# Patient Record
Sex: FEMALE | Race: WHITE | Employment: OTHER | URBAN - METROPOLITAN AREA
[De-identification: names, ages, dates, MRNs, and addresses within clinical notes are randomized per-mention and may not be internally consistent; named-entity substitution may affect disease eponyms.]

---

## 2017-01-04 ENCOUNTER — HOSPITAL ENCOUNTER (OUTPATIENT)
Dept: PHYSICAL THERAPY | Age: 82
Discharge: HOME | End: 2017-01-04
Payer: MEDICARE

## 2017-01-04 PROCEDURE — G8979 MOBILITY GOAL STATUS: HCPCS

## 2017-01-04 PROCEDURE — G8980 MOBILITY D/C STATUS: HCPCS

## 2017-01-04 PROCEDURE — 97116 GAIT TRAINING THERAPY: CPT

## 2017-01-04 NOTE — PROGRESS NOTES
PT DAILY TREATMENT NOTE - [facility]     Patient Name: Carli Child  Date:2017  : 10/17/1927  [x]  Patient  Verified  Payor: [employer] MEDICARE COMPLETE / Plan: [facility] MEDICARE COMPLETE / Product Type: Managed Care Medicare /    In time:1030  Out time:1048  Total Treatment Time (min): 18  Total Timed Codes (min): 18  1:1 Treatment Time ( [address] only): 18   Visit #: 5 of 8    Treatment Area: Muscle weakness (generalized) [M62.81]    SUBJECTIVE  Pain Level (0-10 scale): 0  Any medication changes, allergies to medications, adverse drug reactions, diagnosis change, or new procedure performed?: [x] No    [] Yes (see summary sheet for update)  Subjective functional status/changes:   [] No changes reported  \"No pain or dizziness. \"     OBJECTIVE    Modality rationale: patient declined   Min Type Additional Details    [] Estim:  []Unatt       []IFC  []Premod                        []Other:  []w/ice   []w/heat  Position:  Location:    [] Estim: []Att    []TENS instruct  []NMES                    []Other:  []w/US   []w/ice   []w/heat  Position:  Location:    []  Traction: [] Cervical       []Lumbar                       [] Prone          []Supine                       []Intermittent   []Continuous Lbs:  [] before manual  [] after manual    []  Ultrasound: []Continuous   [] Pulsed                           []1MHz   []3MHz W/cm2:  Location:    []  Iontophoresis with dexamethasone         Location: [] Take home patch   [] In clinic    []  Ice     []  heat  []  Ice massage  []  Laser   []  Anodyne Position:  Location:    []  Laser with stim  []  Other:  Position:  Location:    []  Vasopneumatic Device Pressure:       [] lo [] med [] hi   Temperature: [] lo [] med [] hi   [] Skin assessment post-treatment:  []intact []redness- no adverse reaction    []redness  adverse reaction:     10 min Therapeutic Activity:  [x]  See flow sheet : HEP review   Rationale: increase ROM and increase strength  to improve the patients ability to perform ADLs      8 min Gait Training:  _150__ feet with _SPC__ device on level surfaces with __SBA_ level of assist   Rationale: To improve fluidity with gait    With   [] TE   [x] TA   [x] neuro   [] other: Patient Education: [x] Review HEP    [] Progressed/Changed HEP based on:   [x] positioning   [x] body mechanics   [] transfers   [] heat/ice application    [] other:      Other Objective/Functional Measures:      Pain Level (0-10 scale) post treatment: 0    ASSESSMENT/Changes in Function: Ms. Abran Lutz has been a pleasure to treat and reports 100% improvement since beginning therapy. She reports no dizziness or falls recently. She wishes to discharge at this time to an updated HEP due to feeling and operating around her home much better. Patient will continue to benefit from skilled PT services to modify and progress therapeutic interventions, address functional mobility deficits, address ROM deficits, address strength deficits, analyze and address soft tissue restrictions, analyze and cue movement patterns, analyze and modify body mechanics/ergonomics, assess and modify postural abnormalities, address imbalance/dizziness and instruct in home and community integration to attain remaining goals. []  See Plan of Care  []  See progress note/recertification  [x]  See Discharge Summary         Progress towards goals / Updated goals:  Short Term Goals: To be accomplished in 1 weeks:  1. Establish HEP for ROM & Strengthening. Eval Status:issued   MET    Long Term Goals: To be accomplished in 4 weeks:  1. Patient will be independent with HEP for ROM & Strengthening. Eval Status:na   MET    2. Pt will report no falls over the next 4 weeks to improve fall risk & improve functional independence. Eval Status:2-3 falls over the past few weeks   MET; no falls to report  3. Pt will increase FOTO score by 10 points to demonstrate increased ease with ADLs.     Eval Status: FOTO: assess at 1st treatment session. Patient deferred    4. Pt will report decreased onset of vertigo symptoms by 50% to decrease fall risk.     Eval Status:reports vertigo whenever she lies down or turns   MET; reports 100% improvement    Functional Gains: balance, walking, dizziness, ADLs, no falls recently  Functional Deficits: none  % improvement: 100%  Pain   Average: 0/10       Best: 0/10     Worst: 0/10  Patient Goal: \"do the laundry again\"    PLAN  []  Upgrade activities as tolerated     []  Continue plan of care  []  Update interventions per flow sheet       [x]  Discharge due to: [address]  []  Other:_      Xavier King PTA, CSCS 1/4/2017  10:53 AM    Future Appointments  Date Time Provider Luís Abreu   1/4/2017 10:30 AM Xavier King PTA [facility]   1/6/2017 11:30 AM Xavier King PTA [facility]   4/5/2017 11:00 AM Bridget Wilkerson MD [facility]

## 2017-01-05 NOTE — PROGRESS NOTES
[facility] [address]  [address] [facility] [address], [address] [phone] fax    Discharge Summary  Patient name: Kishan Crump Start of Care: 12/15/2016   Referral source: [facility], * : 10/17/1927    Medical Diagnosis: Muscle weakness (generalized) [M62.81] Onset Date:7 weeks    Treatment Diagnosis: balance abnormality   Prior Hospitalization: see medical history Provider#: [id number]   Medications: Verified on Patient summary List   Comorbidities: osteoporosis, OA, HTN, hearing impaired  Prior Level of Function: retired. Lives with daughter in 1st floor of 2 story home          Visits from Start of Care: 5    Missed Visits: 0  Reporting Period : 12/15/16 to 17      Progress towards goals / Updated goals:  Short Term Goals: To be accomplished in 1 weeks:  1. Establish HEP for ROM & Strengthening. Eval Status:issued  MET    Long Term Goals: To be accomplished in 4 weeks:  1. Patient will be independent with HEP for ROM & Strengthening. Eval Status:na  MET    2. Pt will report no falls over the next 4 weeks to improve fall risk & improve functional independence. Eval Status:2-3 falls over the past few weeks  MET; no falls to report  3. Pt will increase FOTO score by 10 points to demonstrate increased ease with ADLs. Eval Status: FOTO: assess at 1st treatment session. Patient deferred    4. Pt will report decreased onset of vertigo symptoms by 50% to decrease fall risk. Eval Status:reports vertigo whenever she lies down or turns  MET; reports 100% improvement     Functional Gains: balance, walking, dizziness, ADLs, no falls recently  Functional Deficits: none  % improvement: 100%  Pain Average: 0/10  Best: 0/10  Worst: 0/10  Patient Goal: \"do the laundry again\"    Assessment/Summary of care: Ms. Neal Johnson has been a pleasure to treat and has seen excellent response to PT for improved balance, mobility & decrease in vertigo symptoms.  The patient reports much improvement with daily activities & no falls. She would like to discharge to [facility] at this time as she reports no remaining limitations. G-Codes (GP)  Mobility   [id number] Goal  CK= 40-59%  D/C  CK= 40-59%      The severity rating is based on clinical judgment and theFOTO score.   RECOMMENDATIONS:  [x]Discontinue therapy: [x]Patient has reached or is progressing toward set goals      []Patient is non-compliant or has abdicated      []Due to lack of appreciable progress towards set [address], PT 1/5/2017 10:45 AM

## 2017-01-06 ENCOUNTER — APPOINTMENT (OUTPATIENT)
Dept: PHYSICAL THERAPY | Age: 82
End: 2017-01-06
Payer: MEDICARE

## 2017-03-09 RX ORDER — SERTRALINE HYDROCHLORIDE 50 MG/1
TABLET, FILM COATED ORAL
Qty: 90 TAB | Refills: 0 | Status: SHIPPED | OUTPATIENT
Start: 2017-03-09 | End: 2017-06-08 | Stop reason: SDUPTHER

## 2017-04-05 ENCOUNTER — OFFICE VISIT (OUTPATIENT)
Dept: INTERNAL MEDICINE CLINIC | Age: 82
End: 2017-04-05

## 2017-04-05 VITALS
RESPIRATION RATE: 14 BRPM | DIASTOLIC BLOOD PRESSURE: 72 MMHG | BODY MASS INDEX: 23.48 KG/M2 | WEIGHT: 127.6 LBS | SYSTOLIC BLOOD PRESSURE: 142 MMHG | OXYGEN SATURATION: 96 % | HEIGHT: 62 IN | HEART RATE: 101 BPM | TEMPERATURE: 97.4 F

## 2017-04-05 DIAGNOSIS — I10 ESSENTIAL HYPERTENSION WITH GOAL BLOOD PRESSURE LESS THAN 140/90: Primary | ICD-10-CM

## 2017-04-05 DIAGNOSIS — M15.9 GENERALIZED OSTEOARTHROSIS, INVOLVING MULTIPLE SITES: ICD-10-CM

## 2017-04-05 DIAGNOSIS — K21.9 GASTROESOPHAGEAL REFLUX DISEASE WITHOUT ESOPHAGITIS: ICD-10-CM

## 2017-04-05 NOTE — PROGRESS NOTES
Facundo Blake 10/17/1927, is a 80 y.o. female, who is seen today for reevaluation of hypertension anxiety and depression GERD and history of multiple falls. After she had had several falls and fractured her wrist last fall and center to physical therapy and her daughter indicates that great with physical therapy, a lot better with her balance and strength and still uses her walker when she is first walking now because that is when her legs feel weak in her knees hurt but most of the time gets around without her walker and without further falls. She also notes that sometimes she gets a headache from the frontal to the occipital area and by taking 2 Advil and laying down for well that goes away. She is having no reflux symptoms as she continues omeprazole. Past Medical History:   Diagnosis Date    Anxiety     Arthritis     Depression     GERD (gastroesophageal reflux disease)     HTN (hypertension)     LBBB (left bundle branch block)     Osteoporosis     completed 8 years Fosamax    Sciatica     Urinary incontinence      Current Outpatient Prescriptions   Medication Sig Dispense Refill    sertraline (ZOLOFT) 50 mg tablet TAKE 1 TABLET BY MOUTH DAILY 90 Tab 0    loratadine (CLARITIN) 10 mg tablet Take 10 mg by mouth.  omeprazole (PRILOSEC) 40 mg capsule TAKE ONE CAPSULE BY MOUTH DAILY 90 Cap 1    amLODIPine (NORVASC) 5 mg tablet Take 1 Tab by mouth daily. 90 Tab 1    CALCIUM CARBONATE/VITAMIN D3 (CALCIUM 600 + D,3, PO) Take  by mouth.  MULTI-VITAMIN PO Take  by mouth. Visit Vitals    /72    Pulse (!) 101    Temp 97.4 °F (36.3 °C) (Oral)    Resp 14    Ht 5' 2\" (1.575 m)    Wt 127 lb 9.6 oz (57.9 kg)    SpO2 96%    BMI 23.34 kg/m2     Carotids are 2+ without bruits. Lungs are clear to percussion. Good breath sounds with no wheezing or crackles. Heart reveals regular rhythm with normal S1 and S2 no murmur gallop click or rub. Apical impulse is not palpable.   Abdomen is soft and nontender with no hepatosplenomegaly or masses and no bruits. Extremities reveal no clubbing cyanosis or edema. Pulses are 2+. She has good range of motion of both knees but there is some crepitation in the left knee. Good stability of both knees. Assessment: #1. Hypertension adequately controlled. She will continue amlodipine 5 mg daily. #2.  History of anxiety and depression doing quite well. She will continue sertraline 50 mg daily. #3.  GERD asymptomatic. She will continue omeprazole 40 mg daily. #4.  Multiple falls doing much better after physical therapy training. She will continue to use her walker as needed and I am pleased that she has gained 8 pounds over the last 4 or 5 months. Follow-up in 6 months and we will plan to do a complete evaluation and laboratory in a regular slot    Charan Spencer MD FACP    Please note: This document has been produced using voice recognition software. Unrecognized errors in transcription may be present.

## 2017-04-05 NOTE — MR AVS SNAPSHOT
Visit Information Date & Time Provider Department Dept. Phone Encounter #  
 4/5/2017 11:00 AM Amanuel Newberry MD [facility] [address] [id number] Follow-up Instructions Return in about 6 months (around 10/5/2017). Follow-up and Disposition History Upcoming Health Maintenance Date Due  
 GLAUCOMA SCREENING Q2Y 6/1/2017* Pneumococcal 65+ Low/Medium Risk (2 of 2 - PPSV23) 10/21/2017 MEDICARE YEARLY EXAM 10/22/2017 DTaP/Tdap/Td series (2 - Td) 10/21/2026 *Topic was postponed. The date shown is not the original due date. Allergies as of 4/5/2017  Review Complete On: 4/5/2017 By: Amanuel Newberry MD  
 No Known Allergies Current Immunizations  Reviewed on 10/21/2016 Name Date Influenza High Dose Vaccine PF 10/21/2016  5:11 PM, 10/10/2014 Influenza Vaccine Split 9/15/2011 Pneumococcal Conjugate (PCV-13) 10/21/2016  5:10 PM  
  
 Not reviewed this visit You Were Diagnosed With   
  
 Codes Comments Essential hypertension with goal blood pressure less than 140/90    -  Primary ICD-10-CM: I10 
ICD-9-CM: 401.9 Gastroesophageal reflux disease without esophagitis     ICD-10-CM: K21.9 ICD-9-CM: 530.81 Generalized osteoarthrosis, involving multiple sites     ICD-10-CM: M15.9 ICD-9-CM: 715.09 Vitals BP Pulse Temp Resp Height(growth percentile) Weight(growth percentile) 142/72 (!) 101 97.4 °F (36.3 °C) (Oral) 14 5' 2\" (1.575 m) 127 lb 9.6 oz (57.9 kg) SpO2 BMI OB Status Smoking Status 96% 23.34 kg/m2 Hysterectomy Never Smoker Vitals History BMI and BSA Data Body Mass Index Body Surface Area  
 23.34 kg/m 2 1.59 m 2 Preferred Pharmacy Pharmacy Name Phone [facility] [address] [place] [address] Roberto Ponce [address] [place] [address] [phone] Your Updated Medication List  
  
   
 This list is accurate as of: 4/5/17 11:57 AM.  Always use your most recent med list. amLODIPine 5 mg tablet Commonly known as:  Leana Medici Take 1 Tab by mouth daily. CALCIUM 600 + D(3) PO Take  by mouth. CLARITIN 10 mg tablet Generic drug:  loratadine Take 10 mg by mouth. MULTI-VITAMIN PO Take  by mouth. omeprazole 40 mg capsule Commonly known as:  PRILOSEC  
TAKE ONE CAPSULE BY MOUTH DAILY  
  
 sertraline 50 mg tablet Commonly known as:  ZOLOFT  
TAKE 1 TABLET BY MOUTH DAILY Follow-up Instructions Return in about 6 months (around 10/5/2017). Introducing [facility]! Marlen Morales introduces [employer] patient portal. Now you can access parts of your medical record, email your doctor's office, and request medication refills online. 1. In your internet browser, go to [email or web address] 2. Click on the First Time User? Click Here link in the Sign In box. You will see the New Member Sign Up page. 3. Enter your [employer] Access Code exactly as it appears below. You will not need to use this code after youve completed the sign-up process. If you do not sign up before the expiration date, you must request a new code. · [employer] Access Code: [email or web address] Expires: 6/4/2017  3:57 PM 
 
4. Enter the last four digits of your Social Security Number (xxxx) and Date of Birth (mm/dd/yyyy) as indicated and click Submit. You will be taken to the next sign-up page. 5. Create a [employer] ID. This will be your [employer] login ID and cannot be changed, so think of one that is secure and easy to remember. 6. Create a [employer] password. You can change your password at any time. 7. Enter your Password Reset Question and Answer. This can be used at a later time if you forget your password. 8. Enter your e-mail address. You will receive e-mail notification when new information is available in [address] 9. Click Sign Up. You can now view and download portions of your medical record. 10. Click the Download Summary menu link to download a portable copy of your medical information. If you have questions, please visit the Frequently Asked Questions section of the [employer] website. Remember, [employer] is NOT to be used for urgent needs. For medical emergencies, dial 911. Now available from your iPhone and Android! Please provide this summary of care documentation to your next provider. Your primary care clinician is listed as Madhavi Yip. Kandace Fernandez. If you have any questions after today's visit, please call [phone].

## 2017-05-15 RX ORDER — AMLODIPINE BESYLATE 5 MG/1
TABLET ORAL
Qty: 90 TAB | Refills: 0 | Status: SHIPPED | OUTPATIENT
Start: 2017-05-15 | End: 2017-08-14 | Stop reason: SDUPTHER

## 2017-05-15 RX ORDER — OMEPRAZOLE 40 MG/1
CAPSULE, DELAYED RELEASE ORAL
Qty: 90 CAP | Refills: 0 | Status: SHIPPED | OUTPATIENT
Start: 2017-05-15 | End: 2017-08-14 | Stop reason: SDUPTHER

## 2017-06-08 RX ORDER — SERTRALINE HYDROCHLORIDE 50 MG/1
TABLET, FILM COATED ORAL
Qty: 90 TAB | Refills: 0 | Status: SHIPPED | OUTPATIENT
Start: 2017-06-08 | End: 2017-09-04 | Stop reason: SDUPTHER

## 2017-08-14 RX ORDER — OMEPRAZOLE 40 MG/1
CAPSULE, DELAYED RELEASE ORAL
Qty: 90 CAP | Refills: 0 | Status: SHIPPED | OUTPATIENT
Start: 2017-08-14 | End: 2017-11-10 | Stop reason: SDUPTHER

## 2017-08-14 RX ORDER — AMLODIPINE BESYLATE 5 MG/1
TABLET ORAL
Qty: 90 TAB | Refills: 0 | Status: SHIPPED | OUTPATIENT
Start: 2017-08-14 | End: 2017-11-10 | Stop reason: SDUPTHER

## 2017-09-05 RX ORDER — SERTRALINE HYDROCHLORIDE 50 MG/1
TABLET, FILM COATED ORAL
Qty: 90 TAB | Refills: 0 | Status: SHIPPED | OUTPATIENT
Start: 2017-09-05 | End: 2017-12-04 | Stop reason: SDUPTHER

## 2017-10-11 ENCOUNTER — HOSPITAL ENCOUNTER (OUTPATIENT)
Dept: LAB | Age: 82
Discharge: HOME | End: 2017-10-11
Payer: MEDICARE

## 2017-10-11 ENCOUNTER — OFFICE VISIT (OUTPATIENT)
Dept: INTERNAL MEDICINE CLINIC | Age: 82
End: 2017-10-11

## 2017-10-11 VITALS
DIASTOLIC BLOOD PRESSURE: 78 MMHG | OXYGEN SATURATION: 96 % | BODY MASS INDEX: 23.3 KG/M2 | SYSTOLIC BLOOD PRESSURE: 138 MMHG | RESPIRATION RATE: 12 BRPM | HEIGHT: 62 IN | TEMPERATURE: 97.6 F | HEART RATE: 102 BPM | WEIGHT: 126.6 LBS

## 2017-10-11 DIAGNOSIS — E55.9 HYPOVITAMINOSIS D: ICD-10-CM

## 2017-10-11 DIAGNOSIS — Z00.00 MEDICARE ANNUAL WELLNESS VISIT, SUBSEQUENT: ICD-10-CM

## 2017-10-11 DIAGNOSIS — Z13.39 SCREENING FOR ALCOHOLISM: ICD-10-CM

## 2017-10-11 DIAGNOSIS — I10 ESSENTIAL HYPERTENSION WITH GOAL BLOOD PRESSURE LESS THAN 140/90: ICD-10-CM

## 2017-10-11 DIAGNOSIS — Z23 ENCOUNTER FOR IMMUNIZATION: Primary | ICD-10-CM

## 2017-10-11 DIAGNOSIS — Z13.31 SCREENING FOR DEPRESSION: ICD-10-CM

## 2017-10-11 DIAGNOSIS — R63.4 WEIGHT LOSS: ICD-10-CM

## 2017-10-11 LAB
ALBUMIN SERPL-MCNC: 3.9 G/DL (ref 3.4–5)
ALBUMIN/GLOB SERPL: 1 {RATIO} (ref 0.8–1.7)
ALP SERPL-CCNC: 124 U/L (ref 45–117)
ALT SERPL-CCNC: 41 U/L (ref 13–56)
ANION GAP SERPL CALC-SCNC: 10 MMOL/L (ref 3–18)
AST SERPL-CCNC: 23 U/L (ref 15–37)
BASOPHILS # BLD: 0 K/UL (ref 0–0.06)
BASOPHILS NFR BLD: 0 % (ref 0–2)
BILIRUB SERPL-MCNC: 0.3 MG/DL (ref 0.2–1)
BUN SERPL-MCNC: 19 MG/DL (ref 7–18)
BUN/CREAT SERPL: 19 (ref 12–20)
CALCIUM SERPL-MCNC: 9.4 MG/DL (ref 8.5–10.1)
CHLORIDE SERPL-SCNC: 103 MMOL/L (ref 100–108)
CHOLEST SERPL-MCNC: 247 MG/DL
CO2 SERPL-SCNC: 26 MMOL/L (ref 21–32)
CREAT SERPL-MCNC: 1.02 MG/DL (ref 0.6–1.3)
DIFFERENTIAL METHOD BLD: ABNORMAL
EOSINOPHIL # BLD: 0.1 K/UL (ref 0–0.4)
EOSINOPHIL NFR BLD: 1 % (ref 0–5)
ERYTHROCYTE [DISTWIDTH] IN BLOOD BY AUTOMATED COUNT: 15.2 % (ref 11.6–14.5)
GLOBULIN SER CALC-MCNC: 3.8 G/DL (ref 2–4)
GLUCOSE SERPL-MCNC: 90 MG/DL (ref 74–99)
HCT VFR BLD AUTO: 44.9 % (ref 35–45)
HDLC SERPL-MCNC: 63 MG/DL (ref 40–60)
HDLC SERPL: 3.9 {RATIO} (ref 0–5)
HGB BLD-MCNC: 14.4 G/DL (ref 12–16)
LDLC SERPL CALC-MCNC: 157.8 MG/DL (ref 0–100)
LIPID PROFILE,FLP: ABNORMAL
LYMPHOCYTES # BLD: 1.7 K/UL (ref 0.9–3.6)
LYMPHOCYTES NFR BLD: 21 % (ref 21–52)
MCH RBC QN AUTO: 27.3 PG (ref 24–34)
MCHC RBC AUTO-ENTMCNC: 32.1 G/DL (ref 31–37)
MCV RBC AUTO: 85 FL (ref 74–97)
MONOCYTES # BLD: 0.7 K/UL (ref 0.05–1.2)
MONOCYTES NFR BLD: 9 % (ref 3–10)
NEUTS SEG # BLD: 5.4 K/UL (ref 1.8–8)
NEUTS SEG NFR BLD: 69 % (ref 40–73)
PLATELET # BLD AUTO: 278 K/UL (ref 135–420)
PMV BLD AUTO: 9.7 FL (ref 9.2–11.8)
POTASSIUM SERPL-SCNC: 4.8 MMOL/L (ref 3.5–5.5)
PROT SERPL-MCNC: 7.7 G/DL (ref 6.4–8.2)
RBC # BLD AUTO: 5.28 M/UL (ref 4.2–5.3)
SODIUM SERPL-SCNC: 139 MMOL/L (ref 136–145)
T4 FREE SERPL-MCNC: 1.2 NG/DL (ref 0.7–1.5)
TRIGL SERPL-MCNC: 131 MG/DL (ref ?–150)
TSH SERPL DL<=0.05 MIU/L-ACNC: 2.52 UIU/ML (ref 0.36–3.74)
VLDLC SERPL CALC-MCNC: 26.2 MG/DL
WBC # BLD AUTO: 7.9 K/UL (ref 4.6–13.2)

## 2017-10-11 PROCEDURE — 82306 VITAMIN D 25 HYDROXY: CPT | Performed by: INTERNAL MEDICINE

## 2017-10-11 PROCEDURE — 84439 ASSAY OF FREE THYROXINE: CPT | Performed by: INTERNAL MEDICINE

## 2017-10-11 PROCEDURE — 80053 COMPREHEN METABOLIC PANEL: CPT | Performed by: INTERNAL MEDICINE

## 2017-10-11 PROCEDURE — 85025 COMPLETE CBC W/AUTO DIFF WBC: CPT | Performed by: INTERNAL MEDICINE

## 2017-10-11 PROCEDURE — 84443 ASSAY THYROID STIM HORMONE: CPT | Performed by: INTERNAL MEDICINE

## 2017-10-11 PROCEDURE — 80061 LIPID PANEL: CPT | Performed by: INTERNAL MEDICINE

## 2017-10-11 RX ORDER — OMEPRAZOLE 40 MG/1
40 CAPSULE, DELAYED RELEASE ORAL DAILY
Qty: 90 CAP | Refills: 3 | Status: SHIPPED | OUTPATIENT
Start: 2017-10-11 | End: 2018-10-30 | Stop reason: SDUPTHER

## 2017-10-11 RX ORDER — AMLODIPINE BESYLATE 5 MG/1
5 TABLET ORAL DAILY
Qty: 90 TAB | Refills: 3 | Status: SHIPPED | OUTPATIENT
Start: 2017-10-11 | End: 2018-10-30 | Stop reason: SDUPTHER

## 2017-10-11 RX ORDER — SERTRALINE HYDROCHLORIDE 50 MG/1
50 TABLET, FILM COATED ORAL DAILY
Qty: 90 TAB | Refills: 3 | Status: SHIPPED | OUTPATIENT
Start: 2017-10-11 | End: 2017-11-17 | Stop reason: ALTCHOICE

## 2017-10-11 NOTE — PATIENT INSTRUCTIONS
Medicare Part B Preventive Services Limitations Recommendation Follow-up Action Needed    Bone Mass Measurement  (age 72 & older, biennial) Requires diagnosis related to osteoporosis or estrogen deficiency. Biennial benefit unless patient has history of long-term glucocorticoid tx or baseline is needed because initial test was by other method Last: Done per patient report    A DEXA scan is recommended after you turn 72years of age to determine your risk for osteoporosis Next: Follow up as recommended by primary care provider and/or specialist     Colorectal Cancer Screening  -Fecal occult blood test (annual)  -Flexible sigmoidoscopy (5y)  -Screening colonoscopy (10y)  -Barium Enema Normally recommended for patients age 48 - 78  Last: N/A    Every 5-10 years depending on your colonoscopy result starting at age 48 years Next: No longer needed based on age, follow up as recommended by primary care provider and/or specialist      Glaucoma Screening (no USPSTF recommendation) Diabetes mellitus, family history, , age 48 or over,  American, age 72 or over Last: Several years ago    Every year, or as directed by provider Next: Please consider going to eye doctor   Screening Mammography (biennial age 54-69) Annually (age 36 or over) Last: N/A Next: No longer needed based on age, follow up as recommended by primary care provider and/or specialist      Screening Pap Tests and Pelvic Examination (up to age 72 and after 72 if unknown history or abnormal study last 10 years) Every 24 months except high risk Last: N/A Next: Discuss with your doctor if this screening is appropriate for you.    Cardiovascular Screening Blood Tests (every 5 years)  Total cholesterol, HDL, Triglycerides Order as a panel if possible Last: 10/2016    Every Year Next: Follow up as recommended by primary care provider and/or specialist      Diabetes Screening Tests (at least every 3 years, Medicare covers annually or at 6-month intervals for prediabetic patients)    Fasting blood sugar (FBS) or glucose tolerance test (GTT) Patient must be diagnosed with one of the following:  -Hypertension, Dyslipidemia, obesity, previous impaired FBS or GTT  Or any two of the following: overweight, FH of diabetes, age ? 72, history of gestational diabetes, birth of baby weighing more than 9 pounds Last: 10/2016    Every 3-6 months depending on your result    Every 3 years Next: Follow up as recommended by primary care provider and/or specialist        Diabetes Self-Management Training (DSMT) (no USPSTF recommendation) Requires referral by treating physician for patient with diabetes or renal disease. 10 hours of initial DSMT session of no less than 30 minutes each in a continuous 12-month period. 2 hours of follow-up DSMT in subsequent years. Last: N/A Talk to your doctor if you are interested in a refresher course. Medical Nutrition Therapy (MNT) (for diabetes or renal disease not recommended schedule) Requires referral by treating physician for patient with diabetes or renal disease. Can be provided in same year as diabetes self-management training (DSMT), and CMS recommends medical nutrition therapy take place after DSMT. Up to 3 hours for initial year and 2 hours in subsequent years. Last: N/A Talk to your doctor if you are interested in a refresher course.    Seasonal Influenza Vaccination (annually)  Last:     Every Fall Please consider getting one every fall    Pneumococcal Vaccination (once after 72)  Last:  Pneumovax: Given today    Prevnar-13: 10/2016   Next:Vaccination series complete      Shingles Vaccination  Last: N/A    A shingles vaccine is recommended once in a lifetime after age 61 N/A   Tetanus, Diphtheria and Pertussis (Tdap) Vaccination Booster One Booster as an adult and then tetanus every 10 years or as indicated Last: N/A Please consider, especially if you will have frequent contact with young children who have not completed their vaccine series. Can get at your pharmacy. Hepatitis B Vaccinations (if medium/high risk) Medium/high risk factors:  End-stage renal disease,  Hemophiliacs who received Factor VIII or IX concentrates, Clients of institutions for the mentally retarded, Persons who live in the same house as a HepB virus carrier, Homosexual men, Illicit injectable drug abusers. Last: N/A Next: N/A   Counseling to Prevent Tobacco Use (up to 8 sessions per year)  - Counseling greater than 3 and up to 10 minutes  - Counseling greater than 10 minutes Patients must be asymptomatic of tobacco-related conditions to receive as preventive service  As recommended by your PCP or specialist    Human Immunodeficiency Virus (HIV) Screening (annually for increased risk patients)  HIV-1 and HIV-2 by EIA, EMRE, rapid antibody test, or oral mucosa transudate Patient must be at increased risk for HIV infection per USPSTF guidelines or pregnant. Tests covered annually for patients at increased risk. Pregnant patients may receive up to 3 test during pregnancy. As recommended by your PCP or Specialist   Ultrasound Screening for Abdominal Aortic Aneurysm (AAA) once Patient must be referred through IPPE and not have had a screening for abdominal aortic aneurysm before under medicare. Limited to patients who meet onf of the following criteria:  -Men who are 73-68 years old and have smoked more than 100 cigarettes in their lifetime  -Anyone with a FH of AAA  -Anyone recommended for screening by the USPSFTF    As recommended by your PCP or Specialist     NA = Not Applicable; NI= Not Indicated     Advanced care planning: Please bring in a copy of your advanced directives at your convenience to our office so we may scan a copy for our records.

## 2017-10-11 NOTE — MR AVS SNAPSHOT
Visit Information Date & Time Provider Department Dept. Phone Encounter #  
 10/11/2017 11:00 AM Sterling Fox MD [facility] [phone] Upcoming Health Maintenance Date Due  
 GLAUCOMA SCREENING Q2Y 10/17/1992 INFLUENZA AGE 9 TO ADULT 8/1/2017 Pneumococcal 65+ Low/Medium Risk (2 of 2 - PPSV23) 10/21/2017 MEDICARE YEARLY EXAM 10/22/2017 DTaP/Tdap/Td series (2 - Td) 10/21/2026 Allergies as of 10/11/2017  Review Complete On: 10/11/2017 By: Sterling Fox MD  
 No Known Allergies Current Immunizations  Reviewed on 10/11/2017 Name Date Influenza High Dose Vaccine PF 10/11/2017 11:35 AM, 10/21/2016  5:11 PM, 10/10/2014 Influenza Vaccine Split 9/15/2011 Pneumococcal Conjugate (PCV-13) 10/21/2016  5:10 PM  
 Pneumococcal Polysaccharide (PPSV-23) 10/11/2017 11:36 AM  
  
 Reviewed by Sterling Fox MD on 10/11/2017 at 11:30 AM  
 Reviewed by Sterling Fox MD on 10/11/2017 at 11:30 AM  
You Were Diagnosed With   
  
 Codes Comments Encounter for immunization    -  Primary ICD-10-CM: Z07 ICD-9-CM: V03.89 Essential hypertension with goal blood pressure less than 140/90     ICD-10-CM: I10 
ICD-9-CM: 401.9 Medicare annual wellness visit, subsequent     ICD-10-CM: Z00.00 ICD-9-CM: V70.0 Screening for alcoholism     ICD-10-CM: Z13.89 ICD-9-CM: V79.1 Screening for depression     ICD-10-CM: Z13.89 ICD-9-CM: V79.0 Vitals BP Pulse Temp Resp Height(growth percentile) Weight(growth percentile) 150/74 (!) 102 97.6 °F (36.4 °C) (Oral) 12 5' 2\" (1.575 m) 126 lb 9.6 oz (57.4 kg) SpO2 BMI OB Status Smoking Status 96% 23.16 kg/m2 Hysterectomy Never Smoker Vitals History BMI and BSA Data Body Mass Index Body Surface Area  
 23.16 kg/m 2 1.58 m 2 Preferred Pharmacy Pharmacy Name Phone  [address], [address] [facility],[address] Your Updated Medication List  
  
   
This list is accurate as of: 10/11/17 12:10 PM.  Always use your most recent med list. amLODIPine 5 mg tablet Commonly known as:  Kaci Blade Take 1 Tab by mouth daily. CALCIUM 600 + D(3) PO Take  by mouth. CLARITIN 10 mg tablet Generic drug:  loratadine Take 10 mg by mouth. MULTI-VITAMIN PO Take  by mouth. omeprazole 40 mg capsule Commonly known as:  PRILOSEC Take 1 Cap by mouth daily. sertraline 50 mg tablet Commonly known as:  ZOLOFT Take 1 Tab by mouth daily. Prescriptions Sent to Pharmacy Refills  
 omeprazole (PRILOSEC) 40 mg capsule 3 Sig: Take 1 Cap by mouth daily. Class: Normal  
 Pharmacy: [address] Ph #: [phone] Route: Oral  
 sertraline (ZOLOFT) 50 mg tablet 3 Sig: Take 1 Tab by mouth daily. Class: Normal  
 Pharmacy: [address] Ph #: [phone] Route: Oral  
 amLODIPine (NORVASC) 5 mg tablet 3 Sig: Take 1 Tab by mouth daily. Class: Normal  
 Pharmacy: [address] Ph #: [phone] Route: Oral  
  
We Performed the Following [place] [id number] [[id number] [facility]] INFLUENZA VIRUS VACCINE, HIGH DOSE SEASONAL, PRESERVATIVE FREE [63446 CPT(R)] PNEUMOCOCCAL POLYSACCHARIDE VACCINE, 23-VALENT, ADULT OR IMMUNOSUPPRESSED PT DOSE, [02121 CPT(R)] Patient Instructions Medicare Part B Preventive Services Limitations Recommendation Follow-up Action Needed Bone Mass Measurement 
(age 72 & older, biennial) Requires diagnosis related to osteoporosis or estrogen deficiency.  Biennial benefit unless patient has history of long-term glucocorticoid tx or baseline is needed because initial test was by other method Last: Done per patient report A DEXA scan is recommended after you turn 72years of age to determine your risk for osteoporosis Next: Follow up as recommended by primary care provider and/or specialist    
Colorectal Cancer Screening 
-Fecal occult blood test (annual) -Flexible sigmoidoscopy (5y) 
-Screening colonoscopy (10y) -Barium Enema Normally recommended for patients age 48 - 78  Last: N/A Every 5-10 years depending on your colonoscopy result starting at age 48 years Next: No longer needed based on age, follow up as recommended by primary care provider and/or specialist  
  
Glaucoma Screening (no USPSTF recommendation) Diabetes mellitus, family history, , age 48 or over,  American, age 72 or over Last: Several years ago Every year, or as directed by provider Next: Please consider going to eye doctor Screening Mammography (biennial age 54-69) Annually (age 36 or over) Last: N/A Next: No longer needed based on age, follow up as recommended by primary care provider and/or specialist  
  
Screening Pap Tests and Pelvic Examination (up to age 72 and after 72 if unknown history or abnormal study last 10 years) Every 24 months except high risk Last: N/A Next: Discuss with your doctor if this screening is appropriate for you. Cardiovascular Screening Blood Tests (every 5 years) Total cholesterol, HDL, Triglycerides Order as a panel if possible Last: 10/2016 Every Year Next: Follow up as recommended by primary care provider and/or specialist  
  
Diabetes Screening Tests (at least every 3 years, Medicare covers annually or at 6-month intervals for prediabetic patients) Fasting blood sugar (FBS) or glucose tolerance test (GTT) Patient must be diagnosed with one of the following: 
-Hypertension, Dyslipidemia, obesity, previous impaired FBS or GTT Or any two of the following: overweight, FH of diabetes, age ? 72, history of gestational diabetes, birth of baby weighing more than 9 pounds Last: 10/2016 Every 3-6 months depending on your result Every 3 years Next: Follow up as recommended by primary care provider and/or specialist  
 
  
Diabetes Self-Management Training (DSMT) (no USPSTF recommendation) Requires referral by treating physician for patient with diabetes or renal disease. 10 hours of initial DSMT session of no less than 30 minutes each in a continuous 12-month period. 2 hours of follow-up DSMT in subsequent years. Last: N/A Talk to your doctor if you are interested in a refresher course. Medical Nutrition Therapy (MNT) (for diabetes or renal disease not recommended schedule) Requires referral by treating physician for patient with diabetes or renal disease. Can be provided in same year as diabetes self-management training (DSMT), and CMS recommends medical nutrition therapy take place after DSMT. Up to 3 hours for initial year and 2 hours in subsequent years. Last: N/A Talk to your doctor if you are interested in a refresher course. Seasonal Influenza Vaccination (annually)  Last:  
 
Every Fall Please consider getting one every fall Pneumococcal Vaccination (once after 65)  Last: 
Pneumovax: Given today Prevnar-13: 10/2016 Next:Vaccination series complete Shingles Vaccination  Last: N/A A shingles vaccine is recommended once in a lifetime after age 61 N/A Tetanus, Diphtheria and Pertussis (Tdap) Vaccination Booster One Booster as an adult and then tetanus every 10 years or as indicated Last: N/A Please consider, especially if you will have frequent contact with young children who have not completed their vaccine series. Can get at your pharmacy. Hepatitis B Vaccinations (if medium/high risk) Medium/high risk factors:  End-stage renal disease, Hemophiliacs who received Factor VIII or IX concentrates, Clients of institutions for the mentally retarded, Persons who live in the same house as a HepB virus carrier, Homosexual men, Illicit injectable drug abusers. Last: N/A Next: N/A Counseling to Prevent Tobacco Use (up to 8 sessions per year) - Counseling greater than 3 and up to 10 minutes - Counseling greater than 10 minutes Patients must be asymptomatic of tobacco-related conditions to receive as preventive service  As recommended by your PCP or specialist   
Human Immunodeficiency Virus (HIV) Screening (annually for increased risk patients) HIV-1 and HIV-2 by EIA, EMRE, rapid antibody test, or oral mucosa transudate Patient must be at increased risk for HIV infection per USPSTF guidelines or pregnant. Tests covered annually for patients at increased risk. Pregnant patients may receive up to 3 test during pregnancy. As recommended by your PCP or Specialist  
Ultrasound Screening for Abdominal Aortic Aneurysm (AAA) once Patient must be referred through IPPE and not have had a screening for abdominal aortic aneurysm before under medicare. Limited to patients who meet onf of the following criteria: 
-Men who are 73-68 years old and have smoked more than 100 cigarettes in their lifetime 
-Anyone with a FH of AAA 
-Anyone recommended for screening by the USPSFTF As recommended by your PCP or Specialist 
  
NA = Not Applicable; NI= Not Indicated Advanced care planning: Please bring in a copy of your advanced directives at your convenience to our office so we may scan a copy for our records. Introducing [facility] & HEALTH SERVICES! [facility] introduces [employer] patient portal. Now you can access parts of your medical record, email your doctor's office, and request medication refills online. 1. In your internet browser, go to [email or web address] 2. Click on the First Time User? Click Here link in the Sign In box. You will see the New Member Sign Up page. 3. Enter your [employer] Access Code exactly as it appears below. You will not need to use this code after youve completed the sign-up process. If you do not sign up before the expiration date, you must request a new code. · [employer] Access Code: [email or web address] Expires: 11/25/2017  1:53 PM 
 
4. Enter the last four digits of your Social Security Number (xxxx) and Date of Birth (mm/dd/yyyy) as indicated and click Submit. You will be taken to the next sign-up page. 5. Create a [employer] ID. This will be your [employer] login ID and cannot be changed, so think of one that is secure and easy to remember. 6. Create a [employer] password. You can change your password at any time. 7. Enter your Password Reset Question and Answer. This can be used at a later time if you forget your password. 8. Enter your e-mail address. You will receive e-mail notification when new information is available in [address] 9. Click Sign Up. You can now view and download portions of your medical record. 10. Click the Download Summary menu link to download a portable copy of your medical information. If you have questions, please visit the Frequently Asked Questions section of the [employer] website. Remember, [employer] is NOT to be used for urgent needs. For medical emergencies, dial 911. Now available from your iPhone and Android! Please provide this summary of care documentation to your next provider. Your primary care clinician is listed as Humaira Joseph. Luan Whitlock. If you have any questions after today's visit, please call [phone].

## 2017-10-12 LAB — 25(OH)D3 SERPL-MCNC: 38.8 NG/ML (ref 30–100)

## 2017-10-12 NOTE — PROGRESS NOTES
Heike Mac 10/17/1927, is a 80 y.o. female, who is seen today for reevaluation of hypertension, depression, anxiety, GERD, hyperlipidemia. Her daughter notes that she is sleeping more than she ever did in the past, 2 fairly long naps in the daytime but sleeps well at night as well. She had been losing weight and now is drinking a can of Ensure each day. She does not eat a lot during the day. She does not seem particularly depressed or anxious and she continues to use sertraline. She takes her blood pressure medicine regularly. She is having no reflux as she continues omeprazole. She does have arthritis in her right knee which is gradually getting worse and she occasionally uses Advil or Aspercreme, each of these seem to help somewhat. Past Medical History:   Diagnosis Date    Anxiety     Arthritis     Depression     GERD (gastroesophageal reflux disease)     HTN (hypertension)     LBBB (left bundle branch block)     Osteoporosis     completed 8 years Fosamax    Sciatica     Urinary incontinence      Current Outpatient Prescriptions   Medication Sig Dispense Refill    omeprazole (PRILOSEC) 40 mg capsule Take 1 Cap by mouth daily. 90 Cap 3    sertraline (ZOLOFT) 50 mg tablet Take 1 Tab by mouth daily. 90 Tab 3    amLODIPine (NORVASC) 5 mg tablet Take 1 Tab by mouth daily. 90 Tab 3    sertraline (ZOLOFT) 50 mg tablet TAKE 1 TABLET BY MOUTH DAILY 90 Tab 0    omeprazole (PRILOSEC) 40 mg capsule TAKE ONE CAPSULE BY MOUTH DAILY 90 Cap 0    amLODIPine (NORVASC) 5 mg tablet TAKE 1 TABLET BY MOUTH DAILY 90 Tab 0    loratadine (CLARITIN) 10 mg tablet Take 10 mg by mouth.  CALCIUM CARBONATE/VITAMIN D3 (CALCIUM 600 + D,3, PO) Take  by mouth.  MULTI-VITAMIN PO Take  by mouth.        No Known Allergies  Past Surgical History:   Procedure Laterality Date    HX CHOLECYSTECTOMY      HX HYSTERECTOMY      HX ORTHOPAEDIC      fractured right patella surgery    [facility] ANESTH,SURGERY OF SHOULDER Social History     Social History    Marital status:      Spouse name: N/A    Number of children: N/A    Years of education: N/A     Social History Main Topics    Smoking status: Never Smoker    Smokeless tobacco: Never Used    Alcohol use No    Drug use: No    Sexual activity: Not Asked     Other Topics Concern    None     Social History Narrative     Visit Vitals    /78    Pulse (!) 102    Temp 97.6 °F (36.4 °C) (Oral)    Resp 12    Ht 5' 2\" (1.575 m)    Wt 126 lb 9.6 oz (57.4 kg)    SpO2 96%    BMI 23.16 kg/m2     Ear canals and tympanic membranes appear normal.  Oral cavity reveals no lesions. Neck reveals no adenopathy or thyromegaly. Carotids are 2+ without bruits. Lungs are clear to percussion. Good breath sounds with no wheezing or crackles. Heart reveals a regular rhythm with normal S1 and S2 no murmur gallop click or rub. Apical impulse is not palpable. Abdomen is soft and nontender with no hepatosplenomegaly or masses and no bruits. Extremities reveal no clubbing cyanosis or edema. Pulses are 2+. She does have some slight crepitation with range of motion of the right knee but no instability of the cruciate or collateral ligaments can be demonstrated. Results for orders placed or performed during the hospital encounter of 10/14/16   CBC WITH AUTOMATED DIFF   Result Value Ref Range    WBC 7.3 4.6 - 13.2 K/uL    RBC 5.05 4.20 - 5.30 M/uL    HGB 13.7 12.0 - 16.0 g/dL    HCT 43.5 35.0 - 45.0 %    MCV 86.1 74.0 - 97.0 FL    MCH 27.1 24.0 - 34.0 PG    MCHC 31.5 31.0 - 37.0 g/dL    RDW 15.3 (H) 11.6 - 14.5 %    PLATELET 575 171 - 784 K/uL    MPV 9.6 9.2 - 11.8 FL    NEUTROPHILS 56 40 - 73 %    LYMPHOCYTES 33 21 - 52 %    MONOCYTES 8 3 - 10 %    EOSINOPHILS 2 0 - 5 %    BASOPHILS 1 0 - 2 %    ABS. NEUTROPHILS 4.1 1.8 - 8.0 K/UL    ABS. LYMPHOCYTES 2.4 0.9 - 3.6 K/UL    ABS. MONOCYTES 0.6 0.05 - 1.2 K/UL    ABS. EOSINOPHILS 0.2 0.0 - 0.4 K/UL    ABS.  BASOPHILS 0.0 0.0 - 0.06 K/UL    DF AUTOMATED     T4, FREE   Result Value Ref Range    T4, Free 1.0 0.7 - 1.5 NG/DL   LIPID PANEL   Result Value Ref Range    LIPID PROFILE          Cholesterol, total 260 (H) <200 MG/DL    Triglyceride 104 <150 MG/DL    HDL Cholesterol 69 (H) 40 - 60 MG/DL    LDL, calculated 170.2 (H) 0 - 100 MG/DL    VLDL, calculated 20.8 MG/DL    CHOL/HDL Ratio 3.8 0 - 5.0     METABOLIC PANEL, COMPREHENSIVE   Result Value Ref Range    Sodium 138 136 - 145 mmol/L    Potassium 4.5 3.5 - 5.5 mmol/L    Chloride 101 100 - 108 mmol/L    CO2 28 21 - 32 mmol/L    Anion gap 9 3.0 - 18 mmol/L    Glucose 83 74 - 99 mg/dL    BUN 18 7.0 - 18 MG/DL    Creatinine 1.08 0.6 - 1.3 MG/DL    BUN/Creatinine ratio 17 12 - 20      GFR est AA 58 (L) >60 ml/min/1.73m2    GFR est non-AA 48 (L) >60 ml/min/1.73m2    Calcium 8.9 8.5 - 10.1 MG/DL    Bilirubin, total 0.4 0.2 - 1.0 MG/DL    ALT (SGPT) 24 13 - 56 U/L    AST (SGOT) 23 15 - 37 U/L    Alk. phosphatase 114 45 - 117 U/L    Protein, total 7.6 6.4 - 8.2 g/dL    Albumin 3.8 3.4 - 5.0 g/dL    Globulin 3.8 2.0 - 4.0 g/dL    A-G Ratio 1.0 0.8 - 1.7     TSH, 3RD GENERATION   Result Value Ref Range    TSH 2.50 0.36 - 3.74 uIU/mL   URINALYSIS W/ RFLX MICROSCOPIC   Result Value Ref Range    Color YELLOW      Appearance CLEAR      Specific gravity 1.015 1.005 - 1.030      pH (UA) 7.5 5.0 - 8.0      Protein NEGATIVE  NEG mg/dL    Glucose NEGATIVE  NEG mg/dL    Ketone NEGATIVE  NEG mg/dL    Bilirubin NEGATIVE  NEG      Blood NEGATIVE  NEG      Urobilinogen 0.2 0.2 - 1.0 EU/dL    Nitrites NEGATIVE  NEG      Leukocyte Esterase TRACE (A) NEG     VITAMIN D, 25 HYDROXY   Result Value Ref Range    Vitamin D 25-Hydroxy 39.2 30 - 100 ng/mL   URINE MICROSCOPIC ONLY   Result Value Ref Range    WBC 0 to 3 0 - 4 /hpf    RBC NEGATIVE  0 - 5 /hpf    Epithelial cells 1+ 0 - 5 /lpf    Bacteria 1+ (A) NEG /hpf     Assessment: #1. Hypertension is controlled. She will continue amlodipine 10 mg daily.   #2.  History of depression and anxiety doing fairly well but not eating that well. She will continue sertraline 50 mg daily and Ensure 1 can daily along with regular food. #3.  GERD asymptomatic. She will continue omeprazole 40 mg daily. #4.  History of osteoporosis, she will continue calcium and D supplements. #5. She is sleeping more and is having somewhat more problems with memory. Mini cog was not too good today. Nonetheless her daughter takes good care of her and they do not think any medication is going to be particularly helpful at this point. She lives with her daughter. #6.  History of hyperlipidemia, lipids have been adequately controlled with diet alone, medication is unlikely to offer any significant benefit. We will check lab today. She also will get a flu shot and pneumococcal vaccination today. She had a Medicare wellness evaluation today and I agree with Foster's note. Follow-up in 6 months or sooner if needed    Charan Mejia MD FACP    Please note: This document has been produced using voice recognition software. Unrecognized errors in transcription may be present.

## 2017-11-10 ENCOUNTER — TELEPHONE (OUTPATIENT)
Dept: INTERNAL MEDICINE CLINIC | Age: 82
End: 2017-11-10

## 2017-11-10 RX ORDER — AMLODIPINE BESYLATE 5 MG/1
TABLET ORAL
Qty: 90 TAB | Refills: 0 | Status: SHIPPED | OUTPATIENT
Start: 2017-11-10 | End: 2017-11-17 | Stop reason: ALTCHOICE

## 2017-11-10 RX ORDER — OMEPRAZOLE 40 MG/1
CAPSULE, DELAYED RELEASE ORAL
Qty: 90 CAP | Refills: 0 | Status: SHIPPED | OUTPATIENT
Start: 2017-11-10 | End: 2017-11-17 | Stop reason: ALTCHOICE

## 2017-11-17 ENCOUNTER — OFFICE VISIT (OUTPATIENT)
Dept: INTERNAL MEDICINE CLINIC | Age: 82
End: 2017-11-17

## 2017-11-17 VITALS
OXYGEN SATURATION: 96 % | DIASTOLIC BLOOD PRESSURE: 74 MMHG | TEMPERATURE: 97.8 F | BODY MASS INDEX: 23.19 KG/M2 | HEART RATE: 103 BPM | HEIGHT: 62 IN | WEIGHT: 126 LBS | SYSTOLIC BLOOD PRESSURE: 136 MMHG

## 2017-11-17 DIAGNOSIS — H92.02 LEFT EAR PAIN: Primary | ICD-10-CM

## 2017-11-17 NOTE — PROGRESS NOTES
Chief Complaint   Patient presents with    Ear Injury     Follow up from [facility] for a ruptured left ear drum. ROOM 9     Patient's daughter states that patient is on amoxicillin for the left ear, and at [facility] they cleared out the wax and told patient not to wear her hearing aide until everything clears up. Health Maintenance Due   Topic Date Due    GLAUCOMA SCREENING Q2Y  10/17/1992     1. Have you been to the ER, urgent care clinic or hospitalized since your last visit? YES. [facility] 7 days ago for a ruptured left ear drum. 2. Have you seen or consulted any other health care providers outside of the [address] [place] [address] since your last visit (Include any pap smears or colon screening)?  NO

## 2017-11-17 NOTE — MR AVS SNAPSHOT
Visit Information Date & Time Provider Department Dept. Phone Encounter #  
 11/17/2017  4:00 PM Haley Springer MD [facility] [phone] [id number] Your Appointments 4/17/2018 11:00 AM  
Office Visit with Haley Springer MD  
[facility] [address]) Appt Note: 6 month f/u  
 [address] [facility], [address]  
  
   
 [address] Upcoming Health Maintenance Date Due  
 GLAUCOMA SCREENING Q2Y 10/17/1992 MEDICARE YEARLY EXAM 10/12/2018 DTaP/Tdap/Td series (2 - Td) 10/21/2026 Allergies as of 11/17/2017  Review Complete On: 11/17/2017 By: Haley Springer MD  
 No Known Allergies Current Immunizations  Reviewed on 10/11/2017 Name Date Influenza High Dose Vaccine PF 10/11/2017 11:35 AM, 10/21/2016  5:11 PM, 10/10/2014 Influenza Vaccine Split 9/15/2011 Pneumococcal Conjugate (PCV-13) 10/21/2016  5:10 PM  
 Pneumococcal Polysaccharide (PPSV-23) 10/11/2017 11:36 AM  
  
 Not reviewed this visit Vitals BP Pulse Temp Height(growth percentile) Weight(growth percentile) SpO2  
 140/74 (BP 1 Location: Right arm, BP Patient Position: Sitting) (!) 103 97.8 °F (36.6 °C) (Oral) 5' 2\" (1.575 m) 126 lb (57.2 kg) 96% BMI OB Status Smoking Status 23.05 kg/m2 Hysterectomy Never Smoker Vitals History BMI and BSA Data Body Mass Index Body Surface Area 23.05 kg/m 2 1.58 m 2 Preferred Pharmacy Pharmacy Name Phone [facility] Roberto Ponce [address] [facility] [phone] Your Updated Medication List  
  
   
This list is accurate as of: 11/17/17  4:55 PM.  Always use your most recent med list. amLODIPine 5 mg tablet Commonly known as:  Richard Del Take 1 Tab by mouth daily. CALCIUM 600 + D(3) PO Take  by mouth. CLARITIN 10 mg tablet Generic drug:  loratadine Take 10 mg by mouth. MULTI-VITAMIN PO Take  by mouth. omeprazole 40 mg capsule Commonly known as:  PRILOSEC Take 1 Cap by mouth daily. sertraline 50 mg tablet Commonly known as:  ZOLOFT  
TAKE 1 TABLET BY MOUTH DAILY Introducing [facility] & HEALTH SERVICES! [address] [facility] introduces "[employer] patient portal. Now you can access parts of your medical record, email your doctor's office, and request medication refills online. 1. In your internet browser, go to [email or web address] 2. Click on the First Time User? Click Here link in the Sign In box. You will see the New Member Sign Up page. 3. Enter your "[employer] Access Code exactly as it appears below. You will not need to use this code after youve completed the sign-up process. If you do not sign up before the expiration date, you must request a new code. · "[employer] Access Code: [email or web address] Expires: 11/25/2017 12:53 PM 
 
4. Enter the last four digits of your Social Security Number (xxxx) and Date of Birth (mm/dd/yyyy) as indicated and click Submit. You will be taken to the next sign-up page. 5. Create a "[employer] ID. This will be your "[employer] login ID and cannot be changed, so think of one that is secure and easy to remember. 6. Create a "[employer] password. You can change your password at any time. 7. Enter your Password Reset Question and Answer. This can be used at a later time if you forget your password. 8. Enter your e-mail address. You will receive e-mail notification when new information is available in [address] 9. Click Sign Up. You can now view and download portions of your medical record. 10. Click the Download Summary menu link to download a portable copy of your medical information. If you have questions, please visit the Frequently Asked Questions section of the "[employer] website.  Remember, "[employer] is NOT to be used for urgent needs. For medical emergencies, dial 911. Now available from your iPhone and Android! Please provide this summary of care documentation to your next provider. Your primary care clinician is listed as Martin Fine. Valery Toro. If you have any questions after today's visit, please call [phone].

## 2017-11-17 NOTE — PROGRESS NOTES
Estrellita Lechuga 10/17/1927, is a 80 y.o. female, who is seen today for left ear perforation. The patient went to have her hearing aids adjusted 1 week ago and the technician said there was cerumen impaction and recommended going to the urgent care center. She went there and the ear was irrigated and apparently curetting was done to remove wax but when the doctor looked in the ear she told the patient there was tympanic perforation and she was given amoxicillin. She was told to follow-up here. She is not having any pain in the ear or any loss of hearing compared with usual but was told not to use her hearing aid until she was checked out here. Past Medical History:   Diagnosis Date    Anxiety     Arthritis     Depression     GERD (gastroesophageal reflux disease)     HTN (hypertension)     LBBB (left bundle branch block)     Osteoporosis     completed 8 years Fosamax    Sciatica     Urinary incontinence      Current Outpatient Prescriptions   Medication Sig Dispense Refill    omeprazole (PRILOSEC) 40 mg capsule Take 1 Cap by mouth daily. 90 Cap 3    amLODIPine (NORVASC) 5 mg tablet Take 1 Tab by mouth daily. 90 Tab 3    sertraline (ZOLOFT) 50 mg tablet TAKE 1 TABLET BY MOUTH DAILY 90 Tab 0    loratadine (CLARITIN) 10 mg tablet Take 10 mg by mouth.  CALCIUM CARBONATE/VITAMIN D3 (CALCIUM 600 + D,3, PO) Take  by mouth.  MULTI-VITAMIN PO Take  by mouth. Visit Vitals    /74 (BP 1 Location: Right arm, BP Patient Position: Sitting)    Pulse (!) 103    Temp 97.8 °F (36.6 °C) (Oral)    Ht 5' 2\" (1.575 m)    Wt 126 lb (57.2 kg)    SpO2 96%    BMI 23.05 kg/m2     The outer ear appears normal and the ear canal appears normal.  The tympanic membrane reveals some scarring but no current perforation. There is no redness.   There is minimal wax near the entrance to the ear canal.    Assessment: Recent cerumen impaction has been cleared and I do not see any redness or blood, just some scarring of the tympanic membrane. At this point she can stop amoxicillin and start using her hearing aid again. If she has further problems with the ear I recommended she see an ear nose and throat specialist.    Follow-up as previously planned    Dung Cm. Elijah Ortega MD FACP    Please note: This document has been produced using voice recognition software. Unrecognized errors in transcription may be present.

## 2017-11-21 ENCOUNTER — TELEPHONE (OUTPATIENT)
Dept: INTERNAL MEDICINE CLINIC | Age: 82
End: 2017-11-21

## 2017-11-21 NOTE — TELEPHONE ENCOUNTER
Micah from [facility] to check and see if  received a provider query for the patient. Says he will fax it over again today.     Pls Advise   [facility] [phone] ext [phone]

## 2017-12-04 RX ORDER — SERTRALINE HYDROCHLORIDE 50 MG/1
TABLET, FILM COATED ORAL
Qty: 90 TAB | Refills: 0 | Status: SHIPPED | OUTPATIENT
Start: 2017-12-04 | End: 2020-11-16 | Stop reason: SDUPTHER

## 2018-04-23 ENCOUNTER — OFFICE VISIT (OUTPATIENT)
Dept: INTERNAL MEDICINE CLINIC | Age: 83
End: 2018-04-23

## 2018-04-23 VITALS
TEMPERATURE: 97.3 F | HEIGHT: 62 IN | SYSTOLIC BLOOD PRESSURE: 138 MMHG | BODY MASS INDEX: 22.34 KG/M2 | DIASTOLIC BLOOD PRESSURE: 72 MMHG | WEIGHT: 121.4 LBS | OXYGEN SATURATION: 97 % | HEART RATE: 90 BPM | RESPIRATION RATE: 12 BRPM

## 2018-04-23 DIAGNOSIS — K21.00 REFLUX ESOPHAGITIS: ICD-10-CM

## 2018-04-23 DIAGNOSIS — J30.1 SEASONAL ALLERGIC RHINITIS DUE TO POLLEN: ICD-10-CM

## 2018-04-23 DIAGNOSIS — I10 ESSENTIAL HYPERTENSION WITH GOAL BLOOD PRESSURE LESS THAN 140/90: Primary | ICD-10-CM

## 2018-04-23 NOTE — MR AVS SNAPSHOT
Davi Fine 
 
 
 [address], [address] 
[phone] Patient: Marielle Garcia MRN: [id number] :10/17/1927 Visit Information Date & Time Provider Department Dept. Phone Encounter #  
 2018 11:00 AM Simón Escamilla MD [facility] [phone] Your Appointments 10/24/2018 10:30 AM  
PHYSICAL with Simón Escamilla MD  
[facility] [address] [facility]) Appt Note: rpe rm - labs at visit per pt  
 [address], [address] [address] [address] [place]  
  
   
 [address] [facility] [address] Upcoming Health Maintenance Date Due  
 GLAUCOMA SCREENING Q2Y 2018* Bone Densitometry (Dexa) Screening 2018* MEDICARE YEARLY EXAM 10/12/2018 DTaP/Tdap/Td series (2 - Td) 10/21/2026 *Topic was postponed. The date shown is not the original due date. Allergies as of 2018  Review Complete On: 2018 By: Simón Escamilla MD  
 No Known Allergies Current Immunizations  Reviewed on 10/11/2017 Name Date Influenza High Dose Vaccine PF 10/11/2017 11:35 AM, 10/21/2016  5:11 PM, 10/10/2014 Influenza Vaccine Split 9/15/2011 Pneumococcal Conjugate (PCV-13) 10/21/2016  5:10 PM  
 Pneumococcal Polysaccharide (PPSV-23) 10/11/2017 11:36 AM  
  
 Not reviewed this visit You Were Diagnosed With   
  
 Codes Comments Essential hypertension with goal blood pressure less than 140/90    -  Primary ICD-10-CM: I10 
ICD-9-CM: 401.9 Seasonal allergic rhinitis due to pollen     ICD-10-CM: J30.1 ICD-9-CM: 477.0 Reflux esophagitis     ICD-10-CM: K21.0 ICD-9-CM: 530.11 Vitals BP Pulse Temp Resp Height(growth percentile) Weight(growth percentile) 138/72 90 97.3 °F (36.3 °C) (Oral) 12 5' 2\" (1.575 m) 121 lb 6.4 oz (55.1 kg) SpO2 BMI OB Status Smoking Status 97% 22.2 kg/m2 Hysterectomy Never Smoker Vitals History BMI and BSA Data Body Mass Index Body Surface Area  
 22.2 kg/m 2 1.55 m 2 Preferred Pharmacy Pharmacy Name Phone [facility] Roberto Ponce [address] [place] [address] [phone] Your Updated Medication List  
  
   
This list is accurate as of 4/23/18 11:51 AM.  Always use your most recent med list. amLODIPine 5 mg tablet Commonly known as:  Osmel Lute Take 1 Tab by mouth daily. CALCIUM 600 + D(3) PO Take  by mouth. CLARITIN 10 mg tablet Generic drug:  loratadine Take 10 mg by mouth. MULTI-VITAMIN PO Take  by mouth. omeprazole 40 mg capsule Commonly known as:  PRILOSEC Take 1 Cap by mouth daily. sertraline 50 mg tablet Commonly known as:  ZOLOFT  
TAKE 1 TABLET BY MOUTH DAILY Introducing [address]! Eri Draper introduces [employer] patient portal. Now you can access parts of your medical record, email your doctor's office, and request medication refills online. 1. In your internet browser, go to [email or web address] 2. Click on the First Time User? Click Here link in the Sign In box. You will see the New Member Sign Up page. 3. Enter your [employer] Access Code exactly as it appears below. You will not need to use this code after youve completed the sign-up process. If you do not sign up before the expiration date, you must request a new code. · [employer] Access Code: [email or web address] Expires: 7/22/2018 11:02 AM 
 
4. Enter the last four digits of your Social Security Number (xxxx) and Date of Birth (mm/dd/yyyy) as indicated and click Submit. You will be taken to the next sign-up page. 5. Create a [employer] ID. This will be your [employer] login ID and cannot be changed, so think of one that is secure and easy to remember. 6. Create a [employer] password. You can change your password at any time. 7. Enter your Password Reset Question and Answer. This can be used at a later time if you forget your password. 8. Enter your e-mail address. You will receive e-mail notification when new information is available in [address] 9. Click Sign Up. You can now view and download portions of your medical record. 10. Click the Download Summary menu link to download a portable copy of your medical information. If you have questions, please visit the Frequently Asked Questions section of the [employer] website. Remember, [employer] is NOT to be used for urgent needs. For medical emergencies, dial 911. Now available from your iPhone and Android! Please provide this summary of care documentation to your next provider. Your primary care clinician is listed as Antoinette Servin. If you have any questions after today's visit, please call [phone].

## 2018-04-23 NOTE — PROGRESS NOTES
Mimi Gutierrez 10/17/1927, is a 80 y.o. female, who is seen today for reevaluation of hypertension anxiety GERD weight loss. She has lost 5 more pounds in the last 6 months but weight remains acceptable. Her strength is pretty good. She gets around without falls. She takes her medicine regularly and has had no edema. Nerves are doing pretty well on sertraline and she is having no reflux symptoms on omeprazole. She has noticed some phlegm in her throat the last couple of weeks that she has to cough up in the mornings. She also has a sore area that comes and goes to some extent but never totally clears in the left popliteal fossa. Past Medical History:   Diagnosis Date    Anxiety     Arthritis     Depression     GERD (gastroesophageal reflux disease)     HTN (hypertension)     LBBB (left bundle branch block)     Osteoporosis     completed 8 years Fosamax    Sciatica     Urinary incontinence      Current Outpatient Prescriptions   Medication Sig Dispense Refill    sertraline (ZOLOFT) 50 mg tablet TAKE 1 TABLET BY MOUTH DAILY 90 Tab 0    omeprazole (PRILOSEC) 40 mg capsule Take 1 Cap by mouth daily. 90 Cap 3    amLODIPine (NORVASC) 5 mg tablet Take 1 Tab by mouth daily. 90 Tab 3    loratadine (CLARITIN) 10 mg tablet Take 10 mg by mouth.  CALCIUM CARBONATE/VITAMIN D3 (CALCIUM 600 + D,3, PO) Take  by mouth.  MULTI-VITAMIN PO Take  by mouth. Visit Vitals    /72    Pulse 90    Temp 97.3 °F (36.3 °C) (Oral)    Resp 12    Ht 5' 2\" (1.575 m)    Wt 121 lb 6.4 oz (55.1 kg)    SpO2 97%    BMI 22.2 kg/m2     Carotids are 2+ without bruits. Nasal passages reveal clear tenacious secretions on the left and no secretions on the right. Pharynx reveals no redness exudate or drainage. Carotids are 2+ without bruits. Lungs are clear to percussion. Good breath sounds with no wheezing or crackles. Heart reveals a regular rhythm with normal S1 and S2 no murmur gallop click or rub. Apical impulse is not palpable. Abdomen is soft and nontender with no hepatosplenomegaly or masses and no bruits. Extremities reveal no clubbing cyanosis or edema. Pulses are 2+. In the left popliteal fossa there is an indurated reddened thickened growth that may be simply inflammatory but not rule out skin cancer. Assessment: #1. Hypertension is controlled. She will continue amlodipine 5 mg daily. #2.  History of anxiety doing well. She will continue sertraline 50 mg daily. #3.  GERD asymptomatic, she will continue omeprazole 40 mg daily. #4.  There is some type of growth in the left popliteal fossa, she will see Dr. Danelle Gross check that for her. #5.  Allergic rhinitis probably the cause of the morning phlegm in her throat. I recommended over-the-counter generic Flonase and her daughter will get her some of that to use for at least a month. #5. She has lost 5 more pounds in the last 6 months, have encouraged her to continue using Ensure and try to stabilize weight. She still has a little fat over the abdomen but I have cautioned her that any further weight loss could result in increased weakness. Follow-up in 6 months for complete evaluation, we will do lab work when she is here. Charan Spicer MD FACP    Please note: This document has been produced using voice recognition software. Unrecognized errors in transcription may be present.

## 2018-10-28 ENCOUNTER — HOSPITAL ENCOUNTER (EMERGENCY)
Age: 83
Discharge: HOME | End: 2018-10-28
Attending: EMERGENCY MEDICINE
Payer: MEDICARE

## 2018-10-28 ENCOUNTER — APPOINTMENT (OUTPATIENT)
Dept: GENERAL RADIOLOGY | Age: 83
End: 2018-10-28
Attending: STUDENT IN AN ORGANIZED HEALTH CARE EDUCATION/TRAINING PROGRAM
Payer: MEDICARE

## 2018-10-28 VITALS
SYSTOLIC BLOOD PRESSURE: 146 MMHG | OXYGEN SATURATION: 94 % | HEART RATE: 81 BPM | RESPIRATION RATE: 24 BRPM | DIASTOLIC BLOOD PRESSURE: 122 MMHG

## 2018-10-28 DIAGNOSIS — R55 SYNCOPE AND COLLAPSE: Primary | ICD-10-CM

## 2018-10-28 LAB
ALBUMIN SERPL-MCNC: 3.6 G/DL (ref 3.4–5)
ALBUMIN/GLOB SERPL: 0.8 {RATIO} (ref 0.8–1.7)
ALP SERPL-CCNC: 100 U/L (ref 45–117)
ALT SERPL-CCNC: 23 U/L (ref 13–56)
ANION GAP SERPL CALC-SCNC: 9 MMOL/L (ref 3–18)
AST SERPL-CCNC: 26 U/L (ref 15–37)
BASOPHILS # BLD: 0 K/UL (ref 0–0.1)
BASOPHILS NFR BLD: 1 % (ref 0–2)
BILIRUB SERPL-MCNC: 0.4 MG/DL (ref 0.2–1)
BUN SERPL-MCNC: 25 MG/DL (ref 7–18)
BUN/CREAT SERPL: 22 (ref 12–20)
CALCIUM SERPL-MCNC: 8.9 MG/DL (ref 8.5–10.1)
CHLORIDE SERPL-SCNC: 104 MMOL/L (ref 100–108)
CK MB CFR SERPL CALC: 2.6 % (ref 0–4)
CK MB SERPL-MCNC: 1.9 NG/ML (ref 5–25)
CK SERPL-CCNC: 74 U/L (ref 26–192)
CO2 SERPL-SCNC: 27 MMOL/L (ref 21–32)
CREAT SERPL-MCNC: 1.14 MG/DL (ref 0.6–1.3)
DIFFERENTIAL METHOD BLD: NORMAL
EOSINOPHIL # BLD: 0.1 K/UL (ref 0–0.4)
EOSINOPHIL NFR BLD: 2 % (ref 0–5)
ERYTHROCYTE [DISTWIDTH] IN BLOOD BY AUTOMATED COUNT: 14.5 % (ref 11.6–14.5)
GLOBULIN SER CALC-MCNC: 4.5 G/DL (ref 2–4)
GLUCOSE SERPL-MCNC: 102 MG/DL (ref 74–99)
HCT VFR BLD AUTO: 41.1 % (ref 35–45)
HGB BLD-MCNC: 14.2 G/DL (ref 12–16)
INR PPP: 0.9 (ref 0.8–1.2)
LACTATE BLD-SCNC: 0.9 MMOL/L (ref 0.4–2)
LYMPHOCYTES # BLD: 2.2 K/UL (ref 0.9–3.6)
LYMPHOCYTES NFR BLD: 29 % (ref 21–52)
MAGNESIUM SERPL-MCNC: 2 MG/DL (ref 1.6–2.6)
MCH RBC QN AUTO: 28.3 PG (ref 24–34)
MCHC RBC AUTO-ENTMCNC: 34.5 G/DL (ref 31–37)
MCV RBC AUTO: 82 FL (ref 74–97)
MONOCYTES # BLD: 0.7 K/UL (ref 0.05–1.2)
MONOCYTES NFR BLD: 9 % (ref 3–10)
NEUTS SEG # BLD: 4.7 K/UL (ref 1.8–8)
NEUTS SEG NFR BLD: 59 % (ref 40–73)
PLATELET # BLD AUTO: 209 K/UL (ref 135–420)
PMV BLD AUTO: 9.3 FL (ref 9.2–11.8)
POTASSIUM SERPL-SCNC: 4.5 MMOL/L (ref 3.5–5.5)
PROT SERPL-MCNC: 8.1 G/DL (ref 6.4–8.2)
PROTHROMBIN TIME: 12.2 SEC (ref 11.5–15.2)
RBC # BLD AUTO: 5.01 M/UL (ref 4.2–5.3)
SODIUM SERPL-SCNC: 140 MMOL/L (ref 136–145)
TROPONIN I SERPL-MCNC: <0.02 NG/ML (ref 0–0.04)
WBC # BLD AUTO: 7.7 K/UL (ref 4.6–13.2)

## 2018-10-28 PROCEDURE — 85610 PROTHROMBIN TIME: CPT | Performed by: STUDENT IN AN ORGANIZED HEALTH CARE EDUCATION/TRAINING PROGRAM

## 2018-10-28 PROCEDURE — 99284 EMERGENCY DEPT VISIT MOD MDM: CPT

## 2018-10-28 PROCEDURE — 93005 ELECTROCARDIOGRAM TRACING: CPT

## 2018-10-28 PROCEDURE — 83735 ASSAY OF MAGNESIUM: CPT | Performed by: STUDENT IN AN ORGANIZED HEALTH CARE EDUCATION/TRAINING PROGRAM

## 2018-10-28 PROCEDURE — 80053 COMPREHEN METABOLIC PANEL: CPT | Performed by: STUDENT IN AN ORGANIZED HEALTH CARE EDUCATION/TRAINING PROGRAM

## 2018-10-28 PROCEDURE — 85025 COMPLETE CBC W/AUTO DIFF WBC: CPT | Performed by: STUDENT IN AN ORGANIZED HEALTH CARE EDUCATION/TRAINING PROGRAM

## 2018-10-28 PROCEDURE — 71045 X-RAY EXAM CHEST 1 VIEW: CPT

## 2018-10-28 PROCEDURE — 82550 ASSAY OF CK (CPK): CPT | Performed by: STUDENT IN AN ORGANIZED HEALTH CARE EDUCATION/TRAINING PROGRAM

## 2018-10-28 PROCEDURE — 83605 ASSAY OF LACTIC ACID: CPT

## 2018-10-28 RX ORDER — GUAIFENESIN 100 MG/5ML
324 LIQUID (ML) ORAL
Status: DISCONTINUED | OUTPATIENT
Start: 2018-10-28 | End: 2018-10-28 | Stop reason: HOSPADM

## 2018-10-28 NOTE — ED TRIAGE NOTES
From home via EMS. witnessed syncopal episode. Patient was eased to floor by daughter when she became weak. No trauma. +LOC for less than 3 seconds

## 2018-10-28 NOTE — ED PROVIDER NOTES
EMERGENCY DEPARTMENT HISTORY AND PHYSICAL EXAM 
 
3:18 PM 
 
 
Date: 10/28/2018 Patient Name: Minh Taylor History of Presenting Illness No chief complaint on file. History Provided By: Patient, Patient's Daughter and Patient's Sister Chief Complaint: syncope today about an hour ago Duration:  Seconds Timing:  Acute Location: occurred at pt's house, daughter caught her Quality: NA Severity: N/A Modifying Factors: acute onset and resolution Associated Symptoms: queezy and nervous Additional History (Context): Minh Taylor is a 80 y.o. female with hypertension who presents with 2nd occurrence of syncope (first occurred last month, never evaluated). Pt states she doesn't feel right. Syncopized with daughter and daughter caught her. Pt did not strike head. Does not on any anticoagulation PCP: Gallo Stewart MD 
 
Current Outpatient Medications Medication Sig Dispense Refill  sertraline (ZOLOFT) 50 mg tablet TAKE 1 TABLET BY MOUTH DAILY 90 Tab 0  
 omeprazole (PRILOSEC) 40 mg capsule Take 1 Cap by mouth daily. 90 Cap 3  
 amLODIPine (NORVASC) 5 mg tablet Take 1 Tab by mouth daily. 90 Tab 3  
 loratadine (CLARITIN) 10 mg tablet Take 10 mg by mouth.  CALCIUM CARBONATE/VITAMIN D3 (CALCIUM 600 + D,3, PO) Take  by mouth.  MULTI-VITAMIN PO Take  by mouth. Past History Past Medical History: 
Past Medical History:  
Diagnosis Date  Anxiety  Arthritis  Depression  GERD (gastroesophageal reflux disease)  HTN (hypertension)  LBBB (left bundle branch block)  Osteoporosis   
 completed 8 years Fosamax  Sciatica  Urinary incontinence Past Surgical History: 
Past Surgical History:  
Procedure Laterality Date  HX CHOLECYSTECTOMY  HX HYSTERECTOMY  HX ORTHOPAEDIC    
 fractured right patella surgery  CO ANESTH,SURGERY OF SHOULDER Family History: 
Family History Problem Relation Age of Onset  Heart Disease Mother  Diabetes Mother  Heart Disease Father  Diabetes Father  Hypertension Sister  Diabetes Sister  Heart Disease Brother  Heart Disease Sister  Diabetes Sister  Heart Disease Brother Social History: 
Social History Tobacco Use  Smoking status: Never Smoker  Smokeless tobacco: Never Used Substance Use Topics  Alcohol use: No  
 Drug use: No  
 
 
Allergies: 
No Known Allergies Review of Systems Review of Systems Constitutional: Negative for activity change, appetite change, chills, diaphoresis, fatigue, fever and unexpected weight change. Eyes: Negative for photophobia and visual disturbance. Respiratory: Negative for apnea, cough, chest tightness, shortness of breath, wheezing and stridor. Cardiovascular: Negative for chest pain, palpitations and leg swelling. Gastrointestinal: Negative for abdominal distention, abdominal pain, blood in stool, constipation, diarrhea, nausea and vomiting. Genitourinary: Negative for difficulty urinating, dysuria and urgency. Musculoskeletal: Negative for gait problem. Neurological: Positive for syncope. Negative for dizziness, seizures, facial asymmetry, light-headedness and headaches. Psychiatric/Behavioral: The patient is nervous/anxious. All other systems reviewed and are negative. Physical Exam  
 
Visit Vitals BP (!) 146/122 Pulse 81 Resp 24 SpO2 94% Physical Exam  
Constitutional: She is oriented to person, place, and time. She appears well-developed and well-nourished. No distress. HENT:  
Head: Normocephalic and atraumatic. Eyes: Pupils are equal, round, and reactive to light. Neck: Normal range of motion. Neck supple. No JVD present. No tracheal deviation present. No thyromegaly present. Cardiovascular: Normal rate, regular rhythm, normal heart sounds and normal pulses. Pulmonary/Chest: Effort normal and breath sounds normal. No stridor. No respiratory distress. She has no wheezes. She has no rales. She exhibits no tenderness. Abdominal: She exhibits mass. She exhibits no distension. There is no tenderness. There is no rebound and no guarding. Neurological: She is alert and oriented to person, place, and time. No cranial nerve deficit. Coordination normal.  
Skin: She is not diaphoretic. Psychiatric: She has a normal mood and affect. Diagnostic Study Results Labs - Recent Results (from the past 12 hour(s)) EKG, 12 LEAD, INITIAL Collection Time: 10/28/18  3:26 PM  
Result Value Ref Range Ventricular Rate 96 BPM  
 Atrial Rate 96 BPM  
 P-R Interval 192 ms QRS Duration 154 ms Q-T Interval 416 ms  
 QTC Calculation (Bezet) 525 ms Calculated P Axis 10 degrees Calculated R Axis -2 degrees Calculated T Axis 151 degrees Diagnosis Normal sinus rhythm Left bundle branch block Abnormal ECG When compared with ECG of 09-JUN-2016 20:29, No significant change was found CBC WITH AUTOMATED DIFF Collection Time: 10/28/18  3:36 PM  
Result Value Ref Range WBC 7.7 4.6 - 13.2 K/uL  
 RBC 5.01 4.20 - 5.30 M/uL  
 HGB 14.2 12.0 - 16.0 g/dL HCT 41.1 35.0 - 45.0 % MCV 82.0 74.0 - 97.0 FL  
 MCH 28.3 24.0 - 34.0 PG  
 MCHC 34.5 31.0 - 37.0 g/dL  
 RDW 14.5 11.6 - 14.5 % PLATELET 928 333 - 900 K/uL MPV 9.3 9.2 - 11.8 FL  
 NEUTROPHILS 59 40 - 73 % LYMPHOCYTES 29 21 - 52 % MONOCYTES 9 3 - 10 % EOSINOPHILS 2 0 - 5 % BASOPHILS 1 0 - 2 %  
 ABS. NEUTROPHILS 4.7 1.8 - 8.0 K/UL  
 ABS. LYMPHOCYTES 2.2 0.9 - 3.6 K/UL  
 ABS. MONOCYTES 0.7 0.05 - 1.2 K/UL  
 ABS. EOSINOPHILS 0.1 0.0 - 0.4 K/UL  
 ABS. BASOPHILS 0.0 0.0 - 0.1 K/UL  
 DF AUTOMATED PROTHROMBIN TIME + INR Collection Time: 10/28/18  3:36 PM  
Result Value Ref Range Prothrombin time 12.2 11.5 - 15.2 sec INR 0.9 0.8 - 1.2 METABOLIC PANEL, COMPREHENSIVE Collection Time: 10/28/18  3:36 PM  
Result Value Ref Range Sodium 140 136 - 145 mmol/L Potassium 4.5 3.5 - 5.5 mmol/L Chloride 104 100 - 108 mmol/L  
 CO2 27 21 - 32 mmol/L Anion gap 9 3.0 - 18 mmol/L Glucose 102 (H) 74 - 99 mg/dL BUN 25 (H) 7.0 - 18 MG/DL Creatinine 1.14 0.6 - 1.3 MG/DL  
 BUN/Creatinine ratio 22 (H) 12 - 20 GFR est AA 54 (L) >60 ml/min/1.73m2 GFR est non-AA 45 (L) >60 ml/min/1.73m2 Calcium 8.9 8.5 - 10.1 MG/DL Bilirubin, total 0.4 0.2 - 1.0 MG/DL  
 ALT (SGPT) 23 13 - 56 U/L  
 AST (SGOT) 26 15 - 37 U/L Alk. phosphatase 100 45 - 117 U/L Protein, total 8.1 6.4 - 8.2 g/dL Albumin 3.6 3.4 - 5.0 g/dL Globulin 4.5 (H) 2.0 - 4.0 g/dL A-G Ratio 0.8 0.8 - 1.7 MAGNESIUM Collection Time: 10/28/18  3:36 PM  
Result Value Ref Range Magnesium 2.0 1.6 - 2.6 mg/dL CARDIAC PANEL,(CK, CKMB & TROPONIN) Collection Time: 10/28/18  3:36 PM  
Result Value Ref Range CK 74 26 - 192 U/L  
 CK - MB 1.9 <3.6 ng/ml CK-MB Index 2.6 0.0 - 4.0 % Troponin-I, Qt. <0.02 0.0 - 0.045 NG/ML  
POC LACTIC ACID Collection Time: 10/28/18  3:44 PM  
Result Value Ref Range Lactic Acid (POC) 0.9 0.4 - 2.0 mmol/L Radiologic Studies -  
XR CHEST PORT    (Results Pending) Medical Decision Making I am the first provider for this patient. I reviewed the vital signs, available nursing notes, past medical history, past surgical history, family history and social history. Vital Signs-Reviewed the patient's vital signs. Pulse Oximetry Analysis -  94%  on room air Cardiac Monitor: 
Rate:  
Rhythm:  Normal Sinus Rhythm EKG: Interpreted by the EP. Recent Results (from the past 12 hour(s)) EKG, 12 LEAD, INITIAL Collection Time: 10/28/18  3:26 PM  
Result Value Ref Range Ventricular Rate 96 BPM  
 Atrial Rate 96 BPM  
 P-R Interval 192 ms QRS Duration 154 ms  Q-T Interval 416 ms  
 QTC Calculation (Bezet) 525 ms Calculated P Axis 10 degrees Calculated R Axis -2 degrees Calculated T Axis 151 degrees Diagnosis Normal sinus rhythm Left bundle branch block Abnormal ECG When compared with ECG of 09-JUN-2016 20:29, No significant change was found Records Reviewed: Nursing Notes, Old Medical Records and Previous electrocardiograms (Time of Review: 3:18 PM) ED Course: Progress Notes, Reevaluation, and Consults: 
Pt remained stable and Asx throughout her ED stay with exception of mild epigastric discomfort Provider Notes (Medical Decision Making):  
considered ACS and PE as source of syncope but think less likely as pt's ECG is unchanged from prior in the setting of no chest pain or dyspnea. Pt is wells PE low risk. HEART score 4. Pt is not on anticoagulation and no trauma associated with the fall. Most likely vasovagal in elderly but can not r/o arrhythmia. Pt and family advised to FU with PCM and cardiology as this week. Procedures: POCUS echo No PCeffusion. No WMA. Normal EF Diagnosis Clinical Impression: 1. Syncope and collapse Disposition: Home with PCM and cardiology FU Follow-up Information Follow up With Specialties Details Why Contact Info Evelin Morales MD Internal Medicine Schedule an appointment as soon as possible for a visit  [address] [place] [address] [address] [place] [address] 
[phone] [facility] EMERGENCY DEPT Emergency Medicine  If symptoms worsen, As needed [address] [phone] 
[phone] Medication List  
  
CONTINUE taking these medications   
amLODIPine 5 mg tablet Commonly known as:  Siri Greening Take 1 Tab by mouth daily. CALCIUM 600 + D(3) PO 
  
CLARITIN 10 mg tablet Generic drug:  loratadine MULTI-VITAMIN PO 
  
omeprazole 40 mg capsule Commonly known as:  PRILOSEC Take 1 Cap by mouth daily. sertraline 50 mg tablet Commonly known as:  ZOLOFT 
TAKE 1 TABLET BY MOUTH DAILY 
  
  
 
_______________________________ Kenrick Dickerson DO October 28, 2018 at 10:05 PM  
PGY3 
    
 
_______________________________

## 2018-10-28 NOTE — DISCHARGE INSTRUCTIONS
Vasovagal Syncope: Care Instructions  Your Care Instructions    Vasovagal syncope (say \"yrh-nqk-ICIHerman MCGOVERN-kuh-pee\")is sudden dizziness or fainting that can be set off by things such as pain, stress, fear, or trauma. You may sweat or feel lightheaded, sick to your stomach, or tingly. The problem causes the heart rate to slow and the blood vessels to widen, or dilate, for a short time. When this happens, blood pools in the lower body, and less blood goes to the brain. You can usually get relief by lying down with your legs raised (elevated). This helps more blood to flow to your brain and may help relieve symptoms like feeling dizzy. Some doctors may recommend a technique that involves tensing your fists and arms. This type of fainting is often easy to predict. For example, it happens to some people when they see blood or have to get a shot. They may feel symptoms before they faint. An episode of vasovagal syncope usually responds well to self-care. Other treatment often isn't needed. But if the fainting keeps happening, your doctor may suggest further treatments. Follow-up care is a key part of your treatment and safety. Be sure to make and go to all appointments, and call your doctor if you are having problems. It's also a good idea to know your test results and keep a list of the medicines you take. How can you care for yourself at home? · Drink plenty of fluids to prevent dehydration. If you have kidney, heart, or liver disease and have to limit fluids, talk with your doctor before you increase your fluid intake. · Try to avoid things that you think may set off vasovagal syncope. · Talk to your doctor about any medicines you take. Some medicines may increase the chance of this condition occurring. · If you feel symptoms, lie down with your legs raised. Talk to your doctor about what to do if your symptoms come back. When should you call for help?   Call 911 anytime you think you may need emergency care. For example, call if:    · You have symptoms of a heart problem. These may include:  ? Chest pain or pressure. ? Severe trouble breathing. ? A fast or irregular heartbeat.    Watch closely for changes in your health, and be sure to contact your doctor if:    · You have more episodes of fainting at home.     · You do not get better as expected. Where can you learn more? Go to http://luz maria-yoselin.info/. Enter L754 in the search box to learn more about \"Vasovagal Syncope: Care Instructions. \"  Current as of: November 20, 2017  Content Version: 11.8  © 9230-7916 [employer]. Care instructions adapted under license by [facility] (which disclaims liability or warranty for this information). If you have questions about a medical condition or this instruction, always ask your healthcare professional. [address] any warranty or liability for your use of this information. Lightheadedness or Faintness: Care Instructions  Your Care Instructions  Lightheadedness is a feeling that you are about to faint or \"pass out. \" You do not feel as if you or your surroundings are moving. It is different from vertigo, which is the feeling that you or things around you are spinning or tilting. Lightheadedness usually goes away or gets better when you lie down. If lightheadedness gets worse, it can lead to a fainting spell. It is common to feel lightheaded from time to time. Lightheadedness usually is not caused by a serious problem. It often is caused by a short-lasting drop in blood pressure and blood flow to your head that occurs when you get up too quickly from a seated or lying position. Follow-up care is a key part of your treatment and safety. Be sure to make and go to all appointments, and call your doctor if you are having problems. It's also a good idea to know your test results and keep a list of the medicines you take.   How can you care for yourself at home? · Lie down for 1 or 2 minutes when you feel lightheaded. After lying down, sit up slowly and remain sitting for 1 to 2 minutes before slowly standing up. · Avoid movements, positions, or activities that have made you lightheaded in the past.  · Get plenty of rest, especially if you have a cold or flu, which can cause lightheadedness. · Make sure you drink plenty of fluids, especially if you have a fever or have been sweating. · Do not drive or put yourself and others in danger while you feel lightheaded. When should you call for help? Call 911 anytime you think you may need emergency care. For example, call if:    · You have symptoms of a stroke. These may include:  ? Sudden numbness, tingling, weakness, or loss of movement in your face, arm, or leg, especially on only one side of your body. ? Sudden vision changes. ? Sudden trouble speaking. ? Sudden confusion or trouble understanding simple statements. ? Sudden problems with walking or balance. ? A sudden, severe headache that is different from past headaches.     · You have symptoms of a heart attack. These may include:  ? Chest pain or pressure, or a strange feeling in the chest.  ? Sweating. ? Shortness of breath. ? Nausea or vomiting. ? Pain, pressure, or a strange feeling in the back, neck, jaw, or upper belly or in one or both shoulders or arms. ? Lightheadedness or sudden weakness. ? A fast or irregular heartbeat. After you call 911, the  may tell you to chew 1 adult-strength or 2 to 4 low-dose aspirin. Wait for an ambulance. Do not try to drive yourself.    Watch closely for changes in your health, and be sure to contact your doctor if:    · Your lightheadedness gets worse or does not get better with home care. Where can you learn more? Go to http://luz maria-yoselin.info/. Enter O916 in the search box to learn more about \"Lightheadedness or Faintness: Care Instructions. \"  Current as of: November 20, 2017  Content Version: 11.8  © 8910-0964 [employer]. Care instructions adapted under license by [employer] (which disclaims liability or warranty for this information). If you have questions about a medical condition or this instruction, always ask your healthcare professional. [address] any warranty or liability for your use of this information. Fainting: Care Instructions  Your Care Instructions    When you faint, or pass out, you lose consciousness for a short time. A brief drop in blood flow to the brain often causes it. When you fall or lie down, more blood flows to your brain and you regain consciousness. Emotional stress, pain, or overheating--especially if you have been standing--can make you faint. In these cases, fainting is usually not serious. But fainting can be a sign of a more serious problem. Your doctor may want you to have more tests to rule out other causes. The treatment you need depends on the reason why you fainted. The doctor has checked you carefully, but problems can develop later. If you notice any problems or new symptoms, get medical treatment right away. Follow-up care is a key part of your treatment and safety. Be sure to make and go to all appointments, and call your doctor if you are having problems. It's also a good idea to know your test results and keep a list of the medicines you take. How can you care for yourself at home? · Drink plenty of fluids to prevent dehydration. If you have kidney, heart, or liver disease and have to limit fluids, talk with your doctor before you increase your fluid intake. When should you call for help? Call 911 anytime you think you may need emergency care. For example, call if:    · You have symptoms of a heart problem. These may include:  ? Chest pain or pressure. ? Severe trouble breathing. ? A fast or irregular heartbeat.   ? Lightheadedness or sudden weakness. ? Coughing up pink, foamy mucus. ? Passing out. After you call 911, the  may tell you to chew 1 adult-strength or 2 to 4 low-dose aspirin. Wait for an ambulance. Do not try to drive yourself.     · You have symptoms of a stroke. These may include:  ? Sudden numbness, tingling, weakness, or loss of movement in your face, arm, or leg, especially on only one side of your body. ? Sudden vision changes. ? Sudden trouble speaking. ? Sudden confusion or trouble understanding simple statements. ? Sudden problems with walking or balance. ? A sudden, severe headache that is different from past headaches.     · You passed out (lost consciousness) again.    Watch closely for changes in your health, and be sure to contact your doctor if:    · You do not get better as expected. Where can you learn more? Go to http://luz maria-yoselin.info/. Enter F063 in the search box to learn more about \"Fainting: Care Instructions. \"  Current as of: November 20, 2017  Content Version: 11.8  © 3790-7111 [employer]. Care instructions adapted under license by [employer] (which disclaims liability or warranty for this information). If you have questions about a medical condition or this instruction, always ask your healthcare professional. [address] any warranty or liability for your use of this information.

## 2018-10-28 NOTE — ED NOTES
Reviewed discharge instructions with the patient and her daughters. This includes suggested follow-up. Questions encouraged and answered. Patient and family verbalized an understanding

## 2018-10-29 LAB
ATRIAL RATE: 96 BPM
CALCULATED P AXIS, ECG09: 10 DEGREES
CALCULATED R AXIS, ECG10: -2 DEGREES
CALCULATED T AXIS, ECG11: 151 DEGREES
DIAGNOSIS, 93000: NORMAL
P-R INTERVAL, ECG05: 192 MS
Q-T INTERVAL, ECG07: 416 MS
QRS DURATION, ECG06: 154 MS
QTC CALCULATION (BEZET), ECG08: 525 MS
VENTRICULAR RATE, ECG03: 96 BPM

## 2018-10-30 RX ORDER — OMEPRAZOLE 40 MG/1
CAPSULE, DELAYED RELEASE ORAL
Qty: 90 CAP | Refills: 0 | Status: SHIPPED | OUTPATIENT
Start: 2018-10-30 | End: 2019-01-27 | Stop reason: SDUPTHER

## 2018-10-30 RX ORDER — AMLODIPINE BESYLATE 5 MG/1
TABLET ORAL
Qty: 90 TAB | Refills: 0 | Status: SHIPPED | OUTPATIENT
Start: 2018-10-30 | End: 2018-11-07 | Stop reason: ALTCHOICE

## 2018-11-01 ENCOUNTER — OFFICE VISIT (OUTPATIENT)
Dept: INTERNAL MEDICINE CLINIC | Age: 83
End: 2018-11-01

## 2018-11-01 VITALS
SYSTOLIC BLOOD PRESSURE: 120 MMHG | DIASTOLIC BLOOD PRESSURE: 68 MMHG | OXYGEN SATURATION: 97 % | BODY MASS INDEX: 22.63 KG/M2 | HEIGHT: 62 IN | HEART RATE: 87 BPM | TEMPERATURE: 97.8 F | WEIGHT: 123 LBS | RESPIRATION RATE: 12 BRPM

## 2018-11-01 DIAGNOSIS — F32.0 CURRENT MILD EPISODE OF MAJOR DEPRESSIVE DISORDER, UNSPECIFIED WHETHER RECURRENT (HCC): ICD-10-CM

## 2018-11-01 DIAGNOSIS — K21.00 REFLUX ESOPHAGITIS: ICD-10-CM

## 2018-11-01 DIAGNOSIS — I10 ESSENTIAL HYPERTENSION WITH GOAL BLOOD PRESSURE LESS THAN 140/90: ICD-10-CM

## 2018-11-01 DIAGNOSIS — M17.0 PRIMARY OSTEOARTHRITIS OF BOTH KNEES: ICD-10-CM

## 2018-11-01 DIAGNOSIS — R55 SYNCOPE, UNSPECIFIED SYNCOPE TYPE: Primary | ICD-10-CM

## 2018-11-01 RX ORDER — MELOXICAM 15 MG/1
15 TABLET ORAL DAILY
Qty: 90 TAB | Refills: 3 | Status: SHIPPED | OUTPATIENT
Start: 2018-11-01 | End: 2018-11-29

## 2018-11-01 NOTE — PROGRESS NOTES
Renee Perla 10/17/1927, is a 80 y.o. female, who is seen today for reevaluation after recent syncope. The patient passed out under uncertain circumstances about a month ago when her daughter was out of town. She did not know anything about it initially. 4 days ago the patient had gone to [employer] then was with her daughter standing when she started feeling slight nausea and very quickly thereafter slid into her daughter and fell but her daughter broke the fall. She appeared very pale immediately after and was confused for a few minutes as to where she was and what was going on and then was back to normal mentation fairly soon. She has never had problems with feeling palpitations. She is not having no dyspnea or chest pain. She takes all the same medicine. She does have a lot of arthritis in her knees and Advil has not been helping very much, her daughter would like her to try something else and also they do need a new wheelchair, hers is very old and have a in too big for her. She cannot really get around the home some of the time without a wheelchair because of the arthritis and sense of weakness and concerned about passing out again. Past Medical History:  
Diagnosis Date  Anxiety  Arthritis  Depression  GERD (gastroesophageal reflux disease)  HTN (hypertension)  LBBB (left bundle branch block)  Osteoporosis   
 completed 8 years Fosamax  Sciatica  Urinary incontinence Current Outpatient Medications Medication Sig Dispense Refill  meloxicam (MOBIC) 15 mg tablet Take 1 Tab by mouth daily. 90 Tab 3  
 amLODIPine (NORVASC) 5 mg tablet TAKE 1 TABLET BY MOUTH DAILY 90 Tab 0  
 omeprazole (PRILOSEC) 40 mg capsule TAKE 1 CAPSULE BY MOUTH DAILY 90 Cap 0  
 sertraline (ZOLOFT) 50 mg tablet TAKE 1 TABLET BY MOUTH DAILY 90 Tab 0  
 loratadine (CLARITIN) 10 mg tablet Take 10 mg by mouth.  CALCIUM CARBONATE/VITAMIN D3 (CALCIUM 600 + D,3, PO) Take  by mouth.  MULTI-VITAMIN PO Take  by mouth. Visit Vitals /68 (BP 1 Location: Left arm, BP Patient Position: Sitting) Pulse 87 Temp 97.8 °F (36.6 °C) (Oral) Resp 12 Ht 5' 2\" (1.575 m) Wt 123 lb (55.8 kg) SpO2 97% BMI 22.50 kg/m² The above blood pressure was taken after sitting for about 10 minutes. Blood pressure supine soon thereafter was 128/68 and after standing 2 minutes blood pressure was 124/70. When she first laid down and then when she first sat up she felt a spinning type sensation but not really lightheaded. This cleared quickly with no change in position thereafter. Carotids are 2+ without bruits. No JVD or HJR. Lungs are clear to percussion. Good breath sounds with no wheezing or crackles. Heart reveals a regular rhythm with normal S1 and S2 no gallop click or rub. Abdomen is soft and nontender with no hepatosplenomegaly or masses and no bruits. Extremities reveal no clubbing cyanosis or edema. Pulses are 2+, except 1+ in the feet and ankles. Assessment: #1.  2 episodes of syncope within the last month, her second episode was more clearly documented and sounds like it could be vasovagal because of the initial symptoms preceding syncope but she has never had syncope in the past including as a teenager or young adult when blood has been drawn when being upset or in pain or overly hot or cold. She needs further evaluation to investigate the possibility of serious arrhythmia. Nothing was seen on initial evaluation in the emergency room a few days ago I will refer her to a local cardiologist.  #2. Hypertension is very well controlled but with these episodes of syncope for the time being at least we will stop amlodipine.   I told her daughter the amlodipine is very unlikely to be the cause of her syncope but could contribute to the severity and without that medicine she might simply feel lightheaded and not actually pass out. The daughter will be monitoring blood pressure at home, gave her written guidelines on how to monitor blood pressure including being at rest having a arm supported doing 3 readings in a row etc.  #3. History of anxiety and some depression doing well, she will continue sertraline 50 mg daily. #4.  GERD asymptomatic, she will continue omeprazole 40 mg daily. #5.  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 Follow-up will be rescheduled for 3 months from now with lab, rather than the complete evaluation that had been planned for a few days from now. This visit lasted 40 minutes, greater than 50% of the time was spent counseling, explained to the patient and her daughter the differential diagnosis of syncope, various treatments for her arthritic knees, the need to stop amlodipine and how to monitor blood pressure at home. Charan Das, [facility] Please note: This document has been produced using voice recognition software. Unrecognized errors in transcription may be present.

## 2018-11-07 ENCOUNTER — OFFICE VISIT (OUTPATIENT)
Dept: CARDIOLOGY CLINIC | Age: 83
End: 2018-11-07

## 2018-11-07 VITALS
OXYGEN SATURATION: 97 % | DIASTOLIC BLOOD PRESSURE: 86 MMHG | HEIGHT: 62 IN | SYSTOLIC BLOOD PRESSURE: 162 MMHG | WEIGHT: 125 LBS | HEART RATE: 85 BPM | BODY MASS INDEX: 23 KG/M2

## 2018-11-07 DIAGNOSIS — R55 SYNCOPE, UNSPECIFIED SYNCOPE TYPE: Primary | ICD-10-CM

## 2018-11-07 NOTE — PROGRESS NOTES
Kristal Sargent    Chief Complaint   Patient presents with    New Patient     ref by PCP for syncope       HPI    Kristal Sargent is a 80 y.o. with no known cardiac disease who presents for evaluation of syncope. As you know, Lloyd Reyes is such a pleasant patient with history hypertension, arthritis and falls who comes in for evaluation of repeated syncope. Lloyd Reyes lives with her daughter. Back in Sept when her daughter was out of town she apparently had a syncopal event. Her sister was with her at the time and saw her somewhat slumped in her chair. She was slow to respond but came to. Lloyd Reyes wasn't sure what happened and didn't seek medical care after that initial event. Again since then she had another episode. Her daughter said her mother looked unwell/ pale and said her stomach was upset. She again kind of slumped and her daughter caught her so she did not fall/ get injured. She believes she lost consciousness. She came to and felt somewhat fatigued briefly. She had no observed seizure like activity. She completely denies surrounding cardiac complaints with these episodes- specifically has never noticed palpations/ heart racing, no CP, or SOB. No swelling or headaches. She has no known heart problems and has never seen a heart doctor or had cardiac testing. Past Medical History:   Diagnosis Date    Anxiety     Arthritis     Depression     GERD (gastroesophageal reflux disease)     HTN (hypertension)     LBBB (left bundle branch block)     Osteoporosis     completed 8 years Fosamax    Sciatica     Urinary incontinence        Past Surgical History:   Procedure Laterality Date    HX CHOLECYSTECTOMY      HX HYSTERECTOMY      HX ORTHOPAEDIC      fractured right patella surgery    RI ANESTH,SURGERY OF SHOULDER         Current Outpatient Medications   Medication Sig Dispense Refill    meloxicam (MOBIC) 15 mg tablet Take 1 Tab by mouth daily.  90 Tab 3    omeprazole (PRILOSEC) 40 mg capsule TAKE 1 CAPSULE BY MOUTH DAILY 90 Cap 0    sertraline (ZOLOFT) 50 mg tablet TAKE 1 TABLET BY MOUTH DAILY 90 Tab 0    loratadine (CLARITIN) 10 mg tablet Take 10 mg by mouth.  CALCIUM CARBONATE/VITAMIN D3 (CALCIUM 600 + D,3, PO) Take  by mouth.  MULTI-VITAMIN PO Take  by mouth. No Known Allergies    Social History     Socioeconomic History    Marital status:      Spouse name: Not on file    Number of children: Not on file    Years of education: Not on file    Highest education level: Not on file   Social Needs    Financial resource strain: Not on file    Food insecurity - worry: Not on file    Food insecurity - inability: Not on file    Transportation needs - medical: Not on file   [employer] needs - non-medical: Not on file   Occupational History    Not on file   Tobacco Use    Smoking status: Never Smoker    Smokeless tobacco: Never Used   Substance and Sexual Activity    Alcohol use: No    Drug use: No    Sexual activity: Not on file   Other Topics Concern    Not on file   Social History Narrative    Not on file        The patient has a family history of heart problems, but no genetic cardiomyopathies or SCD. Review of Systems    14 pt Review of Systems is negative unless otherwise mentioned in the HPI. Wt Readings from Last 3 Encounters:   11/07/18 56.7 kg (125 lb)   11/01/18 55.8 kg (123 lb)   04/23/18 55.1 kg (121 lb 6.4 oz)     Temp Readings from Last 3 Encounters:   11/01/18 97.8 °F (36.6 °C) (Oral)   04/23/18 97.3 °F (36.3 °C) (Oral)   11/17/17 97.8 °F (36.6 °C) (Oral)     BP Readings from Last 3 Encounters:   11/07/18 162/86   11/01/18 120/68   10/28/18 (!) 146/122     Pulse Readings from Last 3 Encounters:   11/07/18 85   11/01/18 87   10/28/18 81            Physical Exam:    Visit Vitals  /86   Pulse 85   Ht 5' 2\" (1.575 m)   Wt 56.7 kg (125 lb)   SpO2 97%   BMI 22.86 kg/m²      Physical Exam   Constitutional: She is oriented to person, place, and time.    HENT:   Head: Normocephalic and atraumatic. Eyes: EOM are normal. Pupils are equal, round, and reactive to light. Cardiovascular: Normal rate, regular rhythm, normal heart sounds and intact distal pulses. Exam reveals no gallop and no friction rub. No murmur heard. Pulmonary/Chest: Effort normal and breath sounds normal. No respiratory distress. She has no wheezes. She has no rales. She exhibits no tenderness. Abdominal: Soft. Bowel sounds are normal.   Musculoskeletal: She exhibits no edema or tenderness. Neurological: She is alert and oriented to person, place, and time. Skin: Skin is warm and dry. Psychiatric: She has a normal mood and affect. EKG today shows: NSR, normal axis, no ST segment abnormalities. LBBB noted and similar when compared to prior. Impression and Plan:  Pawel Chapin is a 80 y.o. with:    1.) Syncope, mostly likely neurocardiogenic/ vasovagal but r/o structural/ significant arrhtyhmia  2.) HTN, amlodipine held with BP monitoring at home, known  3.) Dyslipidemia, known  4.) Advanced age with falls, known  5.) Normal exam, doubt significant valvular finding/ AS  6.) LBBB, known    1.) 7 day event monitor  2.) 2D echocardiogram  3.) Agree close BP monitoring  4.) Consider cholesterol medication for stroke/ ASCVD prevention ( 2017)    I took the liberty of setting patient up for some noninvasive testing to help r/o significant arrhythmias/ structural heart issues that we could potentially treat medically. I do suspect she is having the \"common faint\" and spent the majority of the time offering suggestions on how to avoid falling if this happens again. We will call patient with the results and hopefully give her reassurance. Thank you for allowing me to participate in the care of your patient, please do not hesitate to call with questions or concerns. Follow-up Disposition:  Return if symptoms worsen or fail to improve.     Kelley Higuera, DO

## 2018-11-20 ENCOUNTER — HOSPITAL ENCOUNTER (OUTPATIENT)
Dept: NON INVASIVE DIAGNOSTICS | Age: 83
Discharge: HOME | End: 2018-11-20
Attending: INTERNAL MEDICINE
Payer: MEDICARE

## 2018-11-20 VITALS
WEIGHT: 125 LBS | BODY MASS INDEX: 23 KG/M2 | SYSTOLIC BLOOD PRESSURE: 162 MMHG | HEIGHT: 62 IN | DIASTOLIC BLOOD PRESSURE: 86 MMHG

## 2018-11-20 DIAGNOSIS — R55 SYNCOPE, UNSPECIFIED SYNCOPE TYPE: ICD-10-CM

## 2018-11-20 LAB
ECHO AO ROOT DIAM: 3.32 CM
ECHO LA AREA 4C: 18.2 CM2
ECHO LA VOL 2C: 87.14 ML (ref 22–52)
ECHO LA VOL 4C: 46.51 ML (ref 22–52)
ECHO LA VOL BP: 70.32 ML (ref 22–52)
ECHO LA VOL/BSA BIPLANE: 44.92 ML/M2 (ref 16–28)
ECHO LA VOLUME INDEX A2C: 55.67 ML/M2 (ref 16–28)
ECHO LA VOLUME INDEX A4C: 29.71 ML/M2 (ref 16–28)
ECHO LV INTERNAL DIMENSION DIASTOLIC: 4.77 CM (ref 3.9–5.3)
ECHO LV INTERNAL DIMENSION SYSTOLIC: 4.28 CM
ECHO LV IVSD: 1.29 CM (ref 0.6–0.9)
ECHO LV MASS 2D: 296.4 G (ref 67–162)
ECHO LV MASS INDEX 2D: 189.3 G/M2 (ref 43–95)
ECHO LV POSTERIOR WALL DIASTOLIC: 1.34 CM (ref 0.6–0.9)
ECHO LVOT DIAM: 1.87 CM
ECHO LVOT PEAK GRADIENT: 2.3 MMHG
ECHO LVOT PEAK VELOCITY: 76.62 CM/S
ECHO LVOT VTI: 17.14 CM
ECHO MV A VELOCITY: 97.45 CM/S
ECHO MV E DECELERATION TIME (DT): 157.7 MS
ECHO MV E VELOCITY: 0.71 CM/S
ECHO MV E/A RATIO: 0.01
ECHO PULMONARY ARTERY SYSTOLIC PRESSURE (PASP): 37 MMHG
ECHO RV INTERNAL DIMENSION: 2.8 CM
ECHO TV REGURGITANT MAX VELOCITY: 292.52 CM/S
ECHO TV REGURGITANT PEAK GRADIENT: 34.2 MMHG

## 2018-11-20 PROCEDURE — 93306 TTE W/DOPPLER COMPLETE: CPT

## 2018-11-21 NOTE — PROGRESS NOTES
Per your last note\" Boni Narvaez is a 80 y.o. with: 
  
1.) Syncope, mostly likely neurocardiogenic/ vasovagal but r/o structural/ significant arrhtyhmia 2.) HTN, amlodipine held with BP monitoring at home, known 
3.) Dyslipidemia, known 
4.) Advanced age with falls, known 
5.) Normal exam, doubt significant valvular finding/ AS 
6.) LBBB, known 
  
1.) 7 day event monitor 
2.) 2D echocardiogram 
3.) Agree close BP monitoring 
4.) Consider cholesterol medication for stroke/ ASCVD prevention ( 2017) 
  
I took the liberty of setting patient up for some noninvasive testing to help r/o significant arrhythmias/ structural heart issues that we could potentially treat medically. I do suspect she is having the \"common faint\" and spent the majority of the time offering suggestions on how to avoid falling if this happens again. We will call patient with the results and hopefully give her reassurance.

## 2018-11-23 ENCOUNTER — TELEPHONE (OUTPATIENT)
Dept: CARDIOLOGY | Age: 83
End: 2018-11-23

## 2018-11-23 NOTE — TELEPHONE ENCOUNTER
I called patient's daughter Jh Tan (who she lives with). Discussed results of the echo. 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 I will have them come back for a follow up appt and likely start medical therapy (and will hopefully have results of her monitor then as well). Daughter agreed and understood. ----- Message from Valeria Díaz LPN sent at 38/82/1164 12:27 PM EST -----  Per your last note\" Joe Snider is a 80 y.o. with:     1.) Syncope, mostly likely neurocardiogenic/ vasovagal but r/o structural/ significant arrhtyhmia  2.) HTN, amlodipine held with BP monitoring at home, known  3.) Dyslipidemia, known  4.) Advanced age with falls, known  5.) Normal exam, doubt significant valvular finding/ AS  6.) LBBB, known     1.) 7 day event monitor  2.) 2D echocardiogram  3.) Agree close BP monitoring  4.) Consider cholesterol medication for stroke/ ASCVD prevention ( 2017)     I took the liberty of setting patient up for some noninvasive testing to help r/o significant arrhythmias/ structural heart issues that we could potentially treat medically. I do suspect she is having the \"common faint\" and spent the majority of the time offering suggestions on how to avoid falling if this happens again.  We will call patient with the results and hopefully give her reassurance.

## 2018-11-24 ENCOUNTER — HOSPITAL ENCOUNTER (INPATIENT)
Age: 83
LOS: 5 days | Discharge: HOME HEALTH | DRG: 243 | End: 2018-11-29
Attending: EMERGENCY MEDICINE | Admitting: INTERNAL MEDICINE
Payer: MEDICARE

## 2018-11-24 ENCOUNTER — APPOINTMENT (OUTPATIENT)
Dept: GENERAL RADIOLOGY | Age: 83
DRG: 243 | End: 2018-11-24
Attending: PHYSICIAN ASSISTANT
Payer: MEDICARE

## 2018-11-24 DIAGNOSIS — N39.0 ACUTE UTI: ICD-10-CM

## 2018-11-24 DIAGNOSIS — I50.21 CHF (CONGESTIVE HEART FAILURE), NYHA CLASS III, ACUTE, SYSTOLIC (HCC): Primary | ICD-10-CM

## 2018-11-24 DIAGNOSIS — R55 SYNCOPE, UNSPECIFIED SYNCOPE TYPE: ICD-10-CM

## 2018-11-24 DIAGNOSIS — Z95.0 S/P CARDIAC PACEMAKER PROCEDURE: ICD-10-CM

## 2018-11-24 PROBLEM — R39.81 FUNCTIONAL URINARY INCONTINENCE: Status: ACTIVE | Noted: 2018-11-24

## 2018-11-24 PROBLEM — J81.0 ACUTE PULMONARY EDEMA (HCC): Status: ACTIVE | Noted: 2018-11-24

## 2018-11-24 LAB
ANION GAP SERPL CALC-SCNC: 5 MMOL/L (ref 3–18)
APPEARANCE UR: CLEAR
ATRIAL RATE: 95 BPM
BACTERIA URNS QL MICRO: ABNORMAL /HPF
BASOPHILS # BLD: 0 K/UL (ref 0–0.1)
BASOPHILS NFR BLD: 0 % (ref 0–2)
BILIRUB UR QL: NEGATIVE
BNP SERPL-MCNC: 7192 PG/ML (ref 0–1800)
BUN SERPL-MCNC: 19 MG/DL (ref 7–18)
BUN/CREAT SERPL: 18 (ref 12–20)
CALCIUM SERPL-MCNC: 9 MG/DL (ref 8.5–10.1)
CALCULATED P AXIS, ECG09: -7 DEGREES
CALCULATED R AXIS, ECG10: -28 DEGREES
CALCULATED T AXIS, ECG11: 141 DEGREES
CHLORIDE SERPL-SCNC: 106 MMOL/L (ref 100–108)
CHOLEST SERPL-MCNC: 204 MG/DL
CK MB CFR SERPL CALC: 4.1 % (ref 0–4)
CK MB CFR SERPL CALC: 4.4 % (ref 0–4)
CK MB SERPL-MCNC: 2.2 NG/ML (ref 5–25)
CK MB SERPL-MCNC: 2.4 NG/ML (ref 5–25)
CK SERPL-CCNC: 54 U/L (ref 26–192)
CK SERPL-CCNC: 54 U/L (ref 26–192)
CO2 SERPL-SCNC: 28 MMOL/L (ref 21–32)
COLOR UR: YELLOW
CREAT SERPL-MCNC: 1.08 MG/DL (ref 0.6–1.3)
DIAGNOSIS, 93000: NORMAL
DIFFERENTIAL METHOD BLD: ABNORMAL
EOSINOPHIL # BLD: 0.3 K/UL (ref 0–0.4)
EOSINOPHIL NFR BLD: 5 % (ref 0–5)
EPITH CASTS URNS QL MICRO: ABNORMAL /LPF (ref 0–5)
ERYTHROCYTE [DISTWIDTH] IN BLOOD BY AUTOMATED COUNT: 14.8 % (ref 11.6–14.5)
GLUCOSE SERPL-MCNC: 93 MG/DL (ref 74–99)
GLUCOSE UR STRIP.AUTO-MCNC: NEGATIVE MG/DL
HCT VFR BLD AUTO: 41.9 % (ref 35–45)
HDLC SERPL-MCNC: 59 MG/DL (ref 40–60)
HDLC SERPL: 3.5 {RATIO} (ref 0–5)
HGB BLD-MCNC: 13.8 G/DL (ref 12–16)
HGB UR QL STRIP: ABNORMAL
INR PPP: 1 (ref 0.8–1.2)
KETONES UR QL STRIP.AUTO: NEGATIVE MG/DL
LACTATE BLD-SCNC: 0.88 MMOL/L (ref 0.4–2)
LACTATE SERPL-SCNC: 1.2 MMOL/L (ref 0.4–2)
LDLC SERPL CALC-MCNC: 129.2 MG/DL (ref 0–100)
LEUKOCYTE ESTERASE UR QL STRIP.AUTO: ABNORMAL
LIPID PROFILE,FLP: ABNORMAL
LYMPHOCYTES # BLD: 2.3 K/UL (ref 0.9–3.6)
LYMPHOCYTES NFR BLD: 32 % (ref 21–52)
MCH RBC QN AUTO: 27.9 PG (ref 24–34)
MCHC RBC AUTO-ENTMCNC: 32.9 G/DL (ref 31–37)
MCV RBC AUTO: 84.8 FL (ref 74–97)
MONOCYTES # BLD: 0.7 K/UL (ref 0.05–1.2)
MONOCYTES NFR BLD: 10 % (ref 3–10)
NEUTS SEG # BLD: 3.9 K/UL (ref 1.8–8)
NEUTS SEG NFR BLD: 53 % (ref 40–73)
NITRITE UR QL STRIP.AUTO: POSITIVE
P-R INTERVAL, ECG05: 188 MS
PH UR STRIP: 5 [PH] (ref 5–8)
PLATELET # BLD AUTO: 242 K/UL (ref 135–420)
PMV BLD AUTO: 9.6 FL (ref 9.2–11.8)
POTASSIUM SERPL-SCNC: 4.1 MMOL/L (ref 3.5–5.5)
PROT UR STRIP-MCNC: NEGATIVE MG/DL
PROTHROMBIN TIME: 12.9 SEC (ref 11.5–15.2)
Q-T INTERVAL, ECG07: 424 MS
QRS DURATION, ECG06: 148 MS
QTC CALCULATION (BEZET), ECG08: 532 MS
RBC # BLD AUTO: 4.94 M/UL (ref 4.2–5.3)
RBC #/AREA URNS HPF: ABNORMAL /HPF (ref 0–5)
SODIUM SERPL-SCNC: 139 MMOL/L (ref 136–145)
SP GR UR REFRACTOMETRY: 1.01 (ref 1–1.03)
TRIGL SERPL-MCNC: 79 MG/DL (ref ?–150)
TROPONIN I SERPL-MCNC: 0.1 NG/ML (ref 0–0.04)
TROPONIN I SERPL-MCNC: 0.1 NG/ML (ref 0–0.04)
UROBILINOGEN UR QL STRIP.AUTO: 0.2 EU/DL (ref 0.2–1)
VENTRICULAR RATE, ECG03: 95 BPM
VLDLC SERPL CALC-MCNC: 15.8 MG/DL
WBC # BLD AUTO: 7.2 K/UL (ref 4.6–13.2)
WBC URNS QL MICRO: ABNORMAL /HPF (ref 0–5)

## 2018-11-24 PROCEDURE — 36415 COLL VENOUS BLD VENIPUNCTURE: CPT

## 2018-11-24 PROCEDURE — 82553 CREATINE MB FRACTION: CPT

## 2018-11-24 PROCEDURE — 80061 LIPID PANEL: CPT

## 2018-11-24 PROCEDURE — 81001 URINALYSIS AUTO W/SCOPE: CPT

## 2018-11-24 PROCEDURE — 83880 ASSAY OF NATRIURETIC PEPTIDE: CPT

## 2018-11-24 PROCEDURE — 83605 ASSAY OF LACTIC ACID: CPT

## 2018-11-24 PROCEDURE — 94761 N-INVAS EAR/PLS OXIMETRY MLT: CPT

## 2018-11-24 PROCEDURE — 87077 CULTURE AEROBIC IDENTIFY: CPT

## 2018-11-24 PROCEDURE — 87186 SC STD MICRODIL/AGAR DIL: CPT

## 2018-11-24 PROCEDURE — 74011250636 HC RX REV CODE- 250/636: Performed by: INTERNAL MEDICINE

## 2018-11-24 PROCEDURE — 74011250637 HC RX REV CODE- 250/637: Performed by: INTERNAL MEDICINE

## 2018-11-24 PROCEDURE — 65660000000 HC RM CCU STEPDOWN

## 2018-11-24 PROCEDURE — 87040 BLOOD CULTURE FOR BACTERIA: CPT

## 2018-11-24 PROCEDURE — 93005 ELECTROCARDIOGRAM TRACING: CPT

## 2018-11-24 PROCEDURE — 74011250636 HC RX REV CODE- 250/636: Performed by: EMERGENCY MEDICINE

## 2018-11-24 PROCEDURE — 80048 BASIC METABOLIC PNL TOTAL CA: CPT

## 2018-11-24 PROCEDURE — 99283 EMERGENCY DEPT VISIT LOW MDM: CPT

## 2018-11-24 PROCEDURE — 74011000250 HC RX REV CODE- 250: Performed by: INTERNAL MEDICINE

## 2018-11-24 PROCEDURE — 85610 PROTHROMBIN TIME: CPT

## 2018-11-24 PROCEDURE — 71045 X-RAY EXAM CHEST 1 VIEW: CPT

## 2018-11-24 PROCEDURE — 87086 URINE CULTURE/COLONY COUNT: CPT

## 2018-11-24 PROCEDURE — 85025 COMPLETE CBC W/AUTO DIFF WBC: CPT

## 2018-11-24 PROCEDURE — 99284 EMERGENCY DEPT VISIT MOD MDM: CPT

## 2018-11-24 RX ORDER — NALOXONE HYDROCHLORIDE 0.4 MG/ML
0.4 INJECTION, SOLUTION INTRAMUSCULAR; INTRAVENOUS; SUBCUTANEOUS AS NEEDED
Status: DISCONTINUED | OUTPATIENT
Start: 2018-11-24 | End: 2018-11-29 | Stop reason: HOSPADM

## 2018-11-24 RX ORDER — DOCUSATE SODIUM 100 MG/1
100 CAPSULE, LIQUID FILLED ORAL
Status: DISCONTINUED | OUTPATIENT
Start: 2018-11-24 | End: 2018-11-29 | Stop reason: HOSPADM

## 2018-11-24 RX ORDER — GUAIFENESIN 100 MG/5ML
81 LIQUID (ML) ORAL DAILY
Status: DISCONTINUED | OUTPATIENT
Start: 2018-11-25 | End: 2018-11-29 | Stop reason: HOSPADM

## 2018-11-24 RX ORDER — CARVEDILOL 3.12 MG/1
3.12 TABLET ORAL EVERY 12 HOURS
Status: DISCONTINUED | OUTPATIENT
Start: 2018-11-24 | End: 2018-11-27

## 2018-11-24 RX ORDER — SODIUM CHLORIDE 0.9 % (FLUSH) 0.9 %
5-10 SYRINGE (ML) INJECTION AS NEEDED
Status: DISCONTINUED | OUTPATIENT
Start: 2018-11-24 | End: 2018-11-29 | Stop reason: HOSPADM

## 2018-11-24 RX ORDER — ACETAMINOPHEN 325 MG/1
650 TABLET ORAL
Status: DISCONTINUED | OUTPATIENT
Start: 2018-11-24 | End: 2018-11-29 | Stop reason: HOSPADM

## 2018-11-24 RX ORDER — ATORVASTATIN CALCIUM 10 MG/1
10 TABLET, FILM COATED ORAL
Status: DISCONTINUED | OUTPATIENT
Start: 2018-11-24 | End: 2018-11-29 | Stop reason: HOSPADM

## 2018-11-24 RX ORDER — SERTRALINE HYDROCHLORIDE 50 MG/1
50 TABLET, FILM COATED ORAL DAILY
Status: DISCONTINUED | OUTPATIENT
Start: 2018-11-25 | End: 2018-11-29 | Stop reason: HOSPADM

## 2018-11-24 RX ORDER — SPIRONOLACTONE 25 MG/1
25 TABLET ORAL DAILY
Status: DISCONTINUED | OUTPATIENT
Start: 2018-11-24 | End: 2018-11-27

## 2018-11-24 RX ORDER — MELOXICAM 7.5 MG/1
15 TABLET ORAL DAILY
Status: DISCONTINUED | OUTPATIENT
Start: 2018-11-25 | End: 2018-11-29 | Stop reason: HOSPADM

## 2018-11-24 RX ORDER — ONDANSETRON 2 MG/ML
4 INJECTION INTRAMUSCULAR; INTRAVENOUS
Status: DISCONTINUED | OUTPATIENT
Start: 2018-11-24 | End: 2018-11-29 | Stop reason: HOSPADM

## 2018-11-24 RX ORDER — OXYCODONE AND ACETAMINOPHEN 5; 325 MG/1; MG/1
1 TABLET ORAL
Status: DISCONTINUED | OUTPATIENT
Start: 2018-11-24 | End: 2018-11-29 | Stop reason: HOSPADM

## 2018-11-24 RX ORDER — LISINOPRIL 10 MG/1
10 TABLET ORAL DAILY
Status: DISCONTINUED | OUTPATIENT
Start: 2018-11-25 | End: 2018-11-25

## 2018-11-24 RX ORDER — PANTOPRAZOLE SODIUM 40 MG/1
40 TABLET, DELAYED RELEASE ORAL
Status: DISCONTINUED | OUTPATIENT
Start: 2018-11-25 | End: 2018-11-29 | Stop reason: HOSPADM

## 2018-11-24 RX ORDER — HEPARIN SODIUM 5000 [USP'U]/ML
5000 INJECTION, SOLUTION INTRAVENOUS; SUBCUTANEOUS EVERY 8 HOURS
Status: DISPENSED | OUTPATIENT
Start: 2018-11-24 | End: 2018-11-27

## 2018-11-24 RX ORDER — FUROSEMIDE 10 MG/ML
40 INJECTION INTRAMUSCULAR; INTRAVENOUS EVERY 12 HOURS
Status: DISCONTINUED | OUTPATIENT
Start: 2018-11-24 | End: 2018-11-25

## 2018-11-24 RX ORDER — LEVOFLOXACIN 5 MG/ML
750 INJECTION, SOLUTION INTRAVENOUS EVERY 24 HOURS
Status: DISCONTINUED | OUTPATIENT
Start: 2018-11-24 | End: 2018-11-24

## 2018-11-24 RX ORDER — FUROSEMIDE 10 MG/ML
40 INJECTION INTRAMUSCULAR; INTRAVENOUS
Status: COMPLETED | OUTPATIENT
Start: 2018-11-24 | End: 2018-11-24

## 2018-11-24 RX ORDER — IPRATROPIUM BROMIDE AND ALBUTEROL SULFATE 2.5; .5 MG/3ML; MG/3ML
3 SOLUTION RESPIRATORY (INHALATION)
Status: DISCONTINUED | OUTPATIENT
Start: 2018-11-24 | End: 2018-11-29 | Stop reason: HOSPADM

## 2018-11-24 RX ADMIN — HEPARIN SODIUM 5000 UNITS: 5000 INJECTION, SOLUTION INTRAVENOUS; SUBCUTANEOUS at 16:08

## 2018-11-24 RX ADMIN — FUROSEMIDE 40 MG: 10 INJECTION, SOLUTION INTRAMUSCULAR; INTRAVENOUS at 23:30

## 2018-11-24 RX ADMIN — CARVEDILOL 3.12 MG: 6.25 TABLET, FILM COATED ORAL at 18:42

## 2018-11-24 RX ADMIN — FUROSEMIDE 40 MG: 10 INJECTION, SOLUTION INTRAMUSCULAR; INTRAVENOUS at 15:57

## 2018-11-24 RX ADMIN — CEFTRIAXONE SODIUM 2 G: 2 INJECTION, POWDER, FOR SOLUTION INTRAMUSCULAR; INTRAVENOUS at 15:50

## 2018-11-24 RX ADMIN — ATORVASTATIN CALCIUM 10 MG: 10 TABLET, FILM COATED ORAL at 23:29

## 2018-11-24 RX ADMIN — SPIRONOLACTONE 25 MG: 25 TABLET ORAL at 18:40

## 2018-11-24 RX ADMIN — SPIRONOLACTONE 25 MG: 25 TABLET ORAL at 18:43

## 2018-11-24 RX ADMIN — HEPARIN SODIUM 5000 UNITS: 5000 INJECTION, SOLUTION INTRAVENOUS; SUBCUTANEOUS at 23:31

## 2018-11-24 NOTE — ED PROVIDER NOTES
EMERGENCY DEPARTMENT HISTORY AND PHYSICAL EXAM    1:40 PM      Date: 11/24/2018  Patient Name: Reba Alvarado    History of Presenting Illness     Chief Complaint   Patient presents with    Shortness of Breath         History Provided By: Patient and Patient's Daughter    Chief Complaint: shortness of breath  Duration:  Days  Timing:  Acute  Location: respiratory   Quality: fullness  Severity: Moderate  Modifying Factors: none   Associated Symptoms: sensation of fullness in chest and abdomen area, decreased appetite      Additional History (Context): Reba Alvarado is a 80 y.o. female with Anxiety, HTN, GERD, and LBBB presenting to the ED with nicholas for evaluation of shortness of breath onset 3 days ago. Associated sensation of fullness in chest and abdomen area, decreased appetite. Denies nausea, vomiting, diarrhea. Daughter states that the Pt has not been eating as much as she usually does. Daughter was contacted by cardiology w/ results of recent echocardiogram. Daughter was told that the study revealed that the Pt's heart is not pumping the way it is supposed to. Pt started complaining of difficulty breathing. Pt also started experience a, \"fullness sensation,\" from her chest to her abdomen. Pt says that she has not been eating regularly, because it feels like the food is not going all the way down to her stomach. Pt clarified that it does not feel like she is choking when this happens.      Cardiologist: Konrad Rodgers DO    PCP: Ashly Woodward MD    Current Facility-Administered Medications   Medication Dose Route Frequency Provider Last Rate Last Dose    furosemide (LASIX) injection 40 mg  40 mg IntraVENous NOW Edu Rodriguez DO        sodium chloride (NS) flush 5-10 mL  5-10 mL IntraVENous PRN Kiana Rodriguez DO        sodium chloride 0.9 % bolus infusion 1,000 mL  1,000 mL IntraVENous ONCE Edu Rodriguez DO        cefTRIAXone (ROCEPHIN) 2 g in sterile water (preservative free) 20 mL IV syringe  2 g IntraVENous Q24H Grace Beltrán MD        [START ON 11/25/2018] meloxicam (MOBIC) tablet 15 mg  15 mg Oral DAILY Grace Beltrán MD        [START ON 11/25/2018] pantoprazole (PROTONIX) tablet 40 mg  40 mg Oral ACB Grace Beltrán MD        [START ON 11/25/2018] sertraline (ZOLOFT) tablet 50 mg  50 mg Oral DAILY Grace Beltrán MD        furosemide (LASIX) injection 40 mg  40 mg IntraVENous Q12H Grace Beltrán MD        [START ON 11/25/2018] aspirin chewable tablet 81 mg  81 mg Oral DAILY Grace Beltrán MD        [START ON 11/25/2018] lisinopril (PRINIVIL, ZESTRIL) tablet 10 mg  10 mg Oral DAILY Grace Beltrán MD        acetaminophen (TYLENOL) tablet 650 mg  650 mg Oral Q6H PRN Grace Beltrán MD        oxyCODONE-acetaminophen (PERCOCET) 5-325 mg per tablet 1 Tab  1 Tab Oral Q6H PRN Grace Beltrán MD        naloxone [facility]) injection 0.4 mg  0.4 mg IntraVENous PRN Grace Beltrán MD        ondansetron [facility]) injection 4 mg  4 mg IntraVENous Q6H PRN Grace Beltrán MD        docusate sodium (COLACE) capsule 100 mg  100 mg Oral BID PRN Grace Beltrán MD        heparin (porcine) injection 5,000 Units  5,000 Units SubCUTAneous Q8H Yue Chen MD        atorvastatin (LIPITOR) tablet 10 mg  10 mg Oral QHS Grace Beltrán MD         Current Outpatient Medications   Medication Sig Dispense Refill    meloxicam (MOBIC) 15 mg tablet Take 1 Tab by mouth daily. 90 Tab 3    omeprazole (PRILOSEC) 40 mg capsule TAKE 1 CAPSULE BY MOUTH DAILY 90 Cap 0    sertraline (ZOLOFT) 50 mg tablet TAKE 1 TABLET BY MOUTH DAILY 90 Tab 0    loratadine (CLARITIN) 10 mg tablet Take 10 mg by mouth.  CALCIUM CARBONATE/VITAMIN D3 (CALCIUM 600 + D,3, PO) Take  by mouth.  MULTI-VITAMIN PO Take  by mouth.          Past History     Past Medical History:  Past Medical History:   Diagnosis Date    Anxiety     Arthritis     Depression     GERD (gastroesophageal reflux disease)     HTN (hypertension)     LBBB (left bundle branch block)     Osteoporosis     completed 8 years Fosamax    Sciatica     Urinary incontinence        Past Surgical History:  Past Surgical History:   Procedure Laterality Date    HX CHOLECYSTECTOMY      HX HYSTERECTOMY      HX ORTHOPAEDIC      fractured right patella surgery    OH ANESTH,SURGERY OF SHOULDER         Family History:  Family History   Problem Relation Age of Onset    Heart Disease Mother     Diabetes Mother     Heart Disease Father     Diabetes Father     Hypertension Sister     Diabetes Sister     Heart Disease Brother     Heart Disease Sister     Diabetes Sister     Heart Disease Brother        Social History:  Social History     Tobacco Use    Smoking status: Never Smoker    Smokeless tobacco: Never Used   Substance Use Topics    Alcohol use: No    Drug use: No       Allergies:  No Known Allergies      Review of Systems       Review of Systems   Constitutional: Positive for appetite change (decrease). Negative for chills, diaphoresis and fever. HENT: Negative. Negative for congestion, rhinorrhea and sore throat. Eyes: Negative. Negative for pain, discharge and redness. Respiratory: Positive for shortness of breath. Negative for cough, chest tightness and wheezing. Cardiovascular: Negative. Negative for chest pain. Gastrointestinal: Negative. Negative for abdominal pain, constipation, diarrhea, nausea and vomiting. Feels like her food is not going all the way to her stomach. Denies choking. Genitourinary: Negative. Negative for dysuria, flank pain, frequency, hematuria and urgency. Musculoskeletal: Negative. Negative for back pain and neck pain. Skin: Negative. Negative for rash. Neurological: Negative. Negative for syncope, weakness, numbness and headaches. Psychiatric/Behavioral: Negative. All other systems reviewed and are negative.         Physical Exam     Visit Vitals  /83   Pulse 90   Temp 97.2 °F (36.2 °C)   Resp 16   Ht 5' 2\" (1.575 m)   Wt 56.7 kg (125 lb 0 oz) Comment: From 11/8 office visit   SpO2 99%   BMI 22.86 kg/m²         Physical Exam   Constitutional: She appears well-developed and well-nourished. Non-toxic appearance. She does not have a sickly appearance. She does not appear ill. No distress. HENT:   Head: Normocephalic and atraumatic. Mouth/Throat: Oropharynx is clear and moist. No oropharyngeal exudate. Eyes: Conjunctivae and EOM are normal. Pupils are equal, round, and reactive to light. No scleral icterus. Neck: Trachea normal and normal range of motion. Neck supple. No hepatojugular reflux and no JVD present. No tracheal deviation present. No thyromegaly present. Cardiovascular: Normal rate, regular rhythm, S1 normal, S2 normal, normal heart sounds, intact distal pulses and normal pulses. Exam reveals no gallop, no S3 and no S4. No murmur heard. Pulses:       Radial pulses are 2+ on the right side, and 2+ on the left side. Dorsalis pedis pulses are 2+ on the right side, and 2+ on the left side. Pulmonary/Chest: Effort normal. No accessory muscle usage. No tachypnea. No respiratory distress. She has decreased breath sounds in the right lower field and the left lower field. She has no wheezes. She has no rhonchi. She has rales in the right upper field, the right middle field, the left upper field and the left middle field. Abdominal: Soft. Normal appearance and bowel sounds are normal. She exhibits no distension and no mass. There is no hepatosplenomegaly. There is no tenderness. There is no rigidity, no rebound, no guarding, no CVA tenderness, no tenderness at McBurney's point and negative Simon's sign. Musculoskeletal: Normal range of motion. She exhibits no edema or tenderness.    Strength 4/5 throughout    Lymphadenopathy:        Head (right side): No submental, no submandibular, no preauricular and no occipital adenopathy present. Head (left side): No submental, no submandibular, no preauricular and no occipital adenopathy present. She has no cervical adenopathy. Right: No supraclavicular adenopathy present. Left: No supraclavicular adenopathy present. Neurological: She is alert. She has normal strength and normal reflexes. She is not disoriented. No cranial nerve deficit or sensory deficit. Coordination and gait normal. GCS eye subscore is 4. GCS verbal subscore is 5. GCS motor subscore is 6. Grossly intact    Skin: Skin is warm, dry and intact. No rash noted. She is not diaphoretic. Psychiatric: She has a normal mood and affect. Her speech is normal and behavior is normal. Judgment and thought content normal. Cognition and memory are normal.   Nursing note and vitals reviewed.         Diagnostic Study Results     Labs -  Recent Results (from the past 12 hour(s))   EKG, 12 LEAD, INITIAL    Collection Time: 11/24/18 12:15 PM   Result Value Ref Range    Ventricular Rate 95 BPM    Atrial Rate 95 BPM    P-R Interval 188 ms    QRS Duration 148 ms    Q-T Interval 424 ms    QTC Calculation (Bezet) 532 ms    Calculated P Axis -7 degrees    Calculated R Axis -28 degrees    Calculated T Axis 141 degrees    Diagnosis       Normal sinus rhythm  Left bundle branch block  Abnormal ECG  When compared with ECG of 28-OCT-2018 15:26,  No significant change was found    Confirmed by Beatriz Rand MD, ----- ([id number]) on 11/24/2018 3:03:17 PM     CBC WITH AUTOMATED DIFF    Collection Time: 11/24/18 12:20 PM   Result Value Ref Range    WBC 7.2 4.6 - 13.2 K/uL    RBC 4.94 4.20 - 5.30 M/uL    HGB 13.8 12.0 - 16.0 g/dL    HCT 41.9 35.0 - 45.0 %    MCV 84.8 74.0 - 97.0 FL    MCH 27.9 24.0 - 34.0 PG    MCHC 32.9 31.0 - 37.0 g/dL    RDW 14.8 (H) 11.6 - 14.5 %    PLATELET 408 501 - 805 K/uL    MPV 9.6 9.2 - 11.8 FL    NEUTROPHILS 53 40 - 73 %    LYMPHOCYTES 32 21 - 52 %    MONOCYTES 10 3 - 10 %    EOSINOPHILS 5 0 - 5 % BASOPHILS 0 0 - 2 %    ABS. NEUTROPHILS 3.9 1.8 - 8.0 K/UL    ABS. LYMPHOCYTES 2.3 0.9 - 3.6 K/UL    ABS. MONOCYTES 0.7 0.05 - 1.2 K/UL    ABS. EOSINOPHILS 0.3 0.0 - 0.4 K/UL    ABS.  BASOPHILS 0.0 0.0 - 0.1 K/UL    DF AUTOMATED     METABOLIC PANEL, BASIC    Collection Time: 11/24/18 12:20 PM   Result Value Ref Range    Sodium 139 136 - 145 mmol/L    Potassium 4.1 3.5 - 5.5 mmol/L    Chloride 106 100 - 108 mmol/L    CO2 28 21 - 32 mmol/L    Anion gap 5 3.0 - 18 mmol/L    Glucose 93 74 - 99 mg/dL    BUN 19 (H) 7.0 - 18 MG/DL    Creatinine 1.08 0.6 - 1.3 MG/DL    BUN/Creatinine ratio 18 12 - 20      GFR est AA 58 (L) >60 ml/min/1.73m2    GFR est non-AA 48 (L) >60 ml/min/1.73m2    Calcium 9.0 8.5 - 10.1 MG/DL   CARDIAC PANEL,(CK, CKMB & TROPONIN)    Collection Time: 11/24/18 12:20 PM   Result Value Ref Range    CK 54 26 - 192 U/L    CK - MB 2.4 <3.6 ng/ml    CK-MB Index 4.4 (H) 0.0 - 4.0 %    Troponin-I, Qt. 0.10 (H) 0.0 - 0.045 NG/ML   NT-PRO BNP    Collection Time: 11/24/18 12:20 PM   Result Value Ref Range    NT pro-BNP 7,192 (H) 0 - 1,800 PG/ML   PROTHROMBIN TIME + INR    Collection Time: 11/24/18 12:20 PM   Result Value Ref Range    Prothrombin time 12.9 11.5 - 15.2 sec    INR 1.0 0.8 - 1.2     URINALYSIS W/ RFLX MICROSCOPIC    Collection Time: 11/24/18  1:40 PM   Result Value Ref Range    Color YELLOW      Appearance CLEAR      Specific gravity 1.007 1.005 - 1.030      pH (UA) 5.0 5.0 - 8.0      Protein NEGATIVE  NEG mg/dL    Glucose NEGATIVE  NEG mg/dL    Ketone NEGATIVE  NEG mg/dL    Bilirubin NEGATIVE  NEG      Blood TRACE (A) NEG      Urobilinogen 0.2 0.2 - 1.0 EU/dL    Nitrites POSITIVE (A) NEG      Leukocyte Esterase MODERATE (A) NEG     URINE MICROSCOPIC ONLY    Collection Time: 11/24/18  1:40 PM   Result Value Ref Range    WBC 15 to 20 0 - 5 /hpf    RBC 0 to 2 0 - 5 /hpf    Epithelial cells FEW 0 - 5 /lpf    Bacteria 4+ (A) NEG /hpf       Radiologic Studies -   Chest XR:   Impression: Worsened congestion. Medical Decision Making   Provider Notes (Medical Decision Making):  MDM  Number of Diagnoses or Management Options  Acute UTI:   CHF (congestive heart failure), NYHA class III, acute, systolic ([address]):   Diagnosis management comments: Shortness of breath etiologies include chronic obstructive pulmonary disease (COPD), acute asthma exacerbation, congestive heart failure, pneumonia, acute bronchitis, pulmonary embolism, upper respiratory infection, cardiac event to include acute coronary syndrome, acute myocardial infarction or a combination of the above (ex URI on top of COPD thus causing respiratory distress). I am the first provider for this patient. I reviewed the vital signs, available nursing notes, past medical history, past surgical history, family history and social history. Vital Signs-Reviewed the patient's vital signs. Records Reviewed: Nursing Notes and Old Medical Records (Time of Review: 1:40 PM)    ED Course: Progress Notes, Reevaluation, and Consults:    Labs essentially normal with the exception of BNP of 7,192 and  Troponin of 0.10 . Chest X-Ray showed moderate to severe vascular congestion throughout. UA positive for nitrites and leukocytes. EKG showed NSR with rate of 95 bpm with LBBB. No ST elevations or depression and non specific T wave changes. 1:40 PM 11/24/2018     Consult:  Discussed care with Dr. Simone Constantino, hospitalist. Standard discussion; including history of patients chief complaint, available diagnostic results, and treatment course. Agrees to admit. 2:17 PM    Consult:  Discussed care with Dr. Carey, cardiologist. Standard discussion; including history of patients chief complaint, available diagnostic results, and treatment course. Agrees to see Pt in ED.   2:46 PM        For Hospitalized Patients:    1.  Hospitalization Decision Time:  The decision to hospitalize the patient was made by Dr. Elise Jorge at [phone] on 11/24/2018    Diagnosis      Clinical Impression:   1. CHF (congestive heart failure), NYHA class III, acute, systolic ([address]    2. Acute UTI        Disposition: Admit     _______________________________    Attestations:  Scribe Attestation     Richieferdinand Montero acting as a scribe for and in the presence of Abad Rodriguez DO      November 24, 2018 at 1:40 PM       Provider Attestation:      I personally performed the services described in the documentation, reviewed the documentation, as recorded by the scribe in my presence, and it accurately and completely records my words and actions.  November 24, 2018 at 1:40 PM - Abad Rodriguez DO    _______________________________

## 2018-11-24 NOTE — Clinical Note
TRANSFER - IN REPORT:  
 
Verbal report received from: [address]par[address] Kayode Randy. Report consisted of patient's Situation, Background, Assessment and  
Recommendations(SBAR). Opportunity for questions and clarification was provided. Assessment completed upon patient's arrival to unit and care assumed. Patient transported with a Cardiac Cath Tech / Patient Care Tech.

## 2018-11-24 NOTE — ED TRIAGE NOTES
Patient complains of SOB and chest discomfort that started on Thursday night. Patient has a cardiac hx.

## 2018-11-24 NOTE — Clinical Note
A Bovie was used. Blend setting: low. Mode: monopolar. Site (pad location): anterior thigh. Laterality: right.

## 2018-11-24 NOTE — Clinical Note
TRANSFER - OUT REPORT:  
 
Verbal report given to: Obdulio COLE. Report consisted of patient's Situation, Background, Assessment and  
Recommendations(SBAR). Opportunity for questions and clarification was provided. Patient transported with a Cardiac Cath Tech / Patient Care Tech.

## 2018-11-24 NOTE — ROUTINE PROCESS
TRANSFER - OUT REPORT:    Verbal report given to Marko Mcgowan RN (name) on Seferino Shaver  being transferred to [address] [place] [address] (unit) for routine progression of care       Report consisted of patients Situation, Background, Assessment and   Recommendations(SBAR). Information from the following report(s) ED Summary and MAR was reviewed with the receiving nurse. Lines:   Peripheral IV 11/24/18 Right Antecubital (Active)   Site Assessment Clean, dry, & intact 11/24/2018  1:38 PM   Phlebitis Assessment 0 11/24/2018  1:38 PM   Infiltration Assessment 0 11/24/2018  1:38 PM   Dressing Status Clean, dry, & intact 11/24/2018  1:38 PM   Dressing Type 4 X 4;Tape;Transparent 11/24/2018  1:38 PM   Hub Color/Line Status Pink;Patent 11/24/2018  1:38 PM   Action Taken Blood drawn 11/24/2018  1:38 PM   Alcohol Cap Used No 11/24/2018  1:38 PM        Opportunity for questions and clarification was provided.       Patient transported with:   [facility]

## 2018-11-24 NOTE — Clinical Note
Contrast Dose Calculator:  
Patient's age: 80.  
Patient's sex: Female. Patient weight (kg) = 54.1. Creatinine level (mg/dL) = 1.52. Creatinine clearance (mL/min): 21.  
Contrast concentration (mg/mL) = 300. MACD = 177.96 mL. Max Contrast dose per Creatinine Cl calculator = 47.25 mL.

## 2018-11-24 NOTE — Clinical Note
The pocket was closed in 1 layer(s) using interrupted 2-0 vicryl. The generator was secured using a 0 silk stitch through the header of the device.

## 2018-11-24 NOTE — Clinical Note
The pocket was closed in 3 layer(s) using continuous 4-0 monocryl sutures. Needle, sponge, and instrument counts were verified correctly. Applied dressing(s): steri strips.

## 2018-11-24 NOTE — CONSULTS
[facility] - Consult Note    Consultation request by Julisa Campbell MD for advice/opinion related to evaluating CHF (congestive heart failure), NYHA class III, acute, systolic ([facility] [address]    Date of  Admission: 11/24/2018 12:11 PM   Primary Care Physician:  Mima Montes De Oca MD     Assessment:     Patient Active Problem List   Diagnosis Code    Osteoporosis M81.0    Anxiety F41.9    Depression F32.9    Reflux esophagitis K21.0    Generalized osteoarthrosis, involving multiple sites M15.9    Zoster B02.9    Distal radius fracture, left S52.502A    Sprain and strain of shoulder and upper arm S43.409A, S46.919A    Vertigo, benign paroxysmal H81.10    Memory loss R41.3    S/P ORIF (open reduction internal fixation) fracture Z96.7, Z87.81    Essential hypertension with goal blood pressure less than 140/90 I10    Advance directive discussed with patient Z70.80    Gastroesophageal reflux disease without esophagitis K21.9    Dysphagia R13.10    Seasonal allergic rhinitis due to pollen J30.1    Primary osteoarthritis of both knees M17.0    Syncope R55    CHF (congestive heart failure), NYHA class III, acute, systolic ([facility]) A66.29     > possible UTI     Plan:     1. Lasix 40 mg IV twice daily  2. Coreg 3.125 mg twice daily  3. Aldactone 25 mg twice daily. 4. Once failure improved will add Entresto 24/26 mg twice daily. 5. Further recommendations pending course. History of Present Illness: This is a 80 y.o. female admitted for CHF (congestive heart failure), NYHA class III, acute, systolic ([facility] [address] This lady has past medical history remarkable for hypertension, dyslipidemia, chronic left bundle branch, and recurrent syncope.  She has had no past history of heart failure, however she had a recent echocardiogram completed November 20, 2018 showing significant left ventricular dysfunction with an ejection fraction of 26-30% in the setting of mild concentric hypertrophy and moderate grade 2 diastolic dysfunction of left ventricle. She had no significant valvular heart disease appreciated with only mild tricuspid and mild mitral valve regurgitation. She comes in today and relates that over the past 24-36 hours she been having more more problems with dyspnea and her daughter relates that she thought that she needed to go to the hospital last night with the patient wanted to stay home but this morning with worsening shortness of breath it was decided to bring her to the emergency room. In the emergency room her chest x-ray appears to suggest congestive heart failure as does her markedly elevated BNP at 7192 today. She is having mild chest fullness but no marked chest pain and her troponins are only minimally elevated at 0.10. She has 2 pillow orthopnea increasing to 3 pillow orthopnea last night and marked fatigue, but denies palpitations or peripheral edema. She does admit to a dry cough particularly at night. Cardiac risk factors: family history, dyslipidemia, sedentary life style, hypertension, post-menopausal      Review of Symptoms:  Except as stated above include:  Constitutional:  negative  Respiratory:  negative  Cardiovascular:  negative  Gastrointestinal: negative  Genitourinary:  negative  Musculoskeletal:  Negative  Neurological:  Negative  Dermatological:  Negative  Endocrinological: Negative  Psychological:  Negative    A comprehensive review of systems was negative except for that written in the HPI.      Past Medical History:     Past Medical History:   Diagnosis Date    Anxiety     Arthritis     Depression     GERD (gastroesophageal reflux disease)     HTN (hypertension)     LBBB (left bundle branch block)     Osteoporosis     completed 8 years Fosamax    Sciatica     Urinary incontinence          Social History:     Social History     Socioeconomic History    Marital status:      Spouse name: Not on file    Number of children: Not on file    Years of education: Not on file    Highest education level: Not on file   Tobacco Use    Smoking status: Never Smoker    Smokeless tobacco: Never Used   Substance and Sexual Activity    Alcohol use: No    Drug use: No        Family History:     Family History   Problem Relation Age of Onset    Heart Disease Mother     Diabetes Mother     Heart Disease Father     Diabetes Father     Hypertension Sister     Diabetes Sister     Heart Disease Brother     Heart Disease Sister     Diabetes Sister     Heart Disease Brother         Medications:   No Known Allergies     Current Facility-Administered Medications   Medication Dose Route Frequency    furosemide (LASIX) injection 40 mg  40 mg IntraVENous NOW    sodium chloride (NS) flush 5-10 mL  5-10 mL IntraVENous PRN    sodium chloride 0.9 % bolus infusion 1,000 mL  1,000 mL IntraVENous ONCE    Followed by    sodium chloride 0.9 % bolus infusion 701 mL  701 mL IntraVENous ONCE    cefTRIAXone (ROCEPHIN) 2 g in sterile water (preservative free) 20 mL IV syringe  2 g IntraVENous Q24H     Current Outpatient Medications   Medication Sig    meloxicam (MOBIC) 15 mg tablet Take 1 Tab by mouth daily.  omeprazole (PRILOSEC) 40 mg capsule TAKE 1 CAPSULE BY MOUTH DAILY    sertraline (ZOLOFT) 50 mg tablet TAKE 1 TABLET BY MOUTH DAILY    loratadine (CLARITIN) 10 mg tablet Take 10 mg by mouth.  CALCIUM CARBONATE/VITAMIN D3 (CALCIUM 600 + D,3, PO) Take  by mouth.  MULTI-VITAMIN PO Take  by mouth.          Physical Exam:     Visit Vitals  /83   Pulse 90   Temp 97.2 °F (36.2 °C)   Resp 16   Ht 5' 2\" (1.575 m)   Wt 56.7 kg (125 lb 0 oz)   SpO2 99%   BMI 22.86 kg/m²     BP Readings from Last 3 Encounters:   11/24/18 154/83   11/20/18 162/86   11/07/18 162/86     Pulse Readings from Last 3 Encounters:   11/24/18 90   11/07/18 85   11/01/18 87     Wt Readings from Last 3 Encounters:   11/24/18 56.7 kg (125 lb 0 oz)   11/20/18 56.7 kg (125 lb)   11/07/18 56.7 kg (125 lb) General:  alert, cooperative, no distress, appears stated age  Neck:  +JVD  Lungs: Harsh rales throughout the posterior lung fields up nearly the entire back. Heart:  regular rate and rhythm, S1, S2 normal, no murmur, click, rub or gallop  Abdomen:  abdomen is soft without significant tenderness, masses, organomegaly or guarding  Extremities:  extremities normal, atraumatic, no cyanosis or edema  Skin: Warm and dry.  no hyperpigmentation, vitiligo, or suspicious lesions  Neuro: alert, oriented x3, affect appropriate, no focal neurological deficits, moves all extremities well, no involuntary movements, reflexes at knee and ankle intact  Psych: non focal     Data Review:     Recent Labs     11/24/18  1220   WBC 7.2   HGB 13.8   HCT 41.9        Recent Labs     11/24/18  1220      K 4.1      CO2 28   GLU 93   BUN 19*   CREA 1.08   CA 9.0   INR 1.0       Results for orders placed or performed during the hospital encounter of 11/24/18   EKG, 12 LEAD, INITIAL   Result Value Ref Range    Ventricular Rate 95 BPM    Atrial Rate 95 BPM    P-R Interval 188 ms    QRS Duration 148 ms    Q-T Interval 424 ms    QTC Calculation (Bezet) 532 ms    Calculated P Axis -7 degrees    Calculated R Axis -28 degrees    Calculated T Axis 141 degrees    Diagnosis       Normal sinus rhythm  Left bundle branch block  Abnormal ECG  When compared with ECG of 28-OCT-2018 15:26,  T wave inversion more evident in Lateral leads         All Cardiac Markers in the last 24 hours:    Lab Results   Component Value Date/Time    CPK 54 11/24/2018 12:20 PM    CKMB 2.4 11/24/2018 12:20 PM    CKND1 4.4 (H) 11/24/2018 12:20 PM    TROIQ 0.10 (H) 11/24/2018 12:20 PM       Last Lipid:    Lab Results   Component Value Date/Time    Cholesterol, total 247 (H) 10/11/2017 12:12 PM    HDL Cholesterol 63 (H) 10/11/2017 12:12 PM    LDL, calculated 157.8 (H) 10/11/2017 12:12 PM    Triglyceride 131 10/11/2017 12:12 PM    CHOL/HDL Ratio 3.9 10/11/2017 12:12 PM       Signed By: Brittany Cochran DO     November 24, 2018

## 2018-11-24 NOTE — H&P
History & Physical    Patient: Teressa Sanders MRN: [id number]  CSN: [id number]    YOB: 1927  Age: 80 y.o. Sex: female      DOA: 11/24/2018    Chief Complaint:   Chief Complaint   Patient presents with    Shortness of Breath          HPI:     Teressa Sanders is a 80 y.o.  female who has PMH of Arthritis , unsteady gate and urinary incontinence 2 ry to chronic bladder prolapse. She presented to ER with Generalized weakness and progressively worsening SOB for the last few days. Pt refused to come to ER yesterday but this AM her SOB was worsening and agreed to come when asked by her daughter. Pt saw her PCP 2 weeks ago as she started to develop SOB who referred her to a cardiologist where she received an echo on 11/20/18 to show EF of 25-30%  On admission, pt was found to have moderate to severe pulmonary edema on CXR, saturating well at rest but with tachypnea of 30-35/min and Pt was desaturating on Mild exertion  Pt is placed on oxygen and received Lasix in ER and became more stable. Of note, Pt has remote Hx of Hysterectomy complicated by bladder injury and resulting Bladder prolapse and urinary incontinence. When asked, pt refused any intervention or urology consult.           Past Medical History:   Diagnosis Date    Anxiety     Arthritis     Depression     GERD (gastroesophageal reflux disease)     HTN (hypertension)     LBBB (left bundle branch block)     Osteoporosis     completed 8 years Fosamax    Sciatica     Urinary incontinence        Past Surgical History:   Procedure Laterality Date    HX CHOLECYSTECTOMY      HX HYSTERECTOMY      HX ORTHOPAEDIC      fractured right patella surgery    [place] ANESTH,SURGERY OF SHOULDER         Family History   Problem Relation Age of Onset    Heart Disease Mother     Diabetes Mother     Heart Disease Father     Diabetes Father     Hypertension Sister     Diabetes Sister     Heart Disease Brother     Heart Disease Sister    [facility] Diabetes Sister     Heart Disease Brother        Social History     Socioeconomic History    Marital status:      Spouse name: Not on file    Number of children: Not on file    Years of education: Not on file    Highest education level: Not on file   Tobacco Use    Smoking status: Never Smoker    Smokeless tobacco: Never Used   Substance and Sexual Activity    Alcohol use: No    Drug use: No       Prior to Admission medications    Medication Sig Start Date End Date Taking? Authorizing Provider   meloxicam (MOBIC) 15 mg tablet Take 1 Tab by mouth daily. 11/1/18   Catie Choudhary MD   omeprazole (PRILOSEC) 40 mg capsule TAKE 1 CAPSULE BY MOUTH DAILY 10/30/18   Catie Choudhary MD   sertraline (ZOLOFT) 50 mg tablet TAKE 1 TABLET BY MOUTH DAILY 12/4/17   Catie Choudhary MD   loratadine (CLARITIN) 10 mg tablet Take 10 mg by mouth. Provider, Historical   CALCIUM CARBONATE/VITAMIN D3 (CALCIUM 600 + D,3, PO) Take  by mouth. Provider, Historical   MULTI-VITAMIN PO Take  by mouth. Provider, Historical       No Known Allergies      Review of Systems  GENERAL: Patient alert, awake and oriented times 3, unable to communicate full sentences, in mild distress   HEENT: No change in vision, no earache, tinnitus, sore throat or sinus congestion. NECK: No pain or stiffness. PULMONARY:+ve shortness of breath, No cough or wheeze. Cardiovascular: +ve pnd / orthopnea, no CP  GASTROINTESTINAL: No abdominal pain, nausea, vomiting or diarrhea, melena or bright red blood per rectum. GENITOURINARY: No urinary frequency, urgency, hesitancy or dysuria. MUSCULOSKELETAL: +ve joint/ muscle pain, no back pain, no recent trauma. DERMATOLOGIC: No rash, no itching, no lesions. ENDOCRINE: No polyuria, polydipsia, no heat or cold intolerance. No recent change in weight. HEMATOLOGICAL: No anemia or easy bruising or bleeding. NEUROLOGIC: No headache, seizures, numbness, tingling+ve weakness. Physical Exam:     Physical Exam:  Visit Vitals  /83   Pulse 90   Temp 97.2 °F (36.2 °C)   Resp 16   Ht 5' 2\" (1.575 m)   Wt 56.7 kg (125 lb 0 oz) Comment: From  office visit   SpO2 99%   BMI 22.86 kg/m²      O2 Device: Room air    Temp (24hrs), Av.2 °F (36.2 °C), Min:97.2 °F (36.2 °C), Max:97.2 °F (36.2 °C)    No intake/output data recorded. No intake/output data recorded. General:  Alert, cooperative, mildno distress, appears stated age. Head: Normocephalic, without obvious abnormality, atraumatic. Eyes:  Conjunctivae/corneas clear. PERRL, EOMs intact. Nose: Nares normal. No drainage or sinus tenderness. Neck: Supple, symmetrical, trachea midline, no adenopathy, thyroid: no enlargement, no carotid bruit and no JVD. Lungs:   Decrease BS with heavy B/L rales    Heart:  Regular rate and rhythm, S1, S2 normal.     Abdomen: Soft, non-tender. Bowel sounds normal.    Extremities: Extremities normal, atraumatic, no cyanosis or edema. Pulses: 2+ and symmetric all extremities. Skin:  No rashes or lesions   Neurologic: AAOx3, No focal motor or sensory deficit. Labs Reviewed:    Lab results reviewed. For significant abnormal values and values requiring intervention, see assessment and plan.   CXR and EKG    Procedures/imaging: see electronic medical records for all procedures/Xrays and details which were not copied into this note but were reviewed prior to creation of Plan      Assessment/Plan     Principal Problem:    CHF (congestive heart failure), NYHA class III, acute, systolic ([facility] [address] ()    Active Problems:    Depression ()      Essential hypertension with goal blood pressure less than 140/90 (2016)      Acute UTI (2018)      Functional urinary incontinence (2018)      Acute pulmonary edema ([address] (2018)       Admit to TELE for management of acute exacerbation of  New onset CHF     Monitor daily weights and strict I/Os   Continue IV diuresis, sodium and fluid restriction - monitor renal function and electrolytes   ACE-i or ARB given systolic dysfunction Obtain echo, trend cardiac enzymes, check TSH & LFTs (evidence of hepatic congestion)   Cardiology Consult is called . Pt is educated on fluid restriction   Lipid panel and statin   BB is added by cardiology     UTI >> ceftriaxone    Urinary incontinence>> Continue conservative MGT for now.  Adjust lasix to be in AM on discharge     Unsteady gate >> PT/OT once more stable       DVT/GI Prophylaxis: Hep SQ    Plan of care is discussed in details with Patient/Family at bedside and agreed upon    Larraine Cheadle, MD  11/24/2018 3:21 PM

## 2018-11-25 LAB
ALBUMIN SERPL-MCNC: 3.4 G/DL (ref 3.4–5)
ALBUMIN/GLOB SERPL: 0.8 {RATIO} (ref 0.8–1.7)
ALP SERPL-CCNC: 100 U/L (ref 45–117)
ALT SERPL-CCNC: 20 U/L (ref 13–56)
ANION GAP SERPL CALC-SCNC: 9 MMOL/L (ref 3–18)
AST SERPL-CCNC: 19 U/L (ref 15–37)
BASOPHILS # BLD: 0 K/UL (ref 0–0.1)
BASOPHILS NFR BLD: 0 % (ref 0–2)
BILIRUB SERPL-MCNC: 0.4 MG/DL (ref 0.2–1)
BUN SERPL-MCNC: 19 MG/DL (ref 7–18)
BUN/CREAT SERPL: 17 (ref 12–20)
CALCIUM SERPL-MCNC: 9 MG/DL (ref 8.5–10.1)
CHLORIDE SERPL-SCNC: 103 MMOL/L (ref 100–108)
CK MB CFR SERPL CALC: 3.9 % (ref 0–4)
CK MB SERPL-MCNC: 1.9 NG/ML (ref 5–25)
CK SERPL-CCNC: 49 U/L (ref 26–192)
CO2 SERPL-SCNC: 28 MMOL/L (ref 21–32)
CREAT SERPL-MCNC: 1.1 MG/DL (ref 0.6–1.3)
DIFFERENTIAL METHOD BLD: ABNORMAL
EOSINOPHIL # BLD: 0.3 K/UL (ref 0–0.4)
EOSINOPHIL NFR BLD: 3 % (ref 0–5)
ERYTHROCYTE [DISTWIDTH] IN BLOOD BY AUTOMATED COUNT: 14.6 % (ref 11.6–14.5)
GLOBULIN SER CALC-MCNC: 4.3 G/DL (ref 2–4)
GLUCOSE SERPL-MCNC: 97 MG/DL (ref 74–99)
HCT VFR BLD AUTO: 40.7 % (ref 35–45)
HGB BLD-MCNC: 13.3 G/DL (ref 12–16)
LYMPHOCYTES # BLD: 2.2 K/UL (ref 0.9–3.6)
LYMPHOCYTES NFR BLD: 26 % (ref 21–52)
MAGNESIUM SERPL-MCNC: 1.8 MG/DL (ref 1.6–2.6)
MCH RBC QN AUTO: 27.4 PG (ref 24–34)
MCHC RBC AUTO-ENTMCNC: 32.7 G/DL (ref 31–37)
MCV RBC AUTO: 83.9 FL (ref 74–97)
MONOCYTES # BLD: 0.9 K/UL (ref 0.05–1.2)
MONOCYTES NFR BLD: 10 % (ref 3–10)
NEUTS SEG # BLD: 5.2 K/UL (ref 1.8–8)
NEUTS SEG NFR BLD: 61 % (ref 40–73)
PHOSPHATE SERPL-MCNC: 4 MG/DL (ref 2.5–4.9)
PLATELET # BLD AUTO: 251 K/UL (ref 135–420)
PMV BLD AUTO: 9.7 FL (ref 9.2–11.8)
POTASSIUM SERPL-SCNC: 3.6 MMOL/L (ref 3.5–5.5)
PROT SERPL-MCNC: 7.7 G/DL (ref 6.4–8.2)
RBC # BLD AUTO: 4.85 M/UL (ref 4.2–5.3)
SODIUM SERPL-SCNC: 140 MMOL/L (ref 136–145)
TROPONIN I SERPL-MCNC: 0.12 NG/ML (ref 0–0.04)
TSH SERPL DL<=0.05 MIU/L-ACNC: 3.01 UIU/ML (ref 0.36–3.74)
WBC # BLD AUTO: 8.6 K/UL (ref 4.6–13.2)

## 2018-11-25 PROCEDURE — 80053 COMPREHEN METABOLIC PANEL: CPT

## 2018-11-25 PROCEDURE — 74011250636 HC RX REV CODE- 250/636: Performed by: INTERNAL MEDICINE

## 2018-11-25 PROCEDURE — 83735 ASSAY OF MAGNESIUM: CPT

## 2018-11-25 PROCEDURE — 65660000000 HC RM CCU STEPDOWN

## 2018-11-25 PROCEDURE — 77030038269 HC DRN EXT URIN PURWCK BARD -A

## 2018-11-25 PROCEDURE — 74011250637 HC RX REV CODE- 250/637: Performed by: INTERNAL MEDICINE

## 2018-11-25 PROCEDURE — 74011250636 HC RX REV CODE- 250/636: Performed by: PHYSICIAN ASSISTANT

## 2018-11-25 PROCEDURE — 82553 CREATINE MB FRACTION: CPT

## 2018-11-25 PROCEDURE — 74011000250 HC RX REV CODE- 250: Performed by: INTERNAL MEDICINE

## 2018-11-25 PROCEDURE — 85025 COMPLETE CBC W/AUTO DIFF WBC: CPT

## 2018-11-25 PROCEDURE — 77010033678 HC OXYGEN DAILY

## 2018-11-25 PROCEDURE — 36415 COLL VENOUS BLD VENIPUNCTURE: CPT

## 2018-11-25 PROCEDURE — 84100 ASSAY OF PHOSPHORUS: CPT

## 2018-11-25 PROCEDURE — 84443 ASSAY THYROID STIM HORMONE: CPT

## 2018-11-25 RX ORDER — FUROSEMIDE 10 MG/ML
40 INJECTION INTRAMUSCULAR; INTRAVENOUS 2 TIMES DAILY
Status: DISCONTINUED | OUTPATIENT
Start: 2018-11-25 | End: 2018-11-26

## 2018-11-25 RX ORDER — THERA TABS 400 MCG
1 TAB ORAL DAILY
Status: DISCONTINUED | OUTPATIENT
Start: 2018-11-26 | End: 2018-11-29 | Stop reason: HOSPADM

## 2018-11-25 RX ADMIN — CARVEDILOL 3.12 MG: 6.25 TABLET, FILM COATED ORAL at 22:31

## 2018-11-25 RX ADMIN — CARVEDILOL 3.12 MG: 6.25 TABLET, FILM COATED ORAL at 08:55

## 2018-11-25 RX ADMIN — SACUBITRIL AND VALSARTAN 1 TABLET: 24; 26 TABLET, FILM COATED ORAL at 11:11

## 2018-11-25 RX ADMIN — CEFTRIAXONE SODIUM 2 G: 2 INJECTION, POWDER, FOR SOLUTION INTRAMUSCULAR; INTRAVENOUS at 14:20

## 2018-11-25 RX ADMIN — ATORVASTATIN CALCIUM 10 MG: 10 TABLET, FILM COATED ORAL at 22:31

## 2018-11-25 RX ADMIN — SACUBITRIL AND VALSARTAN 1 TABLET: 24; 26 TABLET, FILM COATED ORAL at 22:31

## 2018-11-25 RX ADMIN — FUROSEMIDE 40 MG: 10 INJECTION, SOLUTION INTRAMUSCULAR; INTRAVENOUS at 17:14

## 2018-11-25 RX ADMIN — SPIRONOLACTONE 25 MG: 25 TABLET ORAL at 08:54

## 2018-11-25 RX ADMIN — MELOXICAM 15 MG: 7.5 TABLET ORAL at 11:10

## 2018-11-25 RX ADMIN — HEPARIN SODIUM 5000 UNITS: 5000 INJECTION, SOLUTION INTRAVENOUS; SUBCUTANEOUS at 08:55

## 2018-11-25 RX ADMIN — PANTOPRAZOLE SODIUM 40 MG: 40 TABLET, DELAYED RELEASE ORAL at 08:55

## 2018-11-25 RX ADMIN — FUROSEMIDE 40 MG: 10 INJECTION, SOLUTION INTRAMUSCULAR; INTRAVENOUS at 11:11

## 2018-11-25 RX ADMIN — ASPIRIN 81 MG CHEWABLE TABLET 81 MG: 81 TABLET CHEWABLE at 11:11

## 2018-11-25 RX ADMIN — SERTRALINE HYDROCHLORIDE 50 MG: 50 TABLET ORAL at 11:11

## 2018-11-25 RX ADMIN — HEPARIN SODIUM 5000 UNITS: 5000 INJECTION, SOLUTION INTRAVENOUS; SUBCUTANEOUS at 17:14

## 2018-11-25 NOTE — ROUTINE PROCESS
TRANSFER - IN REPORT:    Verbal report received from Nithya Ramey RN(name) on Nelly Dewey  being received from ED(unit) for routine progression of care      Report consisted of patients Situation, Background, Assessment and   Recommendations(SBAR). Information from the following report(s) SBAR, Kardex, ED Summary, MAR, Recent Results and Cardiac Rhythm SR was reviewed with the receiving nurse. Patient remains in ED, awaiting arrival.     Opportunity for questions and clarification was provided. 2030 Assessment completed upon patients arrival to unit and care assumed.

## 2018-11-25 NOTE — PROGRESS NOTES
Hospitalist Progress Note    Patient: Darian Moody MRN: [id number]  CSN: [id number]    YOB: 1927  Age: 80 y.o. Sex: female    DOA: 11/24/2018 LOS:  LOS: 1 day          Coughs after po intake. Breathing better than when she came in. Daughter / patient have not had low Na, CHF education. Had been losing weight lately d/t poor appetite, better today now that some fluid has come off. She's eaten more today than in a couple of weeks. She is not constipated. Started on entresto per cardiology. Daughter declines any urology intervention given complications, patient has also had bladder tack which did not go well. Assessment/Plan     1. Acute HFrEF exacerbation. nsg to do CHF education. Nutritionist consulted regarding low Na diet. Lasix, entresto, coreg, spironolactone. 2. Echo 11/20/18: EF 26-30%, mild concentric LVH, LV global hypokinesis, grade 2 diastolic dysfunction, mild TR + MR  3. LBBB  4. hypertension  5. dyslipidemia, , Total Chol 204 11/2018  6. Osteoporosis  7. GERD  8. UTI >100,000 GNR - follow cx/s. On ceftriaxone. Blood cx (11/24) ngtd. 9. Hx hysterectomy complicated by bladder injury with subsequent bladder prolapse and urinary incontinence. Hx bladder tack. Daughter declines further  intervention. 10. Advanced age, cough after po - consult SLP  11. Hx unsteady gait - pt / ot. 12. Unintentional weight loss - multivit. Consult nutritionist.   13. flat indeterminate troponin 0.10 x 2 --> 0.12.  14. dvt prophylaxis  15. Full code. Additional Notes:      Case discussed with:  [x]Patient  [x]Family  [x]Nursing  []Case Management  DVT Prophylaxis:  []Lovenox  [x]Hep SQ  []SCDs  []Coumadin   []On Heparin gtt    Vital signs/Intake and Output:  Visit Vitals  /75   Pulse 83   Temp 97.3 °F (36.3 °C)   Resp 16   Ht 5' 2\" (1.575 m)   Wt 53.3 kg (117 lb 6.4 oz)   SpO2 98%   Breastfeeding?  No   BMI 21.47 kg/m²     Current Shift:  No intake/output data recorded. Last three shifts:  11/23 1901 - 11/25 0700  In: 120 [P.O.:120]  Out: 2000 [Urine:2000]    Thin, sitting up in bed eating. Daughter at bedside. Ncat. perrl  RRR  Decreased bs at bases  Soft nt nd nabs  No edema. dp 2+ b.l  Other than hearing, no focal deficit  No rash    Medications Reviewed      Labs: Results:       Chemistry Recent Labs     11/25/18 0118 11/24/18  1220   GLU 97 93    139   K 3.6 4.1    106   CO2 28 28   BUN 19* 19*   CREA 1.10 1.08   CA 9.0 9.0   AGAP 9 5   BUCR 17 18     --    TP 7.7  --    ALB 3.4  --    GLOB 4.3*  --    AGRAT 0.8  --       CBC w/Diff Recent Labs     11/25/18 0118 11/24/18  1220   WBC 8.6 7.2   RBC 4.85 4.94   HGB 13.3 13.8   HCT 40.7 41.9    242   GRANS 61 53   LYMPH 26 32   EOS 3 5      Cardiac Enzymes Recent Labs     11/25/18 0118 11/24/18  2140   CPK 49 54   CKND1 3.9 4.1*      Coagulation Recent Labs     11/24/18  1220   PTP 12.9   INR 1.0       Lipid Panel Lab Results   Component Value Date/Time    Cholesterol, total 204 (H) 11/24/2018 09:40 PM    HDL Cholesterol 59 11/24/2018 09:40 PM    LDL, calculated 129.2 (H) 11/24/2018 09:40 PM    VLDL, calculated 15.8 11/24/2018 09:40 PM    Triglyceride 79 11/24/2018 09:40 PM    CHOL/HDL Ratio 3.5 11/24/2018 09:40 PM      BNP No results for input(s): BNPP in the last 72 hours. Liver Enzymes Recent Labs     11/25/18 0118   TP 7.7   ALB 3.4      SGOT 19      Thyroid Studies Lab Results   Component Value Date/Time    TSH 3.01 11/25/2018 01:18 AM        Procedures/imaging: see electronic medical records for all procedures/Xrays and details which were not copied into this note but were reviewed prior to creation of Plan.

## 2018-11-25 NOTE — PROGRESS NOTES
Problem: Pressure Injury - Risk of  Goal: *Prevention of pressure injury  Document Vance Scale and appropriate interventions in the flowsheet.   Outcome: Progressing Towards Goal  Pressure Injury Interventions:  Sensory Interventions: Assess changes in LOC, Discuss PT/OT consult with provider    Moisture Interventions: Absorbent underpads, Apply protective barrier, creams and emollients, Internal/External urinary devices    Activity Interventions: Increase time out of bed, PT/OT evaluation    Mobility Interventions: HOB 30 degrees or less, PT/OT evaluation    Nutrition Interventions: Document food/fluid/supplement intake, Discuss nutritional consult with provider

## 2018-11-25 NOTE — PROGRESS NOTES
Problem: Falls - Risk of  Goal: *Absence of Falls  Document Luis Fall Risk and appropriate interventions in the flowsheet.   Outcome: Progressing Towards Goal  Fall Risk Interventions:  Mobility Interventions: Assess mobility with egress test, PT Consult for mobility concerns, PT Consult for assist device competence         Medication Interventions: Bed/chair exit alarm, Patient to call before getting OOB, Teach patient to arise slowly    Elimination Interventions: Bed/chair exit alarm, Call light in reach, Patient to call for help with toileting needs, Urinal in reach    History of Falls Interventions: Bed/chair exit alarm, Investigate reason for fall

## 2018-11-25 NOTE — PROGRESS NOTES
[facility]  -  Progress Note      Patient: Rosey Madden MRN: [id number]  SSN: [id number]    YOB: 1927  Age: 80 y.o. Sex: female      Admit Date: 11/24/2018     The patient was seen, examined, and independently evaluated and I agree with the below assessment and plan by Glynn Robert PA-C with the following comments. Appears to be doing much better. Breathing comfortably and agree with continuing with IV Lasix one more day and initiating low dose Entresto this morning. Hopefully home in a day or two. Assessment:     -Acute HFrEF exacerbation, CXR with worsened congestion compared to 10/28/18, BNP 7K, flat indeterminate troponin 0.10 x 2 --> 0.12  -Echo 11/20/18: EF 26-30%, mild concentric LVH, LV global hypokinesis, grade 2 diastolic dysfunction, mild TR + MR  -LBBB  -HTN  -Hyperlipidemia, , Total Chol 204 11/2018  -Osteoporosis  -GERD  -UTI  -Hx hysterectomy complicated by bladder injury with subsequent bladder prolapse and urinary incontinence    Plan:     -Cr remains stable after first two doses of IV Lasix, continue IV Lasix for now. Strict I/O's as previously ordered, continue close monitoring of renal indices.  -Discontinued Lisinopril order (not yet given) to allow initiation of Entresto. May be able to start on Entresto today.  -Should otherwise continue remainder of cardiac medication regimen to include ASA, low-dose Coreg, Aldactone, Lipitor.  -Anticipate possible discharge within the next day or so. Subjective:     No new complaints. States feeling \"100% better\" and asking about going home.     Objective:      Patient Vitals for the past 8 hrs:   Temp Pulse Resp BP SpO2   11/25/18 0345 97.4 °F (36.3 °C) 65 18 118/72 96 %   11/24/18 2330 97.4 °F (36.3 °C) 68 20 131/71 95 %         Patient Vitals for the past 96 hrs:   Weight   11/25/18 0448 117 lb 6.4 oz (53.3 kg)   11/24/18 1455 125 lb 0 oz (56.7 kg)         Intake/Output Summary (Last 24 hours) at 11/25/2018 0714  Last data filed at 11/25/2018 0449  Gross per 24 hour   Intake --   Output 1000 ml   Net -1000 ml       Physical Exam:  General:  alert, cooperative, no distress, appears stated age  Neck:  supple  Lungs: Few bibasilar inspiratory rales, much better than yesterday  Heart:  Regular rate and rhythm  Abdomen:  abdomen is soft without significant tenderness, masses, organomegaly or guarding  Extremities:  Atraumatic, no edema     Data Review:     Labs: Results:       Chemistry Recent Labs     11/25/18 0118 11/24/18  1220   GLU 97 93    139   K 3.6 4.1    106   CO2 28 28   BUN 19* 19*   CREA 1.10 1.08   CA 9.0 9.0   MG 1.8  --    PHOS 4.0  --    AGAP 9 5   BUCR 17 18     --    TP 7.7  --    ALB 3.4  --    GLOB 4.3*  --    AGRAT 0.8  --       CBC w/Diff Recent Labs     11/25/18 0118 11/24/18  1220   WBC 8.6 7.2   RBC 4.85 4.94   HGB 13.3 13.8   HCT 40.7 41.9    242   GRANS 61 53   LYMPH 26 32   EOS 3 5      Cardiac Enzymes Lab Results   Component Value Date/Time    CPK 49 11/25/2018 01:18 AM    CPK 54 11/24/2018 09:40 PM    CPK 54 11/24/2018 12:20 PM    CKMB 1.9 11/25/2018 01:18 AM    CKMB 2.2 11/24/2018 09:40 PM    CKMB 2.4 11/24/2018 12:20 PM    CKND1 3.9 11/25/2018 01:18 AM    CKND1 4.1 (H) 11/24/2018 09:40 PM    CKND1 4.4 (H) 11/24/2018 12:20 PM    TROIQ 0.12 (H) 11/25/2018 01:18 AM    TROIQ 0.10 (H) 11/24/2018 09:40 PM    TROIQ 0.10 (H) 11/24/2018 12:20 PM      Coagulation Recent Labs     11/24/18  1220   PTP 12.9   INR 1.0       Lipid Panel Lab Results   Component Value Date/Time    Cholesterol, total 204 (H) 11/24/2018 09:40 PM    HDL Cholesterol 59 11/24/2018 09:40 PM    LDL, calculated 129.2 (H) 11/24/2018 09:40 PM    VLDL, calculated 15.8 11/24/2018 09:40 PM    Triglyceride 79 11/24/2018 09:40 PM    CHOL/HDL Ratio 3.5 11/24/2018 09:40 PM      Liver Enzymes Recent Labs     11/25/18  0118   TP 7.7   ALB 3.4      SGOT 19      Thyroid Studies Lab Results   Component Value Date/Time    TSH 3.01 11/25/2018 01:18 AM          Lauren Rain DO   November 25, 2018

## 2018-11-26 ENCOUNTER — TELEPHONE (OUTPATIENT)
Dept: CARDIOLOGY CLINIC | Age: 83
End: 2018-11-26

## 2018-11-26 LAB
ALBUMIN SERPL-MCNC: 3.2 G/DL (ref 3.4–5)
ALBUMIN/GLOB SERPL: 0.7 {RATIO} (ref 0.8–1.7)
ALP SERPL-CCNC: 96 U/L (ref 45–117)
ALT SERPL-CCNC: 18 U/L (ref 13–56)
ANION GAP SERPL CALC-SCNC: 7 MMOL/L (ref 3–18)
AST SERPL-CCNC: 19 U/L (ref 15–37)
BACTERIA SPEC CULT: ABNORMAL
BASOPHILS # BLD: 0 K/UL (ref 0–0.1)
BASOPHILS NFR BLD: 0 % (ref 0–2)
BILIRUB SERPL-MCNC: 0.2 MG/DL (ref 0.2–1)
BUN SERPL-MCNC: 36 MG/DL (ref 7–18)
BUN/CREAT SERPL: 21 (ref 12–20)
CALCIUM SERPL-MCNC: 9 MG/DL (ref 8.5–10.1)
CHLORIDE SERPL-SCNC: 98 MMOL/L (ref 100–108)
CO2 SERPL-SCNC: 30 MMOL/L (ref 21–32)
CREAT SERPL-MCNC: 1.75 MG/DL (ref 0.6–1.3)
DIFFERENTIAL METHOD BLD: ABNORMAL
EOSINOPHIL # BLD: 0.5 K/UL (ref 0–0.4)
EOSINOPHIL NFR BLD: 5 % (ref 0–5)
ERYTHROCYTE [DISTWIDTH] IN BLOOD BY AUTOMATED COUNT: 14.6 % (ref 11.6–14.5)
GLOBULIN SER CALC-MCNC: 4.3 G/DL (ref 2–4)
GLUCOSE SERPL-MCNC: 119 MG/DL (ref 74–99)
HCT VFR BLD AUTO: 41.3 % (ref 35–45)
HGB BLD-MCNC: 13.5 G/DL (ref 12–16)
LYMPHOCYTES # BLD: 3.2 K/UL (ref 0.9–3.6)
LYMPHOCYTES NFR BLD: 33 % (ref 21–52)
MAGNESIUM SERPL-MCNC: 1.7 MG/DL (ref 1.6–2.6)
MCH RBC QN AUTO: 27.3 PG (ref 24–34)
MCHC RBC AUTO-ENTMCNC: 32.7 G/DL (ref 31–37)
MCV RBC AUTO: 83.6 FL (ref 74–97)
MONOCYTES # BLD: 1 K/UL (ref 0.05–1.2)
MONOCYTES NFR BLD: 10 % (ref 3–10)
NEUTS SEG # BLD: 5.1 K/UL (ref 1.8–8)
NEUTS SEG NFR BLD: 52 % (ref 40–73)
PHOSPHATE SERPL-MCNC: 4.8 MG/DL (ref 2.5–4.9)
PLATELET # BLD AUTO: 251 K/UL (ref 135–420)
PMV BLD AUTO: 9.7 FL (ref 9.2–11.8)
POTASSIUM SERPL-SCNC: 3.7 MMOL/L (ref 3.5–5.5)
PROT SERPL-MCNC: 7.5 G/DL (ref 6.4–8.2)
RBC # BLD AUTO: 4.94 M/UL (ref 4.2–5.3)
SERVICE CMNT-IMP: ABNORMAL
SODIUM SERPL-SCNC: 135 MMOL/L (ref 136–145)
WBC # BLD AUTO: 9.8 K/UL (ref 4.6–13.2)

## 2018-11-26 PROCEDURE — 74011250636 HC RX REV CODE- 250/636: Performed by: INTERNAL MEDICINE

## 2018-11-26 PROCEDURE — 92610 EVALUATE SWALLOWING FUNCTION: CPT

## 2018-11-26 PROCEDURE — 85025 COMPLETE CBC W/AUTO DIFF WBC: CPT

## 2018-11-26 PROCEDURE — 74011000250 HC RX REV CODE- 250: Performed by: INTERNAL MEDICINE

## 2018-11-26 PROCEDURE — 65660000000 HC RM CCU STEPDOWN

## 2018-11-26 PROCEDURE — 74011250637 HC RX REV CODE- 250/637: Performed by: HOSPITALIST

## 2018-11-26 PROCEDURE — 97161 PT EVAL LOW COMPLEX 20 MIN: CPT

## 2018-11-26 PROCEDURE — 97116 GAIT TRAINING THERAPY: CPT

## 2018-11-26 PROCEDURE — 97165 OT EVAL LOW COMPLEX 30 MIN: CPT

## 2018-11-26 PROCEDURE — 77030038269 HC DRN EXT URIN PURWCK BARD -A

## 2018-11-26 PROCEDURE — 80053 COMPREHEN METABOLIC PANEL: CPT

## 2018-11-26 PROCEDURE — 84100 ASSAY OF PHOSPHORUS: CPT

## 2018-11-26 PROCEDURE — 74011250637 HC RX REV CODE- 250/637: Performed by: INTERNAL MEDICINE

## 2018-11-26 PROCEDURE — 36415 COLL VENOUS BLD VENIPUNCTURE: CPT

## 2018-11-26 PROCEDURE — 74011250636 HC RX REV CODE- 250/636: Performed by: PHYSICIAN ASSISTANT

## 2018-11-26 PROCEDURE — 83735 ASSAY OF MAGNESIUM: CPT

## 2018-11-26 RX ORDER — IBUPROFEN 200 MG
200 TABLET ORAL
COMMUNITY
End: 2018-11-29

## 2018-11-26 RX ORDER — FUROSEMIDE 20 MG/1
20 TABLET ORAL DAILY
Status: DISCONTINUED | OUTPATIENT
Start: 2018-11-27 | End: 2018-11-27

## 2018-11-26 RX ORDER — FLUTICASONE PROPIONATE 50 MCG
1 SPRAY, SUSPENSION (ML) NASAL AS NEEDED
COMMUNITY
End: 2022-10-20

## 2018-11-26 RX ADMIN — THERA TABS 1 TABLET: TAB at 09:25

## 2018-11-26 RX ADMIN — SACUBITRIL AND VALSARTAN 1 TABLET: 24; 26 TABLET, FILM COATED ORAL at 22:45

## 2018-11-26 RX ADMIN — SACUBITRIL AND VALSARTAN 1 TABLET: 24; 26 TABLET, FILM COATED ORAL at 09:24

## 2018-11-26 RX ADMIN — HEPARIN SODIUM 5000 UNITS: 5000 INJECTION, SOLUTION INTRAVENOUS; SUBCUTANEOUS at 03:00

## 2018-11-26 RX ADMIN — HEPARIN SODIUM 5000 UNITS: 5000 INJECTION, SOLUTION INTRAVENOUS; SUBCUTANEOUS at 09:24

## 2018-11-26 RX ADMIN — Medication 10 ML: at 22:50

## 2018-11-26 RX ADMIN — SERTRALINE HYDROCHLORIDE 50 MG: 50 TABLET ORAL at 09:25

## 2018-11-26 RX ADMIN — ASPIRIN 81 MG CHEWABLE TABLET 81 MG: 81 TABLET CHEWABLE at 09:24

## 2018-11-26 RX ADMIN — CARVEDILOL 3.12 MG: 6.25 TABLET, FILM COATED ORAL at 22:45

## 2018-11-26 RX ADMIN — PANTOPRAZOLE SODIUM 40 MG: 40 TABLET, DELAYED RELEASE ORAL at 09:26

## 2018-11-26 RX ADMIN — HEPARIN SODIUM 5000 UNITS: 5000 INJECTION, SOLUTION INTRAVENOUS; SUBCUTANEOUS at 18:38

## 2018-11-26 RX ADMIN — CEFTRIAXONE SODIUM 2 G: 2 INJECTION, POWDER, FOR SOLUTION INTRAMUSCULAR; INTRAVENOUS at 15:29

## 2018-11-26 RX ADMIN — MELOXICAM 15 MG: 7.5 TABLET ORAL at 09:25

## 2018-11-26 RX ADMIN — CARVEDILOL 3.12 MG: 6.25 TABLET, FILM COATED ORAL at 09:25

## 2018-11-26 RX ADMIN — ATORVASTATIN CALCIUM 10 MG: 10 TABLET, FILM COATED ORAL at 22:45

## 2018-11-26 RX ADMIN — SPIRONOLACTONE 25 MG: 25 TABLET ORAL at 09:25

## 2018-11-26 RX ADMIN — FUROSEMIDE 40 MG: 10 INJECTION, SOLUTION INTRAMUSCULAR; INTRAVENOUS at 09:24

## 2018-11-26 NOTE — PROGRESS NOTES
Hospitalist Progress Note    Patient: Robert Linton MRN: [id number]  CSN: [id number]    YOB: 1927  Age: 80 y.o. Sex: female    DOA: 11/24/2018 LOS:  LOS: 2 days          Creatinine up to 1.75 today. Changed to po lasix per cards. Her breathing has been good, and appetite continues to improve. Daughter got CHF education. Assessment/Plan     1. Acute HFrEF exacerbation. nsg did CHF education. Nutritionist consulted regarding low Na diet. Lasix, entresto, coreg, spironolactone. hh ordered  2. Echo 11/20/18: EF 26-30%, mild concentric LVH, LV global hypokinesis, grade 2 diastolic dysfunction, mild TR + MR  3. LBBB  4. hypertension  5. dyslipidemia, , Total Chol 204 11/2018  6. Osteoporosis  7. GERD  8. UTI >100,000 E coli - completed 3 days of ceftriaxone based on cx/s. Blood cx (11/24) ngtd. 9. Hx hysterectomy complicated by bladder injury with subsequent bladder prolapse and urinary incontinence. Hx bladder tack. Daughter declines further  intervention. 10. Advanced age, cough after po - did well with SLP. 11. Hx unsteady gait - pt / ot. 12. Mild protein calorie malnutrition AEB Inadequate energy intake related to decreased appetite and SOB associated with meals PTA as evidenced by pt consuming 50% or less of recent meals. multivit. On supplements, nutritionist following. 13. flat indeterminate troponin 0.10 x 2 --> 0.12.  14. dvt prophylaxis  15. Full code. Home with hh when ready. Additional Notes:      Case discussed with:  [x]Patient  [x]Family  [x]Nursing  []Case Management  DVT Prophylaxis:  []Lovenox  [x]Hep SQ  []SCDs  []Coumadin   []On Heparin gtt    Vital signs/Intake and Output:  Visit Vitals  /68 (BP 1 Location: Left arm, BP Patient Position: At rest)   Pulse 84   Temp 97.7 °F (36.5 °C)   Resp 16   Ht 5' 2\" (1.575 m)   Wt 53.9 kg (118 lb 12.8 oz)   SpO2 98%   Breastfeeding?  No   BMI 21.73 kg/m²     Current Shift:  No intake/output data recorded. Last three shifts:  11/24 1901 - 11/26 0700  In: 120 [P.O.:120]  Out: 2000 [Urine:2000]    Thin, sitting up in bed nad. Daughter at bedside. Ncat. perrl  RRR  cta b.l  Soft nt nd nabs  No edema. dp 2+ b.l  Other than hearing, no focal deficit  No rash    Medications Reviewed      Labs: Results:       Chemistry Recent Labs     11/26/18  0030 11/25/18 0118 11/24/18  1220   * 97 93   * 140 139   K 3.7 3.6 4.1   CL 98* 103 106   CO2 30 28 28   BUN 36* 19* 19*   CREA 1.75* 1.10 1.08   CA 9.0 9.0 9.0   AGAP 7 9 5   BUCR 21* 17 18   AP 96 100  --    TP 7.5 7.7  --    ALB 3.2* 3.4  --    GLOB 4.3* 4.3*  --    AGRAT 0.7* 0.8  --       CBC w/Diff Recent Labs     11/26/18  0030 11/25/18  0118 11/24/18  1220   WBC 9.8 8.6 7.2   RBC 4.94 4.85 4.94   HGB 13.5 13.3 13.8   HCT 41.3 40.7 41.9    251 242   GRANS 52 61 53   LYMPH 33 26 32   EOS 5 3 5      Cardiac Enzymes Recent Labs     11/25/18  0118 11/24/18  2140   CPK 49 54   CKND1 3.9 4.1*      Coagulation Recent Labs     11/24/18  1220   PTP 12.9   INR 1.0       Lipid Panel Lab Results   Component Value Date/Time    Cholesterol, total 204 (H) 11/24/2018 09:40 PM    HDL Cholesterol 59 11/24/2018 09:40 PM    LDL, calculated 129.2 (H) 11/24/2018 09:40 PM    VLDL, calculated 15.8 11/24/2018 09:40 PM    Triglyceride 79 11/24/2018 09:40 PM    CHOL/HDL Ratio 3.5 11/24/2018 09:40 PM      BNP No results for input(s): BNPP in the last 72 hours. Liver Enzymes Recent Labs     11/26/18  0030   TP 7.5   ALB 3.2*   AP 96   SGOT 19      Thyroid Studies Lab Results   Component Value Date/Time    TSH 3.01 11/25/2018 01:18 AM        Procedures/imaging: see electronic medical records for all procedures/Xrays and details which were not copied into this note but were reviewed prior to creation of Plan.

## 2018-11-26 NOTE — PROGRESS NOTES
Problem: Mobility Impaired (Adult and Pediatric)  Goal: *Acute Goals and Plan of Care (Insert Text)  Physical Therapy Goals  Initiated 11/26/2018 and to be accomplished within 7 day(s)  1. Patient will move from supine to sit and sit to supine , scoot up and down and roll side to side in bed with modified independence. 2.  Patient will transfer from bed to chair and chair to bed with modified independence using the least restrictive device. 3.  Patient will perform sit to stand with modified independence. 4.  Patient will ambulate with modified independence for >100 feet with the least restrictive device. 5.  Patient will ascend/descend 3 stairs with 2 handrail(s) with supervision/set-up. physical Therapy EVALUATION    Patient: Shola Chanel (76 y.o. female)  Date: 11/26/2018  Primary Diagnosis: CHF (congestive heart failure), NYHA class III, acute, systolic (HCC)       Precautions: Fall   ASSESSMENT :  Patient is 81yo F admitted to hospital for CHF and presents today alert and agreeable to therapy and was supine in bed upon arrival. Patient transferred to sitting EOB for objective assessment and educated on sit <> stand with RW. Patient stood and ambulated 250ft total with RW. Patient required 2 standing rest breaks with SOB and reports fatigue with mobility. Patient transferred back to supine in bed and was left resting with call bell by her side. Patient educated to ambulated with nursing staff and RW and to try to get to bathroom rather than using Purewick as she is able to determine when she needs to use bathroom. Patient educated on role and goals of therapy and on energy conservation and deep breathing and demos fair to good carryover. Patient will benefit from skilled intervention to address the above impairments.   Patients rehabilitation potential is considered to be Good  Factors which may influence rehabilitation potential include:   []         None noted  []         Mental ability/status  [x] Medical condition  [x]         Home/family situation and support systems  [x]         Safety awareness  [x]         Pain tolerance/management  []         Other:      PLAN :  Recommendations and Planned Interventions:  [x]           Bed Mobility Training             [x]    Neuromuscular Re-Education  [x]           Transfer Training                   []    Orthotic/Prosthetic Training  [x]           Gait Training                          []    Modalities  [x]           Therapeutic Exercises          []    Edema Management/Control  [x]           Therapeutic Activities            [x]    Patient and Family Training/Education  []           Other (comment):    Frequency/Duration: Patient will be followed by physical therapy 1-2 times per day/4-7 days per week to address goals. Discharge Recommendations: Home Health  Further Equipment Recommendations for Discharge: rolling walker     G-CODES     Mobility  Current  CI= 1-19%   Goal  CI= 1-19%. The severity rating is based on the Level of Assistance required for Functional Mobility and ADLs.        G-CODES     Eval Complexity: History: MEDIUM  Complexity : 1-2 comorbidities / personal factors will impact the outcome/ POC Exam:LOW Complexity : 1-2 Standardized tests and measures addressing body structure, function, activity limitation and / or participation in recreation  Presentation: LOW Complexity : Stable, uncomplicated  Clinical Decision Making:Low Complexity   Overall Complexity:LOW     SUBJECTIVE:   Patient stated I think I'm doing pretty good but I sometimes get nervous I'll fall.     OBJECTIVE DATA SUMMARY:     Past Medical History:   Diagnosis Date    Anxiety     Arthritis     Depression     GERD (gastroesophageal reflux disease)     HTN (hypertension)     LBBB (left bundle branch block)     Osteoporosis     completed 8 years Fosamax    Sciatica     Urinary incontinence      Past Surgical History:   Procedure Laterality Date    HX CHOLECYSTECTOMY      HX HYSTERECTOMY      HX ORTHOPAEDIC      fractured right patella surgery    MD ANESTH,SURGERY OF SHOULDER       Barriers to Learning/Limitations: None  Compensate with: N/A  Prior Level of Function/Home Situation: Patient lives with daughter in 1 story home with 3STE with BHR. Patient was using RW for mobility and Rollator for outside the house mobility. She was independent with I/ADL's PTA. Home Situation  Home Environment: Private residence  # Steps to Enter: 3  Rails to Enter: Yes  Hand Rails : Bilateral  One/Two Story Residence: One story  Living Alone: No(Lives with her daughter who is retired.)  Support Systems: Child(deejay)  Patient Expects to be Discharged to[de-identified] Private residence  Current DME Used/Available at [facility]: Lift chair, Walker, rollator, Walker, rolling(bedrail, tubseat without a back)  Tub or Shower Type: (walk-in shower )  Critical Behavior:  Neurologic State: Alert  Orientation Level: Oriented X4  Cognition: Appropriate for age attention/concentration; Follows commands   Strength:    Strength: Within functional limits(BLE)   Tone & Sensation:   Tone: Normal(BLE)   Sensation: Intact(BLE)   Range Of Motion:  AROM: Within functional limits(BLE)   Functional Mobility:  Bed Mobility:  Supine to Sit: Modified independent  Sit to Supine: Supervision  Scooting: Modified independent  Transfers:  Sit to Stand: Modified independent  Stand to Sit: Modified independent   Balance:   Sitting: Intact  Sitting - Static: (normal)  Sitting - Dynamic: (good)  Standing: Intact; With support  Standing - Static: (good with RW)  Standing - Dynamic : (fair+ with RW)  Ambulation/Gait Training:  Distance (ft): 250 Feet (ft)  Assistive Device: Walker, rolling  Ambulation - Level of Assistance: Supervision(one LoB with CG)    Base of Support: Narrowed  Speed/Linda: Slow  Interventions: Verbal cues; Visual/Demos  Pain:  Pt reports 0/10 pain or discomfort prior to treatment.    Pt reports 0/10 pain or discomfort post treatment. Activity Tolerance:   Patient tolerated activity well; was SOB and required 2 standing rest breaks and education on safety awareness in regards to activity pacing. Please refer to the flowsheet for vital signs taken during this treatment. After treatment:   []         Patient left in no apparent distress sitting up in chair  [x]         Patient left in no apparent distress in bed  [x]         Call bell left within reach  [x]         Nursing notified Pat Beat)  []         Caregiver present  []         Bed alarm activated  []         SCDs in place to B LE     COMMUNICATION/EDUCATION:   [x]         Fall prevention education was provided and the patient/caregiver indicated understanding. [x]         Patient/family have participated as able in goal setting and plan of care. [x]         Patient/family agree to work toward stated goals and plan of care. []         Patient understands intent and goals of therapy, but is neutral about his/her participation. []         Patient is unable to participate in goal setting and plan of care.     Thank you for this referral.  Keya Quiles, PT   Time Calculation: 23 mins

## 2018-11-26 NOTE — PROGRESS NOTES
conducted an initial consultation and Spiritual Assessment for Bacilio Ventura, who is a 80 y.o.,female. Patients Primary Language is: [place]. According to the patients EMR Episcopal Affiliation is: [facility].     The reason the Patient came to the hospital is:   Patient Active Problem List    Diagnosis Date Noted    CHF (congestive heart failure), NYHA class III, acute, systolic ([facility] 80/10/7613    Acute UTI 11/24/2018    Functional urinary incontinence 11/24/2018    Acute pulmonary edema ([facility] 11/24/2018    Primary osteoarthritis of both knees 11/01/2018    Syncope 11/01/2018    Seasonal allergic rhinitis due to pollen 04/23/2018    Dysphagia 12/06/2016    Advance directive discussed with patient 10/21/2016    Gastroesophageal reflux disease without esophagitis 10/21/2016    Essential hypertension with goal blood pressure less than 140/90 06/22/2016    S/P ORIF (open reduction internal fixation) fracture 06/14/2016    Memory loss 12/16/2015    Vertigo, benign paroxysmal 06/15/2015    Distal radius fracture, left 11/30/2013    Sprain and strain of shoulder and upper arm 11/30/2013    Zoster 06/10/2013    Generalized osteoarthrosis, involving multiple sites 01/08/2013    Reflux esophagitis 01/19/2012    Osteoporosis     Anxiety     Depression         The  provided the following Interventions:  Initiated a relationship of care and support. Explored issues of abisai, belief, spirituality and Restorationism/ritual needs while hospitalized. Listened empathically. Provided information about Spiritual Care Services. Chart reviewed. The following outcomes where achieved:  Patient expressed gratitude for 's visit. Assessment:  Patient does not have any Restorationism/cultural needs that will affect patients preferences in health care. There are no spiritual or Restorationism issues which require intervention at this time.      Plan:  Chaplains will continue to follow and will provide pastoral care on an as needed/requested basis.  recommends bedside caregivers page  on duty if patient shows signs of acute spiritual or emotional distress.     [address]  [phone])

## 2018-11-26 NOTE — PROGRESS NOTES
NUTRITION    Nursing Referral: [facility]  Nutrition Consult: General Nutrition Management & Supplements, Diet Education     RECOMMENDATIONS / PLAN:     - Add supplements: Ensure Enlive once daily & Magic Cup BID.  - Diet education provided today. - Continue 1500 mL fluid restriction per MD order.  - Continue RD inpatient monitoring and evaluation. NUTRITION INTERVENTIONS & DIAGNOSIS:     [x] Meals/snacks: modify composition  [x] Medical food supplement therapy: Ensure Enlive- modify  [x] Nutrition education: cardiac diet and fluid restriction provided 11/26    Nutrition Diagnosis: Inadequate energy intake related to decreased appetite and SOB associated with meals PTA as evidenced by pt consuming 50% or less of recent meals. Food and nutrition related knowledge deficit related to cardiac diet, fluid restriction as evidenced by pt and pt's daughter report. ASSESSMENT:     Pt with CHF, shortness of breath PTA associated with eating and poor appetite. Consuming small portions of each meal per pt's daughter. Recent weight loss after diuresis. Reports appetite and meal intake improved since admission, 50% meal intake today at breakfast.    Average po intake adequate to meet patients estimated nutritional needs:   [] Yes     [x] No   [] Unable to determine at this time    Diet: DIET CARDIAC Regular; FR 1500ML      Food Allergies: NKFA  Current Appetite:   [] Good     [x] Fair     [] Poor     [] Other:  Appetite/meal intake prior to admission:   [] Good     [] Fair     [x] Poor x several months PTA     [] Other:  Feeding Limitations:  [x] Swallowing difficulty: SLP evaluation completed 11/26/18    [] Chewing difficulty    [] Other:  Current Meal Intake: No data found.     BM: 11/25  Skin Integrity: WDL   Edema:   [x] No     [] Yes   Pertinent Medications: Reviewed: colace, lasix, zofran, aldactone, MVI,    Recent Labs     11/26/18  0030 11/25/18  0118 11/24/18  1220   * 140 139   K 3.7 3.6 4.1   CL 98* 103 106 CO2 30 28 28   * 97 93   BUN 36* 19* 19*   CREA 1.75* 1.10 1.08   CA 9.0 9.0 9.0   MG 1.7 1.8  --    PHOS 4.8 4.0  --    ALB 3.2* 3.4  --    SGOT 19 19  --    ALT 18 20  --      No intake or output data in the 24 hours ending 11/26/18 1044    Anthropometrics:  Ht Readings from Last 1 Encounters:   11/24/18 5' 2\" (1.575 m)     Last 3 Recorded Weights in this Encounter    11/24/18 1455 11/25/18 0448 11/26/18 0512   Weight: 56.7 kg (125 lb 0 oz) 53.3 kg (117 lb 6.4 oz) 53.9 kg (118 lb 12.8 oz)     Body mass index is 21.73 kg/m². Weight History:  Pt's daughter reports pt with 7 lb, 5.6% weight loss x 1 month PTA, with recent diuresis    Weight Metrics 11/26/2018 11/20/2018 11/7/2018 11/1/2018 4/23/2018 11/17/2017 10/11/2017   Weight 118 lb 12.8 oz 125 lb 125 lb 123 lb 121 lb 6.4 oz 126 lb 126 lb 9.6 oz   BMI 21.73 kg/m2 22.86 kg/m2 22.86 kg/m2 22.5 kg/m2 22.2 kg/m2 23.05 kg/m2 23.16 kg/m2        Admitting Diagnosis: CHF (congestive heart failure), NYHA class III, acute, systolic ([facility]  Pertinent PMHx: HTN    Education Needs:        [] None identified  [] Identified - Not appropriate at this time  [x]  Identified and addressed - refer to education log  Learning Limitations:   [x] None identified  [x] Identified    Cultural, Congregational & ethnic food preferences:  [x] None identified    [] Identified and addressed     ESTIMATED NUTRITION NEEDS:     Calories: 3287-6819 kcal (MSJx1.2-1.3) based on  [x] Actual BW: 54 kg      [] IBW   Protein: 43-65 gm (0.8-1.2 gm/kg) based on  [x] Actual BW      [] IBW   Fluid: 1500 mL/day per MD order     MONITORING & EVALUATION:     Nutrition Goal(s):   1. Po intake of meals will meet >75% of patient estimated nutritional needs within the next 7 days. Outcome:  [] Met/Ongoing    []  Not Met    [x] New/Initial Goal  2. Patient will increase knowledge of appropriate food choices on a cardiac diet and fluid restriction within 7 days.   Outcome:  [] Met    []  Not Met    [x] New/Initial Goal        Monitoring:   [x] Food and beverage intake   [x] Diet order   [x] Nutrition-focused physical findings   [x] Treatment/therapy   [] Weight   [] Enteral nutrition intake        Previous Recommendations (for follow-up assessments only):     []   Implemented       []   Not Implemented (RD to address)      [] No Longer Appropriate     [] No Recommendation Made     Discharge Planning: cardiac diet; fluid restriction per MD order   [x] Participated in care planning, discharge planning, & interdisciplinary rounds as appropriate      Jose Enrique Martins RD   Pager: [phone]

## 2018-11-26 NOTE — PROGRESS NOTES
Reason for Admission:   CHF,NYHA class III, Acute systolic                  RRAT Score:     14             Do you (patient/family) have any concerns for transition/discharge? No               Plan for utilizing home health: To be determined    Likelihood of readmission? Yellow/moderate            Transition of Care Plan:      Met with pt and pt's daughter Nithya Ramey [phone] at pt's bedside. Pt reports that she lives with her daughter Nithya Ramey and was able to care for herself prior to admission. She transports pt to her appointments. Pt has a walker and wheelchair at home. Pt's daughter verified pt's pcp and demographic info and states they do not have any concerns at this time. She will be transporting pt home when discharged. Pt has PT/OT consult. Will continue to follow for any recommendations. Care Management Interventions  PCP Verified by CM: Yes(saw pcp last a month ago)  Mode of Transport at Discharge: Other (see comment)(family)  Transition of Care Consult (CM Consult): Discharge Planning  Physical Therapy Consult: Yes  Occupational Therapy Consult: Yes  Speech Therapy Consult: Yes  Current Support Network: Relative's Home(lives with her daughter)  Confirm Follow Up Transport: Family  Plan discussed with Pt/Family/Caregiver:  Yes

## 2018-11-26 NOTE — PROGRESS NOTES
Problem: Self Care Deficits Care Plan (Adult)  Goal: *Acute Goals and Plan of Care (Insert Text)  Outcome: Resolved/Met Date Met: 11/26/18  Occupational Therapy EVALUATION/discharge    Patient: Bacilio Ventura (57 y.o. female)  Date: 11/26/2018  Primary Diagnosis: CHF (congestive heart failure), NYHA class III, acute, systolic ([address]       Precautions: Patient is hearing impaired    ASSESSMENT AND RECOMMENDATIONS:  Ms. Oniel Casey is a very pleasant 80 yr old female admitted to the hospital with generalized weakness and SOB. During the OT eval today, the pt completed bed mobility and lower body dressing with modified independence, and sit to stand with a RW, ambulating ~30 feet with a RW, and hand washing in standing at sink level with supervision. The pt appears to be at or very near to her baseline level of functioning for ADLs, and she does not require further OT services. As such, OT will sign off & recommend the pt return home at discharge. Skilled occupational therapy is not indicated at this time. Discharge Recommendations: None/Return home  Further Equipment Recommendations for Discharge: N/A (pt already has needed equipment)     Barriers to Learning/Limitations: yes;  sensory deficits-vision/hearing/speech (pt is hearing impaired)  Compensate with: visual, verbal, tactile, kinesthetic cues/model     COMPLEXITY     Eval Complexity: History: LOW Complexity : Brief history review ; Examination: LOW Complexity : 1-3 performance deficits relating to physical, cognitive , or psychosocial skils that result in activity limitations and / or participation restrictions ; Decision Making:LOW Complexity : No comorbidities that affect functional and no verbal or physical assistance needed to complete eval tasks  Assessment: Low Complexity        G-CODES:     Self Care  Current  CI= 1-19%   Goal  CI= 1-19%   D/C  CI= 1-19%.   The severity rating is based on the Level of Assistance required for Functional Mobility and ADLs. SUBJECTIVE:   Patient stated I use my walker when I go to the bathroom in the middle of the night. \"    OBJECTIVE DATA SUMMARY:     Past Medical History:   Diagnosis Date    Anxiety     Arthritis     Depression     GERD (gastroesophageal reflux disease)     HTN (hypertension)     LBBB (left bundle branch block)     Osteoporosis     completed 8 years Fosamax    Sciatica     Urinary incontinence      Past Surgical History:   Procedure Laterality Date    HX CHOLECYSTECTOMY      HX HYSTERECTOMY      HX ORTHOPAEDIC      fractured right patella surgery    [place] ANESTH,SURGERY OF SHOULDER       Prior Level of Function/Home Situation: Patient was mostly independent with ADLs, however her daughter provided intermittent min assist with bathing if the pt was experiencing occasional dizziness. The pt used a RW for ambulation inside the home and a rollator outside the home. The pt was independent with making her bed and washing dishes, however her daughter managed all other household chores, such as laundry & cooking. The pt does not drive. Home Situation  Home Environment: Private residence  # Steps to Enter: 3  Rails to Enter: Yes  Hand Rails : Bilateral  One/Two Story Residence: One story  Living Alone: No(Lives with her daughter who is retired.)  Support Systems: Child(deejay)  Patient Expects to be Discharged to[de-identified] Private residence  Current DME Used/Available at [facility]: Lift chair, Walker, rollator, Walker rolling, bedrail, tubseat without a back  Tub or Shower Type: (walk-in shower )  []     Right hand dominant   []     Left hand dominant  Cognitive/Behavioral Status:  Neurologic State: Alert  Orientation Level: Oriented X4  Cognition: Appropriate for age attention/concentration; Follows commands       Skin: visible skin appeared intact      Vision/Perceptual:                 Acuity: Able to read clock/calendar on wall without difficulty         Coordination:  Coordination: Within functional limits(BUE and BLE)          Balance:  Sitting - Static: (normal)  Sitting - Dynamic: (good)  Standing - Static: (good with RW)  Standing - Dynamic : (fair+ with RW)    Strength:  Strength: Within functional limits(BUE and BLE; BUE strength 4/5 to 4+/5)     Tone & Sensation:  Tone: Normal(BUE and BLE)  Sensation: (Pt denied numbness & tingling of BUE & BLE)        Range of Motion:  AROM: (BUE and BLE WFL)      Functional Mobility and Transfers for ADLs:  Bed Mobility:     Supine to Sit: Modified independent     Scooting: Modified independent     Transfers:  Sit to Stand: Modified independent      ADL Assessment:  Feeding: Independent    Oral Facial Hygiene/Grooming: Supervision(handwashing in standing at sink level)    Bathing: Supervision    Upper Body Dressing: Independent    Lower Body Dressing: Modified independent(sitting EOB)    Toileting: Supervision          Pain:  Pt reports 0/10 pain or discomfort prior to treatment.    Pt reports 0/10 pain or discomfort post treatment. Activity Tolerance:   good    Please refer to the flowsheet for vital signs taken during this treatment. After treatment:   []  Patient left in no apparent distress sitting up in chair  [x]  Patient left in no apparent distress in bed  [x]  Call bell left within reach  []  Nursing notified  [x]  Daughter present  []  Bed alarm activated    COMMUNICATION/EDUCATION:   Communication/Collaboration:  []      Home safety education was provided and the patient/caregiver indicated understanding. [x]      Patient/family have participated as able and agree with findings and recommendations. []      Patient is unable to participate in plan of care at this time.     Gandy Console, OT  Time Calculation: 20 mins

## 2018-11-26 NOTE — ROUTINE PROCESS
Bedside shift change report given to Jorge Smith (oncoming nurse) by Chuyita Quinteros (offgoing nurse). Report included the following information SBAR, Kardex and MAR.

## 2018-11-26 NOTE — PROGRESS NOTES
[facility]  -  Progress Note      Patient: Estuardo Izquierdo MRN: [id number]  SSN: [id number]    YOB: 1927  Age: 80 y.o. Sex: female      Admit Date: 11/24/2018    Assessment:     Hospital Problems  Date Reviewed: 11/1/2018          Codes Class Noted POA    * (Principal) CHF (congestive heart failure), NYHA class III, acute, systolic (HCC) [id number]: I50.21  ICD-9-CM: 428.21, 428.0  11/24/2018 Unknown        Acute UTI ICD-10-CM: N39.0  ICD-9-CM: 599.0  11/24/2018 Unknown        Functional urinary incontinence ICD-10-CM: R39.81  ICD-9-CM: 788.91  11/24/2018 Unknown        Acute pulmonary edema ([facility] [address] ICD-10-CM: J81.0  ICD-9-CM: 518.4  11/24/2018 Unknown        Essential hypertension with goal blood pressure less than 140/90 ICD-10-CM: I10  ICD-9-CM: 401.9  6/22/2016 Yes        Depression ICD-10-CM: F32.9  ICD-9-CM: 290  Unknown Yes            -Acute HFrEF exacerbation, CXR with worsened congestion compared to 10/28/18, BNP 7K, flat indeterminate troponin 0.10 x 2 --> 0.12  -Echo 11/20/18: EF 26-30%, mild concentric LVH, LV global hypokinesis, grade 2 diastolic dysfunction, mild TR + MR  -LBBB  -HTN  -Hyperlipidemia, , Total Chol 204 11/2018  -Osteoporosis  -GERD  -UTI  -Hx hysterectomy complicated by bladder injury with subsequent bladder prolapse and urinary incontinence      Plan:     Stable and improving. Will stop IV Lasix and start on low dose oral Lasix and monitor renal function, which is up this am.  Have started on Entresto as well. Increase activity today. Hopefully home in 1-2 days. Subjective:     No new complaints.  States that her breathing is doing well    Objective:      Patient Vitals for the past 8 hrs:   Temp Pulse Resp BP SpO2   11/26/18 0753 97.7 °F (36.5 °C) 84 16 104/68 98 %   11/26/18 0400 97.3 °F (36.3 °C) (!) 101 18 101/64 96 %         Patient Vitals for the past 96 hrs:   Weight   11/26/18 0512 53.9 kg (118 lb 12.8 oz)   11/25/18 0448 53.3 kg (117 lb 6.4 oz)   11/24/18 1455 56.7 kg (125 lb 0 oz)       No intake or output data in the 24 hours ending 11/26/18 0952    Physical Exam:  General:  alert, cooperative, no distress, appears stated age  Neck:  nontender, no JVD  Lungs:  Crackles posteriorly  Heart:  regular rate and rhythm, S1, S2 normal, no murmur, click, rub or gallop  Extremities:  extremities normal, atraumatic, no cyanosis or edema    Data Review:     Labs: Results:       Chemistry Recent Labs     11/26/18  0030 11/25/18  0118 11/24/18  1220   * 97 93   * 140 139   K 3.7 3.6 4.1   CL 98* 103 106   CO2 30 28 28   BUN 36* 19* 19*   CREA 1.75* 1.10 1.08   CA 9.0 9.0 9.0   MG 1.7 1.8  --    PHOS 4.8 4.0  --    AGAP 7 9 5   BUCR 21* 17 18   AP 96 100  --    TP 7.5 7.7  --    ALB 3.2* 3.4  --    GLOB 4.3* 4.3*  --    AGRAT 0.7* 0.8  --       CBC w/Diff Recent Labs     11/26/18  0030 11/25/18 0118 11/24/18  1220   WBC 9.8 8.6 7.2   RBC 4.94 4.85 4.94   HGB 13.5 13.3 13.8   HCT 41.3 40.7 41.9    251 242   GRANS 52 61 53   LYMPH 33 26 32   EOS 5 3 5      Cardiac Enzymes No results found for: CPK, CK, CKMMB, CKMB, RCK3, CKMBT, CKNDX, CKND1, DODIE, TROPT, TROIQ, STEPHANIE, TROPT, TNIPOC, BNP, BNPP   Coagulation Recent Labs     11/24/18  1220   PTP 12.9   INR 1.0       Lipid Panel Lab Results   Component Value Date/Time    Cholesterol, total 204 (H) 11/24/2018 09:40 PM    HDL Cholesterol 59 11/24/2018 09:40 PM    LDL, calculated 129.2 (H) 11/24/2018 09:40 PM    VLDL, calculated 15.8 11/24/2018 09:40 PM    Triglyceride 79 11/24/2018 09:40 PM    CHOL/HDL Ratio 3.5 11/24/2018 09:40 PM      BNP No results found for: BNP, BNPP, XBNPT   Liver Enzymes Recent Labs     11/26/18  0030   TP 7.5   ALB 3.2*   AP 96   SGOT 19      Digoxin    Thyroid Studies Lab Results   Component Value Date/Time    TSH 3.01 11/25/2018 01:18 AM

## 2018-11-26 NOTE — TELEPHONE ENCOUNTER
----- Message from Radha Mayo DO sent at 11/23/2018 11:35 AM EST -----  I spoke to her daughter Kehinde Frankel- who pt lives with) at length about the results. Can we please schedule patient for follow up appointment (next couple weeks preferably) regarding: cardiomyopathy. Thanks    ----- Message -----  From: Neyda Nielsen LPN  Sent: 29/69/9403  12:27 PM  To: Radha Mayo DO    Per your last note\" Bacilio Ventura is a 80 y.o. with:     1.) Syncope, mostly likely neurocardiogenic/ vasovagal but r/o structural/ significant arrhtyhmia  2.) HTN, amlodipine held with BP monitoring at home, known  3.) Dyslipidemia, known  4.) Advanced age with falls, known  5.) Normal exam, doubt significant valvular finding/ AS  6.) LBBB, known     1.) 7 day event monitor  2.) 2D echocardiogram  3.) Agree close BP monitoring  4.) Consider cholesterol medication for stroke/ ASCVD prevention ( 2017)     I took the liberty of setting patient up for some noninvasive testing to help r/o significant arrhythmias/ structural heart issues that we could potentially treat medically. I do suspect she is having the \"common faint\" and spent the majority of the time offering suggestions on how to avoid falling if this happens again.  We will call patient with the results and hopefully give her reassurance.

## 2018-11-26 NOTE — ROUTINE PROCESS
Kennedy Parson    Bedside and Verbal shift change report given to Giovany Smith RN (oncoming nurse) by Randy Calles RN (offgoing nurse). Report included the following information SBAR, Kardex, Intake/Output, MAR and Recent Results. Patient alert/oriented. No current complaints of pain, shortness of breath/wheezing. Vital signs are stable.

## 2018-11-26 NOTE — PROGRESS NOTES
Problem: Dysphagia (Adult)  Goal: *Acute Goals and Plan of Care (Insert Text)  Patient will:  1. Tolerate PO trials with 0 s/s overt distress in 4/5 trials-met    Recommend:   Regular diet with thin liquids  Meds as tolerated   Outcome: Resolved/Met Date Met: 11/26/18  Speech LAnguage Pathology bedside swallow evaluation AND DISCHARGE    Patient: Pawel Chapin (7 y.o. female)  Date: 11/26/2018  Primary Diagnosis: CHF (congestive heart failure), NYHA class III, acute, systolic (HCC)  Precautions: None on file       ASSESSMENT :  Clinical beside swallow eval completed per MD orders. Pt alert, oriented and reporting coughing after intake has resolved (was a globus sensation after po that has cleared as fluid is clearing). Speech/voice within functional limits. Oral mech examination revealed structures functional for speech and deglutition. Pt observed with thin liquids +/- straw via single sips and successive swallows with timely swallow initiation, adequate laryngeal elevation to palpation and no overt s/sx aspiration. Pt demo positive rotary chew and thorough oral clearance with regular solids with no overt s/sx aspiration. Pt safe for regular diet with thin liquids, meds as tolerated with general safe swallow precautions. Pt educated with regard to s/sx aspiration, aspiration risk, diet recs and role of SLP. Pt able to verbalize understanding. Will sign off. Please re-consult as indicated. D/w RN. PLAN :  Recommendations and Planned Interventions:  No formal ST needs ID'd for dysphagia. Eval only. Discharge Recommendations: Do not anticipate further SLP needs upon discharge      SUBJECTIVE:   Patient stated \"Everyone has been so nice here.     OBJECTIVE:     Past Medical History:   Diagnosis Date    Anxiety     Arthritis     Depression     GERD (gastroesophageal reflux disease)     HTN (hypertension)     LBBB (left bundle branch block)     Osteoporosis     completed 8 years Fosamax    Sciatica  Urinary incontinence      Past Surgical History:   Procedure Laterality Date    HX CHOLECYSTECTOMY      HX HYSTERECTOMY      HX ORTHOPAEDIC      fractured right patella surgery    [place] ANESTH,SURGERY OF SHOULDER       Prior Level of Function/Home Situation:  Home Situation  Home Environment: Private residence  # Steps to Enter: 3  One/Two Story Residence: One story  Living Alone: No  Support Systems: Family member(s)  Patient Expects to be Discharged to[de-identified] Private residence  Current DME Used/Available at Home: Shower chair, Walker, rolling  Diet prior to admission: Regular/thin  Current Diet:  Regular/thin   Cognitive and Communication Status:  Neurologic State: Alert  Cognition: Follows commands, Appropriate decision making  Oral Assessment:  Oral Assessment  Labial: No impairment  Dentition: Intact  Oral Hygiene: Good  Lingual: No impairment  Velum: No impairment  Mandible: No impairment  P.O. Trials:  Patient Position: [facility]  Vocal quality prior to P.O.: No impairment  Consistency Presented: Thin liquid; Solid  How Presented: Self-fed/presented;Spoon;Straw;Successive swallows  Bolus Acceptance: No impairment  Bolus Formation/Control: No impairment  Propulsion: No impairment  Oral Residue: None  Initiation of Swallow: No impairment  Laryngeal Elevation: Functional  Aspiration Signs/Symptoms: None  Pharyngeal Phase Characteristics: No impairment, issues, or problems   Oral Phase Severity: No impairment  Pharyngeal Phase Severity : No impairment    GCODESwallowing:  Swallow Current Status CH= 0%   Swallow Goal Status CH= 0%   Swallow D/C Status CH= 0%    The severity rating is based on the following outcomes:    Clinical judgment    Pain:  Pt reports 0/10 pain or discomfort prior to eval.   Pt reports 0/10 pain or discomfort post eval.     After treatment:   []            Patient left in no apparent distress sitting up in chair  [x]            Patient left in no apparent distress in bed  [x] Call bell left within reach  [x]            Nursing notified  []            Caregiver present  []            Bed alarm activated    COMMUNICATION/EDUCATION:   [x]            SLP educated pt with regard to aspiration s/sx and to alert MD/RN if symptoms arise.       Thank you for this referral.  Flo Patton M.S., [address] [facility]  Speech-Language Pathologist

## 2018-11-27 LAB
ALBUMIN SERPL-MCNC: 3 G/DL (ref 3.4–5)
ALBUMIN/GLOB SERPL: 0.8 {RATIO} (ref 0.8–1.7)
ALP SERPL-CCNC: 88 U/L (ref 45–117)
ALT SERPL-CCNC: 20 U/L (ref 13–56)
ANION GAP SERPL CALC-SCNC: 8 MMOL/L (ref 3–18)
AST SERPL-CCNC: 16 U/L (ref 15–37)
BASOPHILS # BLD: 0 K/UL (ref 0–0.1)
BASOPHILS NFR BLD: 0 % (ref 0–2)
BILIRUB SERPL-MCNC: 0.1 MG/DL (ref 0.2–1)
BUN SERPL-MCNC: 52 MG/DL (ref 7–18)
BUN/CREAT SERPL: 24 (ref 12–20)
CALCIUM SERPL-MCNC: 8.6 MG/DL (ref 8.5–10.1)
CHLORIDE SERPL-SCNC: 98 MMOL/L (ref 100–108)
CO2 SERPL-SCNC: 30 MMOL/L (ref 21–32)
CREAT SERPL-MCNC: 2.19 MG/DL (ref 0.6–1.3)
DIFFERENTIAL METHOD BLD: ABNORMAL
EOSINOPHIL # BLD: 0.6 K/UL (ref 0–0.4)
EOSINOPHIL NFR BLD: 7 % (ref 0–5)
ERYTHROCYTE [DISTWIDTH] IN BLOOD BY AUTOMATED COUNT: 14.5 % (ref 11.6–14.5)
GLOBULIN SER CALC-MCNC: 4 G/DL (ref 2–4)
GLUCOSE SERPL-MCNC: 131 MG/DL (ref 74–99)
HCT VFR BLD AUTO: 37.5 % (ref 35–45)
HGB BLD-MCNC: 12.4 G/DL (ref 12–16)
LYMPHOCYTES # BLD: 3 K/UL (ref 0.9–3.6)
LYMPHOCYTES NFR BLD: 33 % (ref 21–52)
MAGNESIUM SERPL-MCNC: 1.8 MG/DL (ref 1.6–2.6)
MCH RBC QN AUTO: 27.6 PG (ref 24–34)
MCHC RBC AUTO-ENTMCNC: 33.1 G/DL (ref 31–37)
MCV RBC AUTO: 83.3 FL (ref 74–97)
MONOCYTES # BLD: 0.9 K/UL (ref 0.05–1.2)
MONOCYTES NFR BLD: 10 % (ref 3–10)
NEUTS SEG # BLD: 4.6 K/UL (ref 1.8–8)
NEUTS SEG NFR BLD: 50 % (ref 40–73)
PLATELET # BLD AUTO: 255 K/UL (ref 135–420)
PMV BLD AUTO: 10 FL (ref 9.2–11.8)
POTASSIUM SERPL-SCNC: 3.5 MMOL/L (ref 3.5–5.5)
PROT SERPL-MCNC: 7 G/DL (ref 6.4–8.2)
RBC # BLD AUTO: 4.5 M/UL (ref 4.2–5.3)
SODIUM SERPL-SCNC: 136 MMOL/L (ref 136–145)
WBC # BLD AUTO: 9.1 K/UL (ref 4.6–13.2)

## 2018-11-27 PROCEDURE — 85025 COMPLETE CBC W/AUTO DIFF WBC: CPT

## 2018-11-27 PROCEDURE — 74011250636 HC RX REV CODE- 250/636: Performed by: INTERNAL MEDICINE

## 2018-11-27 PROCEDURE — 65660000004 HC RM CVT STEPDOWN

## 2018-11-27 PROCEDURE — 80053 COMPREHEN METABOLIC PANEL: CPT

## 2018-11-27 PROCEDURE — 36415 COLL VENOUS BLD VENIPUNCTURE: CPT

## 2018-11-27 PROCEDURE — 74011250636 HC RX REV CODE- 250/636: Performed by: HOSPITALIST

## 2018-11-27 PROCEDURE — 83735 ASSAY OF MAGNESIUM: CPT

## 2018-11-27 PROCEDURE — 74011250637 HC RX REV CODE- 250/637: Performed by: INTERNAL MEDICINE

## 2018-11-27 RX ORDER — SODIUM CHLORIDE 9 MG/ML
50 INJECTION, SOLUTION INTRAVENOUS CONTINUOUS
Status: DISCONTINUED | OUTPATIENT
Start: 2018-11-27 | End: 2018-11-27

## 2018-11-27 RX ADMIN — ATORVASTATIN CALCIUM 10 MG: 10 TABLET, FILM COATED ORAL at 21:56

## 2018-11-27 RX ADMIN — SODIUM CHLORIDE 50 ML/HR: 900 INJECTION, SOLUTION INTRAVENOUS at 12:00

## 2018-11-27 RX ADMIN — HEPARIN SODIUM 5000 UNITS: 5000 INJECTION, SOLUTION INTRAVENOUS; SUBCUTANEOUS at 19:06

## 2018-11-27 RX ADMIN — HEPARIN SODIUM 5000 UNITS: 5000 INJECTION, SOLUTION INTRAVENOUS; SUBCUTANEOUS at 02:08

## 2018-11-27 NOTE — ROUTINE PROCESS
Bedside verbal report received from Too Anderson RN, reviewed SBAR, VS, MAR and summary of care. Patient awake on rounds, watching TV, denies pain. Patient encouraged to call for assistance OOB.

## 2018-11-27 NOTE — PROGRESS NOTES
Hospitalist Progress Note    Patient: Cat Marrero MRN: [id number]  CSN: [id number]    YOB: 1927  Age: 80 y.o. Sex: female    DOA: 11/24/2018 LOS:  LOS: 3 days          Several pauses on tele. Brief LOC, vomited. Assessment/Plan     1. Acute HFrEF exacerbation. nsg did CHF education. Nutritionist consulted regarding low Na diet. Lasix, entresto, coreg, spironolactone. hh ordered  2. Echo 11/20/18: EF 26-30%, mild concentric LVH, LV global hypokinesis, grade 2 diastolic dysfunction, mild TR + MR  3. LBBB  4. hypertension  5. dyslipidemia, , Total Chol 204 11/2018  6. Osteoporosis  7. GERD  8. UTI >100,000 E coli - completed 3 days of ceftriaxone based on cx/s. Blood cx (11/24) ngtd. 9. Hx hysterectomy complicated by bladder injury with subsequent bladder prolapse and urinary incontinence. Hx bladder tack. Daughter declines further  intervention. 10. Advanced age, cough after po - did well with SLP. 11. Hx unsteady gait - pt / ot. 12. Mild protein calorie malnutrition AEB Inadequate energy intake related to decreased appetite and SOB associated with meals PTA as evidenced by pt consuming 50% or less of recent meals. multivit. On supplements, nutritionist following. 13. Symptomatic bradycardia/recurrent syncope with pauses (4 sec, 6 sec, 4 sec). History of syncope last month. PM in am per Dr Jacqui Vasquez, input appreciated. 14. dvt prophylaxis  15. Full code. Home with hh when ready. Additional Notes:      Case discussed with:  [x]Patient  [x]Family  [x]Nursing  []Case Management  DVT Prophylaxis:  []Lovenox  [x]Hep SQ  []SCDs  []Coumadin   []On Heparin gtt    Vital signs/Intake and Output:  Visit Vitals  /70 (BP 1 Location: Left arm, BP Patient Position: At rest)   Pulse 67   Temp 97.3 °F (36.3 °C)   Resp 18   Ht 5' 2\" (1.575 m)   Wt 53.5 kg (118 lb)   SpO2 96%   Breastfeeding? No   BMI 21.58 kg/m²     Current Shift:  No intake/output data recorded.   Last three shifts:  11/25 1901 - 11/27 0700  In: 100 [P.O.:100]  Out: 1250 [Urine:1250]    Thin, sitting up in bed nad. Daughter and sister at bedside. Ncat. perrl  RRR  cta b.l  Soft nt nd nabs  No edema. dp 2+ b.l  Other than hearing, no focal deficit  No rash    Medications Reviewed      Labs: Results:       Chemistry Recent Labs     11/27/18 0049 11/26/18  0030 11/25/18  0118   * 119* 97    135* 140   K 3.5 3.7 3.6   CL 98* 98* 103   CO2 30 30 28   BUN 52* 36* 19*   CREA 2.19* 1.75* 1.10   CA 8.6 9.0 9.0   AGAP 8 7 9   BUCR 24* 21* 17   AP 88 96 100   TP 7.0 7.5 7.7   ALB 3.0* 3.2* 3.4   GLOB 4.0 4.3* 4.3*   AGRAT 0.8 0.7* 0.8      CBC w/Diff Recent Labs     11/27/18 0049 11/26/18  0030 11/25/18 0118   WBC 9.1 9.8 8.6   RBC 4.50 4.94 4.85   HGB 12.4 13.5 13.3   HCT 37.5 41.3 40.7    251 251   GRANS 50 52 61   LYMPH 33 33 26   EOS 7* 5 3      Cardiac Enzymes Recent Labs     11/25/18 0118 11/24/18  2140   CPK 49 54   CKND1 3.9 4.1*      Coagulation Recent Labs     11/24/18  1220   PTP 12.9   INR 1.0       Lipid Panel Lab Results   Component Value Date/Time    Cholesterol, total 204 (H) 11/24/2018 09:40 PM    HDL Cholesterol 59 11/24/2018 09:40 PM    LDL, calculated 129.2 (H) 11/24/2018 09:40 PM    VLDL, calculated 15.8 11/24/2018 09:40 PM    Triglyceride 79 11/24/2018 09:40 PM    CHOL/HDL Ratio 3.5 11/24/2018 09:40 PM      BNP No results for input(s): BNPP in the last 72 hours. Liver Enzymes Recent Labs     11/27/18 0049   TP 7.0   ALB 3.0*   AP 88   SGOT 16      Thyroid Studies Lab Results   Component Value Date/Time    TSH 3.01 11/25/2018 01:18 AM        Procedures/imaging: see electronic medical records for all procedures/Xrays and details which were not copied into this note but were reviewed prior to creation of Plan.

## 2018-11-27 NOTE — PROGRESS NOTES
Notified MD Dr. Evangelista Single patient having pauses;  Eddie LARSEN with cardiology at nurses station; patient asymptomatic; orders in to transfer patient to CVT-stepdown; will continue to monitor

## 2018-11-27 NOTE — PROGRESS NOTES
[facility] - Progress Note  Admit Date: 11/24/2018    Assessment:     Hospital Problems  Date Reviewed: 11/1/2018          Codes Class Noted POA    * (Principal) CHF (congestive heart failure), NYHA class III, acute, systolic (HCC) LOD-30-CU: I50.21  ICD-9-CM: 428.21, 428.0  11/24/2018 Unknown        Acute UTI ICD-10-CM: N39.0  ICD-9-CM: 599.0  11/24/2018 Unknown        Functional urinary incontinence ICD-10-CM: R39.81  ICD-9-CM: 788.91  11/24/2018 Unknown        Acute pulmonary edema ([facility] [address] ICD-10-CM: J81.0  ICD-9-CM: 518.4  11/24/2018 Unknown        Essential hypertension with goal blood pressure less than 140/90 ICD-10-CM: I10  ICD-9-CM: 401.9  6/22/2016 Yes        Depression ICD-10-CM: F32.9  ICD-9-CM: 311  Unknown Yes              -Symptomatic bradycardia/recurrent syncope with pauses (4 sec, 6 sec, 4 sec) while eating breakfast this morning. History of syncope last month.  -Admitted with acute HFrEF exacerbation, CXR with worsened congestion compared to 10/28/18, BNP 7K, flat indeterminate troponin 0.10 x 2 --> 0.12  -Echo 11/20/18: EF 26-30%, mild concentric LVH, LV global hypokinesis, grade 2 diastolic dysfunction, mild TR + MR  -LBBB  -HTN  -Hyperlipidemia, , Total Chol 204 11/2018  -Osteoporosis  -GERD  -UTI  -Hx hysterectomy complicated by bladder injury with subsequent bladder prolapse and urinary incontinence.  -Acute kidney injury.       Plan: Will discontinue coreg. Will continue telemetry monitoring. Transferring to stepdown. Have discussed PPM implantation with patient and daughter at bedside, they understand and are willing to proceed. NPO after MN for PPM tomorrow. Diuresed well, but now with worsening renal function, will hold lasix, entresto and aldactone. Continued on ASA and statin. Subjective:     Syncope while eating breakfast this AM, bradycardia on tele with pauses up to 6 seconds.     Objective:      Patient Vitals for the past 8 hrs:   Temp Pulse Resp BP SpO2   11/27/18 0857 -- 67 18 115/70 96 %   11/27/18 0849 -- 67 18 135/86 95 %   11/27/18 0745 97.3 °F (36.3 °C) 68 16 100/57 94 %   11/27/18 0412 98.7 °F (37.1 °C) 65 20 102/60 95 %         Patient Vitals for the past 96 hrs:   Weight   11/27/18 0423 53.5 kg (118 lb)   11/26/18 0512 53.9 kg (118 lb 12.8 oz)   11/25/18 0448 53.3 kg (117 lb 6.4 oz)   11/24/18 1455 56.7 kg (125 lb 0 oz)                    Intake/Output Summary (Last 24 hours) at 11/27/2018 7895  Last data filed at 11/27/2018 8216  Gross per 24 hour   Intake 100 ml   Output 1250 ml   Net -1150 ml       Physical Exam:  General:  alert, cooperative, no distress, appears stated age  Neck:  no JVD  Lungs:  clear to auscultation bilaterally  Heart:  regular rate and rhythm  Abdomen:  abdomen is soft without significant tenderness, masses, organomegaly or guarding  Extremities:  extremities normal, atraumatic, no cyanosis or edema    Data Review:     Labs: Results:       Chemistry Recent Labs     11/27/18 0049 11/26/18  0030 11/25/18  0118   * 119* 97    135* 140   K 3.5 3.7 3.6   CL 98* 98* 103   CO2 30 30 28   BUN 52* 36* 19*   CREA 2.19* 1.75* 1.10   CA 8.6 9.0 9.0   MG 1.8 1.7 1.8   PHOS  --  4.8 4.0   AGAP 8 7 9   BUCR 24* 21* 17   AP 88 96 100   TP 7.0 7.5 7.7   ALB 3.0* 3.2* 3.4   GLOB 4.0 4.3* 4.3*   AGRAT 0.8 0.7* 0.8      CBC w/Diff Recent Labs     11/27/18  0049 11/26/18  0030 11/25/18  0118   WBC 9.1 9.8 8.6   RBC 4.50 4.94 4.85   HGB 12.4 13.5 13.3   HCT 37.5 41.3 40.7    251 251   GRANS 50 52 61   LYMPH 33 33 26   EOS 7* 5 3      Cardiac Enzymes No results found for: CPK, CK, CKMMB, CKMB, RCK3, CKMBT, CKNDX, CKND1, DODIE, TROPT, TROIQ, STEPHANIE, TROPT, TNIPOC, BNP, BNPP   Coagulation Recent Labs     11/24/18  1220   PTP 12.9   INR 1.0       Lipid Panel Lab Results   Component Value Date/Time    Cholesterol, total 204 (H) 11/24/2018 09:40 PM    HDL Cholesterol 59 11/24/2018 09:40 PM    LDL, calculated 129.2 (H) 11/24/2018 09:40 PM VLDL, calculated 15.8 11/24/2018 09:40 PM    Triglyceride 79 11/24/2018 09:40 PM    CHOL/HDL Ratio 3.5 11/24/2018 09:40 PM      BNP No results found for: BNP, BNPP, XBNPT   Liver Enzymes Recent Labs     11/27/18  0049   TP 7.0   ALB 3.0*   AP 88   SGOT 16      Digoxin    Thyroid Studies Lab Results   Component Value Date/Time    TSH 3.01 11/25/2018 01:18 AM          Signed By: CHAVA Bowman     November 27, 2018

## 2018-11-27 NOTE — PROGRESS NOTES
Home health orders cannot be processed at this time. Pt is being transferred to stepdown. Pt's daughter Nydia Even at pt's bedside.

## 2018-11-27 NOTE — PROGRESS NOTES
Problem: Mobility Impaired (Adult and Pediatric)  Goal: *Acute Goals and Plan of Care (Insert Text)  Physical Therapy Goals  Initiated 11/26/2018 and to be accomplished within 7 day(s)  1. Patient will move from supine to sit and sit to supine , scoot up and down and roll side to side in bed with modified independence. 2.  Patient will transfer from bed to chair and chair to bed with modified independence using the least restrictive device. 3.  Patient will perform sit to stand with modified independence. 4.  Patient will ambulate with modified independence for >100 feet with the least restrictive device. 5.  Patient will ascend/descend 3 stairs with 2 handrail(s) with supervision/set-up. 56 - Pt will be discharged from in-patient PT caseload at this time due to change in medical status and transfer to CVT step-down. Please place orders when/if pt becomes medically appropriate for PT. Thank you.

## 2018-11-27 NOTE — PROGRESS NOTES
TRANSFER - OUT REPORT:    Verbal report given to Don [employer] (name) on Triston Moeller  being transferred to Geronimo Hermosillo RN (unit) for change in patient condition(abnormal heart rate, pauses)       Report consisted of patients Situation, Background, Assessment and   Recommendations(SBAR). Information from the following report(s) SBAR, Intake/Output, MAR, Accordion, Recent Results and Cardiac Rhythm NSR was reviewed with the receiving nurse. Lines:   Peripheral IV 11/24/18 Right Antecubital (Active)   Site Assessment Clean, dry, & intact 11/27/2018  8:00 AM   Phlebitis Assessment 0 11/27/2018  8:00 AM   Infiltration Assessment 0 11/27/2018  8:00 AM   Dressing Status Clean, dry, & intact 11/27/2018  8:00 AM   Dressing Type Transparent;Tape 11/27/2018  8:00 AM   Hub Color/Line Status Pink;Flushed 11/27/2018  8:00 AM   Action Taken Open ports on tubing capped 11/27/2018  8:00 AM   Alcohol Cap Used Yes 11/27/2018  8:00 AM        Opportunity for questions and clarification was provided.       Patient transported with:   Monitor  Registered Nurse

## 2018-11-27 NOTE — PROGRESS NOTES
Patient with daughter at bedside, pushed call bell and states she feels dizzy, came to room LARS Gerber at bedside, daughter says patient passed out for about two seconds, vitals taken; BP stable; patient alert oriented x4; Lonzell Siemens PA with cardiology at bedside; will continue to monitor

## 2018-11-27 NOTE — PROGRESS NOTES
Problem: Falls - Risk of  Goal: *Absence of Falls  Document Luis Fall Risk and appropriate interventions in the flowsheet. Outcome: Progressing Towards Goal  Fall Risk Interventions:  Mobility Interventions: Assess mobility with egress test, Patient to call before getting OOB         Medication Interventions: Bed/chair exit alarm, Patient to call before getting OOB, Teach patient to arise slowly    Elimination Interventions: Bed/chair exit alarm, Call light in reach, Patient to call for help with toileting needs    History of Falls Interventions: Bed/chair exit alarm, Consult care management for discharge planning, Door open when patient unattended, Room close to nurse's station        Problem: Pressure Injury - Risk of  Goal: *Prevention of pressure injury  Document Vance Scale and appropriate interventions in the flowsheet.   Outcome: Progressing Towards Goal  Pressure Injury Interventions:  Sensory Interventions: Assess changes in LOC    Moisture Interventions: Apply protective barrier, creams and emollients, Maintain skin hydration (lotion/cream)    Activity Interventions: Increase time out of bed, PT/OT evaluation    Mobility Interventions: HOB 30 degrees or less, PT/OT evaluation    Nutrition Interventions: Document food/fluid/supplement intake, Discuss nutritional consult with provider, Offer support with meals,snacks and hydration    Friction and Shear Interventions: Apply protective barrier, creams and emollients

## 2018-11-27 NOTE — ROUTINE PROCESS
Bedside and Verbal shift change report given to [address] [place] [address] (oncoming nurse) by Hattie King RN (offgoing nurse). Report included the following information SBAR, Kardex, MAR, Recent Results and Cardiac Rhythm SR. Quiet on rounds.

## 2018-11-27 NOTE — ROUTINE PROCESS
Bedside shift change report given to [place] (oncoming nurse) by Jarrell Meraz RN (offgoing nurse). Report included the following information SBAR, Kardex, Intake/Output, MAR, Accordion and Cardiac Rhythm NSR.

## 2018-11-27 NOTE — PROGRESS NOTES
1330: Received report from [place], [facility]. Pt transported in bed. No s/s of distress noted. 1630: Pt ambulated to bathroom with 1 person assist. Family at bedside. No s/s of distress during ambulation. 1900: Pt in bed reading. No s/s of distress noted. Bedside and Verbal shift change report given to Kaden Ribeiro RN (oncoming nurse) by Roderikc Goodman RN (offgoing nurse). Report included the following information SBAR, Kardex, Intake/Output, Recent Results and Med Rec Status.

## 2018-11-27 NOTE — ROUTINE PROCESS
[facility] reviewed SBAR, MAR, VS, labs and summary of care. Patient encouraged to call for assistance OOB, call light in reach and bed alarm on.

## 2018-11-28 ENCOUNTER — APPOINTMENT (OUTPATIENT)
Dept: GENERAL RADIOLOGY | Age: 83
DRG: 243 | End: 2018-11-28
Attending: INTERNAL MEDICINE
Payer: MEDICARE

## 2018-11-28 ENCOUNTER — ANESTHESIA (OUTPATIENT)
Dept: CARDIAC CATH/INVASIVE PROCEDURES | Age: 83
DRG: 243 | End: 2018-11-28
Payer: MEDICARE

## 2018-11-28 ENCOUNTER — ANESTHESIA EVENT (OUTPATIENT)
Dept: CARDIAC CATH/INVASIVE PROCEDURES | Age: 83
DRG: 243 | End: 2018-11-28
Payer: MEDICARE

## 2018-11-28 LAB
ANION GAP SERPL CALC-SCNC: 8 MMOL/L (ref 3–18)
BASOPHILS # BLD: 0 K/UL (ref 0–0.1)
BASOPHILS NFR BLD: 0 % (ref 0–2)
BUN SERPL-MCNC: 46 MG/DL (ref 7–18)
BUN/CREAT SERPL: 30 (ref 12–20)
CALCIUM SERPL-MCNC: 8.5 MG/DL (ref 8.5–10.1)
CHLORIDE SERPL-SCNC: 103 MMOL/L (ref 100–108)
CO2 SERPL-SCNC: 29 MMOL/L (ref 21–32)
CREAT SERPL-MCNC: 1.52 MG/DL (ref 0.6–1.3)
DIFFERENTIAL METHOD BLD: ABNORMAL
EOSINOPHIL # BLD: 0.4 K/UL (ref 0–0.4)
EOSINOPHIL NFR BLD: 6 % (ref 0–5)
ERYTHROCYTE [DISTWIDTH] IN BLOOD BY AUTOMATED COUNT: 14.6 % (ref 11.6–14.5)
GLUCOSE SERPL-MCNC: 102 MG/DL (ref 74–99)
HCT VFR BLD AUTO: 40.1 % (ref 35–45)
HGB BLD-MCNC: 13 G/DL (ref 12–16)
INR PPP: 1 (ref 0.8–1.2)
LYMPHOCYTES # BLD: 1.8 K/UL (ref 0.9–3.6)
LYMPHOCYTES NFR BLD: 26 % (ref 21–52)
MAGNESIUM SERPL-MCNC: 2.1 MG/DL (ref 1.6–2.6)
MCH RBC QN AUTO: 27.4 PG (ref 24–34)
MCHC RBC AUTO-ENTMCNC: 32.4 G/DL (ref 31–37)
MCV RBC AUTO: 84.4 FL (ref 74–97)
MONOCYTES # BLD: 0.8 K/UL (ref 0.05–1.2)
MONOCYTES NFR BLD: 11 % (ref 3–10)
NEUTS SEG # BLD: 3.9 K/UL (ref 1.8–8)
NEUTS SEG NFR BLD: 57 % (ref 40–73)
PLATELET # BLD AUTO: 246 K/UL (ref 135–420)
PMV BLD AUTO: 9.9 FL (ref 9.2–11.8)
POTASSIUM SERPL-SCNC: 3.8 MMOL/L (ref 3.5–5.5)
PROTHROMBIN TIME: 13.1 SEC (ref 11.5–15.2)
RBC # BLD AUTO: 4.75 M/UL (ref 4.2–5.3)
SODIUM SERPL-SCNC: 140 MMOL/L (ref 136–145)
WBC # BLD AUTO: 6.9 K/UL (ref 4.6–13.2)

## 2018-11-28 PROCEDURE — 77030018729 HC ELECTRD DEFIB PAD CARD -B: Performed by: INTERNAL MEDICINE

## 2018-11-28 PROCEDURE — 0JH604Z INSERTION OF PACEMAKER, SINGLE CHAMBER INTO CHEST SUBCUTANEOUS TISSUE AND FASCIA, OPEN APPROACH: ICD-10-PCS | Performed by: INTERNAL MEDICINE

## 2018-11-28 PROCEDURE — 85025 COMPLETE CBC W/AUTO DIFF WBC: CPT

## 2018-11-28 PROCEDURE — 76060000033 HC ANESTHESIA 1 TO 1.5 HR: Performed by: INTERNAL MEDICINE

## 2018-11-28 PROCEDURE — C1898 LEAD, PMKR, OTHER THAN TRANS: HCPCS | Performed by: INTERNAL MEDICINE

## 2018-11-28 PROCEDURE — 74011250637 HC RX REV CODE- 250/637: Performed by: INTERNAL MEDICINE

## 2018-11-28 PROCEDURE — 74011250636 HC RX REV CODE- 250/636: Performed by: INTERNAL MEDICINE

## 2018-11-28 PROCEDURE — 77030031139 HC SUT VCRL2 J&J -A: Performed by: INTERNAL MEDICINE

## 2018-11-28 PROCEDURE — 77030002996 HC SUT SLK J&J -A: Performed by: INTERNAL MEDICINE

## 2018-11-28 PROCEDURE — C1893 INTRO/SHEATH, FIXED,NON-PEEL: HCPCS | Performed by: INTERNAL MEDICINE

## 2018-11-28 PROCEDURE — 65660000004 HC RM CVT STEPDOWN

## 2018-11-28 PROCEDURE — 77030019580 HC CBL PACE MEDT -B: Performed by: INTERNAL MEDICINE

## 2018-11-28 PROCEDURE — 36415 COLL VENOUS BLD VENIPUNCTURE: CPT

## 2018-11-28 PROCEDURE — 02HK3JZ INSERTION OF PACEMAKER LEAD INTO RIGHT VENTRICLE, PERCUTANEOUS APPROACH: ICD-10-PCS | Performed by: INTERNAL MEDICINE

## 2018-11-28 PROCEDURE — 71045 X-RAY EXAM CHEST 1 VIEW: CPT

## 2018-11-28 PROCEDURE — 80048 BASIC METABOLIC PNL TOTAL CA: CPT

## 2018-11-28 PROCEDURE — 74011250636 HC RX REV CODE- 250/636

## 2018-11-28 PROCEDURE — 77030018673: Performed by: INTERNAL MEDICINE

## 2018-11-28 PROCEDURE — C1786 PMKR, SINGLE, RATE-RESP: HCPCS | Performed by: INTERNAL MEDICINE

## 2018-11-28 PROCEDURE — 83735 ASSAY OF MAGNESIUM: CPT

## 2018-11-28 PROCEDURE — 85610 PROTHROMBIN TIME: CPT

## 2018-11-28 PROCEDURE — 74011636320 HC RX REV CODE- 636/320: Performed by: INTERNAL MEDICINE

## 2018-11-28 PROCEDURE — 77030002933 HC SUT MCRYL J&J -A: Performed by: INTERNAL MEDICINE

## 2018-11-28 PROCEDURE — 33207 INSERT HEART PM VENTRICULAR: CPT | Performed by: INTERNAL MEDICINE

## 2018-11-28 DEVICE — IPG A3SR01 ADVISA SR MRI US MKT
Type: IMPLANTABLE DEVICE | Status: FUNCTIONAL
Brand: ADVISA SR MRI™ SURESCAN™

## 2018-11-28 DEVICE — LEAD PCMKR CAPSUR FIX NOVUS 52 --: Type: IMPLANTABLE DEVICE | Status: FUNCTIONAL

## 2018-11-28 RX ORDER — FENTANYL CITRATE 50 UG/ML
25 INJECTION, SOLUTION INTRAMUSCULAR; INTRAVENOUS AS NEEDED
Status: DISCONTINUED | OUTPATIENT
Start: 2018-11-28 | End: 2018-11-29 | Stop reason: HOSPADM

## 2018-11-28 RX ORDER — PROPOFOL 10 MG/ML
INJECTION, EMULSION INTRAVENOUS
Status: DISCONTINUED | OUTPATIENT
Start: 2018-11-28 | End: 2018-11-28 | Stop reason: HOSPADM

## 2018-11-28 RX ORDER — CARVEDILOL 3.12 MG/1
3.12 TABLET ORAL EVERY 12 HOURS
Status: DISCONTINUED | OUTPATIENT
Start: 2018-11-28 | End: 2018-11-29 | Stop reason: HOSPADM

## 2018-11-28 RX ORDER — SODIUM CHLORIDE 9 MG/ML
50 INJECTION, SOLUTION INTRAVENOUS CONTINUOUS
Status: DISCONTINUED | OUTPATIENT
Start: 2018-11-28 | End: 2018-11-29

## 2018-11-28 RX ORDER — SODIUM CHLORIDE 0.9 % (FLUSH) 0.9 %
5-10 SYRINGE (ML) INJECTION AS NEEDED
Status: DISCONTINUED | OUTPATIENT
Start: 2018-11-28 | End: 2018-11-29 | Stop reason: HOSPADM

## 2018-11-28 RX ORDER — SODIUM CHLORIDE 900 MG/100ML
INJECTION INTRAVENOUS
Status: DISPENSED
Start: 2018-11-28 | End: 2018-11-28

## 2018-11-28 RX ORDER — CEFAZOLIN SODIUM 2 G/50ML
2 SOLUTION INTRAVENOUS EVERY 8 HOURS
Status: COMPLETED | OUTPATIENT
Start: 2018-11-28 | End: 2018-11-29

## 2018-11-28 RX ORDER — CEFAZOLIN SODIUM 2 G/50ML
2 SOLUTION INTRAVENOUS ONCE
Status: COMPLETED | OUTPATIENT
Start: 2018-11-28 | End: 2018-11-28

## 2018-11-28 RX ORDER — OXYCODONE AND ACETAMINOPHEN 5; 325 MG/1; MG/1
1 TABLET ORAL
Status: DISCONTINUED | OUTPATIENT
Start: 2018-11-28 | End: 2018-11-29 | Stop reason: HOSPADM

## 2018-11-28 RX ORDER — PHENYLEPHRINE HCL IN 0.9% NACL 1 MG/10 ML
SYRINGE (ML) INTRAVENOUS AS NEEDED
Status: DISCONTINUED | OUTPATIENT
Start: 2018-11-28 | End: 2018-11-28 | Stop reason: HOSPADM

## 2018-11-28 RX ORDER — ONDANSETRON 2 MG/ML
4 INJECTION INTRAMUSCULAR; INTRAVENOUS ONCE
Status: ACTIVE | OUTPATIENT
Start: 2018-11-28 | End: 2018-11-28

## 2018-11-28 RX ORDER — LIDOCAINE HYDROCHLORIDE 10 MG/ML
INJECTION, SOLUTION EPIDURAL; INFILTRATION; INTRACAUDAL; PERINEURAL AS NEEDED
Status: DISCONTINUED | OUTPATIENT
Start: 2018-11-28 | End: 2018-11-28 | Stop reason: HOSPADM

## 2018-11-28 RX ADMIN — CEFAZOLIN SODIUM 2 G: 2 SOLUTION INTRAVENOUS at 09:33

## 2018-11-28 RX ADMIN — Medication 100 MCG: at 09:59

## 2018-11-28 RX ADMIN — PROPOFOL 50 MCG/KG/MIN: 10 INJECTION, EMULSION INTRAVENOUS at 09:44

## 2018-11-28 RX ADMIN — ATORVASTATIN CALCIUM 10 MG: 10 TABLET, FILM COATED ORAL at 21:00

## 2018-11-28 RX ADMIN — CARVEDILOL 3.12 MG: 3.12 TABLET, FILM COATED ORAL at 21:00

## 2018-11-28 RX ADMIN — Medication 100 MCG: at 09:47

## 2018-11-28 RX ADMIN — CEFAZOLIN SODIUM 2 G: 2 SOLUTION INTRAVENOUS at 18:00

## 2018-11-28 NOTE — PROGRESS NOTES
[facility] - Progress Note  Admit Date: 11/24/2018    Assessment:     -Symptomatic bradycardia/recurrent syncope with pauses (4 sec, 6 sec, 4 sec). History of syncope last month.  -Admitted with acute HFrEF exacerbation, CXR with worsened congestion compared to 10/28/18, BNP 7K, flat indeterminate troponin 0.10 x 2 --> 0.12  -Echo 11/20/18: EF 26-30%, mild concentric LVH, LV global hypokinesis, grade 2 diastolic dysfunction, mild TR + MR  -LBBB  -HTN  -Hyperlipidemia, , Total Chol 204 11/2018  -Osteoporosis  -GERD  -UTI  -Hx hysterectomy complicated by bladder injury with subsequent bladder prolapse and urinary incontinence.  -Acute kidney injury. Plan:     Plan single chamber pacemaker implantation. All risks, benefits, alternatives discussed. Plan moderate sedation if anesthesia not available. Risks included but not limited to pain, infection, bleeding, deep venous thrombosis with chronic swelling of arm, anesthesia reactions, pneumothorax, hemothorax, emergent open heart surgery, and death. All questions answered. Subjective:     No new complaints.      Objective:      Patient Vitals for the past 8 hrs:   Temp Pulse Resp BP SpO2   11/28/18 0744 97.8 °F (36.6 °C) 80 18 139/67 97 %   11/28/18 0357 97.7 °F (36.5 °C) 66 18 99/52 93 %         Patient Vitals for the past 96 hrs:   Weight   11/28/18 0357 54.1 kg (119 lb 4.3 oz)   11/27/18 0423 53.5 kg (118 lb)   11/26/18 0512 53.9 kg (118 lb 12.8 oz)   11/25/18 0448 53.3 kg (117 lb 6.4 oz)   11/24/18 1455 56.7 kg (125 lb 0 oz)                    Intake/Output Summary (Last 24 hours) at 11/28/2018 0859  Last data filed at 11/27/2018 1853  Gross per 24 hour   Intake 240 ml   Output --   Net 240 ml       Physical Exam:  General:  alert, cooperative, no distress, appears stated age  Neck:  nontender  Lungs:  clear to auscultation bilaterally  Heart:  regular rate and rhythm, S1, S2 normal, no murmur, click, rub or gallop  Abdomen:  abdomen is soft without significant tenderness, masses, organomegaly or guarding  Extremities:  extremities normal, atraumatic, no cyanosis or edema    Data Review:     Labs: Results:       Chemistry Recent Labs     11/28/18 0525 11/27/18 0049 11/26/18 0030   * 131* 119*    136 135*   K 3.8 3.5 3.7    98* 98*   CO2 29 30 30   BUN 46* 52* 36*   CREA 1.52* 2.19* 1.75*   CA 8.5 8.6 9.0   MG 2.1 1.8 1.7   PHOS  --   --  4.8   AGAP 8 8 7   BUCR 30* 24* 21*   AP  --  88 96   TP  --  7.0 7.5   ALB  --  3.0* 3.2*   GLOB  --  4.0 4.3*   AGRAT  --  0.8 0.7*      CBC w/Diff Recent Labs     11/28/18 0525 11/27/18 0049 11/26/18 0030   WBC 6.9 9.1 9.8   RBC 4.75 4.50 4.94   HGB 13.0 12.4 13.5   HCT 40.1 37.5 41.3    255 251   GRANS 57 50 52   LYMPH 26 33 33   EOS 6* 7* 5      Cardiac Enzymes No results found for: CPK, CK, CKMMB, CKMB, RCK3, CKMBT, CKNDX, CKND1, DODIE, TROPT, TROIQ, STEPHANIE, TROPT, TNIPOC, BNP, BNPP   Coagulation Recent Labs     11/28/18 0525   PTP 13.1   INR 1.0       Lipid Panel Lab Results   Component Value Date/Time    Cholesterol, total 204 (H) 11/24/2018 09:40 PM    HDL Cholesterol 59 11/24/2018 09:40 PM    LDL, calculated 129.2 (H) 11/24/2018 09:40 PM    VLDL, calculated 15.8 11/24/2018 09:40 PM    Triglyceride 79 11/24/2018 09:40 PM    CHOL/HDL Ratio 3.5 11/24/2018 09:40 PM      BNP No results found for: BNP, BNPP, XBNPT   Liver Enzymes Recent Labs     11/27/18 0049   TP 7.0   ALB 3.0*   AP 88   SGOT 16      Digoxin    Thyroid Studies Lab Results   Component Value Date/Time    TSH 3.01 11/25/2018 01:18 AM          Signed By: Larry Lee MD     November 28, 2018

## 2018-11-28 NOTE — PROGRESS NOTES
Hospitalist Progress Note    Patient: Minh Taylor MRN: [id number]  CSN: [id number]    YOB: 1927  Age: 80 y.o. Sex: female    DOA: 11/24/2018 LOS:  LOS: 4 days          S/p single chamber pacemaker implantation placement. Patient states pain is well controlled. She's not hungry. Voices no complaint. Daughter at bedside. Assessment/Plan     1. Acute HFrEF exacerbation. nsg did CHF education. Nutritionist consulted regarding low Na diet. Lasix, entresto, coreg, spironolactone. hh ordered  2. Echo 11/20/18: EF 26-30%, mild concentric LVH, LV global hypokinesis, grade 2 diastolic dysfunction, mild TR + MR  3. LBBB  4. hypertension  5. dyslipidemia, , Total Chol 204 11/2018  6. Osteoporosis  7. GERD  8. UTI >100,000 E coli - completed 3 days of ceftriaxone based on cx/s. Blood cx (11/24) ngtd. 9. Hx hysterectomy complicated by bladder injury with subsequent bladder prolapse and urinary incontinence. Hx bladder tack. Daughter declines further  intervention. 10. Advanced age, cough after po - did well with SLP. 11. Hx unsteady gait - pt / ot. 12. Mild protein calorie malnutrition AEB Inadequate energy intake related to decreased appetite and SOB associated with meals PTA as evidenced by pt consuming 50% or less of recent meals. multivit. On supplements, nutritionist following. 13. Symptomatic bradycardia/recurrent syncope with pauses (4 sec, 6 sec, 4 sec). History of syncope last month. PM in am per Dr Perez Vo, input appreciated. 14. dvt prophylaxis  15. Full code. Home with hh when ready. Additional Notes:      Case discussed with:  [x]Patient  [x]Family  [x]Nursing  []Case Management  DVT Prophylaxis:  []Lovenox  [x]Hep SQ  []SCDs  []Coumadin   []On Heparin gtt    Vital signs/Intake and Output:  Visit Vitals  /58   Pulse 68   Temp 98.2 °F (36.8 °C)   Resp 20   Ht 5' 2\" (1.575 m)   Wt 54.1 kg (119 lb 4.3 oz)   SpO2 97%   Breastfeeding?  No   BMI 21.81 kg/m² Current Shift:  11/28 0701 - 11/28 1900  In: 400 [I.V.:400]  Out: 5   Last three shifts:  11/26 1901 - 11/28 0700  In: 340 [P.O.:340]  Out: 550 [Urine:550]    Thin, sitting up in bed nad. Daughter and sister at bedside. Ncat. perrl  Chest wall: dressing to left chest c/d/i  RRR  cta b.l  Soft nt nd nabs  No edema. dp 2+ b.l  Other than hearing, no focal deficit  No rash    Medications Reviewed      Labs: Results:       Chemistry Recent Labs     11/28/18 0525 11/27/18 0049 11/26/18 0030   * 131* 119*    136 135*   K 3.8 3.5 3.7    98* 98*   CO2 29 30 30   BUN 46* 52* 36*   CREA 1.52* 2.19* 1.75*   CA 8.5 8.6 9.0   AGAP 8 8 7   BUCR 30* 24* 21*   AP  --  88 96   TP  --  7.0 7.5   ALB  --  3.0* 3.2*   GLOB  --  4.0 4.3*   AGRAT  --  0.8 0.7*      CBC w/Diff Recent Labs     11/28/18 0525 11/27/18 0049 11/26/18 0030   WBC 6.9 9.1 9.8   RBC 4.75 4.50 4.94   HGB 13.0 12.4 13.5   HCT 40.1 37.5 41.3    255 251   GRANS 57 50 52   LYMPH 26 33 33   EOS 6* 7* 5      Cardiac Enzymes No results for input(s): CPK, CKND1, DODIE in the last 72 hours. No lab exists for component: CKRMB, TROIP   Coagulation Recent Labs     11/28/18 0525   PTP 13.1   INR 1.0       Lipid Panel Lab Results   Component Value Date/Time    Cholesterol, total 204 (H) 11/24/2018 09:40 PM    HDL Cholesterol 59 11/24/2018 09:40 PM    LDL, calculated 129.2 (H) 11/24/2018 09:40 PM    VLDL, calculated 15.8 11/24/2018 09:40 PM    Triglyceride 79 11/24/2018 09:40 PM    CHOL/HDL Ratio 3.5 11/24/2018 09:40 PM      BNP No results for input(s): BNPP in the last 72 hours. Liver Enzymes Recent Labs     11/27/18  0049   TP 7.0   ALB 3.0*   AP 88   SGOT 16      Thyroid Studies Lab Results   Component Value Date/Time    TSH 3.01 11/25/2018 01:18 AM        Procedures/imaging: see electronic medical records for all procedures/Xrays and details which were not copied into this note but were reviewed prior to creation of Plan.

## 2018-11-28 NOTE — PROGRESS NOTES
11/28/18 1043   Vital Signs   Pulse (Heart Rate) 72   Cardiac Rhythm NSR   Resp Rate 22   O2 Sat (%) 98 %   Level of Consciousness Responds to Voice   /51   MAP (Calculated) 75   Oxygen Therapy   Pulse via Oximetry 88 beats per minute   O2 Device Room air   Modified Víctor Score   Activity 2   Respiration 2   Circulation 2   Consciousness 2   O2 Saturation 2   Víctor Score 10   Neuro   Neuro (WDL) WDL   Pain 1   Pain Scale 1 Numeric (0 - 10)   Pain Intensity 1 0   Post-Procedure Site Assessment (1)   Wound Type Incision   Location Chest   Orientation  Left   Site Assessment No bleeding; No hematoma;Dry/intact   Respiratory   Respiratory (WDL) WDL   Patient  Assessment   Skin Color Appropriate for ethnicity   Skin Condition/Temp Warm   Skin Integrity Incision (comment)   Activity   Activity Level Bed Rest   received patient from EP lab. Pt stable.

## 2018-11-28 NOTE — PROGRESS NOTES
Pt asleep in bed, met with pt's sister at bedside. [address] signed for [facility]. Pt lives with pt's daughter, who stays home 24/7 with pt. Additional family members live next door, lots of family support. Family will be available to transport home. Referral made to [facility]. Pt already has walker in the home, as well as many other DME.    Yuko Phan MSW  Case Management  [phone]

## 2018-11-28 NOTE — ANESTHESIA POSTPROCEDURE EVALUATION
Procedure(s): 
INSERT PPM SINGLE VENTRICULAR. Anesthesia Post Evaluation Multimodal analgesia: multimodal analgesia used between 6 hours prior to anesthesia start to PACU discharge Patient location during evaluation: bedside Patient participation: complete - patient participated Level of consciousness: awake and alert Pain management: adequate Airway patency: patent Anesthetic complications: no 
Cardiovascular status: acceptable Respiratory status: acceptable Hydration status: acceptable Post anesthesia nausea and vomiting:  none Visit Vitals /64 Pulse 72 Temp 36.8 °C (98.2 °F) Resp 18 Ht 5' 2\" (1.575 m) Wt 54.1 kg (119 lb 4.3 oz) SpO2 99% Breastfeeding? No  
BMI 21.81 kg/m²

## 2018-11-28 NOTE — ANESTHESIA PREPROCEDURE EVALUATION
Anesthetic History No history of anesthetic complications Review of Systems / Medical History Patient summary reviewed and pertinent labs reviewed Pulmonary Within defined limits Neuro/Psych Cardiovascular Hypertension: well controlled Dysrhythmias (LBBB,passing out spells recently) Exercise tolerance: <4 METS: Uses a walker GI/Hepatic/Renal 
  
GERD: well controlled Endo/Other Arthritis Other Findings Physical Exam 
 
Airway Mallampati: II 
TM Distance: 4 - 6 cm Neck ROM: normal range of motion Mouth opening: Normal 
 
 Cardiovascular Regular rate and rhythm,  S1 and S2 normal,  no murmur, click, rub, or gallop Dental 
 
Dentition: Full lower dentures and Full upper dentures Pulmonary Breath sounds clear to auscultation Abdominal 
GI exam deferred Other Findings Anesthetic Plan ASA: 3 Anesthesia type: MAC Induction: Intravenous Anesthetic plan and risks discussed with: Patient

## 2018-11-28 NOTE — PROGRESS NOTES
Pt transported to room 2306 via bed. Left chest dressing site CDI. Pt sent with dentures in place, monitor and sling. Pt stable.  Site checked with nurse prior to departure

## 2018-11-29 ENCOUNTER — HOME HEALTH ADMISSION (OUTPATIENT)
Dept: HOME HEALTH SERVICES | Facility: HOME HEALTH | Age: 83
End: 2018-11-29
Payer: MEDICARE

## 2018-11-29 ENCOUNTER — APPOINTMENT (OUTPATIENT)
Dept: GENERAL RADIOLOGY | Age: 83
DRG: 243 | End: 2018-11-29
Attending: INTERNAL MEDICINE
Payer: MEDICARE

## 2018-11-29 VITALS
OXYGEN SATURATION: 96 % | RESPIRATION RATE: 18 BRPM | TEMPERATURE: 98 F | HEIGHT: 62 IN | BODY MASS INDEX: 22.19 KG/M2 | DIASTOLIC BLOOD PRESSURE: 63 MMHG | WEIGHT: 120.59 LBS | SYSTOLIC BLOOD PRESSURE: 111 MMHG | HEART RATE: 61 BPM

## 2018-11-29 LAB
ANION GAP SERPL CALC-SCNC: 3 MMOL/L (ref 3–18)
BUN SERPL-MCNC: 27 MG/DL (ref 7–18)
BUN/CREAT SERPL: 25 (ref 12–20)
CALCIUM SERPL-MCNC: 8.5 MG/DL (ref 8.5–10.1)
CHLORIDE SERPL-SCNC: 110 MMOL/L (ref 100–108)
CO2 SERPL-SCNC: 28 MMOL/L (ref 21–32)
CREAT SERPL-MCNC: 1.08 MG/DL (ref 0.6–1.3)
GLUCOSE SERPL-MCNC: 115 MG/DL (ref 74–99)
POTASSIUM SERPL-SCNC: 4 MMOL/L (ref 3.5–5.5)
SODIUM SERPL-SCNC: 141 MMOL/L (ref 136–145)

## 2018-11-29 PROCEDURE — 80048 BASIC METABOLIC PNL TOTAL CA: CPT

## 2018-11-29 PROCEDURE — 74011250637 HC RX REV CODE- 250/637: Performed by: HOSPITALIST

## 2018-11-29 PROCEDURE — 36415 COLL VENOUS BLD VENIPUNCTURE: CPT

## 2018-11-29 PROCEDURE — 74011250637 HC RX REV CODE- 250/637: Performed by: INTERNAL MEDICINE

## 2018-11-29 PROCEDURE — 71046 X-RAY EXAM CHEST 2 VIEWS: CPT

## 2018-11-29 PROCEDURE — 74011250636 HC RX REV CODE- 250/636: Performed by: INTERNAL MEDICINE

## 2018-11-29 RX ORDER — OXYCODONE AND ACETAMINOPHEN 5; 325 MG/1; MG/1
1 TABLET ORAL
Qty: 15 TAB | Refills: 0 | Status: SHIPPED | OUTPATIENT
Start: 2018-11-29 | End: 2018-12-05 | Stop reason: ALTCHOICE

## 2018-11-29 RX ORDER — FUROSEMIDE 20 MG/1
20 TABLET ORAL DAILY
Qty: 30 TAB | Refills: 2 | Status: SHIPPED | OUTPATIENT
Start: 2018-11-29 | End: 2019-02-04 | Stop reason: ALTCHOICE

## 2018-11-29 RX ORDER — FUROSEMIDE 20 MG/1
20 TABLET ORAL DAILY
Status: DISCONTINUED | OUTPATIENT
Start: 2018-11-30 | End: 2018-11-29 | Stop reason: HOSPADM

## 2018-11-29 RX ORDER — CARVEDILOL 3.12 MG/1
3.12 TABLET ORAL EVERY 12 HOURS
Qty: 60 TAB | Refills: 2 | Status: SHIPPED | OUTPATIENT
Start: 2018-11-29 | End: 2019-02-11 | Stop reason: SDUPTHER

## 2018-11-29 RX ORDER — GUAIFENESIN 100 MG/5ML
81 LIQUID (ML) ORAL DAILY
Qty: 30 TAB | Refills: 11 | Status: SHIPPED | OUTPATIENT
Start: 2018-11-30

## 2018-11-29 RX ORDER — ATORVASTATIN CALCIUM 10 MG/1
10 TABLET, FILM COATED ORAL
Qty: 30 TAB | Refills: 2 | Status: SHIPPED | OUTPATIENT
Start: 2018-11-29 | End: 2019-03-01 | Stop reason: SDUPTHER

## 2018-11-29 RX ADMIN — CARVEDILOL 3.12 MG: 3.12 TABLET, FILM COATED ORAL at 09:06

## 2018-11-29 RX ADMIN — CEFAZOLIN SODIUM 2 G: 2 SOLUTION INTRAVENOUS at 02:28

## 2018-11-29 RX ADMIN — THERA TABS 1 TABLET: TAB at 09:06

## 2018-11-29 RX ADMIN — MELOXICAM 15 MG: 7.5 TABLET ORAL at 09:06

## 2018-11-29 RX ADMIN — ASPIRIN 81 MG CHEWABLE TABLET 81 MG: 81 TABLET CHEWABLE at 09:06

## 2018-11-29 RX ADMIN — PANTOPRAZOLE SODIUM 40 MG: 40 TABLET, DELAYED RELEASE ORAL at 09:06

## 2018-11-29 RX ADMIN — SERTRALINE HYDROCHLORIDE 50 MG: 50 TABLET ORAL at 09:06

## 2018-11-29 NOTE — PROGRESS NOTES
Confirmed with [facility] that they have received referral. Family to transport home at discharge. Important Message from [address]" reviewed and explained with the patient and/or representative at bedside and signature was obtained. A signed copy provided to patient/representative. Original signed document placed in patient's chart.        Wanda Polk, IDALIA [phone]

## 2018-11-29 NOTE — PROGRESS NOTES
conducted a Follow up consultation and Spiritual Assessment for Bacilio Ventura, who is a 80 y.o.,female. The  provided the following Interventions:  Continued the relationship of care and support. Listened empathically as patient shared her life story and her blessed children and grandchildren. Offered actual prayer and assurance of continued prayer on patients behalf. Chart reviewed. The following outcomes were achieved:  Patient expressed gratitude for 's visit. Assessment:  Patient had a contagious smile, all excited to go home as they wait for he labs and a promised discharge. Daughter was by her bedside and was very supportive. There are no further spiritual or [employer] issues which require Spiritual Care Services interventions at this time. Plan:  Chaplains will continue to follow and will provide pastoral care on an as needed/requested basis.  recommends bedside caregivers page  on duty if patient shows signs of acute spiritual or emotional distress.        Chaplain Resident [address]   [phone]

## 2018-11-29 NOTE — ROUTINE PROCESS
Bedside and Verbal shift change report given to Marissa Kinney RN (oncoming nurse) by Rosalind Solorzano RN (offgoing nurse). Report included the following information SBAR, Kardex, Intake/Output, MAR, Recent Results and Med Rec Status.

## 2018-11-29 NOTE — PROGRESS NOTES
[facility] - Progress Note  Admit Date: 11/24/2018    Assessment:     Hospital Problems  Date Reviewed: 11/1/2018          Codes Class Noted POA    * (Principal) CHF (congestive heart failure), NYHA class III, acute, systolic ([facility]) OER-18-JT: I50.21  ICD-9-CM: 428.21, 428.0  11/24/2018 Unknown        Acute UTI ICD-10-CM: N39.0  ICD-9-CM: 599.0  11/24/2018 Unknown        Functional urinary incontinence ICD-10-CM: R39.81  ICD-9-CM: 788.91  11/24/2018 Unknown        Acute pulmonary edema ([facility] [address] ICD-10-CM: J81.0  ICD-9-CM: 518.4  11/24/2018 Unknown        Essential hypertension with goal blood pressure less than 140/90 ICD-10-CM: I10  ICD-9-CM: 401.9  6/22/2016 Yes        Depression ICD-10-CM: F32.9  ICD-9-CM: 311  Unknown Yes              -Symptomatic bradycardia/recurrent syncope with pauses (4 sec, 6 sec, 4 sec). History of syncope last month.  -Admitted with acute HFrEF exacerbation, CXR with worsened congestion compared to 10/28/18, BNP 7K, flat indeterminate troponin 0.10 x 2 --> 0.12  -Echo 11/20/18: EF 26-30%, mild concentric LVH, LV global hypokinesis, grade 2 diastolic dysfunction, mild TR + MR  -LBBB  -HTN  -Hyperlipidemia, , Total Chol 204 11/2018  -Osteoporosis  -GERD  -UTI  -Hx hysterectomy complicated by bladder injury with subsequent bladder prolapse and urinary incontinence.  -Acute kidney injury, s/p diuresis, improving.       Primary cardiologist Dr. Marielle Niño:     Doing well post PPM.   Awaiting CXR results. PPM site without hematoma or drainage. Device working appropriately on interrogation. Renal function improving, follow up for today pending, CHF medications remain on hold until follow up labs reviewed. Coreg has been resumed. Hopefully home later today if remains stable. Subjective:     No CP, No SOB. Some left arm pain yesterday now resolved.      Objective:      Patient Vitals for the past 8 hrs:   Temp Pulse Resp BP SpO2   11/29/18 0833 97.5 °F (36.4 °C) 69 16 139/74 97 %   11/29/18 0400 97.9 °F (36.6 °C) 72 18 118/70 94 %         Patient Vitals for the past 96 hrs:   Weight   11/29/18 0654 54.7 kg (120 lb 9.5 oz)   11/28/18 0357 54.1 kg (119 lb 4.3 oz)   11/27/18 0423 53.5 kg (118 lb)   11/26/18 0512 53.9 kg (118 lb 12.8 oz)                    Intake/Output Summary (Last 24 hours) at 11/29/2018 0940  Last data filed at 11/29/2018 0540  Gross per 24 hour   Intake 520 ml   Output 605 ml   Net -85 ml       Physical Exam:  General:  alert, cooperative, no distress, appears stated age  Neck:  no JVD  Lungs:  clear to auscultation bilaterally  Heart:  regular rate and rhythm  Abdomen:  abdomen is soft without significant tenderness, masses, organomegaly or guarding  Extremities:  extremities normal, atraumatic, no cyanosis or edema    Data Review:     Labs: Results:       Chemistry Recent Labs     11/28/18 0525 11/27/18 0049   * 131*    136   K 3.8 3.5    98*   CO2 29 30   BUN 46* 52*   CREA 1.52* 2.19*   CA 8.5 8.6   MG 2.1 1.8   AGAP 8 8   BUCR 30* 24*   AP  --  88   TP  --  7.0   ALB  --  3.0*   GLOB  --  4.0   AGRAT  --  0.8      CBC w/Diff Recent Labs     11/28/18 0525 11/27/18 0049   WBC 6.9 9.1   RBC 4.75 4.50   HGB 13.0 12.4   HCT 40.1 37.5    255   GRANS 57 50   LYMPH 26 33   EOS 6* 7*      Cardiac Enzymes No results found for: CPK, CK, CKMMB, CKMB, RCK3, CKMBT, CKNDX, CKND1, DODIE, TROPT, TROIQ, STEPHANIE, TROPT, TNIPOC, BNP, BNPP   Coagulation Recent Labs     11/28/18 0525   PTP 13.1   INR 1.0       Lipid Panel Lab Results   Component Value Date/Time    Cholesterol, total 204 (H) 11/24/2018 09:40 PM    HDL Cholesterol 59 11/24/2018 09:40 PM    LDL, calculated 129.2 (H) 11/24/2018 09:40 PM    VLDL, calculated 15.8 11/24/2018 09:40 PM    Triglyceride 79 11/24/2018 09:40 PM    CHOL/HDL Ratio 3.5 11/24/2018 09:40 PM      BNP No results found for: BNP, BNPP, XBNPT   Liver Enzymes Recent Labs     11/27/18  0049   TP 7.0   ALB 3.0*   AP 88 SGOT 16      Digoxin    Thyroid Studies Lab Results   Component Value Date/Time    TSH 3.01 11/25/2018 01:18 AM          Signed By: CHAVA Watst     November 29, 2018

## 2018-11-29 NOTE — PROGRESS NOTES
NUTRITION    Nursing Referral: [facility]  Nutrition Consult: General Nutrition Management & Supplements, Diet Education     RECOMMENDATIONS / PLAN:     - Continue current nutrition interventions. - Monitor and encourage po/supplement intake. - Continue RD inpatient monitoring and evaluation. NUTRITION INTERVENTIONS & DIAGNOSIS:     [x] Meals/snacks: modify composition  [x] Medical food supplement therapy: Ensure Enlive & Magic Cup BID  [x] Nutrition education: cardiac diet and fluid restriction provided 11/26    Nutrition Diagnosis: Inadequate energy intake related to decreased appetite and SOB associated with meals PTA as evidenced by pt consuming 50% or less of recent meals. Food and nutrition related knowledge deficit related to cardiac diet, fluid restriction as evidenced by pt and pt's daughter report. ASSESSMENT:     11/29: Pt s/p pacemaker placement. Pt's eating around 25% of meals, states she is not hungry. Plans to consume Ensure today, encourage intake. Coupons provided. Plan for discharge today. Encouraged weight monitoring post-discharge. 11/26: Pt with CHF, shortness of breath PTA associated with eating and poor appetite. Consuming small portions of each meal per pt's daughter. Recent weight loss after diuresis.  Reports appetite and meal intake improved since admission, 50% meal intake today at breakfast.    Average po intake adequate to meet patients estimated nutritional needs:   [] Yes     [x] No   [] Unable to determine at this time    Diet: DIET NUTRITIONAL SUPPLEMENTS Lunch, Dinner; [employer] CUPS  DIET NUTRITIONAL SUPPLEMENTS Breakfast; ENSURE ENLIVE  DIET CARDIAC Regular      Food Allergies: NKFA  Current Appetite:   [] Good     [x] Fair     [x] Poor     [] Other:  Appetite/meal intake prior to admission:   [] Good     [] Fair     [x] Poor x several months PTA     [] Other:  Feeding Limitations:  [x] Swallowing difficulty: SLP evaluation completed 11/26/18    [] Chewing difficulty    [] Other:  Current Meal Intake:   Patient Vitals for the past 100 hrs:   % Diet Eaten   11/28/18 1755 25 %   11/27/18 1853 50 %       BM: 11/27  Skin Integrity: WDL   Edema:   [x] No     [] Yes   Pertinent Medications: Reviewed: NS at 50 mL/hr, colace, zofran, MVI,    Recent Labs     11/29/18  1021 11/28/18  0525 11/27/18  0049    140 136   K 4.0 3.8 3.5   * 103 98*   CO2 28 29 30   * 102* 131*   BUN 27* 46* 52*   CREA 1.08 1.52* 2.19*   CA 8.5 8.5 8.6   MG  --  2.1 1.8   ALB  --   --  3.0*   SGOT  --   --  16   ALT  --   --  20       Intake/Output Summary (Last 24 hours) at 11/29/2018 1425  Last data filed at 11/29/2018 0540  Gross per 24 hour   Intake 120 ml   Output 600 ml   Net -480 ml       Anthropometrics:  Ht Readings from Last 1 Encounters:   11/24/18 5' 2\" (1.575 m)     Last 3 Recorded Weights in this Encounter    11/27/18 0423 11/28/18 0357 11/29/18 0654   Weight: 53.5 kg (118 lb) 54.1 kg (119 lb 4.3 oz) 54.7 kg (120 lb 9.5 oz)     Body mass index is 22.06 kg/m².     Weight History:  Pt's daughter reports pt with 7 lb, 5.6% weight loss x 1 month PTA, with recent diuresis    Weight Metrics 11/29/2018 11/20/2018 11/7/2018 11/1/2018 4/23/2018 11/17/2017 10/11/2017   Weight 120 lb 9.5 oz 125 lb 125 lb 123 lb 121 lb 6.4 oz 126 lb 126 lb 9.6 oz   BMI 22.06 kg/m2 22.86 kg/m2 22.86 kg/m2 22.5 kg/m2 22.2 kg/m2 23.05 kg/m2 23.16 kg/m2        Admitting Diagnosis: CHF (congestive heart failure), NYHA class III, acute, systolic ([facility] [address]  Pertinent PMHx: HTN    Education Needs:        [] None identified  [] Identified - Not appropriate at this time  [x]  Identified and addressed - refer to education log  Learning Limitations:   [x] None identified  [] Identified    Cultural, Episcopal & ethnic food preferences:  [x] None identified    [] Identified and addressed     ESTIMATED NUTRITION NEEDS:     Calories: 2258-5985 kcal (MSJx1.2-1.3) based on  [x] Actual BW: 54 kg      [] IBW   Protein: 43-65 gm (0.8-1.2 gm/kg) based on  [x] Actual BW      [] IBW   Fluid: 1500 mL/day per MD order     MONITORING & EVALUATION:     Nutrition Goal(s):   1. Po intake of meals will meet >75% of patient estimated nutritional needs within the next 7 days. Outcome:  [] Met/Ongoing    [x]  Not Met    [] New/Initial Goal  2. Patient/Caregiver will increase knowledge of appropriate food choices on a cardiac diet and fluid restriction within 7 days.   Outcome:  [x] Met    []  Not Met    [] New/Initial Goal        Monitoring:   [x] Food and beverage intake   [x] Diet order   [x] Nutrition-focused physical findings   [x] Treatment/therapy   [] Weight   [] Enteral nutrition intake        Previous Recommendations (for follow-up assessments only):     [x]   Implemented       []   Not Implemented (RD to address)      [] No Longer Appropriate     [] No Recommendation Made     Discharge Planning: cardiac diet + Ensure Enlive as needed  [x] Participated in care planning, discharge planning, & interdisciplinary rounds as appropriate      Andrew Paz RD   Pager: [phone]

## 2018-11-29 NOTE — PROGRESS NOTES
Problem: Falls - Risk of  Goal: *Absence of Falls  Document Luis Fall Risk and appropriate interventions in the flowsheet. Outcome: Progressing Towards Goal  Fall Risk Interventions:  Mobility Interventions: Bed/chair exit alarm, Patient to call before getting OOB         Medication Interventions: Bed/chair exit alarm, Patient to call before getting OOB    Elimination Interventions: Bed/chair exit alarm, Call light in reach, Patient to call for help with toileting needs    History of Falls Interventions: Bed/chair exit alarm, Door open when patient unattended        Problem: Pressure Injury - Risk of  Goal: *Prevention of pressure injury  Document Vance Scale and appropriate interventions in the flowsheet.   Outcome: Progressing Towards Goal  Pressure Injury Interventions:  Sensory Interventions: Assess changes in LOC    Moisture Interventions: Absorbent underpads, Apply protective barrier, creams and emollients    Activity Interventions: PT/OT evaluation, Increase time out of bed    Mobility Interventions: HOB 30 degrees or less    Nutrition Interventions: Document food/fluid/supplement intake    Friction and Shear Interventions: HOB 30 degrees or less, Apply protective barrier, creams and emollients

## 2018-11-29 NOTE — PROGRESS NOTES
0700: Bedside and verbal report received from Geovanny Ramos RN. Pt sleeping in bed, no s/s of distress noted. 0740: Pt left for CXR via wheelchair. No s/s of distress noted and no complaints of pain. 0900: Pt is back from XR. In bed with family at bedside. Pt eating breakfast. CHAVA Justin removed chest dressing and left to air. 1230: Pt in bed eating lunch with family at bedside. No s/s of distress noted. Pt ready to be discharged. Informed pt and family of delay and need to be signed off by cardiologist prior to discharge. Pt and family verbalized understanding. Call bell within reach. 1400: Pt in bed resting with family at bedside. No s/s of distress and no complaints of pain or discomfort. 1630: I have reviewed discharge instructions with the patient and caregiver. The patient and caregiver verbalized understanding.

## 2018-11-29 NOTE — DISCHARGE INSTRUCTIONS
Disposition:  Will need follow-up with device/wound check in 7-10 days in my office. Please contact office at [phone] to confirm appointment. Main Office:    [address], [address]    Restrictions: For affected arm:  No lifting greater than 10 lbs or lifting elbow above shoulder for 4 weeks. Keep incision clean and dry for a total of 72 hours after procedure. Remove dressing in 24 hours if not already removed. Please remove the steristrips (small white adhesive strips over wound) after 7 days if they have not already fallen off. No hot tubs or pools for 2 weeks. OK to shower with \"pat\" dry incision after 72 hours. No driving ideally for 2 weeks due to concern for airbag. Learning About a Pacemaker for Heart Failure  What is a pacemaker for heart failure? A pacemaker is a small device that is placed under the skin of your chest. It is powered by batteries. It has thin wires, called leads, that pass through a vein into your heart. A pacemaker for heart failure is a biventricular pacemaker (say \"by[facility]\"). Treatment that uses this type of pacemaker is called cardiac resynchronization therapy (CRT). When you have heart failure, the lower chambers of your heart may not pump at the same time. The pacemaker sends painless electrical signals to your heart. These signals make the chambers pump at the same time. This can help your heart pump blood better and help you feel better. Your pacemaker may be combined with an ICD, or implantable cardioverter-defibrillator. It can control abnormal heart rhythms. This can prevent sudden death. You may feel worried about having a pacemaker. This is common. It can help if you learn about how the pacemaker helps your heart. Talk to your doctor about your concerns. How is a pacemaker put in place? You will get medicine before the procedure. This helps you relax and helps prevent pain.   The doctor makes a cut in the skin just below your collarbone. The cut may be on either side of your chest. The doctor will put the pacemaker leads through the cut. The leads go into a large blood vessel in the upper chest. Then the doctor will guide the leads through the blood vessel into different chambers of the heart. The doctor will place the pacemaker under the skin of your chest. He or she will attach the leads to the pacemaker. Then the cut will be closed with stitches. The procedure usually takes 2 to 3 hours. You may need to spend the night in the hospital.  What can you expect when you have a pacemaker? A pacemaker can help your heart pump blood better. It may help you feel better so you can be more active. It also may help keep you out of the hospital and help you live longer. You can live a normal, active life with a pacemaker. But you need to avoid strong magnetic and electrical fields. Your doctor or the maker of your pacemaker can give you a full list of things to avoid. But most everyday appliances are safe. Your doctor will check your pacemaker regularly to make sure it's working right. Pacemaker batteries usually last 5 to 15 years before they need to be replaced. Follow-up care is a key part of your treatment and safety. Be sure to make and go to all appointments, and call your doctor if you are having problems. It's also a good idea to know your test results and keep a list of the medicines you take. Where can you learn more? Go to http://luz maria-yoselin.info/. Enter Y169 in the search box to learn more about \"Learning About a Pacemaker for Heart Failure. \"  Current as of: December 6, 2017  Content Version: 11.8  © 1131-0560 [employer]. Care instructions adapted under license by [employer] (which disclaims liability or warranty for this information).  If you have questions about a medical condition or this instruction, always ask your healthcare professional. [employer] disclaims any warranty or liability for your use of this information. Bradycardia: Care Instructions  Your Care Instructions    Bradycardia is a slow heart rate. If your heart beats too slowly, it can't supply your body with enough blood. This can make you weak or dizzy. Or it may make you pass out. Sometimes medicine can cause this problem. If this happens, your doctor may have you adjust one of your medicines. If a medicine is not the problem, your doctor may recommend a pacemaker. It is important to treat bradycardia so that you don't get more serious health problems. Your doctor will want to see you on a routine schedule to make sure that your heartbeat is normal.  Follow-up care is a key part of your treatment and safety. Be sure to make and go to all appointments, and call your doctor if you are having problems. It's also a good idea to know your test results and keep a list of the medicines you take. How can you care for yourself at home? · Take your medicines exactly as prescribed. Call your doctor if you think you are having a problem with your medicine. If your bradycardia is caused by another disease, your doctor will try to treat the disease. If it is caused by heart medicines, he or she will adjust your medicines. · Make lifestyle changes to improve your heart health. ? Get regular exercise. Try for 30 minutes on most days of the week. If you do not have other heart problems, you likely do not have limits on the type or level of activity that you can do. You may want to walk, swim, bike, or do other activities. Ask your doctor what level of exercise is safe for you. ? To control your cholesterol, avoid foods with a lot of fat, saturated fat, or sodium. Try to eat more fiber. And if your doctor says it's okay, get some exercise on most days. ? Do not smoke. Smoking can make your heart condition worse.  If you need help quitting, talk to your doctor about stop-smoking programs and medicines. These can increase your chances of quitting for good. ? Limit alcohol to 2 drinks a day for men and 1 drink a day for women. Too much alcohol can cause health problems. Pacemaker  If you have a pacemaker, you will get more specific information about it. Be sure to:  · Check your pulse as your doctor tells you. · Have your pacemaker checked as often as your doctor recommends. You may be able to do this over the phone or computer. · Avoid strong magnetic or electrical fields. These include MRIs, welding equipment, and generators. · You will be checked several times right after you get your pacemaker and when it is time to have the battery changed. Batteries last for 5 to 15 years. · You can talk on a cell phone. But keep it 6 inches away from your pacemaker. · Microwaves, TVs, radios, and kitchen and bathroom appliances won't harm you. When should you call for help? Call 911 anytime you think you may need emergency care. For example, call if:    · You have symptoms of sudden heart failure. These may include:  ? Severe trouble breathing. ? A fast or irregular heartbeat. ? Coughing up pink, foamy mucus. ? You passed out.     · You have symptoms of a stroke. These may include:  ? Sudden numbness, tingling, weakness, or loss of movement in your face, arm, or leg, especially on only one side of your body. ? Sudden vision changes. ? Sudden trouble speaking. ? Sudden confusion or trouble understanding simple statements. ? Sudden problems with walking or balance. ? A sudden, severe headache that is different from past headaches.    Call your doctor now or seek immediate medical care if:    · You have new or changed symptoms of heart failure, such as:  ? New or increased shortness of breath. ? New or worse swelling in your legs, ankles, or feet. ? Sudden weight gain, such as more than 2 to 3 pounds in a day or 5 pounds in a week.  (Your doctor may suggest a different range of weight gain.)  ? Feeling dizzy or lightheaded or like you may faint. ? Feeling so tired or weak that you cannot do your usual activities. ? Not sleeping well. Shortness of breath wakes you at night. You need extra pillows to prop yourself up to breathe easier.    Watch closely for changes in your health, and be sure to contact your doctor if:    · You do not get better as expected. Where can you learn more? Go to http://luz maria-yoselin.info/. Enter J124 in the search box to learn more about \"Bradycardia: Care Instructions. \"  Current as of: December 6, 2017  Content Version: 11.8  © 5435-6333 [employer]. Care instructions adapted under license by [employer] (which disclaims liability or warranty for this information). If you have questions about a medical condition or this instruction, always ask your healthcare professional. [place] [address] any warranty or liability for your use of this information. Heart Failure: Care Instructions  Your Care Instructions    Heart failure occurs when your heart does not pump as much blood as the body needs. Failure does not mean that the heart has stopped pumping but rather that it is not pumping as well as it should. Over time, this causes fluid buildup in your lungs and other parts of your body. Fluid buildup can cause shortness of breath, fatigue, swollen ankles, and other problems. By taking medicines regularly, reducing sodium (salt) in your diet, checking your weight every day, and making lifestyle changes, you can feel better and live longer. Follow-up care is a key part of your treatment and safety. Be sure to make and go to all appointments, and call your doctor if you are having problems. It's also a good idea to know your test results and keep a list of the medicines you take. How can you care for yourself at home? Medicines    · Be safe with medicines. Take your medicines exactly as prescribed.  Call your doctor if you think you are having a problem with your medicine.     · Do not take any vitamins, over-the-counter medicine, or herbal products without talking to your doctor first. Adriel Carvajal not take ibuprofen (Advil or Motrin) and naproxen (Aleve) without talking to your doctor first. They could make your heart failure worse.     · You may be taking some of the following medicine. ? Beta-blockers can slow heart rate, decrease blood pressure, and improve your condition. Taking a beta-blocker may lower your chance of needing to be hospitalized. ? Angiotensin-converting enzyme inhibitors (ACEIs) reduce the heart's workload, lower blood pressure, and reduce swelling. Taking an ACEI may lower your chance of needing to be hospitalized again. ? Angiotensin II receptor blockers (ARBs) work like ACEIs. Your doctor may prescribe them instead of ACEIs. ? Diuretics, also called water pills, reduce swelling. ? Potassium supplements replace this important mineral, which is sometimes lost with diuretics. ? Aspirin and other blood thinners prevent blood clots, which can cause a stroke or heart attack.    You will get more details on the specific medicines your doctor prescribes. Diet    · Your doctor may suggest that you limit sodium to 2,000 milligrams (mg) a day or less. That is less than 1 teaspoon of salt a day, including all the salt you eat in cooking or in packaged foods. People get most of their sodium from processed foods. Fast food and restaurant meals also tend to be very high in sodium.     · Ask your doctor how much liquid you can drink each day. You may have to limit liquids.    Weight    · Weigh yourself without clothing at the same time each day. Record your weight. Call your doctor if you have a sudden weight gain, such as more than 2 to 3 pounds in a day or 5 pounds in a week. (Your doctor may suggest a different range of weight gain.) A sudden weight gain may mean that your heart failure is getting worse.  Activity level    · Start light exercise (if your doctor says it is okay). Even if you can only do a small amount, exercise will help you get stronger, have more energy, and manage your weight and your stress. Walking is an easy way to get exercise. Start out by walking a little more than you did before. Bit by bit, increase the amount you walk.     · When you exercise, watch for signs that your heart is working too hard. You are pushing yourself too hard if you cannot talk while you are exercising. If you become short of breath or dizzy or have chest pain, stop, sit down, and rest.     · If you feel \"wiped out\" the day after you exercise, walk slower or for a shorter distance until you can work up to a better pace.     · Get enough rest at night. Sleeping with 1 or 2 pillows under your upper body and head may help you breathe easier.    Lifestyle changes    · Do not smoke. Smoking can make a heart condition worse. If you need help quitting, talk to your doctor about stop-smoking programs and medicines. These can increase your chances of quitting for good. Quitting smoking may be the most important step you can take to protect your heart.     · Limit alcohol to 2 drinks a day for men and 1 drink a day for women. Too much alcohol can cause health problems.     · Avoid getting sick from colds and the flu. Get a pneumococcal vaccine shot. If you have had one before, ask your doctor whether you need another dose. Get a flu shot each year. If you must be around people with colds or the flu, wash your hands often. When should you call for help?   Call 911 if you have symptoms of sudden heart failure such as:    · You have severe trouble breathing.     · You cough up pink, foamy mucus.     · You have a new irregular or rapid heartbeat.    Call your doctor now or seek immediate medical care if:    · You have new or increased shortness of breath.     · You are dizzy or lightheaded, or you feel like you may faint.     · You have sudden weight gain, such as more than 2 to 3 pounds in a day or 5 pounds in a week. (Your doctor may suggest a different range of weight gain.)     · You have increased swelling in your legs, ankles, or feet.     · You are suddenly so tired or weak that you cannot do your usual activities.    Watch closely for changes in your health, and be sure to contact your doctor if you develop new symptoms. Where can you learn more? Go to http://luz maria-yoselin.info/. Enter V954 in the search box to learn more about \"Heart Failure: Care Instructions. \"  Current as of: December 6, 2017  Content Version: 11.8  © 2048-9824 [employer]. Care instructions adapted under license by [employer] (which disclaims liability or warranty for this information). If you have questions about a medical condition or this instruction, always ask your healthcare professional. [address] any warranty or liability for your use of this information. Acute High Blood Pressure: Care Instructions  Your Care Instructions    Acute high blood pressure is very high blood pressure. It's a serious problem. Very high blood pressure can damage your brain, heart, eyes, and kidneys. You may have been given medicines to lower your blood pressure. You may have gotten them as pills or through a needle in one of your veins. This is called an IV. And maybe you were given other medicines too. These can be needed when high blood pressure causes other problems. To keep your blood pressure at a lower level, you may need to make healthy lifestyle changes. And you will probably need to take medicines. Be sure to follow up with your doctor about your blood pressure and what you can do about it. Follow-up care is a key part of your treatment and safety. Be sure to make and go to all appointments, and call your doctor if you are having problems.  It's also a good idea to know your test results and keep a list of the medicines you take. How can you care for yourself at home? · See your doctor as often as he or she recommends. This is to make sure your blood pressure is under control. You will probably go at least 2 times a year. But it may be more often at first.  · Take your blood pressure medicine exactly as prescribed. You may take one or more types. They include diuretics, beta-blockers, ACE inhibitors, calcium channel blockers, and angiotensin II receptor blockers. Call your doctor if you think you are having a problem with your medicine. · If you take blood pressure medicine, talk to your doctor before you take decongestants or anti-inflammatory medicine, such as ibuprofen. These can raise blood pressure. · Learn how to check your blood pressure at home. Check it often. · Ask your doctor if it's okay to drink alcohol. · Talk to your doctor about lifestyle changes that can help blood pressure. These include being active and not smoking. When should you call for help? Call 911 anytime you think you may need emergency care. This may mean having symptoms that suggest that your blood pressure is causing a serious heart or blood vessel problem. Your blood pressure may be over 180/120.   For example, call 911 if:    · You have symptoms of a heart attack. These may include:  ? Chest pain or pressure, or a strange feeling in the chest.  ? Sweating. ? Shortness of breath. ? Nausea or vomiting. ? Pain, pressure, or a strange feeling in the back, neck, jaw, or upper belly or in one or both shoulders or arms. ? Lightheadedness or sudden weakness. ? A fast or irregular heartbeat.     · You have symptoms of a stroke. These may include:  ? Sudden numbness, tingling, weakness, or loss of movement in your face, arm, or leg, especially on only one side of your body. ? Sudden vision changes. ? Sudden trouble speaking. ? Sudden confusion or trouble understanding simple statements.   ? Sudden problems with walking or balance. ? A sudden, severe headache that is different from past headaches.     · You have severe back or belly pain.    Do not wait until your blood pressure comes down on its own. Get help right away.   Call your doctor now or seek immediate care if:    · Your blood pressure is much higher than normal (such as 180/120 or higher), but you don't have symptoms.     · You think high blood pressure is causing symptoms, such as:  ? Severe headache.  ? Blurry vision.    Watch closely for changes in your health, and be sure to contact your doctor if:    · Your blood pressure measures higher than your doctor recommends at least 2 times. That means the top number is higher or the bottom number is higher, or both.     · You think you may be having side effects from your blood pressure medicine. Where can you learn more? Go to http://luz maria-yoselin.info/. Enter U028 in the search box to learn more about \"Acute High Blood Pressure: Care Instructions. \"  Current as of: December 6, 2017  Content Version: 11.8  © 1904-3398 [employer]. Care instructions adapted under license by [employer] (which disclaims liability or warranty for this information). If you have questions about a medical condition or this instruction, always ask your healthcare professional. [address] any warranty or liability for your use of this information.

## 2018-11-30 ENCOUNTER — PATIENT OUTREACH (OUTPATIENT)
Dept: INTERNAL MEDICINE CLINIC | Age: 83
End: 2018-11-30

## 2018-11-30 LAB
BACTERIA SPEC CULT: NORMAL
BACTERIA SPEC CULT: NORMAL
SERVICE CMNT-IMP: NORMAL
SERVICE CMNT-IMP: NORMAL

## 2018-11-30 NOTE — PROGRESS NOTES
Hospital Discharge Follow-Up Date/Time:  11/30/2018 11:26 AM 
 
Patient was admitted to [facility] on 11/24/18 and discharged on 11/29/18 for NYHA Class III acute systolic CHF. The physician discharge summary was not available at the time of outreach. Patient was contacted within 1 business days of discharge. Contacted patient/daughter for Disease Specific Care Management  follow up. No answer. Left message introducing self, role and reason for call. Requested return call. Contact information provided. Will attempt to contact at a later time.

## 2018-12-01 ENCOUNTER — HOME CARE VISIT (OUTPATIENT)
Dept: SCHEDULING | Facility: HOME HEALTH | Age: 83
End: 2018-12-01
Payer: MEDICARE

## 2018-12-01 VITALS
HEART RATE: 76 BPM | TEMPERATURE: 97 F | BODY MASS INDEX: 22.28 KG/M2 | OXYGEN SATURATION: 98 % | RESPIRATION RATE: 16 BRPM | SYSTOLIC BLOOD PRESSURE: 120 MMHG | HEIGHT: 61 IN | WEIGHT: 118 LBS | DIASTOLIC BLOOD PRESSURE: 70 MMHG

## 2018-12-01 PROCEDURE — G0299 HHS/HOSPICE OF RN EA 15 MIN: HCPCS

## 2018-12-01 PROCEDURE — 400013 HH SOC

## 2018-12-02 RX ORDER — SERTRALINE HYDROCHLORIDE 50 MG/1
TABLET, FILM COATED ORAL
Qty: 90 TAB | Refills: 0 | Status: SHIPPED | OUTPATIENT
Start: 2018-12-02 | End: 2018-12-05 | Stop reason: ALTCHOICE

## 2018-12-03 ENCOUNTER — HOME CARE VISIT (OUTPATIENT)
Dept: HOME HEALTH SERVICES | Facility: HOME HEALTH | Age: 83
End: 2018-12-03
Payer: MEDICARE

## 2018-12-03 ENCOUNTER — PATIENT OUTREACH (OUTPATIENT)
Dept: INTERNAL MEDICINE CLINIC | Age: 83
End: 2018-12-03

## 2018-12-03 NOTE — Clinical Note
[facility] 07/03-17/29 acute systolic HF; Class 3 EF [id number]%SQJ appt 12/5 at 11 AM

## 2018-12-03 NOTE — PROGRESS NOTES
Hospital Discharge Follow-Up Date/Time:  12/3/2018 9:30 AM 
 
[facility] and discharged on 18 for  NYHA Class III acute systolic CHF. The physician discharge summary was not available at the time of outreach. Patient was contacted within 2 business days of discharge. Top Challenges reviewed with the provider Reinforce importance of daily weights and low salt/no salt added diet Request copy of advance medical directive/living will Method of communication with provider :chart routing Inpatient RRAT score: 17 Was this a readmission? no  
Patient stated reason for the readmission: N/A Nurse Navigator (NN) contacted the caregiver by telephone to perform post hospital discharge assessment. Verified name and  with family as identifiers. Provided introduction to self, and explanation of the Nurse Navigator role. Family: Isaias Gilesjosh, daughter Family reported:  
Very weak Energy level improving Left chest/arm soreness with movement Has not weighed since d/c Loose bowels; not watery and no blood present Family denied:  
Difficulty breathing Chest pain Edema Redness Drainage Swelling Hardening around surgical wound Educated family on the importance of daily weights. Instructed family to weigh at the same time of day; preferably in the morning after urinating but before eating, wearing the same amount of clothing. Family  instructed to write down the date, time and weight each time he/she weighs. Instructed to report a weight gain of 3 lbs or greater in 24 hours/1 day or 5 lbs or more in one week. Also instructed family  to monitor for SOB while lying flat, swelling to legs/feet, fatigue with doing normal daily activities or swelling/fullness to abdomen. Daughter verbalized understanding and verbalized she will start weighing patient today.   
 
Reviewed discharge instructions and red flags with family who verbalized understanding. Family given an opportunity to ask questions and does not have any further questions or concerns at this time. The family agrees to contact the PCP office for questions related to their healthcare. NN provided contact information for future reference. Disease Specific:   CHF Heart Failure Note Do you have a Scale:    yes How often do you weigh:  pt has not started weighing Daily Weight (document daily weights in flowsheets):   has not weighed since d/c Amount:  N/A Provider Notified:   N/A Zone:(Pt Reported)  green EF: 26-30%(result) on 11/20/18 (date) Type of HF:   HFrEF Cardiac Device present: pacemaker Heart Failure Medications: Betablocker, Diuretic, Anticoagulant Summary of patient's top problems: 
1. CHF 2. HTN 3. Memory loss Home Health orders at discharge: PT, OT, SN [address]: [facility] Date of initial visit: 12/1/18 Durable Medical Equipment ordered/company: N/A Durable Medical Equipment received: N/A Barriers to care? patient has hx of memory loss and requires assist with all ADL's, lack of knowledge about disease; has not weighed since d/c from [address] [place] [address] 380:  
Does patient have an Advance Directive:  not on file Medication(s):  
New Medications at Discharge: ASA 81, Coreg, Lasix, Percocet, Lipitor Changed Medications at Discharge: None Discontinued Medications at Discharge: None Medication reconciliation was performed with family, who verbalizes understanding of administration of home medications. There were no barriers to obtaining medications identified at this time. Referral to Pharm D needed: no  
 
Current Outpatient Medications Medication Sig  sertraline (ZOLOFT) 50 mg tablet TAKE 1 TABLET BY MOUTH DAILY  meloxicam (MOBIC) 15 mg tablet Take 15 mg by mouth daily. 1 tab by mouth daily  aspirin 81 mg chewable tablet Take 1 Tab by mouth daily.  atorvastatin (LIPITOR) 10 mg tablet Take 1 Tab by mouth nightly.  carvedilol (COREG) 3.125 mg tablet Take 1 Tab by mouth every twelve (12) hours.  furosemide (LASIX) 20 mg tablet Take 1 Tab by mouth daily.  oxyCODONE-acetaminophen (PERCOCET) 5-325 mg per tablet Take 1 Tab by mouth every six (6) hours as needed. Max Daily Amount: 4 Tabs.  fluticasone (FLONASE) 50 mcg/actuation nasal spray 1 [place] by Both Nostrils route daily.  omeprazole (PRILOSEC) 40 mg capsule TAKE 1 CAPSULE BY MOUTH DAILY  sertraline (ZOLOFT) 50 mg tablet TAKE 1 TABLET BY MOUTH DAILY  loratadine (CLARITIN) 10 mg tablet Take 10 mg by mouth daily as needed.  CALCIUM CARBONATE/VITAMIN D3 (CALCIUM 600 + D,3, PO) Take 1 Tab by mouth daily.  MULTI-VITAMIN PO Take 1 Tab by mouth daily. No current facility-administered medications for this visit. There are no discontinued medications. [facility] follow up appointment(s):  
Future Appointments Date Time Provider Luís Abreu 12/3/2018 To Be Determined Tamiko Read PT [facility]  
12/4/2018 To Be Determined Maude Torres LPN [facility]  
12/5/2018 11:00 AM Wenceslao Renee MD [address]  
12/6/2018 To Be Determined LEAH Powell  
12/11/2018 To Be Determined LEAH Powell  
12/13/2018 To Be Determined LEAH Powell  
12/14/2018  2:20 PM Vel Novoa DO [address] [place] [address]  
12/14/2018  2:20 PM Ashleigh Escalante [address]  
2/4/2019  1:00 PM Wenceslao Renee MD [address] [facility] follow up appointment(s): N/A [facility]:  n/a

## 2018-12-04 ENCOUNTER — HOME CARE VISIT (OUTPATIENT)
Dept: SCHEDULING | Facility: HOME HEALTH | Age: 83
End: 2018-12-04
Payer: MEDICARE

## 2018-12-04 VITALS
SYSTOLIC BLOOD PRESSURE: 134 MMHG | OXYGEN SATURATION: 97 % | TEMPERATURE: 98.6 F | DIASTOLIC BLOOD PRESSURE: 78 MMHG | HEART RATE: 84 BPM

## 2018-12-04 PROCEDURE — G0151 HHCP-SERV OF PT,EA 15 MIN: HCPCS

## 2018-12-04 PROCEDURE — G0300 HHS/HOSPICE OF LPN EA 15 MIN: HCPCS

## 2018-12-04 PROCEDURE — G0152 HHCP-SERV OF OT,EA 15 MIN: HCPCS

## 2018-12-05 ENCOUNTER — OFFICE VISIT (OUTPATIENT)
Dept: INTERNAL MEDICINE CLINIC | Age: 83
End: 2018-12-05

## 2018-12-05 ENCOUNTER — PATIENT OUTREACH (OUTPATIENT)
Dept: INTERNAL MEDICINE CLINIC | Age: 83
End: 2018-12-05

## 2018-12-05 VITALS
HEART RATE: 63 BPM | TEMPERATURE: 98.5 F | SYSTOLIC BLOOD PRESSURE: 118 MMHG | DIASTOLIC BLOOD PRESSURE: 68 MMHG | WEIGHT: 118.8 LBS | OXYGEN SATURATION: 92 % | BODY MASS INDEX: 22.45 KG/M2

## 2018-12-05 VITALS
HEIGHT: 61 IN | SYSTOLIC BLOOD PRESSURE: 126 MMHG | WEIGHT: 121 LBS | OXYGEN SATURATION: 98 % | DIASTOLIC BLOOD PRESSURE: 70 MMHG | HEART RATE: 60 BPM | BODY MASS INDEX: 22.84 KG/M2 | TEMPERATURE: 97.7 F | RESPIRATION RATE: 17 BRPM

## 2018-12-05 DIAGNOSIS — I10 ESSENTIAL HYPERTENSION WITH GOAL BLOOD PRESSURE LESS THAN 140/90: ICD-10-CM

## 2018-12-05 DIAGNOSIS — I50.21 CHF (CONGESTIVE HEART FAILURE), NYHA CLASS III, ACUTE, SYSTOLIC (HCC): Primary | ICD-10-CM

## 2018-12-05 NOTE — PROGRESS NOTES
1. Have you been to the ER, urgent care clinic or hospitalized since your last visit? YES 
      
 
2. Have you seen or consulted any other health care providers outside of the [address] [place] [address] since your last visit (Include any pap smears or colon screening)? NO Do you have an Advanced Directive? YES Would you like information on Advanced Directives?  NO

## 2018-12-05 NOTE — PROGRESS NOTES
Misty Tracy 10/17/1927, is a 80 y.o. female, who is seen today for reevaluation after recent hospitalization for recurrent syncope, need for pacemaker and CHF. She is gotten more more short of breath and echocardiogram showed ejection fraction of 26-30%, she initially declined to go to the emergency room but then breathing got a lot worse and she agreed to go to the emergency room. She was diuresed and is breathing well since then. Carvedilol was added. Pacemaker was inserted. She feels much better and is breathing well with no PND orthopnea, still has some mild dyspnea on exertion but much improved. No recent edema. No chest pain. No cough. She is eating somewhat better, drinking some Ensure and eating higher calorie foods, weight has stabilized. She was discharged from the hospital November 29 and contacted December 3, less than 2 business days from discharge, by Betzy Felipe, our nurse. She reviewed the hospitalization,  Medications,  answered questions for the patient. Past Medical History:  
Diagnosis Date  Anxiety  Arthritis  Depression  GERD (gastroesophageal reflux disease)  HTN (hypertension)  LBBB (left bundle branch block)  Osteoporosis   
 completed 8 years Fosamax  Sciatica  Urinary incontinence Current Outpatient Medications Medication Sig Dispense Refill  meloxicam (MOBIC) 15 mg tablet Take 15 mg by mouth daily. 1 tab by mouth daily  aspirin 81 mg chewable tablet Take 1 Tab by mouth daily. 30 Tab 11  
 atorvastatin (LIPITOR) 10 mg tablet Take 1 Tab by mouth nightly. 30 Tab 2  carvedilol (COREG) 3.125 mg tablet Take 1 Tab by mouth every twelve (12) hours. 60 Tab 2  
 furosemide (LASIX) 20 mg tablet Take 1 Tab by mouth daily. 30 Tab 2  
 fluticasone (FLONASE) 50 mcg/actuation nasal spray 1 [place] by Both Nostrils route daily.     
 omeprazole (PRILOSEC) 40 mg capsule TAKE 1 CAPSULE BY MOUTH DAILY 90 Cap 0  
  sertraline (ZOLOFT) 50 mg tablet TAKE 1 TABLET BY MOUTH DAILY 90 Tab 0  
 loratadine (CLARITIN) 10 mg tablet Take 10 mg by mouth daily as needed.  CALCIUM CARBONATE/VITAMIN D3 (CALCIUM 600 + D,3, PO) Take 1 Tab by mouth daily.  MULTI-VITAMIN PO Take 1 Tab by mouth daily. Visit Vitals /70 Pulse 60 Temp 97.7 °F (36.5 °C) (Oral) Resp 17 Ht 5' 1\" (1.549 m) Wt 121 lb (54.9 kg) SpO2 98% BMI 22.86 kg/m² Carotids are 2+ without bruits. No JVD or HJR. Lungs are clear to percussion. Good breath sounds with no wheezing or crackles. Heart reveals a regular rhythm with normal S1 and S2 no murmur gallop click or rub. Apical impulse is not palpable. There is a pacemaker in the upper left chest, Steri-Strips in place, minimal pinkness along the incision but no significant tenderness no drainage, the incision is closed quite tightly. Abdomen is soft and nontender with no hepatosplenic megaly or masses. No bruits. Extremities reveal no clubbing cyanosis or edema. Pulses are intact, somewhat diminished in the feet. Assessment: #1. Congestive heart failure with diastolic heart failure and particularly systolic heart failure. She will continue furosemide 20 mg daily, the cardiologist likely will increase carvedilol and/or add ACE inhibitor or ARB at follow-up next week. #2.  Syncope with her 4-6-second pauses noted, pacemaker now in place, looks to be well seated with no evidence of infection. #3.  History of hypertension blood pressure well controlled. Blood pressure is well controlled on current medical regimen. She will continue no added salt diet. Charan Ramos, [address] Please note: This document has been produced using voice recognition software. Unrecognized errors in transcription may be present.

## 2018-12-06 ENCOUNTER — HOME CARE VISIT (OUTPATIENT)
Dept: SCHEDULING | Facility: HOME HEALTH | Age: 83
End: 2018-12-06
Payer: MEDICARE

## 2018-12-06 PROCEDURE — G0300 HHS/HOSPICE OF LPN EA 15 MIN: HCPCS

## 2018-12-06 PROCEDURE — G0157 HHC PT ASSISTANT EA 15: HCPCS

## 2018-12-07 ENCOUNTER — TELEPHONE (OUTPATIENT)
Dept: INTERNAL MEDICINE CLINIC | Age: 83
End: 2018-12-07

## 2018-12-07 VITALS
TEMPERATURE: 97.6 F | OXYGEN SATURATION: 97 % | SYSTOLIC BLOOD PRESSURE: 116 MMHG | HEART RATE: 69 BPM | DIASTOLIC BLOOD PRESSURE: 72 MMHG

## 2018-12-07 NOTE — TELEPHONE ENCOUNTER
Daughter called - they Need a medical transport chair- [facility]  faxed paperwork on Monday-have you received this?

## 2018-12-09 VITALS
HEART RATE: 67 BPM | OXYGEN SATURATION: 96 % | TEMPERATURE: 96.7 F | SYSTOLIC BLOOD PRESSURE: 118 MMHG | DIASTOLIC BLOOD PRESSURE: 82 MMHG

## 2018-12-11 ENCOUNTER — HOME CARE VISIT (OUTPATIENT)
Dept: SCHEDULING | Facility: HOME HEALTH | Age: 83
End: 2018-12-11
Payer: MEDICARE

## 2018-12-11 VITALS
DIASTOLIC BLOOD PRESSURE: 60 MMHG | OXYGEN SATURATION: 97 % | TEMPERATURE: 98 F | SYSTOLIC BLOOD PRESSURE: 110 MMHG | HEART RATE: 64 BPM

## 2018-12-11 PROCEDURE — G0300 HHS/HOSPICE OF LPN EA 15 MIN: HCPCS

## 2018-12-11 PROCEDURE — G0157 HHC PT ASSISTANT EA 15: HCPCS

## 2018-12-12 ENCOUNTER — PATIENT OUTREACH (OUTPATIENT)
Dept: INTERNAL MEDICINE CLINIC | Age: 83
End: 2018-12-12

## 2018-12-12 NOTE — PROGRESS NOTES
Most recent home health notes reviewed. Disease Specific Care Management  Follow-Up    Date/Time:  12/12/2018 1:30 PM        Contacted daughter for Disease Specific Care Management  follow up. No answer. Left message introducing self, role and reason for call. Requested return call. Contact information provided. Will attempt to contact at a later time.        Future Appointments   Date Time Provider Department Center   12/13/2018 To Be Determined Livandean Bradley Min    12/13/2018 To Be Determined LEAH Nice    12/14/2018  2:20 PM Martínez Mayfield DO [address] [place] [address]   12/14/2018  2:20 PM Ashleigh Escalante [address]   12/18/2018 To Be Determined [facility]   12/20/2018 To Be Determined [facility]   12/25/2018 To Be Determined [facility]   12/27/2018 To Be Determined [facility]   1/1/2019 To Be Determined [facility]   1/3/2019 To Be Determined Messi Tobar PT [facility]   2/4/2019  1:00 PM Ashly Woodward MD [facility] JOANNE CONDE        Other upcoming appointments:  N/A

## 2018-12-13 ENCOUNTER — HOME CARE VISIT (OUTPATIENT)
Dept: SCHEDULING | Facility: HOME HEALTH | Age: 83
End: 2018-12-13
Payer: MEDICARE

## 2018-12-13 VITALS
TEMPERATURE: 97.9 F | HEART RATE: 65 BPM | OXYGEN SATURATION: 97 % | DIASTOLIC BLOOD PRESSURE: 58 MMHG | SYSTOLIC BLOOD PRESSURE: 112 MMHG

## 2018-12-13 PROCEDURE — G0300 HHS/HOSPICE OF LPN EA 15 MIN: HCPCS

## 2018-12-13 PROCEDURE — G0157 HHC PT ASSISTANT EA 15: HCPCS

## 2018-12-14 ENCOUNTER — OFFICE VISIT (OUTPATIENT)
Dept: CARDIOLOGY CLINIC | Age: 83
End: 2018-12-14

## 2018-12-14 ENCOUNTER — CLINICAL SUPPORT (OUTPATIENT)
Dept: CARDIOLOGY CLINIC | Age: 83
End: 2018-12-14

## 2018-12-14 VITALS
WEIGHT: 120 LBS | HEIGHT: 61 IN | SYSTOLIC BLOOD PRESSURE: 130 MMHG | OXYGEN SATURATION: 97 % | HEART RATE: 67 BPM | BODY MASS INDEX: 22.66 KG/M2 | DIASTOLIC BLOOD PRESSURE: 78 MMHG

## 2018-12-14 DIAGNOSIS — Z95.0 CARDIAC PACEMAKER IN SITU: ICD-10-CM

## 2018-12-14 DIAGNOSIS — I10 ESSENTIAL HYPERTENSION WITH GOAL BLOOD PRESSURE LESS THAN 140/90: Primary | ICD-10-CM

## 2018-12-14 DIAGNOSIS — R55 SYNCOPE, UNSPECIFIED SYNCOPE TYPE: Primary | ICD-10-CM

## 2018-12-14 DIAGNOSIS — I50.21 CHF (CONGESTIVE HEART FAILURE), NYHA CLASS III, ACUTE, SYSTOLIC (HCC): ICD-10-CM

## 2018-12-14 NOTE — PROGRESS NOTES
I have personally seen and evaluated the device findings. Interrogation reviewed and I agree with assessment.     Johnson Harper

## 2018-12-14 NOTE — PROGRESS NOTES
Pema Guy presents today for   Chief Complaint   Patient presents with    Follow-up     1 month        Pema Guy preferred language for health care discussion is english/other. Is someone accompanying this pt? Yes, daughter     Is the patient using any DME equipment during OV? No    Depression Screening:  No flowsheet data found. Learning Assessment:  Learning Assessment 1/24/2014   PRIMARY LEARNER Child(deejay)   PRIMARY LANGUAGE ENGLISH   LEARNER PREFERENCE PRIMARY DEMONSTRATION     LISTENING     READING   ANSWERED BY patient   RELATIONSHIP OTHER       Abuse Screening:  Abuse Screening Questionnaire 10/11/2017   Do you ever feel afraid of your partner? N   Are you in a relationship with someone who physically or mentally threatens you? N   Is it safe for you to go home? Y       Fall Risk  Fall Risk Assessment, last 12 mths 12/5/2018   Able to walk? Yes   Fall in past 12 months? No   Fall with injury? -   Number of falls in past 12 months -   Fall Risk Score -       Pt currently taking Antiplatelet therapy? ASA    Coordination of Care:  1. Have you been to the ER, urgent care clinic since your last visit? Hospitalized since your last visit? 11/24/2018 for CHF   2. Have you seen or consulted any other health care providers outside of the [facility] since your last visit? Include any pap smears or colon screening.  No

## 2018-12-14 NOTE — PROGRESS NOTES
Martha Simeon    Chief Complaint   Patient presents with    Follow-up     1 month - no cardiac complaints       HPI    Martha Simeon is a 80 y.o. with no known cardiac disease who presented initially for evaluation of syncope. As you know, Fátima Gonzalez is such a pleasant patient with history hypertension, arthritis and falls who comes in for evaluation of repeated syncope. Fátima Gonzalez lives with her daughter. Back in Sept when her daughter was out of town she apparently had a syncopal event. Her sister was with her at the time and saw her somewhat slumped in her chair. She was slow to respond but came to. Fátima Gonzalez wasn't sure what happened and didn't seek medical care after that initial event. Again since then she had another episode. Her daughter said her mother looked unwell/ pale and said her stomach was upset. She again kind of slumped and her daughter caught her so she did not fall/ get injured. She believes she lost consciousness. She came to and felt somewhat fatigued briefly. She had no observed seizure like activity. She completely denies surrounding cardiac complaints with these episodes- specifically has never noticed palpations/ heart racing, no CP, or SOB. No swelling or headaches. She has no known heart problems and has never seen a heart doctor or had cardiac testing. Since then, Dom's work up showed globally reduced EF 26-30%. I called and spoke to her daughter- giving her the results and alerted her to the signs and symptoms to look out for. Apparently, she got off the phone and asked her mom how she was feeling and she admitted she had been short of breath for 2 days. They ended up going to the [facility] ER the next day and was in AECHF/ HFrEF with fluid overload. Just before discharge she ended up passing out (as she had mentioned very rarely happens)- and she was noted to have a 6 sec pause that correlated. She was appropriately given a pacemaker. She presents today for hospital follow up.  She is now on medical therapy for heart failure and has no complaints. Her fluid status is better and her breathing back to normal. She feels like she has slightly more energy but still wishes she could do more. I reviewed her hospital records and see her SCr was slightly elevated so an ACEI/ Entrestro was not started. Past Medical History:   Diagnosis Date    Anxiety     Arthritis     Depression     GERD (gastroesophageal reflux disease)     HTN (hypertension)     LBBB (left bundle branch block)     Osteoporosis     completed 8 years Fosamax    Sciatica     Urinary incontinence        Past Surgical History:   Procedure Laterality Date    CARDIAC SURG PROCEDURE UNLIST  11/2018    pacemaker     HX CHOLECYSTECTOMY      HX HYSTERECTOMY      HX ORTHOPAEDIC      fractured right patella surgery    [place] ANESTH,SURGERY OF SHOULDER      [place] INS NEW/RPLC PRM PACEMAKER W/TRANSV ELTRD VENTR N/A 11/28/2018    INSERT PPM SINGLE VENTRICULAR performed by Polly Zelaya MD at [facility] CATH [facility]       Current Outpatient Medications   Medication Sig Dispense Refill    meloxicam (MOBIC) 15 mg tablet Take 15 mg by mouth daily. 1 tab by mouth daily      aspirin 81 mg chewable tablet Take 1 Tab by mouth daily. 30 Tab 11    atorvastatin (LIPITOR) 10 mg tablet Take 1 Tab by mouth nightly. 30 Tab 2    carvedilol (COREG) 3.125 mg tablet Take 1 Tab by mouth every twelve (12) hours. 60 Tab 2    furosemide (LASIX) 20 mg tablet Take 1 Tab by mouth daily. 30 Tab 2    fluticasone (FLONASE) 50 mcg/actuation nasal spray 1 [place] by Both Nostrils route daily.  omeprazole (PRILOSEC) 40 mg capsule TAKE 1 CAPSULE BY MOUTH DAILY 90 Cap 0    sertraline (ZOLOFT) 50 mg tablet TAKE 1 TABLET BY MOUTH DAILY 90 Tab 0    loratadine (CLARITIN) 10 mg tablet Take 10 mg by mouth daily as needed for Allergies.  CALCIUM CARBONATE/VITAMIN D3 (CALCIUM 600 + D,3, PO) Take 1 Tab by mouth daily.  MULTI-VITAMIN PO Take 1 Tab by mouth daily.          No Known Allergies    Social History     Socioeconomic History    Marital status:      Spouse name: Not on file    Number of children: Not on file    Years of education: Not on file    Highest education level: Not on file   Social Needs    Financial resource strain: Not on file    Food insecurity - worry: Not on file    Food insecurity - inability: Not on file    Transportation needs - medical: Not on file   Zscaler needs - non-medical: Not on file   Occupational History    Not on file   Tobacco Use    Smoking status: Never Smoker    Smokeless tobacco: Never Used   Substance and Sexual Activity    Alcohol use: No    Drug use: No    Sexual activity: Not on file   Other Topics Concern    Not on file   Social History Narrative    Not on file        The patient has a family history of heart problems, but no genetic cardiomyopathies or SCD. Review of Systems    14 pt Review of Systems is negative unless otherwise mentioned in the HPI. Wt Readings from Last 3 Encounters:   12/14/18 54.4 kg (120 lb)   12/05/18 54.9 kg (121 lb)   12/04/18 53.9 kg (118 lb 12.8 oz)     Temp Readings from Last 3 Encounters:   12/13/18 97.9 °F (36.6 °C)   12/11/18 98 °F (36.7 °C)   12/06/18 96.7 °F (35.9 °C)     BP Readings from Last 3 Encounters:   12/14/18 130/78   12/13/18 112/58   12/11/18 110/60     Pulse Readings from Last 3 Encounters:   12/14/18 67   12/13/18 65   12/11/18 64     Physical Exam:    Visit Vitals  /78   Pulse 67   Ht 5' 1\" (1.549 m)   Wt 54.4 kg (120 lb)   SpO2 97%   BMI 22.67 kg/m²      Physical Exam   Constitutional: She is oriented to person, place, and time. She appears well-developed and well-nourished. No distress. HENT:   Head: Normocephalic and atraumatic. Eyes: EOM are normal. Pupils are equal, round, and reactive to light. Neck: No JVD present. Cardiovascular: Normal rate, regular rhythm, normal heart sounds and intact distal pulses.  Exam reveals no gallop and no friction rub. No murmur heard. Pulmonary/Chest: Effort normal and breath sounds normal. No respiratory distress. She has no wheezes. She has no rales. She exhibits no tenderness. Abdominal: Soft. Bowel sounds are normal.   Musculoskeletal: She exhibits no edema or tenderness. Neurological: She is alert and oriented to person, place, and time. Skin: Skin is warm and dry. She is not diaphoretic. Psychiatric: She has a normal mood and affect. EKG today shows: NSR, normal axis, no ST segment abnormalities. LBBB noted and similar when compared to prior. Impression and Plan:  Jimmy Nunes is a 80 y.o. with:    1.) HFrEF, s/p AECHF, now euvolemic/ stable  2.) Symptomatic SSS s/p PPM, known- device and site checked today  3.) Dyslipidemia, known  4.) Advanced age, known  5.) LBBB, known    1.) Chem 7  2.) Will try to optimize medical therapy next visit, increase Coreg and add either ACEI or Entresto   3.) RTC 3-5 weeks, recheck CHF    Significant time spent reviewing hospital records with patient and her daughter. All their questions were answered. Thank you for allowing me to participate in the care of your patient, please do not hesitate to call with questions or concerns. Follow-up Disposition:  Return in about 4 weeks (around 1/11/2019).     Ramírez Loya, DO

## 2018-12-16 VITALS
DIASTOLIC BLOOD PRESSURE: 64 MMHG | TEMPERATURE: 97.4 F | OXYGEN SATURATION: 97 % | HEART RATE: 64 BPM | SYSTOLIC BLOOD PRESSURE: 104 MMHG

## 2018-12-18 ENCOUNTER — HOME CARE VISIT (OUTPATIENT)
Dept: SCHEDULING | Facility: HOME HEALTH | Age: 83
End: 2018-12-18
Payer: MEDICARE

## 2018-12-18 VITALS
TEMPERATURE: 97.6 F | OXYGEN SATURATION: 97 % | DIASTOLIC BLOOD PRESSURE: 70 MMHG | SYSTOLIC BLOOD PRESSURE: 112 MMHG | HEART RATE: 60 BPM

## 2018-12-18 PROCEDURE — G0157 HHC PT ASSISTANT EA 15: HCPCS

## 2018-12-20 ENCOUNTER — HOME CARE VISIT (OUTPATIENT)
Dept: SCHEDULING | Facility: HOME HEALTH | Age: 83
End: 2018-12-20
Payer: MEDICARE

## 2018-12-20 VITALS
TEMPERATURE: 98 F | HEART RATE: 79 BPM | OXYGEN SATURATION: 99 % | SYSTOLIC BLOOD PRESSURE: 120 MMHG | DIASTOLIC BLOOD PRESSURE: 76 MMHG

## 2018-12-20 PROCEDURE — G0157 HHC PT ASSISTANT EA 15: HCPCS

## 2018-12-21 ENCOUNTER — PATIENT OUTREACH (OUTPATIENT)
Dept: INTERNAL MEDICINE CLINIC | Age: 83
End: 2018-12-21

## 2018-12-21 NOTE — PROGRESS NOTES
Reviewed cardiology and home health notes. patient to follow up with cardiology in Jan for possible increase in Coreg and jose of ACE or Entresto. Patient d/c'd from SN on 12/13; continuing home PT. As per PT note on 12/18, patient is progressing well with PT.      NN contacted the Olya Carver, daughter by telephone to perform CHF follow Up. Contacted daughter for Disease Specific Care Management  follow up. No answer. Left message introducing self, role and reason for call. Requested return call. Contact information provided. Will attempt to contact at a later time.

## 2018-12-25 ENCOUNTER — HOME CARE VISIT (OUTPATIENT)
Dept: HOME HEALTH SERVICES | Facility: HOME HEALTH | Age: 83
End: 2018-12-25
Payer: MEDICARE

## 2019-01-01 VITALS
TEMPERATURE: 98.4 F | OXYGEN SATURATION: 97 % | DIASTOLIC BLOOD PRESSURE: 78 MMHG | SYSTOLIC BLOOD PRESSURE: 122 MMHG | RESPIRATION RATE: 18 BRPM | HEART RATE: 62 BPM

## 2019-01-02 ENCOUNTER — HOME CARE VISIT (OUTPATIENT)
Dept: SCHEDULING | Facility: HOME HEALTH | Age: 84
End: 2019-01-02
Payer: MEDICARE

## 2019-01-02 VITALS
SYSTOLIC BLOOD PRESSURE: 112 MMHG | DIASTOLIC BLOOD PRESSURE: 72 MMHG | OXYGEN SATURATION: 97 % | TEMPERATURE: 97.7 F | HEART RATE: 78 BPM

## 2019-01-02 PROCEDURE — G0157 HHC PT ASSISTANT EA 15: HCPCS

## 2019-01-03 ENCOUNTER — PATIENT OUTREACH (OUTPATIENT)
Dept: INTERNAL MEDICINE CLINIC | Age: 84
End: 2019-01-03

## 2019-01-03 ENCOUNTER — HOME CARE VISIT (OUTPATIENT)
Dept: SCHEDULING | Facility: HOME HEALTH | Age: 84
End: 2019-01-03
Payer: MEDICARE

## 2019-01-03 VITALS
TEMPERATURE: 97.4 F | DIASTOLIC BLOOD PRESSURE: 70 MMHG | SYSTOLIC BLOOD PRESSURE: 110 MMHG | HEART RATE: 75 BPM | OXYGEN SATURATION: 97 %

## 2019-01-03 PROCEDURE — G0151 HHCP-SERV OF PT,EA 15 MIN: HCPCS

## 2019-01-03 NOTE — LETTER
1/3/2019 2:41 PM 
 
Ms. Bam Nunez [address] [facility] [id number] I am a Nurse Navigator working with your physician's office. Part of my job is to follow up with patients who have been in the emergency department or hospital to see how they are feeling, answer any questions they may have about their visit and also make sure they have a follow-up appointment to see their primary care doctor. I can also provide resources to patients that have other needs. Please call me if you need assistance with affording medications, need transportation to physician appointments or any other issue or concern. I can be reached Monday thru Friday at the telephone number listed above. Thank you for allowing us to participate in your care.   
 
 
 
 
 
Sincerely, 
 
 
Alejandro Cardenas RN

## 2019-01-03 NOTE — PROGRESS NOTES
Contacted patient for Disease Specific Care Management  follow up. No answer. Left message introducing self, role and reason for call. Requested return call. Contact information provided. Letter sent. Home health notes reviewed. As per note, patient has met goals and instructed to continue home exercise program. Plan to d/c in 1-3 weeks. Patient has appt with Dr. Segundo Pedro, cardiology on 1/16/19 at 2:20 PM.

## 2019-01-04 ENCOUNTER — HOSPITAL ENCOUNTER (OUTPATIENT)
Dept: LAB | Age: 84
Discharge: HOME | End: 2019-01-04

## 2019-01-04 DIAGNOSIS — R55 SYNCOPE, UNSPECIFIED SYNCOPE TYPE: ICD-10-CM

## 2019-01-04 LAB — XX-LABCORP SPECIMEN COL,LCBCF: NORMAL

## 2019-01-04 PROCEDURE — 99001 SPECIMEN HANDLING PT-LAB: CPT

## 2019-01-05 LAB
ALBUMIN SERPL-MCNC: 3.8 G/DL (ref 3.2–4.6)
ALBUMIN/GLOB SERPL: 1.3 {RATIO} (ref 1.2–2.2)
ALP SERPL-CCNC: 63 IU/L (ref 39–117)
ALT SERPL-CCNC: 15 IU/L (ref 0–32)
AST SERPL-CCNC: 20 IU/L (ref 0–40)
BILIRUB SERPL-MCNC: 0.3 MG/DL (ref 0–1.2)
BUN SERPL-MCNC: 30 MG/DL (ref 10–36)
BUN/CREAT SERPL: 23 (ref 12–28)
CALCIUM SERPL-MCNC: 9.2 MG/DL (ref 8.7–10.3)
CHLORIDE SERPL-SCNC: 100 MMOL/L (ref 96–106)
CO2 SERPL-SCNC: 23 MMOL/L (ref 20–29)
CREAT SERPL-MCNC: 1.28 MG/DL (ref 0.57–1)
GLOBULIN SER CALC-MCNC: 3 G/DL (ref 1.5–4.5)
GLUCOSE SERPL-MCNC: 89 MG/DL (ref 65–99)
POTASSIUM SERPL-SCNC: 4.8 MMOL/L (ref 3.5–5.2)
PROT SERPL-MCNC: 6.8 G/DL (ref 6–8.5)
SODIUM SERPL-SCNC: 140 MMOL/L (ref 134–144)
SPECIMEN STATUS REPORT, ROLRST: NORMAL

## 2019-01-07 NOTE — PROGRESS NOTES
Per your last note\" Rosey Madden is a 80 y.o. with:     1.) HFrEF, s/p AECHF, now euvolemic/ stable  2.) Symptomatic SSS s/p PPM, known- device and site checked today  3.) Dyslipidemia, known  4.) Advanced age, known  5.) LBBB, known     1.) Chem 7  2.) Will try to optimize medical therapy next visit, increase Coreg and add either ACEI or Entresto   3.) RTC 3-5 weeks, recheck CHF     Significant time spent reviewing hospital records with patient and her daughter. All their questions were answered.

## 2019-01-16 ENCOUNTER — OFFICE VISIT (OUTPATIENT)
Dept: CARDIOLOGY CLINIC | Age: 84
End: 2019-01-16

## 2019-01-16 VITALS
SYSTOLIC BLOOD PRESSURE: 140 MMHG | HEIGHT: 61 IN | WEIGHT: 120 LBS | DIASTOLIC BLOOD PRESSURE: 77 MMHG | HEART RATE: 77 BPM | OXYGEN SATURATION: 98 % | BODY MASS INDEX: 22.66 KG/M2

## 2019-01-16 DIAGNOSIS — I10 ESSENTIAL HYPERTENSION WITH GOAL BLOOD PRESSURE LESS THAN 140/90: ICD-10-CM

## 2019-01-16 DIAGNOSIS — I50.21 CHF (CONGESTIVE HEART FAILURE), NYHA CLASS III, ACUTE, SYSTOLIC (HCC): Primary | ICD-10-CM

## 2019-01-16 NOTE — PATIENT INSTRUCTIONS
Increase coreg 6.25 mg one tablet twice a day (2 tablets of your Coreg 3.125 mg twice a day)  Lasix PRN   (Lasix to 20 mg every other day for one week then decrease to Lasix 20 mg every 3 day for one week then stop lasix )    Continue to monitor weight and blood pressures   Call if you have any questions or concerns

## 2019-01-16 NOTE — PROGRESS NOTES
Sally Milan    Chief Complaint   Patient presents with    Follow-up     4 week follow up       HPI    Sally Milan is a 80 y.o. with no known cardiac disease who presented initially for evaluation of syncope. As you know, Darrius Sanders is such a pleasant patient with history hypertension, arthritis and falls who comes in for evaluation of repeated syncope. Darrius Sanders lives with her daughter. Back in Sept when her daughter was out of town she apparently had a syncopal event. Her sister was with her at the time and saw her somewhat slumped in her chair. She was slow to respond but came to. Darrius Sanders wasn't sure what happened and didn't seek medical care after that initial event. Again since then she had another episode. Her daughter said her mother looked unwell/ pale and said her stomach was upset. She again kind of slumped and her daughter caught her so she did not fall/ get injured. She believes she lost consciousness. She came to and felt somewhat fatigued briefly. She had no observed seizure like activity. She completely denies surrounding cardiac complaints with these episodes- specifically has never noticed palpations/ heart racing, no CP, or SOB. No swelling or headaches. She has no known heart problems and has never seen a heart doctor or had cardiac testing. Since then, Dom's work up showed globally reduced EF 26-30%. I called and spoke to her daughter- giving her the results and alerted her to the signs and symptoms to look out for. Apparently, she got off the phone and asked her mom how she was feeling and she admitted she had been short of breath for 2 days. They ended up going to [facility] ER the next day and was in AECHF/ HFrEF with fluid overload. Just before discharge she ended up passing out (as she had mentioned very rarely happens)- and she was noted to have a 6 sec pause that correlated. She was appropriately given a pacemaker. She presents today for routine followup.  She is feeling great and has no complaints. Her daughter notices she is more alert and has more energy. Her only issue is trying to gain weight. She doesn't have much of an appetite and when she eats has a regular bowel movement. She has no CP, no LE edema, no SOB/ VALERIO. I reviewed her hospital records and see her SCr was slightly elevated so an ACEI/ Entrestro was not started. Past Medical History:   Diagnosis Date    Anxiety     Arthritis     Depression     GERD (gastroesophageal reflux disease)     HTN (hypertension)     LBBB (left bundle branch block)     Osteoporosis     completed 8 years Fosamax    Sciatica     Urinary incontinence        Past Surgical History:   Procedure Laterality Date    CARDIAC SURG PROCEDURE UNLIST  11/2018    pacemaker     HX CHOLECYSTECTOMY      HX HYSTERECTOMY      HX ORTHOPAEDIC      fractured right patella surgery    OR ANESTH,SURGERY OF SHOULDER      OR INS NEW/RPLC PRM PACEMAKER W/TRANSV ELTRD VENTR N/A 11/28/2018    INSERT PPM SINGLE VENTRICULAR performed by Sierra Fuller MD at [facility] CATH LAB       Current Outpatient Medications   Medication Sig Dispense Refill    meloxicam (MOBIC) 15 mg tablet Take 15 mg by mouth daily. 1 tab by mouth daily      aspirin 81 mg chewable tablet Take 1 Tab by mouth daily. 30 Tab 11    atorvastatin (LIPITOR) 10 mg tablet Take 1 Tab by mouth nightly. (Patient taking differently: Take 10 mg by mouth as needed.) 30 Tab 2    carvedilol (COREG) 3.125 mg tablet Take 1 Tab by mouth every twelve (12) hours. (Patient taking differently: Take 6.25 mg by mouth every twelve (12) hours.) 60 Tab 2    furosemide (LASIX) 20 mg tablet Take 1 Tab by mouth daily. 30 Tab 2    fluticasone (FLONASE) 50 mcg/actuation nasal spray 1 [place] by Both Nostrils route daily.       omeprazole (PRILOSEC) 40 mg capsule TAKE 1 CAPSULE BY MOUTH DAILY 90 Cap 0    sertraline (ZOLOFT) 50 mg tablet TAKE 1 TABLET BY MOUTH DAILY 90 Tab 0    loratadine (CLARITIN) 10 mg tablet Take 10 mg by mouth daily as needed for Allergies.  CALCIUM CARBONATE/VITAMIN D3 (CALCIUM 600 + D,3, PO) Take 1 Tab by mouth daily.  MULTI-VITAMIN PO Take 1 Tab by mouth daily. No Known Allergies    Social History     Socioeconomic History    Marital status:      Spouse name: Not on file    Number of children: Not on file    Years of education: Not on file    Highest education level: Not on file   Social Needs    Financial resource strain: Not on file    Food insecurity - worry: Not on file    Food insecurity - inability: Not on file    Transportation needs - medical: Not on file   POPRAGEOUS needs - non-medical: Not on file   Occupational History    Not on file   Tobacco Use    Smoking status: Never Smoker    Smokeless tobacco: Never Used   Substance and Sexual Activity    Alcohol use: No    Drug use: No    Sexual activity: Not on file   Other Topics Concern    Not on file   Social History Narrative    Not on file        The patient has a family history of heart problems, but no genetic cardiomyopathies or SCD. Review of Systems    14 pt Review of Systems is negative unless otherwise mentioned in the HPI. Wt Readings from Last 3 Encounters:   01/16/19 54.4 kg (120 lb)   12/14/18 54.4 kg (120 lb)   12/05/18 54.9 kg (121 lb)     Temp Readings from Last 3 Encounters:   01/03/19 97.4 °F (36.3 °C)   01/02/19 97.7 °F (36.5 °C)   12/20/18 98 °F (36.7 °C)     BP Readings from Last 3 Encounters:   01/16/19 140/77   01/03/19 110/70   01/02/19 112/72     Pulse Readings from Last 3 Encounters:   01/16/19 77   01/03/19 75   01/02/19 78     Physical Exam:    Visit Vitals  /77   Pulse 77   Ht 5' 1\" (1.549 m)   Wt 54.4 kg (120 lb)   SpO2 98%   BMI 22.67 kg/m²      Physical Exam   Constitutional: She is oriented to person, place, and time. She appears well-developed and well-nourished. No distress. HENT:   Head: Normocephalic and atraumatic.    Eyes: EOM are normal. Pupils are equal, round, and reactive to light. Neck: No JVD present. Cardiovascular: Normal rate, regular rhythm, normal heart sounds and intact distal pulses. Exam reveals no gallop and no friction rub. No murmur heard. Pulmonary/Chest: Effort normal and breath sounds normal. No respiratory distress. She has no wheezes. She has no rales. She exhibits no tenderness. Abdominal: Soft. Bowel sounds are normal.   Musculoskeletal: She exhibits no edema or tenderness. Neurological: She is alert and oriented to person, place, and time. Skin: Skin is warm and dry. She is not diaphoretic. Psychiatric: She has a normal mood and affect. Labs:  Glucose 89   65 - 99 mg/dL Final   BUN 30   10 - 36 mg/dL Final   Creatinine 1.28 Abnormally high   H  0.57 - 1.00 mg/dL Final   GFR est non-AA 37 Abnormally low   L  >59 mL/min/1.73 Final   GFR est AA 42 Abnormally low   L  >59 mL/min/1.73 Final   BUN/Creatinine ratio 23   12 - 28  Final   Sodium 140   134 - 144 mmol/L Final   Potassium 4.8   3.5 - 5.2 mmol/L Final   Chloride 100   96 - 106 mmol/L Final   CO2 23   20 - 29 mmol/L Final   Calcium 9.2   8.7 - 10.3 mg/dL Final   Protein, total 6.8   6.0 - 8.5 g/dL Final   Albumin 3.8   3.2 - 4.6 g/dL Final   GLOBULIN, TOTAL 3.0   1.5 - 4.5 g/dL Final   A-G Ratio 1.3   1.2 - 2.2  Final   Bilirubin, total 0.3   0.0 - 1.2 mg/dL Final   Alk.  phosphatase 63   39 - 117 IU/L Final   AST (SGOT) 20   0 - 40 IU/L Final   ALT (SGPT) 15   0 - 32 IU/L Final     Impression and Plan:  Zachery Lanza is a 80 y.o. with:    1.) HFrEF, s/p AECHF, now euvolemic/ stable  2.) Symptomatic SSS s/p PPM, known  3.) Dyslipidemia, known  4.) Advanced age/ frailty  5.) LBBB, known    1.) Try to increase Coreg closer to goal HF dosing, will do this very gently to avoid hypotension- 6.25 BID Rx given  2.) Try to stop Lasix, monitor fluid status/ weight, directions given to daughter to try to taper every other day first  3.) Continue close monitoring of SBP at home  4.) Patient and daughter prefer avoiding additional medication at this time, while her SCr is not prohibitive of ACEI/ or Entrestro I think this is reasonable given her age/ frailty and want to avoid hypotension/ falls  5.) RTC 3 months follow up of HFrEF, please call sooner if questions/ problems    Thank you for allowing me to participate in the care of your patient, please do not hesitate to call with questions or concerns. Follow-up Disposition:  Return in about 3 months (around 4/16/2019).     Atanacio Harada, DO

## 2019-01-18 ENCOUNTER — PATIENT OUTREACH (OUTPATIENT)
Dept: INTERNAL MEDICINE CLINIC | Age: 84
End: 2019-01-18

## 2019-01-27 RX ORDER — AMLODIPINE BESYLATE 5 MG/1
TABLET ORAL
Qty: 90 TAB | Refills: 0 | Status: SHIPPED | OUTPATIENT
Start: 2019-01-27 | End: 2019-02-15

## 2019-01-27 RX ORDER — OMEPRAZOLE 40 MG/1
CAPSULE, DELAYED RELEASE ORAL
Qty: 90 CAP | Refills: 0 | Status: SHIPPED | OUTPATIENT
Start: 2019-01-27 | End: 2019-04-26 | Stop reason: SDUPTHER

## 2019-01-29 ENCOUNTER — PATIENT OUTREACH (OUTPATIENT)
Dept: INTERNAL MEDICINE CLINIC | Age: 84
End: 2019-01-29

## 2019-01-29 NOTE — PROGRESS NOTES
NN contacted family by telephone to perform CHF follow up. Contacted family for Disease Specific Care Management  follow up. No answer. Left message introducing self, role and reason for call. Requested return call. Contact information provided.

## 2019-02-04 ENCOUNTER — PATIENT OUTREACH (OUTPATIENT)
Dept: INTERNAL MEDICINE CLINIC | Age: 84
End: 2019-02-04

## 2019-02-04 ENCOUNTER — OFFICE VISIT (OUTPATIENT)
Dept: INTERNAL MEDICINE CLINIC | Age: 84
End: 2019-02-04

## 2019-02-04 ENCOUNTER — TELEPHONE (OUTPATIENT)
Dept: INTERNAL MEDICINE CLINIC | Age: 84
End: 2019-02-04

## 2019-02-04 VITALS
WEIGHT: 119.6 LBS | TEMPERATURE: 97.7 F | DIASTOLIC BLOOD PRESSURE: 64 MMHG | SYSTOLIC BLOOD PRESSURE: 122 MMHG | RESPIRATION RATE: 16 BRPM | HEART RATE: 81 BPM | OXYGEN SATURATION: 96 % | BODY MASS INDEX: 22.58 KG/M2 | HEIGHT: 61 IN

## 2019-02-04 DIAGNOSIS — I50.21 CHF (CONGESTIVE HEART FAILURE), NYHA CLASS III, ACUTE, SYSTOLIC (HCC): Primary | ICD-10-CM

## 2019-02-04 DIAGNOSIS — F32.0 CURRENT MILD EPISODE OF MAJOR DEPRESSIVE DISORDER WITHOUT PRIOR EPISODE (HCC): ICD-10-CM

## 2019-02-04 DIAGNOSIS — I10 PRIMARY HYPERTENSION: ICD-10-CM

## 2019-02-04 NOTE — PROGRESS NOTES
Randi Esquivel 10/17/1927, is a 80 y.o. female, who is seen today for reevaluation of CHF, hypertension, depression. She was hospitalized about 2 months ago with heart failure and was treated with furosemide and carvedilol. A few weeks ago the carvedilol was doubled and furosemide was stopped and she is breathing well still. Weight has not changed, typically around 118 at home. Blood pressures been running good range of around 118 when her daughter checks it. She is taking all of her medicine. She does not feel depressed at this point. Past Medical History:  
Diagnosis Date  Anxiety  Arthritis  Depression  GERD (gastroesophageal reflux disease)  HTN (hypertension)  LBBB (left bundle branch block)  Osteoporosis   
 completed 8 years Fosamax  Sciatica  Urinary incontinence Current Outpatient Medications Medication Sig Dispense Refill  omeprazole (PRILOSEC) 40 mg capsule TAKE 1 CAPSULE BY MOUTH DAILY 90 Cap 0  
 amLODIPine (NORVASC) 5 mg tablet TAKE 1 TABLET BY MOUTH DAILY 90 Tab 0  
 meloxicam (MOBIC) 15 mg tablet Take 15 mg by mouth daily. 1 tab by mouth daily  aspirin 81 mg chewable tablet Take 1 Tab by mouth daily. 30 Tab 11  
 atorvastatin (LIPITOR) 10 mg tablet Take 1 Tab by mouth nightly. (Patient taking differently: Take 10 mg by mouth as needed.) 30 Tab 2  carvedilol (COREG) 3.125 mg tablet Take 1 Tab by mouth every twelve (12) hours. (Patient taking differently: Take 6.25 mg by mouth every twelve (12) hours.) 60 Tab 2  
 fluticasone (FLONASE) 50 mcg/actuation nasal spray 1 [place] by Both Nostrils route daily.  sertraline (ZOLOFT) 50 mg tablet TAKE 1 TABLET BY MOUTH DAILY 90 Tab 0  
 loratadine (CLARITIN) 10 mg tablet Take 10 mg by mouth daily as needed for Allergies.  CALCIUM CARBONATE/VITAMIN D3 (CALCIUM 600 + D,3, PO) Take 1 Tab by mouth daily.  MULTI-VITAMIN PO Take 1 Tab by mouth daily. Visit Vitals /64 Pulse 81 Temp 97.7 °F (36.5 °C) (Oral) Resp 16 Ht 5' 1\" (1.549 m) Wt 119 lb 9.6 oz (54.3 kg) SpO2 96% BMI 22.60 kg/m² Carotids are 2+ without bruits. No JVD or HJR. Lungs are clear to percussion. There are crackles in both bases about a quarter of the way up. Heart reveals a regular rhythm with normal S1 and S2 no murmur gallop click or rub. Apical impulse is not palpable. Abdomen soft and nontender with no hepatosplenomegaly or masses and no bruits. Extremities reveal no clubbing cyanosis or edema. Assessment: #1. Recent episode of pulmonary edema now symptomatically doing well on current medicine and no longer on furosemide she has crackles in both bases which may be chronic versus fluid but she is breathing well. She will continue current medication follow-up with her cardiologist as planned in April. #2.  Depression doing well, she will continue sertraline 150 mg daily. #3.  History of hypertension, she will continue amlodipine 5 mg daily. Follow-up in 6 months and she will see her cardiologist in April. We may check some lab then. Charan Garcia, [address] Please note: This document has been produced using voice recognition software. Unrecognized errors in transcription may be present.

## 2019-02-04 NOTE — PROGRESS NOTES
1. Have you been to the ER, urgent care clinic or hospitalized since your last visit? NO 
      
 
2. Have you seen or consulted any other health care providers outside of the [address] [place] [address] since your last visit (Include any pap smears or colon screening)? NO Do you have an Advanced Directive? YES Would you like information on Advanced Directives?  NO

## 2019-02-11 RX ORDER — CARVEDILOL 6.25 MG/1
6.25 TABLET ORAL 2 TIMES DAILY WITH MEALS
Qty: 60 TAB | Refills: 6 | Status: SHIPPED | OUTPATIENT
Start: 2019-02-11 | End: 2019-03-13 | Stop reason: SDUPTHER

## 2019-02-13 ENCOUNTER — APPOINTMENT (OUTPATIENT)
Dept: GENERAL RADIOLOGY | Age: 84
DRG: 292 | End: 2019-02-13
Attending: EMERGENCY MEDICINE
Payer: MEDICARE

## 2019-02-13 ENCOUNTER — TELEPHONE (OUTPATIENT)
Dept: CARDIOLOGY CLINIC | Age: 84
End: 2019-02-13

## 2019-02-13 ENCOUNTER — HOSPITAL ENCOUNTER (INPATIENT)
Age: 84
LOS: 2 days | Discharge: HOME HEALTH | DRG: 292 | End: 2019-02-15
Attending: EMERGENCY MEDICINE | Admitting: INTERNAL MEDICINE
Payer: MEDICARE

## 2019-02-13 DIAGNOSIS — I50.9 ACUTE ON CHRONIC CONGESTIVE HEART FAILURE, UNSPECIFIED HEART FAILURE TYPE (HCC): Primary | ICD-10-CM

## 2019-02-13 LAB
ALBUMIN SERPL-MCNC: 3.6 G/DL (ref 3.4–5)
ALBUMIN/GLOB SERPL: 0.9 {RATIO} (ref 0.8–1.7)
ALP SERPL-CCNC: 78 U/L (ref 45–117)
ALT SERPL-CCNC: 22 U/L (ref 13–56)
ANION GAP SERPL CALC-SCNC: 5 MMOL/L (ref 3–18)
AST SERPL-CCNC: 21 U/L (ref 15–37)
BASOPHILS # BLD: 0 K/UL (ref 0–0.1)
BASOPHILS NFR BLD: 0 % (ref 0–2)
BILIRUB SERPL-MCNC: 0.3 MG/DL (ref 0.2–1)
BNP SERPL-MCNC: 8831 PG/ML (ref 0–1800)
BUN SERPL-MCNC: 23 MG/DL (ref 7–18)
BUN/CREAT SERPL: 20 (ref 12–20)
CALCIUM SERPL-MCNC: 8.5 MG/DL (ref 8.5–10.1)
CHLORIDE SERPL-SCNC: 107 MMOL/L (ref 100–108)
CK MB CFR SERPL CALC: 3 % (ref 0–4)
CK MB SERPL-MCNC: 1.6 NG/ML (ref 5–25)
CK SERPL-CCNC: 53 U/L (ref 26–192)
CO2 SERPL-SCNC: 28 MMOL/L (ref 21–32)
CREAT SERPL-MCNC: 1.14 MG/DL (ref 0.6–1.3)
DIFFERENTIAL METHOD BLD: ABNORMAL
EOSINOPHIL # BLD: 0.5 K/UL (ref 0–0.4)
EOSINOPHIL NFR BLD: 5 % (ref 0–5)
ERYTHROCYTE [DISTWIDTH] IN BLOOD BY AUTOMATED COUNT: 15.2 % (ref 11.6–14.5)
GLOBULIN SER CALC-MCNC: 4 G/DL (ref 2–4)
GLUCOSE SERPL-MCNC: 100 MG/DL (ref 74–99)
HCT VFR BLD AUTO: 38.9 % (ref 35–45)
HGB BLD-MCNC: 12.6 G/DL (ref 12–16)
LYMPHOCYTES # BLD: 2.6 K/UL (ref 0.9–3.6)
LYMPHOCYTES NFR BLD: 26 % (ref 21–52)
MCH RBC QN AUTO: 27.7 PG (ref 24–34)
MCHC RBC AUTO-ENTMCNC: 32.4 G/DL (ref 31–37)
MCV RBC AUTO: 85.5 FL (ref 74–97)
MONOCYTES # BLD: 0.9 K/UL (ref 0.05–1.2)
MONOCYTES NFR BLD: 10 % (ref 3–10)
NEUTS SEG # BLD: 5.9 K/UL (ref 1.8–8)
NEUTS SEG NFR BLD: 59 % (ref 40–73)
PLATELET # BLD AUTO: 190 K/UL (ref 135–420)
PMV BLD AUTO: 9.1 FL (ref 9.2–11.8)
POTASSIUM SERPL-SCNC: 4.6 MMOL/L (ref 3.5–5.5)
PROT SERPL-MCNC: 7.6 G/DL (ref 6.4–8.2)
RBC # BLD AUTO: 4.55 M/UL (ref 4.2–5.3)
SODIUM SERPL-SCNC: 140 MMOL/L (ref 136–145)
TROPONIN I SERPL-MCNC: 0.03 NG/ML (ref 0–0.04)
WBC # BLD AUTO: 9.9 K/UL (ref 4.6–13.2)

## 2019-02-13 PROCEDURE — 65660000000 HC RM CCU STEPDOWN

## 2019-02-13 PROCEDURE — 83880 ASSAY OF NATRIURETIC PEPTIDE: CPT

## 2019-02-13 PROCEDURE — 71045 X-RAY EXAM CHEST 1 VIEW: CPT

## 2019-02-13 PROCEDURE — 74011250636 HC RX REV CODE- 250/636: Performed by: EMERGENCY MEDICINE

## 2019-02-13 PROCEDURE — 82550 ASSAY OF CK (CPK): CPT

## 2019-02-13 PROCEDURE — 96374 THER/PROPH/DIAG INJ IV PUSH: CPT

## 2019-02-13 PROCEDURE — 80053 COMPREHEN METABOLIC PANEL: CPT

## 2019-02-13 PROCEDURE — 93005 ELECTROCARDIOGRAM TRACING: CPT

## 2019-02-13 PROCEDURE — 99285 EMERGENCY DEPT VISIT HI MDM: CPT

## 2019-02-13 PROCEDURE — 85025 COMPLETE CBC W/AUTO DIFF WBC: CPT

## 2019-02-13 PROCEDURE — 74011250636 HC RX REV CODE- 250/636: Performed by: INTERNAL MEDICINE

## 2019-02-13 RX ORDER — DOCUSATE SODIUM 100 MG/1
100 CAPSULE, LIQUID FILLED ORAL
Status: DISCONTINUED | OUTPATIENT
Start: 2019-02-13 | End: 2019-02-15 | Stop reason: HOSPADM

## 2019-02-13 RX ORDER — HEPARIN SODIUM 5000 [USP'U]/ML
5000 INJECTION, SOLUTION INTRAVENOUS; SUBCUTANEOUS EVERY 8 HOURS
Status: DISCONTINUED | OUTPATIENT
Start: 2019-02-13 | End: 2019-02-15 | Stop reason: HOSPADM

## 2019-02-13 RX ORDER — FUROSEMIDE 10 MG/ML
40 INJECTION INTRAMUSCULAR; INTRAVENOUS
Status: COMPLETED | OUTPATIENT
Start: 2019-02-13 | End: 2019-02-13

## 2019-02-13 RX ORDER — ATORVASTATIN CALCIUM 10 MG/1
10 TABLET, FILM COATED ORAL
Status: DISCONTINUED | OUTPATIENT
Start: 2019-02-13 | End: 2019-02-15 | Stop reason: HOSPADM

## 2019-02-13 RX ORDER — FUROSEMIDE 10 MG/ML
40 INJECTION INTRAMUSCULAR; INTRAVENOUS EVERY 12 HOURS
Status: DISCONTINUED | OUTPATIENT
Start: 2019-02-13 | End: 2019-02-14

## 2019-02-13 RX ORDER — AMLODIPINE BESYLATE 5 MG/1
5 TABLET ORAL DAILY
Status: DISCONTINUED | OUTPATIENT
Start: 2019-02-14 | End: 2019-02-14

## 2019-02-13 RX ORDER — ACETAMINOPHEN 325 MG/1
650 TABLET ORAL
Status: DISCONTINUED | OUTPATIENT
Start: 2019-02-13 | End: 2019-02-15 | Stop reason: HOSPADM

## 2019-02-13 RX ORDER — GUAIFENESIN 100 MG/5ML
81 LIQUID (ML) ORAL DAILY
Status: DISCONTINUED | OUTPATIENT
Start: 2019-02-14 | End: 2019-02-15 | Stop reason: HOSPADM

## 2019-02-13 RX ORDER — CARVEDILOL 6.25 MG/1
6.25 TABLET ORAL 2 TIMES DAILY WITH MEALS
Status: DISCONTINUED | OUTPATIENT
Start: 2019-02-14 | End: 2019-02-15 | Stop reason: HOSPADM

## 2019-02-13 RX ORDER — ONDANSETRON 2 MG/ML
4 INJECTION INTRAMUSCULAR; INTRAVENOUS
Status: DISCONTINUED | OUTPATIENT
Start: 2019-02-13 | End: 2019-02-15 | Stop reason: HOSPADM

## 2019-02-13 RX ORDER — NALOXONE HYDROCHLORIDE 0.4 MG/ML
0.4 INJECTION, SOLUTION INTRAMUSCULAR; INTRAVENOUS; SUBCUTANEOUS AS NEEDED
Status: DISCONTINUED | OUTPATIENT
Start: 2019-02-13 | End: 2019-02-15 | Stop reason: HOSPADM

## 2019-02-13 RX ORDER — SERTRALINE HYDROCHLORIDE 50 MG/1
50 TABLET, FILM COATED ORAL EVERY EVENING
Status: DISCONTINUED | OUTPATIENT
Start: 2019-02-14 | End: 2019-02-14

## 2019-02-13 RX ORDER — OXYCODONE AND ACETAMINOPHEN 5; 325 MG/1; MG/1
1 TABLET ORAL
Status: DISCONTINUED | OUTPATIENT
Start: 2019-02-13 | End: 2019-02-15 | Stop reason: HOSPADM

## 2019-02-13 RX ADMIN — FUROSEMIDE 40 MG: 10 INJECTION, SOLUTION INTRAMUSCULAR; INTRAVENOUS at 23:17

## 2019-02-13 RX ADMIN — FUROSEMIDE 40 MG: 10 INJECTION, SOLUTION INTRAMUSCULAR; INTRAVENOUS at 23:58

## 2019-02-14 ENCOUNTER — APPOINTMENT (OUTPATIENT)
Dept: NON INVASIVE DIAGNOSTICS | Age: 84
DRG: 292 | End: 2019-02-14
Attending: INTERNAL MEDICINE
Payer: MEDICARE

## 2019-02-14 PROBLEM — I50.43 ACUTE ON CHRONIC COMBINED SYSTOLIC AND DIASTOLIC CONGESTIVE HEART FAILURE (HCC): Status: ACTIVE | Noted: 2019-02-14

## 2019-02-14 PROBLEM — J81.1 PULMONARY EDEMA: Status: ACTIVE | Noted: 2018-11-24

## 2019-02-14 PROBLEM — R06.02 SOB (SHORTNESS OF BREATH): Status: ACTIVE | Noted: 2019-02-14

## 2019-02-14 LAB
ANION GAP SERPL CALC-SCNC: 7 MMOL/L (ref 3–18)
ATRIAL RATE: 100 BPM
ATRIAL RATE: 92 BPM
BASOPHILS # BLD: 0 K/UL (ref 0–0.1)
BASOPHILS NFR BLD: 0 % (ref 0–2)
BUN SERPL-MCNC: 23 MG/DL (ref 7–18)
BUN/CREAT SERPL: 18 (ref 12–20)
CALCIUM SERPL-MCNC: 9.1 MG/DL (ref 8.5–10.1)
CALCULATED R AXIS, ECG10: -17 DEGREES
CALCULATED R AXIS, ECG10: -20 DEGREES
CALCULATED T AXIS, ECG11: 143 DEGREES
CALCULATED T AXIS, ECG11: 144 DEGREES
CHLORIDE SERPL-SCNC: 98 MMOL/L (ref 100–108)
CK MB CFR SERPL CALC: 3.8 % (ref 0–4)
CK MB SERPL-MCNC: 1.7 NG/ML (ref 5–25)
CK SERPL-CCNC: 45 U/L (ref 26–192)
CO2 SERPL-SCNC: 31 MMOL/L (ref 21–32)
CREAT SERPL-MCNC: 1.25 MG/DL (ref 0.6–1.3)
DIAGNOSIS, 93000: NORMAL
DIAGNOSIS, 93000: NORMAL
DIFFERENTIAL METHOD BLD: ABNORMAL
ECHO LV INTERNAL DIMENSION DIASTOLIC: 4.54 CM (ref 3.9–5.3)
ECHO LV INTERNAL DIMENSION SYSTOLIC: 4.25 CM
ECHO LV IVSD: 1.27 CM (ref 0.6–0.9)
ECHO LV MASS 2D: 239.6 G (ref 67–162)
ECHO LV MASS INDEX 2D: 158.7 G/M2 (ref 43–95)
ECHO LV POSTERIOR WALL DIASTOLIC: 1.15 CM (ref 0.6–0.9)
EOSINOPHIL # BLD: 0.3 K/UL (ref 0–0.4)
EOSINOPHIL NFR BLD: 3 % (ref 0–5)
ERYTHROCYTE [DISTWIDTH] IN BLOOD BY AUTOMATED COUNT: 15.1 % (ref 11.6–14.5)
GLUCOSE SERPL-MCNC: 107 MG/DL (ref 74–99)
HCT VFR BLD AUTO: 40.3 % (ref 35–45)
HGB BLD-MCNC: 13.2 G/DL (ref 12–16)
LYMPHOCYTES # BLD: 1.8 K/UL (ref 0.9–3.6)
LYMPHOCYTES NFR BLD: 22 % (ref 21–52)
MAGNESIUM SERPL-MCNC: 2 MG/DL (ref 1.6–2.6)
MCH RBC QN AUTO: 27.7 PG (ref 24–34)
MCHC RBC AUTO-ENTMCNC: 32.8 G/DL (ref 31–37)
MCV RBC AUTO: 84.5 FL (ref 74–97)
MONOCYTES # BLD: 1 K/UL (ref 0.05–1.2)
MONOCYTES NFR BLD: 12 % (ref 3–10)
NEUTS SEG # BLD: 5.1 K/UL (ref 1.8–8)
NEUTS SEG NFR BLD: 63 % (ref 40–73)
P-R INTERVAL, ECG05: 136 MS
PHOSPHATE SERPL-MCNC: 3.9 MG/DL (ref 2.5–4.9)
PLATELET # BLD AUTO: 180 K/UL (ref 135–420)
PMV BLD AUTO: 9.7 FL (ref 9.2–11.8)
POTASSIUM SERPL-SCNC: 3.8 MMOL/L (ref 3.5–5.5)
Q-T INTERVAL, ECG07: 394 MS
Q-T INTERVAL, ECG07: 434 MS
QRS DURATION, ECG06: 154 MS
QRS DURATION, ECG06: 160 MS
QTC CALCULATION (BEZET), ECG08: 508 MS
QTC CALCULATION (BEZET), ECG08: 533 MS
RBC # BLD AUTO: 4.77 M/UL (ref 4.2–5.3)
SODIUM SERPL-SCNC: 136 MMOL/L (ref 136–145)
TROPONIN I SERPL-MCNC: 0.06 NG/ML (ref 0–0.04)
VENTRICULAR RATE, ECG03: 100 BPM
VENTRICULAR RATE, ECG03: 91 BPM
WBC # BLD AUTO: 8.2 K/UL (ref 4.6–13.2)

## 2019-02-14 PROCEDURE — 85025 COMPLETE CBC W/AUTO DIFF WBC: CPT

## 2019-02-14 PROCEDURE — 83735 ASSAY OF MAGNESIUM: CPT

## 2019-02-14 PROCEDURE — 84100 ASSAY OF PHOSPHORUS: CPT

## 2019-02-14 PROCEDURE — 77030038269 HC DRN EXT URIN PURWCK BARD -A

## 2019-02-14 PROCEDURE — 80048 BASIC METABOLIC PNL TOTAL CA: CPT

## 2019-02-14 PROCEDURE — 82550 ASSAY OF CK (CPK): CPT

## 2019-02-14 PROCEDURE — 74011250637 HC RX REV CODE- 250/637: Performed by: HOSPITALIST

## 2019-02-14 PROCEDURE — 36415 COLL VENOUS BLD VENIPUNCTURE: CPT

## 2019-02-14 PROCEDURE — 74011250636 HC RX REV CODE- 250/636: Performed by: INTERNAL MEDICINE

## 2019-02-14 PROCEDURE — 74011250637 HC RX REV CODE- 250/637: Performed by: INTERNAL MEDICINE

## 2019-02-14 PROCEDURE — 65660000000 HC RM CCU STEPDOWN

## 2019-02-14 PROCEDURE — 93308 TTE F-UP OR LMTD: CPT

## 2019-02-14 RX ORDER — FAMOTIDINE 20 MG/1
20 TABLET, FILM COATED ORAL DAILY
Status: DISCONTINUED | OUTPATIENT
Start: 2019-02-14 | End: 2019-02-15 | Stop reason: HOSPADM

## 2019-02-14 RX ORDER — SERTRALINE HYDROCHLORIDE 50 MG/1
50 TABLET, FILM COATED ORAL DAILY
Status: DISCONTINUED | OUTPATIENT
Start: 2019-02-14 | End: 2019-02-15 | Stop reason: HOSPADM

## 2019-02-14 RX ORDER — FAMOTIDINE 20 MG/1
20 TABLET, FILM COATED ORAL 2 TIMES DAILY
Status: DISCONTINUED | OUTPATIENT
Start: 2019-02-14 | End: 2019-02-14

## 2019-02-14 RX ORDER — FUROSEMIDE 20 MG/1
20 TABLET ORAL DAILY
Status: DISCONTINUED | OUTPATIENT
Start: 2019-02-15 | End: 2019-02-15 | Stop reason: HOSPADM

## 2019-02-14 RX ORDER — POTASSIUM CHLORIDE 20 MEQ/1
20 TABLET, EXTENDED RELEASE ORAL DAILY
Status: COMPLETED | OUTPATIENT
Start: 2019-02-14 | End: 2019-02-15

## 2019-02-14 RX ADMIN — FAMOTIDINE 20 MG: 20 TABLET ORAL at 11:50

## 2019-02-14 RX ADMIN — HEPARIN SODIUM 5000 UNITS: 5000 INJECTION INTRAVENOUS; SUBCUTANEOUS at 08:22

## 2019-02-14 RX ADMIN — AMLODIPINE BESYLATE 5 MG: 5 TABLET ORAL at 08:21

## 2019-02-14 RX ADMIN — HEPARIN SODIUM 5000 UNITS: 5000 INJECTION INTRAVENOUS; SUBCUTANEOUS at 01:23

## 2019-02-14 RX ADMIN — ASPIRIN 81 MG 81 MG: 81 TABLET ORAL at 08:21

## 2019-02-14 RX ADMIN — POTASSIUM CHLORIDE 20 MEQ: 20 TABLET, EXTENDED RELEASE ORAL at 08:21

## 2019-02-14 RX ADMIN — FUROSEMIDE 40 MG: 10 INJECTION, SOLUTION INTRAMUSCULAR; INTRAVENOUS at 08:21

## 2019-02-14 RX ADMIN — SERTRALINE HYDROCHLORIDE 50 MG: 50 TABLET ORAL at 11:50

## 2019-02-14 RX ADMIN — CARVEDILOL 6.25 MG: 6.25 TABLET, FILM COATED ORAL at 16:20

## 2019-02-14 RX ADMIN — ATORVASTATIN CALCIUM 10 MG: 10 TABLET, FILM COATED ORAL at 21:04

## 2019-02-14 RX ADMIN — CARVEDILOL 6.25 MG: 6.25 TABLET, FILM COATED ORAL at 08:21

## 2019-02-14 RX ADMIN — HEPARIN SODIUM 5000 UNITS: 5000 INJECTION INTRAVENOUS; SUBCUTANEOUS at 16:21

## 2019-02-14 RX ADMIN — ATORVASTATIN CALCIUM 10 MG: 10 TABLET, FILM COATED ORAL at 01:22

## 2019-02-14 NOTE — ROUTINE PROCESS
TRANSFER - IN REPORT: 
 
Verbal report received from Nitesh Rice RN(name) on Doloris Garcia  being received from ED(unit) for routine progression of care Report consisted of patients Situation, Background, Assessment and  
Recommendations(SBAR). Information from the following report(s) ED Summary was reviewed with the receiving nurse. Opportunity for questions and clarification was provided. Assessment completed upon patients arrival to unit and care assumed.

## 2019-02-14 NOTE — ROUTINE PROCESS
0200 Assumed care of patient from [address]. Daughter at bedside. Patient resting in bed. No distress noted. Call bell within reach, siderails up x 3, bed in lowest position, and patient instructed to use call bell for assistance. Will continue to monitor. Incontinent care provided. Pure wick external catheter placed snugly against perineum, between labia and gluteus muscles. Connected  to suction at 60 mm Hg. Will continue to monitor for urine output.  
 
0726 Bedside and Verbal shift change report given to Elena W Shira Marcano (oncoming nurse) by Javid Berumen RN(offgoing nurse). Report included the following information Kardex, Intake/Output, MAR, Recent Results and Cardiac Rhythm SR w/BBB tele box# [id number].

## 2019-02-14 NOTE — H&P
History & Physical 
Patient: Fariha Plaza MRN: [id number]  CSN: [id number] YOB: 1927  Age: 80 y.o. Sex: female DOA: 2/13/2019 Chief Complaint:  
Chief Complaint Patient presents with  Shortness of Breath HPI:  
 
Fariha Plaza is a 80 y.o.  female who has MH of arthritis, CHF with EF of 25-30% had a recent admission on 11/24/2018 for CHF exacerbation. Pt had her meds optimized and was following with cardiology as an OP. And was doing well till the last 2 days when she started to progressively feels SOB on mild exertion then at rest. Pt called her cardiologest who asked if Pt gained weight. Pt was weighted and was found that she had gained 3 Ibs in the last 2 days. Pt was being considered to start on entresto and her lasix was stopped at the end of January Pt was in moderate distress with tachypnea and was given lasix 40 x 2 and had 1.2 L of UOP in 2 hours Pt started to feel better on NC 3 L and will be admitted for further treatment Past Medical History:  
Diagnosis Date  Anxiety  Arthritis  Depression  GERD (gastroesophageal reflux disease)  HTN (hypertension)  LBBB (left bundle branch block)  Osteoporosis   
 completed 8 years Fosamax  Sciatica  Urinary incontinence Past Surgical History:  
Procedure Laterality Date  CARDIAC SURG PROCEDURE UNLIST  11/2018  
 pacemaker  HX CHOLECYSTECTOMY  HX HYSTERECTOMY  HX ORTHOPAEDIC    
 fractured right patella surgery  ME ANESTH,SURGERY OF SHOULDER    
 ME INS NEW/RPLC PRM PACEMAKER W/TRANSV ELTRD VENTR N/A 11/28/2018 INSERT PPM SINGLE VENTRICULAR performed by Judith Mills MD at [facility] CATH LAB Family History Problem Relation Age of Onset  Heart Disease Mother  Diabetes Mother  Heart Disease Father  Diabetes Father  Hypertension Sister  Diabetes Sister  Heart Disease Brother  Heart Disease Sister  Diabetes Sister  Heart Disease Brother Social History Socioeconomic History  Marital status:  Spouse name: Not on file  Number of children: Not on file  Years of education: Not on file  Highest education level: Not on file Tobacco Use  Smoking status: Never Smoker  Smokeless tobacco: Never Used Substance and Sexual Activity  Alcohol use: No  
 Drug use: No  
 
 
Prior to Admission medications Medication Sig Start Date End Date Taking? Authorizing Provider  
carvedilol (COREG) 6.25 mg tablet Take 1 Tab by mouth two (2) times daily (with meals). 2/11/19  Yes Preet OTT, NP  
omeprazole (PRILOSEC) 40 mg capsule TAKE 1 CAPSULE BY MOUTH DAILY 1/27/19  Yes Odette Rodriguez MD  
meloxicam (MOBIC) 15 mg tablet Take 15 mg by mouth daily. 1 tab by mouth daily   Yes Provider, Historical  
aspirin 81 mg chewable tablet Take 1 Tab by mouth daily. 11/30/18  Yes Eddie Henderson MD  
sertraline (ZOLOFT) 50 mg tablet TAKE 1 TABLET BY MOUTH DAILY 12/4/17  Yes Odette Rodriguez MD  
CALCIUM CARBONATE/VITAMIN D3 (CALCIUM 600 + D,3, PO) Take 1 Tab by mouth daily. Yes Provider, Historical  
MULTI-VITAMIN PO Take 1 Tab by mouth daily. Yes Provider, Historical  
amLODIPine (NORVASC) 5 mg tablet TAKE 1 TABLET BY MOUTH DAILY 1/27/19   Odette Rodriguez MD  
atorvastatin (LIPITOR) 10 mg tablet Take 1 Tab by mouth nightly. Patient taking differently: Take 10 mg by mouth as needed. 11/29/18   Eddie Henderson MD  
fluticasone (FLONASE) 50 mcg/actuation nasal spray 1 [place] by Both Nostrils route as needed. Provider, Historical  
loratadine (CLARITIN) 10 mg tablet Take 10 mg by mouth daily as needed for Allergies. Provider, Historical  
 
 
No Known Allergies Review of Systems GENERAL: Patient alert, awake and oriented times 3, unable to communicate full sentences in distress. HEENT: No change in vision, no earache, tinnitus, sore throat or sinus congestion. NECK: No pain or stiffness. PULMONARY: +ve shortness of breath, No cough or wheeze. Cardiovascular: +ve pnd / orthopnea, no CP GASTROINTESTINAL: No abdominal pain, nausea, vomiting or diarrhea, melena or bright red blood per rectum. GENITOURINARY: No urinary frequency, urgency, hesitancy or dysuria. MUSCULOSKELETAL: No joint or muscle pain, no back pain, no recent trauma. DERMATOLOGIC: No rash, no itching, no lesions. ENDOCRINE: No polyuria, polydipsia, no heat or cold intolerance. No recent change in weight. HEMATOLOGICAL: No anemia or easy bruising or bleeding. NEUROLOGIC: No headache, seizures, numbness, tingling or weakness. Physical Exam:  
 
Physical Exam: 
Visit Vitals /76 (BP 1 Location: Right arm, BP Patient Position: At rest) Pulse 74 Temp 97.9 °F (36.6 °C) Resp 22 Ht 5' 1\" (1.549 m) Wt 53.6 kg (118 lb 1.6 oz) SpO2 100% Breastfeeding? No  
BMI 22.31 kg/m² O2 Flow Rate (L/min): 4 l/min O2 Device: Nasal cannula, Humidifier Temp (24hrs), Av °F (36.7 °C), Min:97.3 °F (36.3 °C), Max:98.6 °F (37 °C) No intake/output data recorded.  1901 -  0700 In: 120 [P.O.:120] Out: 1500 [Urine:1500] General:  Alert, cooperative,but in distress, appears stated age. Head: Normocephalic, without obvious abnormality, atraumatic. Eyes:  Conjunctivae/corneas clear. PERRL, EOMs intact. Nose: Nares normal. No drainage or sinus tenderness. Neck: Supple, symmetrical, trachea midline, no adenopathy, thyroid: no enlargement, no carotid bruit and no JVD. Lungs:   Heavy rales b/l Heart:  Regular rate and rhythm, S1, S2 normal.  
  Abdomen: Soft, non-tender. Bowel sounds normal.   
Extremities: Extremities normal, atraumatic, no cyanosis or edema. Pulses: 2+ and symmetric all extremities. Skin:  No rashes or lesions Neurologic: AAOx3, No focal motor or sensory deficit. Labs Reviewed: 
 
Lab results reviewed. For significant abnormal values and values requiring intervention, see assessment and plan. CXR and EKG Procedures/imaging: see electronic medical records for all procedures/Xrays and details which were not copied into this note but were reviewed prior to creation of Plan Assessment/Plan Principal Problem: 
  Acute on chronic combined systolic and diastolic congestive heart failure ([facility] [address] (2/14/2019) Active Problems: 
  Essential hypertension (6/22/2016) Pulmonary edema (11/24/2018) SOB (shortness of breath) (2/14/2019) Pt is admitted for acute on Chronic combined CHF exacerbation 2 ry to fluid overload Pt had her Lasix held to optimize kidney function to start [facility] Respiratory distress 2ry to pulmonary edema IV Diuresis Cardiology consult Continue ASA, BB and Statin DVT/GI Prophylaxis: Hep SQ Plan of care is discussed in details with Patient/Family at bedside and agreed upon Diana Mtz MD 
2/14/2019 11:51 PM

## 2019-02-14 NOTE — CDMP QUERY
Patient admitted with CHF. If possible, please document in progress notes and d/c summary if you are evaluating and /or treating any of the following:   mild protein calorie malnutrition  Other (please specify)  Unable to determine The medical record reflects the following: 
 
  Risk Factors: advanced age;   swallowing difficulty;  early satiety ;  
 
Nutrition Diagnosis: Inadequate energy intake related to decreased appetite and early satiety as evidenced by pt consuming around 50% of recent meals. Clinical Indicators: per dietary note\"   Pt with CHF, early satiety and fair appetite. Consuming small portions of each meal per pt's daughter. Consuming about 50% of meals \" Treatment: Add supplements: Ensure Enlive, once daily (to replace drink on meal tray). - Update food preferences. - Monitor and encourage po/supplement intake. - Continue RD inpatient monitoring and evaluation. Thank you,   Wesley Oliver RN   CCDS  x [phone]

## 2019-02-14 NOTE — PROGRESS NOTES
NUTRITION Nutrition Consult: General Nutrition Management & Supplements RECOMMENDATIONS / PLAN:  
 
- Add supplements: Ensure Enlive, once daily (to replace drink on meal tray). - Update food preferences. - Monitor and encourage po/supplement intake. - Continue RD inpatient monitoring and evaluation. NUTRITION INTERVENTIONS & DIAGNOSIS:  
 
[x] Meals/snacks: modify composition 
[x] Medical food supplement therapy: initiate Nutrition Diagnosis: Inadequate energy intake related to decreased appetite and early satiety as evidenced by pt consuming around 50% of recent meals. ASSESSMENT:  
 
Pt with CHF, early satiety and fair appetite. Consuming small portions of each meal per pt's daughter. Consuming about 50% of meals and agreeable to supplements. Stable weight hx per family and pt report. Dislikes Magic Cup. Average po intake adequate to meet patients estimated nutritional needs:   [] Yes     [] No   [x] Unable to determine at this time Diet: DIET CARDIAC Regular; FR 1200ML Food Allergies: NKFA Current Appetite:   [] Good     [x] Fair     [] Poor     [] Other: 
Appetite/meal intake prior to admission:   [x] Good     [x] Fair     [] Poor     [x] Other: 2-3 small meals/day with snacks + Ensure 1x daily Feeding Limitations:  [x] Swallowing difficulty: SLP evaluation completed 11/26/18 with recommendations for regular diet with thin liquids    [] Chewing difficulty    [] Other: 
Current Meal Intake:  
Patient Vitals for the past 100 hrs: 
 % Diet Eaten 02/14/19 0954 75 % BM: 2/13 Skin Integrity: WDL Edema:   [x] No     [] Yes Pertinent Medications: Reviewed: colace, pepcid, lasix, zofran, 20 mEq KCl Recent Labs  
  02/14/19 
0912 02/13/19 
2232  140  
K 3.8 4.6 CL 98* 107 CO2 31 28 * 100* BUN 23* 23* CREA 1.25 1.14  
CA 9.1 8.5 MG 2.0  --   
PHOS 3.9  --   
ALB  --  3.6 SGOT  --  21 ALT  --  22  
 
 
 Intake/Output Summary (Last 24 hours) at 2/14/2019 1414 Last data filed at 2/14/2019 2287 Gross per 24 hour Intake 320 ml Output 1550 ml Net -1230 ml Anthropometrics: 
Ht Readings from Last 1 Encounters:  
02/14/19 5' 1\" (1.549 m) Last 3 Recorded Weights in this Encounter 02/14/19 0237 02/14/19 1057 Weight: 53.6 kg (118 lb 1.6 oz) 53.5 kg (118 lb) Body mass index is 22.3 kg/m². Weight History: Pt and family reports stable weight hx PTA. Weight Metrics 2/14/2019 2/13/2019 2/4/2019 1/16/2019 12/14/2018 12/5/2018 12/4/2018 Weight 118 lb - 119 lb 9.6 oz 120 lb 120 lb 121 lb 118 lb 12.8 oz BMI - 22.3 kg/m2 22.6 kg/m2 22.67 kg/m2 22.67 kg/m2 22.86 kg/m2 22.45 kg/m2 Admitting Diagnosis: Acute exacerbation of CHF (congestive heart failure) ([facility] [address] [I50.9] Pertinent PMHx: CHF, HTN Education Needs:        [x] None identified  [] Identified - Not appropriate at this time  []  Identified and addressed - refer to education log Learning Limitations:   [x] None identified  [] Identified Cultural, Judaism & ethnic food preferences:  [x] None identified    [] Identified and addressed ESTIMATED NUTRITION NEEDS:  
 
Calories: 3810-6689 kcal (MSJx1.2-1.4) based on  [x] Actual BW: 54 kg      [] IBW Protein: 43-54 gm (0.8-1 gm/kg) based on  [x] Actual BW      [] IBW Fluid: 1 mL/kcal 
  
MONITORING & EVALUATION:  
 
Nutrition Goal(s): 1. Po intake of meals will meet >75% of patient estimated nutritional needs within the next 7 days. Outcome:  [] Met/Ongoing    []  Not Met    [x] New/Initial Goal  
 
Monitoring:   [x] Food and beverage intake   [x] Diet order   [x] Nutrition-focused physical findings   [x] Treatment/therapy   [] Weight   [] Enteral nutrition intake Previous Recommendations (for follow-up assessments only):     []   Implemented       []   Not Implemented (RD to address)      [] No Longer Appropriate     [] No Recommendation Made Discharge Planning: cardiac diet + Ensure Enlive as needed 
[x] Participated in care planning, discharge planning, & interdisciplinary rounds as appropriate Harmeet Hernandez RD Pager: [phone]

## 2019-02-14 NOTE — PROGRESS NOTES
Bedside and Verbal shift change report given to this RN (oncoming nurse) by Dunia Valdes RN (offgoing nurse). Report included the following information SBAR, Kardex and Cardiac Rhythm NSR Pt has been stable today and able to diurese at least  1000 urine. She has not required pain meds, but did report some nausea after eating a little of dinner. Given ginger ale and she felt better. No longer wanted to use zofran. Will continue to monitor. Eura Ferny

## 2019-02-14 NOTE — ED PROVIDER NOTES
Emergency Department Treatment Report Patient: Sally Milan Age: 80 y.o. Sex: female YOB: 1927 Admit Date: 2/13/2019 PCP: Amina Osborne MD  
MRN: [id number]  CSN: [id number] Room: [place] [address] Time Dictated: 9:39 PM   
 
Chief Complaint Chief Complaint Patient presents with  Shortness of Breath History of Present Illness 80 y.o. female, PMH CHF (EF 25%), HTN, anxiety,and LBBB presents with acute, moderate SOB with associated symptom of upper, centralized abd pain onset today at [facility]. Pt was recently taken off Lasix and had gained 3 pounds from yesterday. She also indicated that laying down worsens her SOB. Review of Systems Constitutional: No fever, chills, or weight loss Eyes: No visual symptoms. ENT: No sore throat, runny nose or ear pain. Respiratory: Positive for SOB. No cough, dyspnea or wheezing. Cardiovascular: No chest pain, pressure, palpitations, tightness or heaviness. Gastrointestinal: Positive for upper, centralized abdominal pain. No vomiting, diarrhea. Genitourinary: No dysuria, frequency, or urgency. Musculoskeletal: No joint pain or swelling. Integumentary: No rashes. Neurological: No headaches, sensory or motor symptoms. Denies complaints in all other systems. Past Medical/Surgical History Past Medical History:  
Diagnosis Date  Anxiety  Arthritis  Depression  GERD (gastroesophageal reflux disease)  HTN (hypertension)  LBBB (left bundle branch block)  Osteoporosis   
 completed 8 years Fosamax  Sciatica  Urinary incontinence Past Surgical History:  
Procedure Laterality Date  CARDIAC SURG PROCEDURE UNLIST  11/2018  
 pacemaker  HX CHOLECYSTECTOMY  HX HYSTERECTOMY  HX ORTHOPAEDIC    
 fractured right patella surgery  AL ANESTH,SURGERY OF SHOULDER    
 AL INS NEW/RPLC PRM PACEMAKER W/TRANSV ELTRD VENTR N/A 11/28/2018 INSERT PPM SINGLE VENTRICULAR performed by Coral Mcgarry MD at [facility] Social History Social History Socioeconomic History  Marital status:  Spouse name: Not on file  Number of children: Not on file  Years of education: Not on file  Highest education level: Not on file Tobacco Use  Smoking status: Never Smoker  Smokeless tobacco: Never Used Substance and Sexual Activity  Alcohol use: No  
 Drug use: No  
 
 
Family History Family History Problem Relation Age of Onset  Heart Disease Mother  Diabetes Mother  Heart Disease Father  Diabetes Father  Hypertension Sister  Diabetes Sister  Heart Disease Brother  Heart Disease Sister  Diabetes Sister  Heart Disease Brother Home Medications Prior to Admission Medications Prescriptions Last Dose Informant Patient Reported? Taking? CALCIUM CARBONATE/VITAMIN D3 (CALCIUM 600 + D,3, PO)  Self Yes No  
Sig: Take 1 Tab by mouth daily. MULTI-VITAMIN PO  Self Yes No  
Sig: Take 1 Tab by mouth daily. amLODIPine (NORVASC) 5 mg tablet   No No  
Sig: TAKE 1 TABLET BY MOUTH DAILY  
aspirin 81 mg chewable tablet   No No  
Sig: Take 1 Tab by mouth daily. atorvastatin (LIPITOR) 10 mg tablet   No No  
Sig: Take 1 Tab by mouth nightly. Patient taking differently: Take 10 mg by mouth as needed. carvedilol (COREG) 6.25 mg tablet   No No  
Sig: Take 1 Tab by mouth two (2) times daily (with meals). fluticasone (FLONASE) 50 mcg/actuation nasal spray  Self Yes No  
Si Pax by Both Nostrils route daily. loratadine (CLARITIN) 10 mg tablet  Self Yes No  
Sig: Take 10 mg by mouth daily as needed for Allergies. meloxicam (MOBIC) 15 mg tablet   Yes No  
Sig: Take 15 mg by mouth daily.  1 tab by mouth daily  
omeprazole (PRILOSEC) 40 mg capsule   No No  
Sig: TAKE 1 CAPSULE BY MOUTH DAILY  
sertraline (ZOLOFT) 50 mg tablet   No No  
 Sig: TAKE 1 TABLET BY MOUTH DAILY Facility-Administered Medications: None Allergies No Known Allergies Physical Exam  
 
ED Triage Vitals ED Encounter Vitals Group BP Pulse Resp Temp Temp src SpO2 Weight Height Constitutional: Patient appears well developed and well nourished. Appearance and behavior are age and situation appropriate. HEENT: Conjunctiva clear. PERRLA. Mucous membranes moist, non-erythematous. Surface of the pharynx, palate, and tongue are pink, moist and without lesions. Neck: supple, non tender, symmetrical, no masses or JVD. Respiratory: Tachypneic. Diffuse rales. Cardiovascular: heart regular rate and rhythm. Calves soft and non-tender. Distal pulses 2+ and equal bilaterally. No peripheral edema. Gastrointestinal:  Abdomen soft, nontender without complaint of pain to palpation Musculoskeletal: Nail beds pink with prompt capillary refill Integumentary: warm and dry without rashes or lesions Neurologic: alert and oriented, Sensation intact, motor strength equal and symmetric. No facial asymmetry or dysarthria. Diagnostic Studies Lab:  
Recent Results (from the past 12 hour(s)) EKG, 12 LEAD, INITIAL Collection Time: 02/13/19  9:36 PM  
Result Value Ref Range Ventricular Rate 100 BPM  
 Atrial Rate 100 BPM  
 P-R Interval 136 ms QRS Duration 160 ms  
 Q-T Interval 394 ms QTC Calculation (Bezet) 508 ms Calculated R Axis -17 degrees Calculated T Axis 144 degrees Diagnosis Normal sinus rhythm Left bundle branch block Abnormal ECG When compared with ECG of 24-NOV-2018 12:15, No significant change was found CBC WITH AUTOMATED DIFF Collection Time: 02/13/19 10:32 PM  
Result Value Ref Range WBC 9.9 4.6 - 13.2 K/uL  
 RBC 4.55 4.20 - 5.30 M/uL  
 HGB 12.6 12.0 - 16.0 g/dL HCT 38.9 35.0 - 45.0 %  MCV 85.5 74.0 - 97.0 FL  
 MCH 27.7 24.0 - 34.0 PG  
 MCHC 32.4 31.0 - 37.0 g/dL  
 RDW 15.2 (H) 11.6 - 14.5 % PLATELET 215 804 - 629 K/uL MPV 9.1 (L) 9.2 - 11.8 FL  
 NEUTROPHILS 59 40 - 73 % LYMPHOCYTES 26 21 - 52 % MONOCYTES 10 3 - 10 % EOSINOPHILS 5 0 - 5 % BASOPHILS 0 0 - 2 %  
 ABS. NEUTROPHILS 5.9 1.8 - 8.0 K/UL  
 ABS. LYMPHOCYTES 2.6 0.9 - 3.6 K/UL  
 ABS. MONOCYTES 0.9 0.05 - 1.2 K/UL  
 ABS. EOSINOPHILS 0.5 (H) 0.0 - 0.4 K/UL  
 ABS. BASOPHILS 0.0 0.0 - 0.1 K/UL  
 DF AUTOMATED    
NT-PRO BNP Collection Time: 19 10:32 PM  
Result Value Ref Range NT pro-BNP 8,831 (H) 0 - 5,435 PG/ML  
METABOLIC PANEL, COMPREHENSIVE Collection Time: 19 10:32 PM  
Result Value Ref Range Sodium 140 136 - 145 mmol/L Potassium 4.6 3.5 - 5.5 mmol/L Chloride 107 100 - 108 mmol/L  
 CO2 28 21 - 32 mmol/L Anion gap 5 3.0 - 18 mmol/L Glucose 100 (H) 74 - 99 mg/dL BUN 23 (H) 7.0 - 18 MG/DL Creatinine 1.14 0.6 - 1.3 MG/DL  
 BUN/Creatinine ratio 20 12 - 20 GFR est AA 54 (L) >60 ml/min/1.73m2 GFR est non-AA 45 (L) >60 ml/min/1.73m2 Calcium 8.5 8.5 - 10.1 MG/DL Bilirubin, total 0.3 0.2 - 1.0 MG/DL  
 ALT (SGPT) 22 13 - 56 U/L  
 AST (SGOT) 21 15 - 37 U/L Alk. phosphatase 78 45 - 117 U/L Protein, total 7.6 6.4 - 8.2 g/dL Albumin 3.6 3.4 - 5.0 g/dL Globulin 4.0 2.0 - 4.0 g/dL A-G Ratio 0.9 0.8 - 1.7 CARDIAC PANEL,(CK, CKMB & TROPONIN) Collection Time: 19 10:32 PM  
Result Value Ref Range CK 53 26 - 192 U/L  
 CK - MB 1.6 <3.6 ng/ml CK-MB Index 3.0 0.0 - 4.0 % Troponin-I, QT 0.03 0.0 - 0.045 NG/ML Imaging: No results found. [unfilled] EK:36 LBBB Paced rhythm No STEMI based on sgarbossa criteria ED Course/Medical Decision Making Pt is tachypneic but not hypoxic at this time. CXR shows diffuse pulmonary edema and pro-BNP levels are elevated  She was taken off Lasix this past month.  Due to her being tachypneic with pulmonary edema, the pt requires pt admission for diuresis and further monitoring. Consult:  Discussed care with Dr. Yong Alexander, Specialty: Hospitalist Standard discussion; including history of patients chief complaint, available diagnostic results, and treatment course. Will accept. 11:23 PM, 2/13/2019 Medications  
furosemide (LASIX) injection 40 mg (40 mg IntraVENous Given 2/13/19 5083) amLODIPine (NORVASC) tablet 5 mg (not administered)  
atorvastatin (LIPITOR) tablet 10 mg (not administered) aspirin chewable tablet 81 mg (not administered)  
carvedilol (COREG) tablet 6.25 mg (not administered)  
sertraline (ZOLOFT) tablet 50 mg (not administered)  
acetaminophen (TYLENOL) tablet 650 mg (not administered)  
oxyCODONE-acetaminophen (PERCOCET) 5-325 mg per tablet 1 Tab (not administered)  
naloxone (NARCAN) injection 0.4 mg (not administered)  
ondansetron (ZOFRAN) injection 4 mg (not administered) docusate sodium (COLACE) capsule 100 mg (not administered)  
heparin (porcine) injection 5,000 Units (not administered) potassium chloride (K-DUR, KLOR-CON) SR tablet 20 mEq (not administered)  
furosemide (LASIX) injection 40 mg (40 mg IntraVENous Given 2/13/19 9403) Final Diagnosis ICD-10-CM ICD-9-CM 1. Acute on chronic congestive heart failure, unspecified heart failure type (HCC) I50.9 428.0 Disposition Patient is admitted. My Medications ASK your doctor about these medications Instructions Each Dose to Equal Morning Noon Evening Bedtime  
amLODIPine 5 mg tablet Commonly known as:  Job Dub Your last dose was: Your next dose is: TAKE 1 TABLET BY MOUTH DAILY 
   
  
  
  
  
aspirin 81 mg chewable tablet Your last dose was: Your next dose is: Take 1 Tab by mouth daily. 81 mg 
  
  
  
  
  
atorvastatin 10 mg tablet Commonly known as:  LIPITOR Your last dose was: Your next dose is: Take 1 Tab by mouth nightly.  
 10 mg 
  
  
  
  
  
 CALCIUM 600 + D(3) PO Your last dose was: Your next dose is: Take 1 Tab by mouth daily. 1 Tab 
  
  
  
  
  
carvedilol 6.25 mg tablet Commonly known as:  Melissa Shade Your last dose was: Your next dose is: Take 1 Tab by mouth two (2) times daily (with meals). 6.25 mg CLARITIN 10 mg tablet Generic drug:  loratadine Your last dose was: Your next dose is: Take 10 mg by mouth daily as needed for Allergies. 10 mg 
  
  
  
  
  
fluticasone 50 mcg/actuation nasal spray Commonly known as:  Shasha Napier Your last dose was: Your next dose is:   
 
  
 1 Hubbard by Both Nostrils route daily. 1 Spray 
  
  
  
  
  
meloxicam 15 mg tablet Commonly known as:  MOBIC Your last dose was: Your next dose is: Take 15 mg by mouth daily. 1 tab by mouth daily 15 mg MULTI-VITAMIN PO Your last dose was: Your next dose is: Take 1 Tab by mouth daily. 1 Tab 
  
  
  
  
  
omeprazole 40 mg capsule Commonly known as:  PRILOSEC Your last dose was: Your next dose is: TAKE 1 CAPSULE BY MOUTH DAILY 
   
  
  
  
  
sertraline 50 mg tablet Commonly known as:  ZOLOFT Your last dose was: Your next dose is: TAKE 1 TABLET BY MOUTH DAILY Scribe Attestation Tiffanie Xavier acting as a scribe for and in the presence of Jenni Busch MD     
February 13, 2019 at 11:31 PM 
    
Provider Attestation:     
Ester Cotto MD 
February 13, 2019 My signature above authenticates this document and my orders, the final   
diagnosis (es), discharge prescription (s), and instructions in the Epic   
record. If you have any questions please contact [phone]. 
  
Nursing notes have been reviewed by the physician/ advanced practice   
Clinician.

## 2019-02-14 NOTE — PROGRESS NOTES
[facility] Hospitalist Group Progress Note Patient: Barron Hermosillo Age: 80 y.o. : 10/17/1927 MR#: [id number] SSN: [id number] Date/Time: 2019 Subjective: pt [place], feels lot better, SOB improved from yesterday Assessment/Plan: 1. Acute on chronic systolic heart failure. cont IV lasix, >1.2 L neg balance. 2. Troponin indeterminate not consistent with ACS. Cont asa, BB and statin 3. No signs of PNA: repeat CXR tomorrow for follow up 4. Sick sinus syndrome s/p single chamber [employer] pacemaker 2018. 
5. Hypertension. BP stable 6. Hyperlipidemia: cont statin 7. Mild protein calorie malnutrition: supplements D/w pt and sister at bedside in detail Will wean off o2 as tolerated Will get PT/OT I spent 35 minutes with the patient in face-to-face consultation, of which greater than 50% was spent in counseling and coordination of care as described above. Case discussed with:  [x]Patient  [x]Family  [x]Nursing  []Case Management DVT Prophylaxis:  []Lovenox  []Hep SQ  []SCDs  []Coumadin   []On Heparin gtt Objective:  
VS:  
Visit Vitals /69 (BP 1 Location: Right arm, BP Patient Position: At rest) Pulse 72 Temp 97.7 °F (36.5 °C) Resp 16 Ht 5' 1\" (1.549 m) Wt 53.5 kg (118 lb) SpO2 94% Breastfeeding? No  
BMI 22.30 kg/m² Tmax/24hrs: Temp (24hrs), Av.9 °F (36.6 °C), Min:97.3 °F (36.3 °C), Max:98.6 °F (37 °C) IOBRIEF Intake/Output Summary (Last 24 hours) at 2019 1556 Last data filed at 2019 4391 Gross per 24 hour Intake 320 ml Output 1550 ml Net -1230 ml General:  Alert, cooperative, no acute distress   
Pulmonary: Bilaterally rales. No Wheezing/Rhonchi. Cardiovascular: Regular rate and Rhythm. GI:  Soft, Non distended, Non tender. + Bowel sounds. Extremities:  No edema, cyanosis, clubbing. Psych: Good insight. Not anxious or agitated. Neurologic: Alert and oriented X 3. No acute neuro deficits. Additional: 
 
Medications:  
Current Facility-Administered Medications Medication Dose Route Frequency  potassium chloride (K-DUR, KLOR-CON) SR tablet 20 mEq  20 mEq Oral DAILY  sertraline (ZOLOFT) tablet 50 mg  50 mg Oral DAILY  famotidine (PEPCID) tablet 20 mg  20 mg Oral DAILY  furosemide (LASIX) injection 40 mg  40 mg IntraVENous Q12H  
 atorvastatin (LIPITOR) tablet 10 mg  10 mg Oral QHS  aspirin chewable tablet 81 mg  81 mg Oral DAILY  carvedilol (COREG) tablet 6.25 mg  6.25 mg Oral BID WITH MEALS  
 acetaminophen (TYLENOL) tablet 650 mg  650 mg Oral Q6H PRN  
 oxyCODONE-acetaminophen (PERCOCET) 5-325 mg per tablet 1 Tab  1 Tab Oral Q6H PRN  
 naloxone (NARCAN) injection 0.4 mg  0.4 mg IntraVENous PRN  
 ondansetron (ZOFRAN) injection 4 mg  4 mg IntraVENous Q6H PRN  
 docusate sodium (COLACE) capsule 100 mg  100 mg Oral BID PRN  
 heparin (porcine) injection 5,000 Units  5,000 Units SubCUTAneous Q8H Labs:   
Recent Results (from the past 24 hour(s)) EKG, 12 LEAD, INITIAL Collection Time: 02/13/19  9:36 PM  
Result Value Ref Range Ventricular Rate 100 BPM  
 Atrial Rate 100 BPM  
 P-R Interval 136 ms QRS Duration 160 ms  
 Q-T Interval 394 ms QTC Calculation (Bezet) 508 ms Calculated R Axis -17 degrees Calculated T Axis 144 degrees Diagnosis Normal sinus rhythm Left bundle branch block Abnormal ECG When compared with ECG of 24-NOV-2018 12:15, No significant change was found EKG, 12 LEAD, INITIAL Collection Time: 02/13/19 10:15 PM  
Result Value Ref Range Ventricular Rate 91 BPM  
 Atrial Rate 92 BPM  
 QRS Duration 154 ms Q-T Interval 434 ms QTC Calculation (Bezet) 533 ms Calculated R Axis -20 degrees Calculated T Axis 143 degrees Diagnosis Sinus rhythm with AV dissociation and Wide QRS rhythm Left bundle branch block Abnormal ECG 
 When compared with ECG of 13-FEB-2019 21:36, Wide QRS rhythm has replaced Sinus rhythm CBC WITH AUTOMATED DIFF Collection Time: 02/13/19 10:32 PM  
Result Value Ref Range WBC 9.9 4.6 - 13.2 K/uL  
 RBC 4.55 4.20 - 5.30 M/uL  
 HGB 12.6 12.0 - 16.0 g/dL HCT 38.9 35.0 - 45.0 % MCV 85.5 74.0 - 97.0 FL  
 MCH 27.7 24.0 - 34.0 PG  
 MCHC 32.4 31.0 - 37.0 g/dL  
 RDW 15.2 (H) 11.6 - 14.5 % PLATELET 868 808 - 123 K/uL MPV 9.1 (L) 9.2 - 11.8 FL  
 NEUTROPHILS 59 40 - 73 % LYMPHOCYTES 26 21 - 52 % MONOCYTES 10 3 - 10 % EOSINOPHILS 5 0 - 5 % BASOPHILS 0 0 - 2 %  
 ABS. NEUTROPHILS 5.9 1.8 - 8.0 K/UL  
 ABS. LYMPHOCYTES 2.6 0.9 - 3.6 K/UL  
 ABS. MONOCYTES 0.9 0.05 - 1.2 K/UL  
 ABS. EOSINOPHILS 0.5 (H) 0.0 - 0.4 K/UL  
 ABS. BASOPHILS 0.0 0.0 - 0.1 K/UL  
 DF AUTOMATED    
NT-PRO BNP Collection Time: 02/13/19 10:32 PM  
Result Value Ref Range NT pro-BNP 8,831 (H) 0 - 4,755 PG/ML  
METABOLIC PANEL, COMPREHENSIVE Collection Time: 02/13/19 10:32 PM  
Result Value Ref Range Sodium 140 136 - 145 mmol/L Potassium 4.6 3.5 - 5.5 mmol/L Chloride 107 100 - 108 mmol/L  
 CO2 28 21 - 32 mmol/L Anion gap 5 3.0 - 18 mmol/L Glucose 100 (H) 74 - 99 mg/dL BUN 23 (H) 7.0 - 18 MG/DL Creatinine 1.14 0.6 - 1.3 MG/DL  
 BUN/Creatinine ratio 20 12 - 20 GFR est AA 54 (L) >60 ml/min/1.73m2 GFR est non-AA 45 (L) >60 ml/min/1.73m2 Calcium 8.5 8.5 - 10.1 MG/DL Bilirubin, total 0.3 0.2 - 1.0 MG/DL  
 ALT (SGPT) 22 13 - 56 U/L  
 AST (SGOT) 21 15 - 37 U/L Alk. phosphatase 78 45 - 117 U/L Protein, total 7.6 6.4 - 8.2 g/dL Albumin 3.6 3.4 - 5.0 g/dL Globulin 4.0 2.0 - 4.0 g/dL A-G Ratio 0.9 0.8 - 1.7 CARDIAC PANEL,(CK, CKMB & TROPONIN) Collection Time: 02/13/19 10:32 PM  
Result Value Ref Range CK 53 26 - 192 U/L  
 CK - MB 1.6 <3.6 ng/ml CK-MB Index 3.0 0.0 - 4.0 %  Troponin-I, QT 0.03 0.0 - 5.833 NG/ML  
METABOLIC PANEL, BASIC  
 Collection Time: 02/14/19  9:12 AM  
Result Value Ref Range Sodium 136 136 - 145 mmol/L Potassium 3.8 3.5 - 5.5 mmol/L Chloride 98 (L) 100 - 108 mmol/L  
 CO2 31 21 - 32 mmol/L Anion gap 7 3.0 - 18 mmol/L Glucose 107 (H) 74 - 99 mg/dL BUN 23 (H) 7.0 - 18 MG/DL Creatinine 1.25 0.6 - 1.3 MG/DL  
 BUN/Creatinine ratio 18 12 - 20 GFR est AA 49 (L) >60 ml/min/1.73m2 GFR est non-AA 40 (L) >60 ml/min/1.73m2 Calcium 9.1 8.5 - 10.1 MG/DL MAGNESIUM Collection Time: 02/14/19  9:12 AM  
Result Value Ref Range Magnesium 2.0 1.6 - 2.6 mg/dL PHOSPHORUS Collection Time: 02/14/19  9:12 AM  
Result Value Ref Range Phosphorus 3.9 2.5 - 4.9 MG/DL  
CBC WITH AUTOMATED DIFF Collection Time: 02/14/19  9:12 AM  
Result Value Ref Range WBC 8.2 4.6 - 13.2 K/uL  
 RBC 4.77 4.20 - 5.30 M/uL  
 HGB 13.2 12.0 - 16.0 g/dL HCT 40.3 35.0 - 45.0 % MCV 84.5 74.0 - 97.0 FL  
 MCH 27.7 24.0 - 34.0 PG  
 MCHC 32.8 31.0 - 37.0 g/dL  
 RDW 15.1 (H) 11.6 - 14.5 % PLATELET 869 290 - 677 K/uL MPV 9.7 9.2 - 11.8 FL  
 NEUTROPHILS 63 40 - 73 % LYMPHOCYTES 22 21 - 52 % MONOCYTES 12 (H) 3 - 10 % EOSINOPHILS 3 0 - 5 % BASOPHILS 0 0 - 2 %  
 ABS. NEUTROPHILS 5.1 1.8 - 8.0 K/UL  
 ABS. LYMPHOCYTES 1.8 0.9 - 3.6 K/UL  
 ABS. MONOCYTES 1.0 0.05 - 1.2 K/UL  
 ABS. EOSINOPHILS 0.3 0.0 - 0.4 K/UL  
 ABS. BASOPHILS 0.0 0.0 - 0.1 K/UL  
 DF AUTOMATED CARDIAC PANEL,(CK, CKMB & TROPONIN) Collection Time: 02/14/19  9:12 AM  
Result Value Ref Range CK 45 26 - 192 U/L  
 CK - MB 1.7 <3.6 ng/ml CK-MB Index 3.8 0.0 - 4.0 % Troponin-I, QT 0.06 (H) 0.0 - 0.045 NG/ML  
ECHO ADULT FOLLOW-UP OR LIMITED Collection Time: 02/14/19 11:17 AM  
Result Value Ref Range LVIDd 4.54 3.9 - 5.3 cm  
 LVPWd 1.15 (A) 0.6 - 0.9 cm LVIDs 4.25 cm IVSd 1.27 (A) 0.6 - 0.9 cm  
 LV Mass .6 (A) 67 - 162 g  
 LV Mass AL Index 158.7 (A) 43 - 95 g/m2 Signed By: Tonia Mcintyre MD   
 February 14, 2019

## 2019-02-14 NOTE — ED TRIAGE NOTES
Pt arrived to ED for c/o SOB with labored breathing that started today. Pt's daughter states that pt was on Lasix for congestive heart failure and was taken off of it approximately 2 weeks ago. Pt's daughter states that pt had gained 3 lbs since yesterday.

## 2019-02-14 NOTE — PROGRESS NOTES
Reason for Admission:  Acute exacerbation of CHF (congestive heart failure) ([facility] [address] [address] [I50.9] RRAT Score:    18 Plan for utilizing home health: Yes Likelihood of Readmission:   Moderate Do you (patient/family) have any concerns for transition/discharge?  no 
 
Transition of Care Plan:    
 
Initial assessment completed with patient. Cognitive status of patient: oriented to time, place, person and situation. Face sheet information confirmed:  yes. The patient designates daughter Aline Spencer [phone]  to participate in his/her discharge plan and to receive any needed information. This patient lives in a single family home with patient and daughter. Prior to hospitalization, patient was considered to be independent with ADLs/IADLS : no . If not independent,  patient needs assist with : grooming, cooking Patient has a current ACP document on file: no The {daughter will be available to transport patient home upon discharge. The patient already has walker, bedside commode, shower chair available in the home. Patient not currently active with home health. [place] of choice signed for [facility] and placed on the chart, The patient states that she can obtain her medications from the pharmacy, and take her medications as directed. Patient's current insurance is [employer] complete Care Management Interventions PCP Verified by CM: Yes 
Palliative Care Criteria Met (RRAT>21 & CHF Dx)?: No 
Mode of Transport at Discharge: Other (see comment)(daughter) Transition of Care Consult (CM Consult): Home Health [facility] - INPATIENT: Yes Current Support Network: Relative's Home Confirm Follow Up Transport: Family Freedom of Choice Offered: Yes Discharge Location Discharge Placement: Home with home health Miguel Angel Wilkinson RN, BSN  [phone]

## 2019-02-14 NOTE — CONSULTS
[facility]ists - Consult Note    Consultation request by Angel Anderson MD for advice/opinion related to evaluating Acute exacerbation of CHF (congestive heart failure) [facility] [I50.9]    Date of  Admission: 2/13/2019  9:35 PM   Primary Care Physician:  Marcel Mooney MD     Assessment:     Patient Active Problem List   Diagnosis Code    Osteoporosis M81.0    Anxiety F41.9    Depression F32.9    Reflux esophagitis K21.0    Generalized osteoarthrosis, involving multiple sites M15.9    Zoster B02.9    Distal radius fracture, left S52.502A    Sprain and strain of shoulder and upper arm S43.409A, S46.919A    Vertigo, benign paroxysmal H81.10    Memory loss R41.3    S/P ORIF (open reduction internal fixation) fracture Z96.7, Z87.81    Essential hypertension I10    Advance directive discussed with patient Z70.80    Gastroesophageal reflux disease without esophagitis K21.9    Dysphagia R13.10    Seasonal allergic rhinitis due to pollen J30.1    Primary osteoarthritis of both knees M17.0    Syncope R55    CHF (congestive heart failure), NYHA class III, acute, systolic (HCC) K27.46    Acute UTI N39.0    Functional urinary incontinence R39.81    Pulmonary edema J81.1    Acute on chronic combined systolic and diastolic congestive heart failure (HCC) I50.43    SOB (shortness of breath) R06.02       -Acute on chronic systolic heart failure. Recently taken off lasix for low BP with history of orthostasis and falls. Patient presented with sudden SOB/chest and neck tighntess, improved with IV lasix, troponin indeterminate not consistent with ACS, ECG with underlying LBBB. 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  -Interstitial lung disease likely.  -Sick sinus syndrome s/p single chamber Medtronic pacemaker 11/28/2018.  -Chronic LBBB. -Hypertension.  -Hyperlipidemia. Primary cardiologist Dr. Inge Mercer:     Gentle diuresis as renal function and hemodynamics allow.   Will discuss current diuretic regimen with daughter when available, will likely need maintenance diuretic on discharge. Patient is continued on core. Historically not yet started on ace or entresto given low BP/orthostasis and falls, will hold norvasc and follow hemodynamics. Will review follow up echo. History of Present Illness: This is a 80 y.o. female admitted for Acute exacerbation of CHF (congestive heart failure) ([facility] [I50.9]. Patient complains of:  SOB. Patient reports she was doing well until yesterday when she was eating lunch and suddenly felt SOB and had a tightness in her chest and throat with difficulty taking a deep breath. Patient went to ER, was treated with IV lasix and felt better. Patients breathing stable overnight. No further CP, palp, syncope, near syncope, orthopnea, PND, PAULINO. There is mention of weight up 3 pounds however patient cannot confirm at this time.       Cardiac risk factors: dyslipidemia, hypertension      Review of Symptoms:   Constitutional: negative for fevers and chills  Eyes: negative for visual disturbance  Ears, nose, mouth, throat, and face: negative for nasal congestion  Respiratory: positive for cough  Cardiovascular: positive for chest pressure/discomfort, dyspnea, negative for orthopnea, lower extremity edema  Gastrointestinal: negative for vomiting and diarrhea  Genitourinary:negative for dysuria  Hematologic/lymphatic: negative for bleeding  Musculoskeletal:negative for muscle weakness  Neurological: negative for dizziness     Past Medical History:     Past Medical History:   Diagnosis Date    Anxiety     Arthritis     Depression     GERD (gastroesophageal reflux disease)     HTN (hypertension)     LBBB (left bundle branch block)     Osteoporosis     completed 8 years Fosamax    Sciatica     Urinary incontinence          Social History:     Social History     Socioeconomic History    Marital status:      Spouse name: Not on file    Number of children: Not on file    Years of education: Not on file    Highest education level: Not on file   Tobacco Use    Smoking status: Never Smoker    Smokeless tobacco: Never Used   Substance and Sexual Activity    Alcohol use: No    Drug use: No        Family History:     Family History   Problem Relation Age of Onset    Heart Disease Mother     Diabetes Mother     Heart Disease Father     Diabetes Father     Hypertension Sister     Diabetes Sister     Heart Disease Brother     Heart Disease Sister     Diabetes Sister     Heart Disease Brother         Medications:   No Known Allergies     Current Facility-Administered Medications   Medication Dose Route Frequency    potassium chloride (K-DUR, KLOR-CON) SR tablet 20 mEq  20 mEq Oral DAILY    sertraline (ZOLOFT) tablet 50 mg  50 mg Oral DAILY    famotidine (PEPCID) tablet 20 mg  20 mg Oral DAILY    furosemide (LASIX) injection 40 mg  40 mg IntraVENous Q12H    amLODIPine (NORVASC) tablet 5 mg  5 mg Oral DAILY    atorvastatin (LIPITOR) tablet 10 mg  10 mg Oral QHS    aspirin chewable tablet 81 mg  81 mg Oral DAILY    carvedilol (COREG) tablet 6.25 mg  6.25 mg Oral BID WITH MEALS    acetaminophen (TYLENOL) tablet 650 mg  650 mg Oral Q6H PRN    oxyCODONE-acetaminophen (PERCOCET) 5-325 mg per tablet 1 Tab  1 Tab Oral Q6H PRN    naloxone (NARCAN) injection 0.4 mg  0.4 mg IntraVENous PRN    ondansetron (ZOFRAN) injection 4 mg  4 mg IntraVENous Q6H PRN    docusate sodium (COLACE) capsule 100 mg  100 mg Oral BID PRN    heparin (porcine) injection 5,000 Units  5,000 Units SubCUTAneous Q8H         Physical Exam:     Visit Vitals  /76 (BP 1 Location: Right arm, BP Patient Position: At rest)   Pulse 74   Temp 97.9 °F (36.6 °C)   Resp 22   Ht 5' 1\" (1.549 m)   Wt 53.6 kg (118 lb 1.6 oz)   SpO2 100%   Breastfeeding?  No   BMI 22.31 kg/m²     BP Readings from Last 3 Encounters:   02/14/19 139/76   02/04/19 122/64   01/16/19 140/77     Pulse Readings from Last 3 Encounters:   02/14/19 74   02/04/19 81   01/16/19 77     Wt Readings from Last 3 Encounters:   02/14/19 53.6 kg (118 lb 1.6 oz)   02/04/19 54.3 kg (119 lb 9.6 oz)   01/16/19 54.4 kg (120 lb)       General:  alert, cooperative, no distress, appears stated age  Neck:  no JVD  Lungs:  rales diffuse  Heart:  regular rate and rhythm  Abdomen:  abdomen is soft without significant tenderness, masses, organomegaly or guarding  Extremities:  extremities normal, atraumatic, no cyanosis or edema  Skin: Warm and dry.  no hyperpigmentation, vitiligo, or suspicious lesions  Neuro: alert, oriented x3, affect appropriate  Psych: non focal     Data Review:     Recent Labs     02/14/19  0912 02/13/19  2232   WBC 8.2 9.9   HGB 13.2 12.6   HCT 40.3 38.9    190     Recent Labs     02/14/19  0912 02/13/19  2232    140   K 3.8 4.6   CL 98* 107   CO2 31 28   * 100*   BUN 23* 23*   CREA 1.25 1.14   CA 9.1 8.5   MG 2.0  --    PHOS 3.9  --    ALB  --  3.6   SGOT  --  21   ALT  --  22       Results for orders placed or performed during the hospital encounter of 02/13/19   EKG, 12 LEAD, INITIAL   Result Value Ref Range    Ventricular Rate 91 BPM    Atrial Rate 92 BPM    QRS Duration 154 ms    Q-T Interval 434 ms    QTC Calculation (Bezet) 533 ms    Calculated R Axis -20 degrees    Calculated T Axis 143 degrees    Diagnosis       Sinus rhythm with AV dissociation and Wide QRS rhythm  Left bundle branch block  Abnormal ECG  When compared with ECG of 13-FEB-2019 21:36,  Wide QRS rhythm has replaced Sinus rhythm         All Cardiac Markers in the last 24 hours:    Lab Results   Component Value Date/Time    CPK 45 02/14/2019 09:12 AM    CPK 53 02/13/2019 10:32 PM    CKMB 1.7 02/14/2019 09:12 AM    CKMB 1.6 02/13/2019 10:32 PM    CKND1 3.8 02/14/2019 09:12 AM    CKND1 3.0 02/13/2019 10:32 PM    TROIQ 0.06 (H) 02/14/2019 09:12 AM    TROIQ 0.03 02/13/2019 10:32 PM       Last Lipid:    Lab Results   Component Value Date/Time    Cholesterol, total 204 (H) 11/24/2018 09:40 PM    HDL Cholesterol 59 11/24/2018 09:40 PM    LDL, calculated 129.2 (H) 11/24/2018 09:40 PM    Triglyceride 79 11/24/2018 09:40 PM    CHOL/HDL Ratio 3.5 11/24/2018 09:40 PM       Signed By: CHAVA Lyles     February 14, 2019

## 2019-02-15 ENCOUNTER — HOME HEALTH ADMISSION (OUTPATIENT)
Dept: HOME HEALTH SERVICES | Facility: HOME HEALTH | Age: 84
End: 2019-02-15
Payer: MEDICARE

## 2019-02-15 VITALS
OXYGEN SATURATION: 90 % | SYSTOLIC BLOOD PRESSURE: 110 MMHG | HEIGHT: 61 IN | RESPIRATION RATE: 18 BRPM | HEART RATE: 64 BPM | TEMPERATURE: 97.6 F | WEIGHT: 118.9 LBS | BODY MASS INDEX: 22.45 KG/M2 | DIASTOLIC BLOOD PRESSURE: 63 MMHG

## 2019-02-15 LAB
ANION GAP SERPL CALC-SCNC: 7 MMOL/L (ref 3–18)
BASOPHILS # BLD: 0 K/UL (ref 0–0.1)
BASOPHILS NFR BLD: 0 % (ref 0–2)
BUN SERPL-MCNC: 28 MG/DL (ref 7–18)
BUN/CREAT SERPL: 20 (ref 12–20)
CALCIUM SERPL-MCNC: 8.6 MG/DL (ref 8.5–10.1)
CHLORIDE SERPL-SCNC: 101 MMOL/L (ref 100–108)
CO2 SERPL-SCNC: 29 MMOL/L (ref 21–32)
CREAT SERPL-MCNC: 1.39 MG/DL (ref 0.6–1.3)
DIFFERENTIAL METHOD BLD: ABNORMAL
EOSINOPHIL # BLD: 0.2 K/UL (ref 0–0.4)
EOSINOPHIL NFR BLD: 3 % (ref 0–5)
ERYTHROCYTE [DISTWIDTH] IN BLOOD BY AUTOMATED COUNT: 14.9 % (ref 11.6–14.5)
GLUCOSE SERPL-MCNC: 112 MG/DL (ref 74–99)
HCT VFR BLD AUTO: 37.8 % (ref 35–45)
HGB BLD-MCNC: 12.4 G/DL (ref 12–16)
LYMPHOCYTES # BLD: 1.8 K/UL (ref 0.9–3.6)
LYMPHOCYTES NFR BLD: 21 % (ref 21–52)
MAGNESIUM SERPL-MCNC: 2 MG/DL (ref 1.6–2.6)
MCH RBC QN AUTO: 27.6 PG (ref 24–34)
MCHC RBC AUTO-ENTMCNC: 32.8 G/DL (ref 31–37)
MCV RBC AUTO: 84.2 FL (ref 74–97)
MONOCYTES # BLD: 1 K/UL (ref 0.05–1.2)
MONOCYTES NFR BLD: 12 % (ref 3–10)
NEUTS SEG # BLD: 5.4 K/UL (ref 1.8–8)
NEUTS SEG NFR BLD: 64 % (ref 40–73)
PLATELET # BLD AUTO: 177 K/UL (ref 135–420)
PMV BLD AUTO: 9.9 FL (ref 9.2–11.8)
POTASSIUM SERPL-SCNC: 4 MMOL/L (ref 3.5–5.5)
RBC # BLD AUTO: 4.49 M/UL (ref 4.2–5.3)
SODIUM SERPL-SCNC: 137 MMOL/L (ref 136–145)
WBC # BLD AUTO: 8.4 K/UL (ref 4.6–13.2)

## 2019-02-15 PROCEDURE — 77010033678 HC OXYGEN DAILY

## 2019-02-15 PROCEDURE — 83735 ASSAY OF MAGNESIUM: CPT

## 2019-02-15 PROCEDURE — 74011250637 HC RX REV CODE- 250/637: Performed by: INTERNAL MEDICINE

## 2019-02-15 PROCEDURE — 36415 COLL VENOUS BLD VENIPUNCTURE: CPT

## 2019-02-15 PROCEDURE — 97162 PT EVAL MOD COMPLEX 30 MIN: CPT

## 2019-02-15 PROCEDURE — 77030038269 HC DRN EXT URIN PURWCK BARD -A

## 2019-02-15 PROCEDURE — 74011250637 HC RX REV CODE- 250/637: Performed by: HOSPITALIST

## 2019-02-15 PROCEDURE — 80048 BASIC METABOLIC PNL TOTAL CA: CPT

## 2019-02-15 PROCEDURE — 74011250636 HC RX REV CODE- 250/636: Performed by: INTERNAL MEDICINE

## 2019-02-15 PROCEDURE — 85025 COMPLETE CBC W/AUTO DIFF WBC: CPT

## 2019-02-15 RX ORDER — FUROSEMIDE 20 MG/1
20 TABLET ORAL DAILY
Qty: 30 TAB | Refills: 0 | Status: SHIPPED | OUTPATIENT
Start: 2019-02-15 | End: 2019-04-26 | Stop reason: SDUPTHER

## 2019-02-15 RX ADMIN — POTASSIUM CHLORIDE 20 MEQ: 20 TABLET, EXTENDED RELEASE ORAL at 08:22

## 2019-02-15 RX ADMIN — FUROSEMIDE 20 MG: 20 TABLET ORAL at 08:22

## 2019-02-15 RX ADMIN — HEPARIN SODIUM 5000 UNITS: 5000 INJECTION INTRAVENOUS; SUBCUTANEOUS at 00:16

## 2019-02-15 RX ADMIN — ASPIRIN 81 MG 81 MG: 81 TABLET ORAL at 08:22

## 2019-02-15 RX ADMIN — HEPARIN SODIUM 5000 UNITS: 5000 INJECTION INTRAVENOUS; SUBCUTANEOUS at 08:22

## 2019-02-15 RX ADMIN — SERTRALINE HYDROCHLORIDE 50 MG: 50 TABLET ORAL at 08:21

## 2019-02-15 RX ADMIN — CARVEDILOL 6.25 MG: 6.25 TABLET, FILM COATED ORAL at 08:22

## 2019-02-15 RX ADMIN — FAMOTIDINE 20 MG: 20 TABLET ORAL at 08:22

## 2019-02-15 NOTE — PROGRESS NOTES
Discharge: 
 
Patient will discharge home today (2/15/2019). Patient's home health has been set up with [facility]. Patient's family will transport her home at the time of discharge. There are no other care manager concerns regarding this discharge. This writer will continue to closely monitor for discharge planning until patient has officially discharged home. Hussain Abraham. [facility] Care Manager Pager#: [phone]

## 2019-02-15 NOTE — PROGRESS NOTES
[facility]iovascular Specialists - Progress Note Admit Date: 2/13/2019 Assessment:  
 
Hospital Problems  Date Reviewed: 2/14/2019 Codes Class Noted POA * (Principal) Acute on chronic combined systolic and diastolic congestive heart failure (HCC) ICD-10-CM: I50.43 ICD-9-CM: 428.43, 428.0  2/14/2019 Unknown  
   
 SOB (shortness of breath) ICD-10-CM: R06.02 
ICD-9-CM: 786.05  2/14/2019 Unknown Pulmonary edema ICD-10-CM: J81.1 ICD-9-CM: 952  11/24/2018 Unknown Essential hypertension ICD-10-CM: I10 
ICD-9-CM: 401.9  6/22/2016 Unknown  
   
  
 
 
  
  
-Acute on chronic systolic heart failure. Recently taken off lasix for low BP with history of orthostasis and falls. Patient presented with sudden SOB/chest and neck tighntess, improved with IV lasix, troponin indeterminate not consistent with ACS, ECG with underlying LBBB. 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 
-Interstitial lung disease likely. 
-Sick sinus syndrome s/p single chamber Medtronic pacemaker 11/28/2018. 
-Chronic LBBB. -Hypertension. 
-Hyperlipidemia. 
  
Primary cardiologist Dr. Tanika Batres Plan:  
 
Volume status improved, IV lasix stopped, would continue maintenance lasix on discharge as patient tolerates from BP and renal standpoint. May need to evaluate for home oxygen. BP overall stable on low dose coreg, will continue. Can consider ace or entresto as outpatient if BP and renal function will allow. Continue on ASA and statin. Will arrange close outpatient CHF follow up. Subjective: No new complaints. Objective:  
  
Patient Vitals for the past 8 hrs: 
 Temp Pulse Resp BP SpO2  
02/15/19 0757 97.7 °F (36.5 °C)  18 127/71 97 % 02/15/19 0409 98.3 °F (36.8 °C) 73 15 117/69 96 % Patient Vitals for the past 96 hrs: 
 Weight  
02/15/19 0409 53.9 kg (118 lb 14.4 oz) 02/14/19 1057 53.5 kg (118 lb) 02/14/19 0237 53.6 kg (118 lb 1.6 oz) Intake/Output Summary (Last 24 hours) at 2/15/2019 1005 Last data filed at 2/15/2019 7810 Gross per 24 hour Intake 920 ml Output 1600 ml Net -680 ml Physical Exam: 
General:  alert, cooperative, no distress, appears stated age Neck:  no JVD Lungs:  rales fine bilateral 
Heart:  regular rate and rhythm Abdomen:  abdomen is soft without significant tenderness, masses, organomegaly or guarding Extremities:  extremities normal, atraumatic, no cyanosis or edema Data Review:  
 
Labs: Results:  
   
Chemistry Recent Labs  
  02/15/19 
0328 02/14/19 
0912 02/13/19 
2232 * 107* 100*  136 140  
K 4.0 3.8 4.6  98* 107 CO2 29 31 28 BUN 28* 23* 23* CREA 1.39* 1.25 1.14  
CA 8.6 9.1 8.5 MG 2.0 2.0  --   
PHOS  --  3.9  --   
AGAP 7 7 5 BUCR 20 18 20 AP  --   --  78  
TP  --   --  7.6 ALB  --   --  3.6 GLOB  --   --  4.0 AGRAT  --   --  0.9 CBC w/Diff Recent Labs  
  02/15/19 
0328 02/14/19 
0912 02/13/19 
2232 WBC 8.4 8.2 9.9  
RBC 4.49 4.77 4.55  
HGB 12.4 13.2 12.6 HCT 37.8 40.3 38.9  180 190 GRANS 64 63 59 LYMPH 21 22 26 EOS 3 3 5 Cardiac Enzymes No results found for: CPK, CK, CKMMB, CKMB, RCK3, CKMBT, CKNDX, CKND1, DODIE, TROPT, TROIQ, STEPHANIE, TROPT, TNIPOC, BNP, BNPP Coagulation No results for input(s): PTP, INR, APTT in the last 72 hours. No lab exists for component: INREXT Lipid Panel Lab Results Component Value Date/Time Cholesterol, total 204 (H) 11/24/2018 09:40 PM  
 HDL Cholesterol 59 11/24/2018 09:40 PM  
 LDL, calculated 129.2 (H) 11/24/2018 09:40 PM  
 VLDL, calculated 15.8 11/24/2018 09:40 PM  
 Triglyceride 79 11/24/2018 09:40 PM  
 CHOL/HDL Ratio 3.5 11/24/2018 09:40 PM  
  
BNP No results found for: BNP, BNPP, XBNPT Liver Enzymes Recent Labs  
  02/13/19 
2232 TP 7.6 ALB 3.6 AP 78 SGOT 21  
  
Digoxin Thyroid Studies Lab Results Component Value Date/Time  TSH 3.01 11/25/2018 01:18 AM  
    
 
 Signed By: Dorenda Gosselin, PA February 15, 2019

## 2019-02-15 NOTE — PROGRESS NOTES
Problem: Falls - Risk of 
Goal: *Absence of Falls Document Chivo Quiles Fall Risk and appropriate interventions in the flowsheet. Outcome: Progressing Towards Goal 
Fall Risk Interventions: 
Mobility Interventions: Assess mobility with egress test, Bed/chair exit alarm, Communicate number of staff needed for ambulation/transfer, OT consult for ADLs, Patient to call before getting OOB, PT Consult for mobility concerns Medication Interventions: Evaluate medications/consider consulting pharmacy, Patient to call before getting OOB, Teach patient to arise slowly Elimination Interventions: Call light in reach, Patient to call for help with toileting needs, Toilet paper/wipes in reach, Toileting schedule/hourly rounds

## 2019-02-15 NOTE — PROGRESS NOTES
Discharge planning: 
 
Patient's home health has been set up through [facility]. Once discharged, [facility] home health will follow in home. This writer will continue to closely monitor for discharge planning to ensure a safe discharge home from [facility]. Hussain Frazier. MSW Care Manager Pager#: [phone]

## 2019-02-15 NOTE — ROUTINE PROCESS
1915 Assumed care of patient from off going nurse. Patient resting in bed. No distress noted. Call bell within reach, siderails up x 3, bed in lowest position, and patient instructed to use call bell for assistance. Will continue to monitor. 5516 Bedside and Verbal shift change report given to Anna Looney RN (oncoming nurse) by Javid Berumen RN(offgoing nurse). Report included the following information Kardex, Intake/Output, MAR, Recent Results and Cardiac Rhythm SR BBB tele box# [phone].

## 2019-02-15 NOTE — DISCHARGE INSTRUCTIONS
Discharge Instructions    Patient: Tia Moran MRN: [id number]  CSN: [id number]    YOB: 1927  Age: 80 y.o. Sex: female    DOA: 2/13/2019 LOS:  LOS: 2 days   Discharge Date:      DIET:  Cardiac Diet    ACTIVITY: Activity as tolerated  Home health care for Skilled care for CHF tele monitoring, Hypertension and medication management    ·    PT/OT consult      ADDITIONAL INFORMATION: If you experience any of the following symptoms but not limited to Fever, chills, nausea, vomiting, diarrhea, change in mentation, falling, bleeding, shortness of breath, chest pain, please call your primary care physician or return to the emergency room if you cannot get hold of your doctor:     FOLLOW UP CARE:  Dr. Marcel Mooney MD in 5-7 days. Please call and set up an appointment.   Dr. Ana M Barros DO in 2 week      Allison Arriaza MD  2/15/2019 2:38 PM

## 2019-02-15 NOTE — DISCHARGE SUMMARY
Discharge Summary    Patient: Zachery Lanza MRN: [id number]  CSN: [id number]    YOB: 1927  Age: 80 y.o. Sex: female    DOA: 2/13/2019 LOS:  LOS: 2 days   Discharge Date:      Admission Diagnoses: Acute exacerbation of CHF (congestive heart failure) [facility] [I50.9]    Discharge Diagnoses:  PLEASE SEE DICTATION. Discharge Condition: Stable    PHYSICAL EXAM  Visit Vitals  /63 (BP 1 Location: Right arm, BP Patient Position: At rest)   Pulse 64   Temp 97.6 °F (36.4 °C)   Resp 18   Ht 5' 1\" (1.549 m)   Wt 53.9 kg (118 lb 14.4 oz)   SpO2 90%   Breastfeeding? No   BMI 22.47 kg/m²       General: Alert, cooperative, no acute distress    Lungs:  Bilaterally clear, some R base rales. No Wheezing/Rhonchi. Heart:  Regular rate and Rhythm. Abdomen: Soft, Non distended, Non tender. + Bowel sounds. Extremities: No edema/ cyanosis/ clubbing  Neurologic:  AA oriented X 2-3. Moves all extremities. Hospital Course: Please see dictation. code # [id number]. Discharge Medications:     Current Discharge Medication List      START taking these medications    Details   furosemide (LASIX) 20 mg tablet Take 1 Tab by mouth daily. Qty: 30 Tab, Refills: 0         CONTINUE these medications which have NOT CHANGED    Details   carvedilol (COREG) 6.25 mg tablet Take 1 Tab by mouth two (2) times daily (with meals). Qty: 60 Tab, Refills: 6      omeprazole (PRILOSEC) 40 mg capsule TAKE 1 CAPSULE BY MOUTH DAILY  Qty: 90 Cap, Refills: 0      meloxicam (MOBIC) 15 mg tablet Take 15 mg by mouth daily. 1 tab by mouth daily      aspirin 81 mg chewable tablet Take 1 Tab by mouth daily. Qty: 30 Tab, Refills: 11      sertraline (ZOLOFT) 50 mg tablet TAKE 1 TABLET BY MOUTH DAILY  Qty: 90 Tab, Refills: 0      CALCIUM CARBONATE/VITAMIN D3 (CALCIUM 600 + D,3, PO) Take 1 Tab by mouth daily. MULTI-VITAMIN PO Take 1 Tab by mouth daily.       atorvastatin (LIPITOR) 10 mg tablet Take 1 Tab by mouth nightly. Qty: 30 Tab, Refills: 2      fluticasone (FLONASE) 50 mcg/actuation nasal spray 1 [place] by Both Nostrils route as needed. loratadine (CLARITIN) 10 mg tablet Take 10 mg by mouth daily as needed for Allergies. STOP taking these medications       amLODIPine (NORVASC) 5 mg tablet Comments:   Reason for Stopping:             · It is important that you take the medication exactly as they are prescribed. · Keep your medication in the bottles provided by the pharmacist and keep a list of the medication names, dosages, and times to be taken in your wallet. · Do not take other medications without consulting your doctor. DIET:  Cardiac Diet    ACTIVITY: Activity as tolerated  Home health care for Skilled care for CHF tele monitoring, Hypertension and medication management    ·    PT/OT consult      ADDITIONAL INFORMATION: If you experience any of the following symptoms but not limited to Fever, chills, nausea, vomiting, diarrhea, change in mentation, falling, bleeding, shortness of breath, chest pain, please call your primary care physician or return to the emergency room if you cannot get hold of your doctor:     FOLLOW UP CARE:  Dr. Robbin Jung MD in 5-7 days. Please call and set up an appointment.   Dr. Ori Arriola DO in 2 week    Minutes spent on discharge: 40 minutes spent coordinating this discharge (review instructions/follow-up, prescriptions, preparing report for sign off)    Virgen Hernandez MD  2/15/2019 2:39 PM

## 2019-02-15 NOTE — PROGRESS NOTES
Problem: Mobility Impaired (Adult and Pediatric) Goal: *Acute Goals and Plan of Care (Insert Text) Physical Therapy Goals Initiated 2/15/2019 and to be accomplished within 7 day(s) 1. Patient will move from supine to sit and sit to supine , scoot up and down and roll side to side in bed with supervision/set-up. 2.  Patient will transfer from bed to chair and chair to bed with supervision/set-up using the least restrictive device. 3.  Patient will perform sit to stand with supervision/set-up. 4.  Patient will ambulate with supervision/set-up for 100 feet with the least restrictive device. Outcome: Progressing Towards Goal 
physical Therapy EVALUATION Patient: Barron Hermosillo (89 y.o. female) Date: 2/15/2019 Primary Diagnosis: Acute exacerbation of CHF (congestive heart failure) ([facility] [address] [I50.9] Precautions:   Fall ASSESSMENT : 
PT orders received and patient cleared by nursing to participate with therapy. Patient is a 80 y.o. female admitted to the hospital due to SOB. Pt has EF of 25-30%. Patient consents to PT evaluation and treatment. Pt sleeping initially but woke up and willing to move in the room with daughter present. Pt performing bed mobility and transfers with contact guard assistance/minimal assistance for safety. Gait in room 20 feet with RW contact guard assistance/minimal assistance for balance and safety. Pt is confused but very pleasant. She needs increased assistance for safety especially due to confusion. She ended therapy supine in bed with all needs met and daughter present. Patient will benefit from skilled intervention to address the above impairments and increase functional independence. Patients rehabilitation potential is considered to be Good Factors which may influence rehabilitation potential include:  
[]         None noted 
[]         Mental ability/status []         Medical condition 
[]         Home/family situation and support systems []         Safety awareness 
[]         Pain tolerance/management 
[]         Other: PLAN : 
Recommendations and Planned Interventions: 
[x]           Bed Mobility Training             [x]    Neuromuscular Re-Education 
[x]           Transfer Training                   []    Orthotic/Prosthetic Training 
[x]           Gait Training                          []    Modalities [x]           Therapeutic Exercises          []    Edema Management/Control 
[x]           Therapeutic Activities            [x]    Patient and Family Training/Education 
[]           Other (comment): Frequency/Duration: Patient will be followed by physical therapy 1-2 times per day to address goals. Discharge Recommendations: Home Health Further Equipment Recommendations for Discharge: N/A  
 
SUBJECTIVE:  
Patient stated Where do you want me to go?  OBJECTIVE DATA SUMMARY:  
 
Past Medical History:  
Diagnosis Date  Anxiety  Arthritis  Depression  GERD (gastroesophageal reflux disease)  HTN (hypertension)  LBBB (left bundle branch block)  Osteoporosis   
 completed 8 years Fosamax  Sciatica  Urinary incontinence Past Surgical History:  
Procedure Laterality Date  CARDIAC SURG PROCEDURE UNLIST  11/2018  
 pacemaker  HX CHOLECYSTECTOMY  HX HYSTERECTOMY  HX ORTHOPAEDIC    
 fractured right patella surgery  AK ANESTH,SURGERY OF SHOULDER    
 AK INS NEW/RPLC PRM PACEMAKER W/TRANSV ELTRD VENTR N/A 11/28/2018 INSERT PPM SINGLE VENTRICULAR performed by Alfred Cooley MD at [address] [place] [address] CATH LAB Barriers to Learning/Limitations: yes;  cognitive and altered mental status (i.e.Sedation, Confusion) Compensate with: Visual Cues, Verbal Cues and Tactile Cues Prior Level of Function/Home Situation: Independent/supervision with mobility including gait using a RW. Home Situation Home Environment: Private residence # Steps to Enter: 4 One/Two Story Residence: One story Living Alone: No 
Support Systems: (lives with daughter) Patient Expects to be Discharged to[de-identified] Private residence Current DME Used/Available at Home: Walker, rolling, Raised toilet seat, Safety frame toliet, Shower chairCritical Behavior: 
Neurologic State: Alert;Drowsy; Confused Psychosocial 
Patient Behaviors: Calm; Cooperative;Confused Family  Behaviors: Calm; Cooperative Purposeful Interaction: Yes Pt Identified Daily Priority: Clinical issues (comment) Caritas Process: Establish trust;Teaching/learning Caring Interventions: Reassure Reassure: Informing Therapeutic Modalities: Intentional therapeutic touchStrength:   
Strength: Generally decreased, functional(B LE) Tone & Sensation:  
Tone: Normal(B LE) Sensation: Intact(B LE) Range Of Motion: 
AROM: Within functional limits(B LE) Functional Mobility: 
Bed Mobility: 
Supine to Sit: Contact guard assistance;Minimum assistance Sit to Supine: Stand-by assistance Transfers: 
Sit to Stand: Contact guard assistance;Minimum assistance Stand to Sit: Contact guard assistance Balance:  
Sitting: Intact Standing: Impaired; With support Standing - Static: Fair Standing - Dynamic : FairAmbulation/Gait Training: 
Distance (ft): 20 Feet (ft) Assistive Device: Walker, rolling Ambulation - Level of Assistance: Contact guard assistance;Minimal assistance Base of Support: Center of gravity altered Speed/Linda: Pace decreased (<100 feet/min) Therapeutic Exercises:  
Reviewed and performed ankle pumps. Pain: 
Pre: 0/10 Post: 0/10 Activity Tolerance:  
fair Please refer to the flowsheet for vital signs taken during this treatment. After treatment:  
[] Patient left in no apparent distress sitting up in chair 
[] Patient left sitting on EOB [x] Patient left in no apparent distress in bed 
[] Patient declined to be OOB at this time due to   
[x] Call bell left within reach [x] Nursing notified(Cody) [x] Caregiver present 
[] Bed alarm activated [x] Personal items in reach COMMUNICATION/EDUCATION:  
[x]         Fall prevention education was provided and the patient/caregiver indicated understanding. [x]         Patient/family have participated as able in goal setting and plan of care. [x]         Patient/family agree to work toward stated goals and plan of care. []         Patient understands intent and goals of therapy, but is neutral about his/her participation. []         Patient is unable to participate in goal setting and plan of care. [x]         Role of physical therapy. [x]         Out of bed with nursing assistance 3-5 times a day. Thank you for this referral. 
Nathalie Stone, PT, DPT Time Calculation: 17 mins Eval Complexity: History: MEDIUM  Complexity : 1-2 comorbidities / personal factors will impact the outcome/ POC Exam:HIGH Complexity : 4+ Standardized tests and measures addressing body structure, function, activity limitation and / or participation in recreation  Presentation: MEDIUM Complexity : Evolving with changing characteristics  Clinical Decision Making:Medium Complexity   Overall Complexity:MEDIUM

## 2019-02-15 NOTE — HOME CARE
Rec HC order - d/c noted for today [facility] will follow for SN - pt daughter refusing PT/OT - states she does not need that - will accept SN and telehealth reluctantly - will call for PT/OT if she changes her mind - daughter will tranpsort pt home today - CLAUDIA Abbasi RN

## 2019-02-16 NOTE — DISCHARGE SUMMARY
[address] [place] [address]    Name:  Kim Armstrong  MR#:   [id number]  :  10/17/1927  ACCOUNT #:  [de-identified]  ADMIT DATE:  2019  DISCHARGE DATE:  02/15/2019      PRIMARY CARE PHYSICIAN:  Dhaval Benitez MD    DISCHARGE DIAGNOSES:  1. Acute-on-chronic systolic heart failure, ejection fraction of around 20%. 2.  Dilated cardiomyopathy, ejection fraction of around 20%. 3.  Mild intermittent troponin elevation, not consistent with acute coronary  syndrome, most likely demand ischemia. 4.  Sick sinus syndrome, status post single-chamber [employer] pacemaker in the past.  5.  Hypertension. 6.  Dyslipidemia. 7.  Mild protein-calorie malnutrition. 6.  Advanced age. 9.  Mild prerenal azotemia due to diuresis. HOSPITAL COURSE:  This is a 77-year-old  female who presented to the  emergency room with shortness of breath, was noted to have acute CHF exacerbation. The patient was started on IV Lasix and was admitted to the hospital.  Cardiology was  consulted. The patient had a good diuresis in the hospital, more than 2 liters of  negative balance in the hospital.  Her shortness of breath improved. She was weaned  off IV Lasix to the p.o. Lasix. She was weaned off oxygen too. The patient  responded well with diuresis. She did not have any fevers or white count. During  her admission, her initial x-ray was read as concern for pneumonia, but she did not  have any signs of pneumonia and no signs of infection. The patient responded  appropriately with the diuretics, so this most likely looks likes CHF. The patient  also had recently stopped her Lasix by her cardiologist's advice because of low  normal blood pressure. The patient was seen by Cardiology in the hospital who  recommended diuresis and then recommended to switch to the p.o. Lasix. Cardiology  followup on the patient today, recommended okay for discharge.   The patient was  weaned off of oxygen and was made to ambulate in the hallway. Her oxygen sats  remained around 95% on noninvasive ventilation. The patient does have some mild  crackles in the right base, but symptomatically much better. Cardiology recommends  no outpatient diuresis that could drop her pressure as it happened in the past.   Cardiology recommended okay for discharge. The patient is currently alert, awake, and oriented x2-3 along with her daughter at  the bedside. They are very eagerly awaiting to be discharged. They do not want to  stay any more days in the hospital.  I have discussed with them about the medication  changes and followup appointment. Discussed with them about the actual use of Lasix,  if it is needed based on her weight. I have also set up one month care for CHF  telemonitoring. They agree with my plan. I answered all their questions and  concerns appropriately. I also discussed with the patient and also daughter about  the fluid restriction and salt restricted diet. They agree with my plan. DISPOSITION:  Discharge to home with home healthcare. DISCHARGE CONDITION:  Stable. DISCHARGE MEDICATIONS:  1. Aspirin 81 mg daily. 2.  Lipitor 10 mg at bedtime. 3.  Calcium with vitamin D one tablet daily. 4.  Coreg 6.25 mg b.i.d.  5.  Claritin 10 mg daily. 6.  Flonase nasal spray bilaterally as needed. 7.  Lasix 20 mg daily. I have advised the patient and family taking actually Lasix  if there is any weight gain. 8.  Meloxicam 15 mg daily. 9.  Multivitamin one tablet daily. 10.  Omeprazole 40 mg daily. 11.  Zoloft 50 mg daily. 12.  The patient is not on any ACE inhibitors because of her low normal blood  pressure. PROCEDURES:  None. INVESTIGATIONS IN THE HOSPITAL STAY:  The patient had an echocardiogram, which showed  ejection fraction of around 20%, left ventricular global hyperkinesis noted.       Dion Ngo MD        [id number]  D:  02/15/2019 14:46  T:  02/16/2019 2:58  JOB #: [id number]  CC:  Mare Delgado MD

## 2019-02-17 ENCOUNTER — HOME CARE VISIT (OUTPATIENT)
Dept: SCHEDULING | Facility: HOME HEALTH | Age: 84
End: 2019-02-17
Payer: MEDICARE

## 2019-02-17 VITALS
HEIGHT: 62 IN | DIASTOLIC BLOOD PRESSURE: 76 MMHG | BODY MASS INDEX: 21.16 KG/M2 | TEMPERATURE: 97 F | OXYGEN SATURATION: 97 % | RESPIRATION RATE: 16 BRPM | SYSTOLIC BLOOD PRESSURE: 112 MMHG | HEART RATE: 76 BPM | WEIGHT: 115 LBS

## 2019-02-17 PROCEDURE — 400013 HH SOC

## 2019-02-17 PROCEDURE — G0299 HHS/HOSPICE OF RN EA 15 MIN: HCPCS

## 2019-02-18 ENCOUNTER — PATIENT OUTREACH (OUTPATIENT)
Dept: CARDIOLOGY CLINIC | Age: 84
End: 2019-02-18

## 2019-02-18 ENCOUNTER — HOME CARE VISIT (OUTPATIENT)
Dept: HOME HEALTH SERVICES | Facility: HOME HEALTH | Age: 84
End: 2019-02-18
Payer: MEDICARE

## 2019-02-18 ENCOUNTER — PATIENT OUTREACH (OUTPATIENT)
Dept: INTERNAL MEDICINE CLINIC | Age: 84
End: 2019-02-18

## 2019-02-18 NOTE — PROGRESS NOTES
Heart Failure Follow Up Call NN contacted the patient by telephone to perform CHF Follow Up. Spoke to pt's daughter Sharif Cornell who verified  and address as identifiers. Daily Weight (document daily weights in flowsheets):   unchanged at 115 lbs. Provider Notified:   No  
 
Zone:(Pt Reported)  green Goals None Needs addressed from pathway:   
24-48 Hours- or initial contact Review/Verification: 
? Identify care team 
? Disposition (Home) ? DC Instructions, Education, and HF Zones ? [place] [facility] in place. [facility])  
? Evaluate DME needs; arrange home equipment ? Labs/Diagnostics to follow ? Follow-up apts are arranged ? Identify transportation Med Rec ? Diuretic,  
? Beta Blocker, Baseline Labs ? BMP 
? BUN/Creat. ? Hgb/Hct ? WBC 
 
JOSE ALEJANDRO Documentation:? Goals ? Challenges/Plan ? Weight   
? Edema ? Symptoms Education Disease Management: 
? Cardiac Low-sodium Diet (No added sodium; 1500mg as indicated). If Diabetic:  
?  include carb-controlled ? Fluid restriction (if indicated) ? Comorbidity Management ? Daily Weights PCP/Specialist follow up:  
Future Appointments Date Time Provider Luís Abreu 2019  3:30 PM Christina Collazo MD [facility]  
2019 To Be Determined LEAH Magana  
2019 To Be Determined LEAH Magana  
3/1/2019 To Be Determined Mary Bermudez LPN [address] [facility]  
3/1/2019  1:00  [place] [address],  [facility]  
3/5/2019 To Be Determined LEAH Magana  
3/8/2019 To Be Determined [facility] [address] [place] [address]  
3/13/2019 11:00 AM Csi, Pacer Hv [address] [facility]  
2019  1:40 PM Jenniffer Bryan  [facility]  
2019  1:00 PM Christina Collazo MD [facility] Cardiologist consult while IP: yes Palliative consult while IP:    No  
 
EF: 20 %(result) on 02 /14/19 Type of HF:   HFrEF Cardiac Device present: ICD Heart Failure Medications: Betablocker, Diuretic Disposition of patient:  Home Health Social support: Daughter Do you have a Scale:    yes How often do you weigh:  daily Does patient have an Advance Directive:  not on file Advance Directive scanned into patients chart:  No  
 
Patient reminded that there are physicians on call 24 hours a day / 7 days a week (M-F 5pm to 8am and from Friday 5pm until Monday 8a for the weekend) should the patient have questions or concerns. Patient reminded to call 911 if situation is emergent or patient feels the situation is emergent. Pt verbalizes understanding.

## 2019-02-20 ENCOUNTER — OFFICE VISIT (OUTPATIENT)
Dept: INTERNAL MEDICINE CLINIC | Age: 84
End: 2019-02-20

## 2019-02-20 ENCOUNTER — HOSPITAL ENCOUNTER (OUTPATIENT)
Dept: LAB | Age: 84
Discharge: HOME | End: 2019-02-20
Payer: MEDICARE

## 2019-02-20 ENCOUNTER — HOME CARE VISIT (OUTPATIENT)
Dept: HOME HEALTH SERVICES | Facility: HOME HEALTH | Age: 84
End: 2019-02-20
Payer: MEDICARE

## 2019-02-20 VITALS
SYSTOLIC BLOOD PRESSURE: 124 MMHG | TEMPERATURE: 98.7 F | DIASTOLIC BLOOD PRESSURE: 64 MMHG | WEIGHT: 118 LBS | HEIGHT: 62 IN | BODY MASS INDEX: 21.71 KG/M2 | HEART RATE: 67 BPM | OXYGEN SATURATION: 95 % | RESPIRATION RATE: 16 BRPM

## 2019-02-20 DIAGNOSIS — I10 ESSENTIAL HYPERTENSION: ICD-10-CM

## 2019-02-20 DIAGNOSIS — R06.09 DYSPNEA ON EXERTION: ICD-10-CM

## 2019-02-20 DIAGNOSIS — K21.9 GASTROESOPHAGEAL REFLUX DISEASE WITHOUT ESOPHAGITIS: ICD-10-CM

## 2019-02-20 DIAGNOSIS — M17.0 PRIMARY OSTEOARTHRITIS OF BOTH KNEES: ICD-10-CM

## 2019-02-20 DIAGNOSIS — I50.21 CHF (CONGESTIVE HEART FAILURE), NYHA CLASS III, ACUTE, SYSTOLIC (HCC): ICD-10-CM

## 2019-02-20 DIAGNOSIS — I50.21 CHF (CONGESTIVE HEART FAILURE), NYHA CLASS III, ACUTE, SYSTOLIC (HCC): Primary | ICD-10-CM

## 2019-02-20 LAB
ANION GAP SERPL CALC-SCNC: 9 MMOL/L (ref 3–18)
BNP SERPL-MCNC: 3563 PG/ML (ref 0–1800)
BUN SERPL-MCNC: 38 MG/DL (ref 7–18)
BUN/CREAT SERPL: 25 (ref 12–20)
CALCIUM SERPL-MCNC: 8.7 MG/DL (ref 8.5–10.1)
CHLORIDE SERPL-SCNC: 103 MMOL/L (ref 100–108)
CO2 SERPL-SCNC: 28 MMOL/L (ref 21–32)
CREAT SERPL-MCNC: 1.53 MG/DL (ref 0.6–1.3)
GLUCOSE SERPL-MCNC: 95 MG/DL (ref 74–99)
POTASSIUM SERPL-SCNC: 4.9 MMOL/L (ref 3.5–5.5)
SODIUM SERPL-SCNC: 140 MMOL/L (ref 136–145)

## 2019-02-20 PROCEDURE — 80048 BASIC METABOLIC PNL TOTAL CA: CPT

## 2019-02-20 PROCEDURE — 83880 ASSAY OF NATRIURETIC PEPTIDE: CPT

## 2019-02-20 NOTE — PROGRESS NOTES
[facility]ak[facility] 10/17/1927, is a 80 y.o. female, who is seen today for reevaluation of her recent hospitalization for acute heart failure. Since returning home 5 days ago she had been coughing quite a bit especially at night but the last 2 nights coughing a lot less. She is not producing sputum. She does have some dyspnea on exertion but less so than she did several days ago. Started back on furosemide at the hospital and continues on carvedilol. Cardiogram showed ejection fraction 20% on February 14 the day before she got out of the hospital.  Patient's daughter also notes that she has had urinary incontinence and used to soak a pad overnight but now is urinating about 5 times during the day and the pad is dry in the morning. She is pushing about 1200 cc/day, patient does not really want to drink even that much. Past Medical History:  
Diagnosis Date  Anxiety  Arthritis  Depression  GERD (gastroesophageal reflux disease)  HTN (hypertension)  LBBB (left bundle branch block)  Osteoporosis   
 completed 8 years Fosamax  Sciatica  Urinary incontinence Current Outpatient Medications Medication Sig Dispense Refill  furosemide (LASIX) 20 mg tablet Take 1 Tab by mouth daily. 30 Tab 0  carvedilol (COREG) 6.25 mg tablet Take 1 Tab by mouth two (2) times daily (with meals). 60 Tab 6  
 omeprazole (PRILOSEC) 40 mg capsule TAKE 1 CAPSULE BY MOUTH DAILY 90 Cap 0  
 meloxicam (MOBIC) 15 mg tablet Take 15 mg by mouth daily. 1 tab by mouth daily  aspirin 81 mg chewable tablet Take 1 Tab by mouth daily. 30 Tab 11  
 atorvastatin (LIPITOR) 10 mg tablet Take 1 Tab by mouth nightly. (Patient taking differently: Take 1 Tab by mouth daily.) 30 Tab 2  
 fluticasone (FLONASE) 50 mcg/actuation nasal spray 1 [place] by Both Nostrils route as needed.     
 sertraline (ZOLOFT) 50 mg tablet TAKE 1 TABLET BY MOUTH DAILY 90 Tab 0  
  loratadine (CLARITIN) 10 mg tablet Take 10 mg by mouth daily as needed for Allergies.  MULTI-VITAMIN PO Take 1 Tab by mouth daily. Visit Vitals /64 Pulse 67 Temp 98.7 °F (37.1 °C) (Oral) Resp 16 Ht 5' 2\" (1.575 m) Wt 118 lb (53.5 kg) SpO2 95% BMI 21.58 kg/m² No JVD or HJR. Carotids are 2+ without bruits. Lungs reveal fine crackles one third of the way up bilaterally. No wheezing. He is not using accessory muscles of respiration. Heart reveals a regular rhythm with no murmur gallop click or rub. Abdomen is soft and nontender with no hepatosplenic megaly or masses and no bruits. Extremities reveal no clubbing cyanosis or edema. Assessment: #1. Recent heart failure now coughing a lot less and breathing better. We will check proBNP and BMP today. Continue carvedilol and furosemide as above. #2. Anxiety, she will continue sertraline 50 mg daily, seems to be doing quite well from that standpoint. #3.  Urinary incontinence but not soak tonight, we will check lab as above and daughter will ensure that she drinks at least a liter of fluid per day but not more than about 1.5 L/day. Follow-up as previously planned in August or sooner if needed, she has a follow-up appointment next week with her cardiologist. 
 
Puma Goodman. Stanley Yañez, [address] Please note: This document has been produced using voice recognition software. Unrecognized errors in transcription may be present.

## 2019-02-20 NOTE — PROGRESS NOTES
ROOM # 10 Zachery Lanza presents today for Chief Complaint Patient presents with  ED Follow-up Zachery Lanza preferred language for health care discussion is english/other. Depression Screening: No flowsheet data found. Learning Assessment: 
Learning Assessment 1/24/2014 1/8/2013 PRIMARY LEARNER Child(deejay) Child(deejay) PRIMARY LANGUAGE ENGLISH ENGLISH  
LEARNER PREFERENCE PRIMARY DEMONSTRATION DEMONSTRATION  
  LISTENING VIDEOS READING READING  
ANSWERED BY patient NATHALY Gloria LPN  
RELATIONSHIP OTHER OTHER Abuse Screening: 
Abuse Screening Questionnaire 10/11/2017 5/4/2015 Do you ever feel afraid of your partner? Kristine Osgood Are you in a relationship with someone who physically or mentally threatens you? Kristine Osgood Is it safe for you to go home? Eugenia Wallis Fall Risk Fall Risk Assessment, last 12 mths 2/20/2019 2/4/2019 12/5/2018 11/1/2018 4/23/2018 11/17/2017 10/11/2017 Able to walk? Yes Yes Yes Yes Yes Yes Yes Fall in past 12 months? No No No No No No No  
Fall with injury? - - - - - - - Number of falls in past 12 months - - - - - - - Fall Risk Score - - - - - - - Visit Vitals /78 (BP 1 Location: Left arm, BP Patient Position: Sitting) Pulse 67 Temp 98.7 °F (37.1 °C) (Oral) Resp 16 Ht 5' 2\" (1.575 m) Wt 118 lb (53.5 kg) SpO2 95% BMI 21.58 kg/m² Health Maintenance reviewed and discussed per provider. Yes Zachery Lanza is due for Health Maintenance Due Topic Date Due  Shingrix Vaccine Age 50> (1 of 2) 10/17/1977  GLAUCOMA SCREENING Q2Y  10/17/1992  Bone Densitometry (Dexa) Screening  10/17/1992  MEDICARE YEARLY EXAM  10/28/2018 Please order/place referral if appropriate. Advance Directive: 1. Do you have an advance directive in place? Patient Reply: No 
 
2. If not, would you like material regarding how to put one in place? Patient Reply: No 
 
Coordination of Care: 1. Have you been to the ER, urgent care clinic since your last visit? Hospitalized since your last visit?  Yes

## 2019-02-21 ENCOUNTER — TELEPHONE (OUTPATIENT)
Dept: INTERNAL MEDICINE CLINIC | Age: 84
End: 2019-02-21

## 2019-02-21 NOTE — TELEPHONE ENCOUNTER
----- Message from Simón Escamilla MD sent at 2/21/2019  7:31 AM EST -----  Please notify the patient or her daughter that she is slightly dehydrated and I do recommend pushing fluids a little bit more, around 1.5 quarts per day.

## 2019-02-21 NOTE — TELEPHONE ENCOUNTER
Chief Complaint   Patient presents with    Labs     done 02-20-19 per Dr Amy Hu     02-21-19 Patient's daughter Ms. Tess Winters reached and 2 identifiers were used: Full Name, and Date of Birth of the patient. Ms. Tona Walden was informed to have the patient drink more clear fluids about 1.5 quarts per day. All understood.

## 2019-02-21 NOTE — PROGRESS NOTES
Please notify the patient or her daughter that she is slightly dehydrated and I do recommend pushing fluids a little bit more, around 1.5 quarts per day.

## 2019-02-22 ENCOUNTER — HOME CARE VISIT (OUTPATIENT)
Dept: SCHEDULING | Facility: HOME HEALTH | Age: 84
End: 2019-02-22
Payer: MEDICARE

## 2019-02-22 PROCEDURE — G0300 HHS/HOSPICE OF LPN EA 15 MIN: HCPCS

## 2019-02-23 VITALS
OXYGEN SATURATION: 95 % | TEMPERATURE: 97.4 F | HEART RATE: 58 BPM | SYSTOLIC BLOOD PRESSURE: 110 MMHG | DIASTOLIC BLOOD PRESSURE: 64 MMHG

## 2019-02-23 NOTE — PROGRESS NOTES
Zhanna Saldana presents today for a post-hospital follow-up. She was hospitalized from 2/13/19 through 2/15/19 for acute on chronic CHF. She presented with shortness of breath and her NT pro-BNP was noted to be 8831. She had mildly elevated troponins of 003 and 0.06, likely due to demand ischemia. She was diuresed with IV lasix and her symptoms improved. She had an echo done on 2/14/19 which showed an EF of 20%, mild concentric LVH, LV global hypokinesis. The last echo done in November 2018, showed an EF of 26-30% and grade 2 diastolic dysfunction. She had a NT pro-BNP done on 2/20/19 and it was down to 3563. She is a 80year old female with history of hypertension, dyslipidemia, dilated cardiomyopathy, chronic systolic CHF, arthritis, LBBB, syncope, sick sinus syndrome (with documented 6 second pause that correlated with a syncopal episode while in the hospital in late 2018, s/p single chamber pacemaker). She was last seen by Dr. Miguel Ángel Power on 1/16/19. Denies chest pain, tightness, heaviness, and palpitations. Denies shortness of breath at rest, dyspnea on exertion, orthopnea and PND. Per her daughter, she has been able to sleep on one pillow comfortably lately. Denies abdominal bloating. Denies lightheadedness, dizziness, and syncope. Denies lower extremity edema and claudication. Denies nausea, vomiting, diarrhea, melena, hematochezia. Denies hematuria, urgency, frequency. Denies fever, chills. Her daughter states that she did have a cough that was occurring more at night but that markedly improved. Her daughter also states that she has not had any edema with her CHF but instead, she complains of abdominal fullness and early satiety.       PMH:  Past Medical History:   Diagnosis Date    Anxiety     Arthritis     Depression     GERD (gastroesophageal reflux disease)     HTN (hypertension)     LBBB (left bundle branch block)     Osteoporosis     completed 8 years Fosamax    Sciatica     Urinary incontinence        PSH:  Past Surgical History:   Procedure Laterality Date    CARDIAC SURG PROCEDURE UNLIST  11/2018    pacemaker     HX CHOLECYSTECTOMY      HX HYSTERECTOMY      HX ORTHOPAEDIC      fractured right patella surgery    MD ANESTH,SURGERY OF SHOULDER      MD INS NEW/RPLC PRM PACEMAKER W/TRANSV ELTRD VENTR N/A 11/28/2018    INSERT PPM SINGLE VENTRICULAR performed by Wisam Gross MD at [facility] CATH LAB       MEDS:  Current Outpatient Medications   Medication Sig    furosemide (LASIX) 20 mg tablet Take 1 Tab by mouth daily.  carvedilol (COREG) 6.25 mg tablet Take 1 Tab by mouth two (2) times daily (with meals).  omeprazole (PRILOSEC) 40 mg capsule TAKE 1 CAPSULE BY MOUTH DAILY    meloxicam (MOBIC) 15 mg tablet Take 15 mg by mouth daily. 1 tab by mouth daily    aspirin 81 mg chewable tablet Take 1 Tab by mouth daily.  fluticasone (FLONASE) 50 mcg/actuation nasal spray 1 [place] by Both Nostrils route as needed.  sertraline (ZOLOFT) 50 mg tablet TAKE 1 TABLET BY MOUTH DAILY    loratadine (CLARITIN) 10 mg tablet Take 10 mg by mouth daily as needed for Allergies.  MULTI-VITAMIN PO Take 1 Tab by mouth daily.  atorvastatin (LIPITOR) 10 mg tablet Take 1 Tab by mouth nightly. No current facility-administered medications for this visit. Allergies and Sensitivities:  No Known Allergies    Family History:  Family History   Problem Relation Age of Onset    Heart Disease Mother     Diabetes Mother     Heart Disease Father     Diabetes Father     Hypertension Sister     Diabetes Sister     Heart Disease Brother     Heart Disease Sister     Diabetes Sister     Heart Disease Brother        Social History:  She  reports that  has never smoked. she has never used smokeless tobacco.  She  reports that she does not drink alcohol.       Physical:  Visit Vitals  /62   Pulse 61   Ht 5' 2\" (1.575 m)   Wt 54.3 kg (119 lb 12.8 oz)   SpO2 98%   BMI 21.91 kg/m²           Exam:  Neck:  Supple, no JVD, no carotid bruits  CV:  Normal S1 and  S2, no murmurs, rubs, or gallops noted  Lungs:  Clear to ausculation throughout, no wheezes or rales  Abd:  Soft, non-tender, non-distended with good bowel sounds. No hepatosplenomegaly  Extremities:  No edema      Data:  EKG:      LABS:  Lab Results   Component Value Date/Time    Sodium 140 02/20/2019 03:55 PM    Potassium 4.9 02/20/2019 03:55 PM    Chloride 103 02/20/2019 03:55 PM    CO2 28 02/20/2019 03:55 PM    Glucose 95 02/20/2019 03:55 PM    BUN 38 (H) 02/20/2019 03:55 PM    Creatinine 1.53 (H) 02/20/2019 03:55 PM     Lab Results   Component Value Date/Time    Cholesterol, total 204 (H) 11/24/2018 09:40 PM    HDL Cholesterol 59 11/24/2018 09:40 PM    LDL, calculated 129.2 (H) 11/24/2018 09:40 PM    Triglyceride 79 11/24/2018 09:40 PM    CHOL/HDL Ratio 3.5 11/24/2018 09:40 PM     Lab Results   Component Value Date/Time    ALT (SGPT) 22 02/13/2019 10:32 PM         Impression/Plan:  1. Chronic systolic CHF  2. Essential hypertension  3. Dyslipidemia  4. Dilated cardiomyopathy  5. Sick sinus syndrome, s/p single chamber pacemaker  6. Chronic LBBB    Ms. Darnell Colbert was seen today for a post-hospital follow-up. She was accompanied by her daughter with whom she lives. Her daughter states that Ms. Darnell Colbert has been doing fairly well. A couple of weeks ago, she had 2 pillow orthopnea and complaining of a cough at night. The cough has almost completely resolved and she is now able to sleep on one pillow. Ms. Darnell Colbert states that some days she feels better than other days. She complains of some fatigue and occasional dizziness. Her carvedilol is currently at 6.25mg BID. She is not on an ACEI due to low normal blood pressures (as noted in the hospital) as well as some renal insufficiency. She is not on aldactone likely also due to the same reason.      Her blood pressure today is 118/62 and I am hesitant to increase her carvedilol as she may become hypotensive. As noted above, her EF is 20%. I briefly discussed AICD with her and her daughter. We discussed cardiomyopathy at length. If he EF does not improve, AICD may likely be offered. Congestive heart failure teaching reinforced today. Advised to limit sodium intake to no more than 2000mg per day and to also limit fluid intake to 48 ounces per day (unless otherwise specified). Advised to weigh daily every morning and record weights. Instructed to call our office if progressive weight gain is noted over a 2 to 3 day period of time, shortness of breath increases, or if abdominal bloating, nausea, fatigue, or increased lower extremity edema is noted. Patient education material re:  CHF, low sodium diet, and sodium/fluid restriction attached to her after visit summary. She will follow-up with Dr. Messi Plummer as scheduled and as needed. Simon James MSN, FNP-BC    Please note:  Portions of this chart were created with Dragon medical speech to text program.  Unrecognized errors may be present.

## 2019-02-25 ENCOUNTER — PATIENT OUTREACH (OUTPATIENT)
Dept: CARDIOLOGY CLINIC | Age: 84
End: 2019-02-25

## 2019-02-25 NOTE — PROGRESS NOTES
NN contacted the patient's daughter, Jaya Seay, by telephone to perform CHF follow Up. Noted Priorities/Plan:  Continue maintaining baseline level. Daily Weight: 115.5 lbs Stable Zone: green Signs/Symptoms: Edema none; SOB w/ exertion; orthopnea none. Goals  Knowledge and adherence medication (ie. action, side effects, missed dose, etc.) Family will ensure that the patient is taking all of her prescribed medications.  Maintains daily weight. Pt's family ensures pt is weighing daily and logging results.  Understands and adheres to diet. Pt's family will be able to state appropriate no/low salt diet. Needs addressed from pathway:  
Week 1-4 Provide Daily Disease Management 
(NN initiated) ? Daily weight (Before Breakfast- 
Daily Zone Identification (symptom management; weight, edema, SOB, activity/sleep changes)-notify provider immediately as indicated ? Cardiac Low-sodium Diet (No added sodium; 1500mg as indicated). If 
Diabetic: include carbohydrate controlled ? Fluid restriction (if indicated) ? Comorbidity Management ? Confirm follow-up appointments/transportation. Reschedule if needed. Additional assessment ? Fall precautions ? Activity tolerance assessment  
(eg: Vital signs; level of consciousness; dyspnea on exertion; pillow usage; recliner vs bed) ? Energy conservation management (balance activity with rest) ? Labs/diagnostics *as indicated ? Medication Therapy *as directed ? Diet/appetite assessment 
? ED/Hospital utilization ? Immunizations up to date Pneumonia Flu 
? Home assessment/modifications * as indicated ? Transportation  assistance ? Medication assistance ? Home health services ? Personal assistance ? SNF utilization Psychosocial: Reassurance/emotional support Monitoring: ? Home health 
? [facility] 
? [facility] ? Tele-monitoring ? Cardiac device monitoring ? My Chart Education: ? Advanced care planning status ? Support system identification ( eg: Caregiver, meal planning, community resources, family, friends, Christianity, support group) ? Health literacy for heart failure ? Caregiver education and preparation Have you been to an ER/Hospital since discharge from [facility]? No   
 
Have you followed up with PCP/Cardiologist/Specialist? Seen by Dr Amy Hu on 2/20/19. No changes made at that time. Transportation:  Family Diet: No salt added/cardiac Activity/ADLs: self /w occasional assitance. Medications Reconciled at this time:  yes Home health:  Company/Completion: [employer] Social Support: Family Advanced Care Plan: Not on file. Patient reminded that there is a physician on call 24 hours a day / 7 days a week (M-F 5pm to 8am and from Friday 5pm until Monday 8a for the weekend) should the patient have questions or concerns. Patient reminded to call 911 if situation is emergent or patient feels the situation is emergent. Pt verbalizes understanding.

## 2019-02-26 ENCOUNTER — HOME CARE VISIT (OUTPATIENT)
Dept: SCHEDULING | Facility: HOME HEALTH | Age: 84
End: 2019-02-26
Payer: MEDICARE

## 2019-02-26 PROCEDURE — G0300 HHS/HOSPICE OF LPN EA 15 MIN: HCPCS

## 2019-02-27 VITALS
SYSTOLIC BLOOD PRESSURE: 112 MMHG | HEART RATE: 59 BPM | OXYGEN SATURATION: 95 % | TEMPERATURE: 97.4 F | DIASTOLIC BLOOD PRESSURE: 70 MMHG

## 2019-02-28 ENCOUNTER — HOME CARE VISIT (OUTPATIENT)
Dept: SCHEDULING | Facility: HOME HEALTH | Age: 84
End: 2019-02-28
Payer: MEDICARE

## 2019-02-28 VITALS
OXYGEN SATURATION: 97 % | HEART RATE: 68 BPM | SYSTOLIC BLOOD PRESSURE: 108 MMHG | TEMPERATURE: 97.1 F | DIASTOLIC BLOOD PRESSURE: 68 MMHG

## 2019-02-28 PROCEDURE — G0300 HHS/HOSPICE OF LPN EA 15 MIN: HCPCS

## 2019-03-01 ENCOUNTER — OFFICE VISIT (OUTPATIENT)
Dept: CARDIOLOGY CLINIC | Age: 84
End: 2019-03-01

## 2019-03-01 VITALS
HEIGHT: 62 IN | OXYGEN SATURATION: 98 % | DIASTOLIC BLOOD PRESSURE: 62 MMHG | WEIGHT: 119.8 LBS | HEART RATE: 61 BPM | BODY MASS INDEX: 22.05 KG/M2 | SYSTOLIC BLOOD PRESSURE: 118 MMHG

## 2019-03-01 DIAGNOSIS — R55 SYNCOPE, UNSPECIFIED SYNCOPE TYPE: ICD-10-CM

## 2019-03-01 DIAGNOSIS — I50.21 CHF (CONGESTIVE HEART FAILURE), NYHA CLASS III, ACUTE, SYSTOLIC (HCC): Primary | ICD-10-CM

## 2019-03-01 DIAGNOSIS — I10 ESSENTIAL HYPERTENSION: ICD-10-CM

## 2019-03-01 DIAGNOSIS — R06.02 SOB (SHORTNESS OF BREATH): ICD-10-CM

## 2019-03-01 DIAGNOSIS — Z95.0 CARDIAC PACEMAKER IN SITU: ICD-10-CM

## 2019-03-01 RX ORDER — ATORVASTATIN CALCIUM 10 MG/1
10 TABLET, FILM COATED ORAL
Qty: 30 TAB | Refills: 6 | Status: SHIPPED | OUTPATIENT
Start: 2019-03-01 | End: 2019-03-01 | Stop reason: SDUPTHER

## 2019-03-01 RX ORDER — ATORVASTATIN CALCIUM 10 MG/1
10 TABLET, FILM COATED ORAL
Qty: 30 TAB | Refills: 6 | Status: SHIPPED | OUTPATIENT
Start: 2019-03-01 | End: 2019-08-27 | Stop reason: SDUPTHER

## 2019-03-01 NOTE — PATIENT INSTRUCTIONS
Continue present medication regimen  Follow-up with Dr. Tawny Llanos as scheduled and as needed  Weigh daily and record  Limit sodium intake to 2000mg per day  Limit fluid intake to no more than  6, eight ounce glasses of any type of fluids per day (total of 48 ounces per day)  Call if you notice sudden or progressive weight gain (3-5 pounds in 2-3 days), increasing shortness of breath, abdominal bloating, increasing lower extremity edema, inability to lie flat or on your normal number of pillows, having to sit up to catch your breath, fatigue, increased somnolence (sleeping more), poor appetite    Heart Failure: Care Instructions  Your Care Instructions    Heart failure occurs when your heart does not pump as much blood as the body needs. Failure does not mean that the heart has stopped pumping but rather that it is not pumping as well as it should. Over time, this causes fluid buildup in your lungs and other parts of your body. Fluid buildup can cause shortness of breath, fatigue, swollen ankles, and other problems. By taking medicines regularly, reducing sodium (salt) in your diet, checking your weight every day, and making lifestyle changes, you can feel better and live longer. Follow-up care is a key part of your treatment and safety. Be sure to make and go to all appointments, and call your doctor if you are having problems. It's also a good idea to know your test results and keep a list of the medicines you take. How can you care for yourself at home? Medicines    · Be safe with medicines. Take your medicines exactly as prescribed.  Call your doctor if you think you are having a problem with your medicine.     · Do not take any vitamins, over-the-counter medicine, or herbal products without talking to your doctor first. Vale Hurt not take ibuprofen (Advil or Motrin) and naproxen (Aleve) without talking to your doctor first. They could make your heart failure worse.     · You may be taking some of the following medicine. ? Beta-blockers can slow heart rate, decrease blood pressure, and improve your condition. Taking a beta-blocker may lower your chance of needing to be hospitalized. ? Angiotensin-converting enzyme inhibitors (ACEIs) reduce the heart's workload, lower blood pressure, and reduce swelling. Taking an ACEI may lower your chance of needing to be hospitalized again. ? Angiotensin II receptor blockers (ARBs) work like ACEIs. Your doctor may prescribe them instead of ACEIs. ? Diuretics, also called water pills, reduce swelling. ? Potassium supplements replace this important mineral, which is sometimes lost with diuretics. ? Aspirin and other blood thinners prevent blood clots, which can cause a stroke or heart attack.    You will get more details on the specific medicines your doctor prescribes. Diet    · Your doctor may suggest that you limit sodium to 2,000 milligrams (mg) a day or less. That is less than 1 teaspoon of salt a day, including all the salt you eat in cooking or in packaged foods. People get most of their sodium from processed foods. Fast food and restaurant meals also tend to be very high in sodium.     · Ask your doctor how much liquid you can drink each day. You may have to limit liquids.    Weight    · Weigh yourself without clothing at the same time each day. Record your weight. Call your doctor if you have a sudden weight gain, such as more than 2 to 3 pounds in a day or 5 pounds in a week. (Your doctor may suggest a different range of weight gain.) A sudden weight gain may mean that your heart failure is getting worse.    Activity level    · Start light exercise (if your doctor says it is okay). Even if you can only do a small amount, exercise will help you get stronger, have more energy, and manage your weight and your stress. Walking is an easy way to get exercise. Start out by walking a little more than you did before.  Bit by bit, increase the amount you walk.     · When you exercise, watch for signs that your heart is working too hard. You are pushing yourself too hard if you cannot talk while you are exercising. If you become short of breath or dizzy or have chest pain, stop, sit down, and rest.     · If you feel \"wiped out\" the day after you exercise, walk slower or for a shorter distance until you can work up to a better pace.     · Get enough rest at night. Sleeping with 1 or 2 pillows under your upper body and head may help you breathe easier.    Lifestyle changes    · Do not smoke. Smoking can make a heart condition worse. If you need help quitting, talk to your doctor about stop-smoking programs and medicines. These can increase your chances of quitting for good. Quitting smoking may be the most important step you can take to protect your heart.     · Limit alcohol to 2 drinks a day for men and 1 drink a day for women. Too much alcohol can cause health problems.     · Avoid getting sick from colds and the flu. Get a pneumococcal vaccine shot. If you have had one before, ask your doctor whether you need another dose. Get a flu shot each year. If you must be around people with colds or the flu, wash your hands often. When should you call for help? Call 911 if you have symptoms of sudden heart failure such as:    · You have severe trouble breathing.     · You cough up pink, foamy mucus.     · You have a new irregular or rapid heartbeat.    Call your doctor now or seek immediate medical care if:    · You have new or increased shortness of breath.     · You are dizzy or lightheaded, or you feel like you may faint.     · You have sudden weight gain, such as more than 2 to 3 pounds in a day or 5 pounds in a week.  (Your doctor may suggest a different range of weight gain.)     · You have increased swelling in your legs, ankles, or feet.     · You are suddenly so tired or weak that you cannot do your usual activities.    Watch closely for changes in your health, and be sure to contact your doctor if you develop new symptoms. Where can you learn more? Go to http://luz maria-yoselin.info/. Enter G136 in the search box to learn more about \"Heart Failure: Care Instructions. \"  Current as of: July 22, 2018  Content Version: 11.9  © 4901-7425 [employer]. Care instructions adapted under license by [employer] (which disclaims liability or warranty for this information). If you have questions about a medical condition or this instruction, always ask your healthcare professional. [place] [address] any warranty or liability for your use of this information. Limiting Sodium and Fluids With Heart Failure: Care Instructions  Your Care Instructions    Sodium causes your body to hold on to extra water. This may cause your heart failure symptoms to get worse. Limiting sodium may help you feel better and lower your risk of having to go to the hospital.  People get most of their sodium from processed foods. Fast food and restaurant meals also tend to be very high in sodium. Your doctor may suggest that you limit sodium to 2,000 milligrams (mg) a day or less. That is less than 1 teaspoon of salt a day, including all the salt you eat in cooked or packaged foods. Usually, you have to limit the amount of liquids you drink only if your heart failure is severe. Limiting sodium alone often is enough to help your body get rid of extra fluids. However, your doctor may tell you to limit your fluid intake to a set amount each day. Follow-up care is a key part of your treatment and safety. Be sure to make and go to all appointments, and call your doctor if you are having problems. It's also a good idea to know your test results and keep a list of the medicines you take. How can you care for yourself at home? Read food labels  · Read food labels on cans and food packages. The labels tell you how much sodium is in each serving.  Make sure that you look at the serving size. If you eat more than the serving size, you have eaten more sodium than is listed for one serving. · Food labels also tell you the Percent Daily Value. If the Percent Daily Value says 50%, it means that you will get at least 50% of all the sodium you need for the entire day in one serving. Choose products with low Percent Daily Values for sodium. · Be aware that sodium can come in forms other than salt, including monosodium glutamate (MSG), sodium citrate, and sodium bicarbonate (baking soda). MSG is often added to Asian food. You can sometimes ask for food without MSG or salt. Buy low-sodium foods  · Buy foods that are labeled \"unsalted\" (no salt added), \"sodium-free\" (less than 5 mg of sodium per serving), or \"low-sodium\" (less than 140 mg of sodium per serving). A food labeled \"light sodium\" has less than half of the full-sodium version of that food. Foods labeled \"reduced-sodium\" may still have too much sodium. · Buy fresh vegetables or plain, frozen vegetables. Buy low-sodium versions of canned vegetables, soups, and other canned goods. Prepare low-sodium meals  · Use less salt each day when cooking. Reducing salt in this way will help you adjust to the taste. Do not add salt after cooking. Take the salt shaker off the table. · Flavor your food with garlic, lemon juice, onion, vinegar, herbs, and spices instead of salt. Do not use soy sauce, steak sauce, onion salt, garlic salt, mustard, or ketchup on your food. · Make your own salad dressings, sauces, and ketchup without adding salt. · Use less salt (or none) when recipes call for it. You can often use half the salt a recipe calls for without losing flavor. Other dishes like rice, pasta, and grains do not need added salt. · Rinse canned vegetables. This removes some--but not all--of the salt. · Avoid water that has a naturally high sodium content or that has been treated with water softeners, which add sodium.  Call your local water company to find out the sodium content of your water supply. If you buy bottled water, read the label and choose a sodium-free brand. Avoid high-sodium foods, such as:  · Smoked, cured, salted, and canned meat, fish, and poultry. · Ham, fernández, hot dogs, and luncheon meats. · Regular, hard, and processed cheese and regular peanut butter. · Crackers with salted tops. · Frozen prepared meals. · Canned and dried soups, broths, and bouillon, unless labeled sodium-free or low-sodium. · Canned vegetables, unless labeled sodium-free or low-sodium. · Salted snack foods such as chips and pretzels. · [place] fries, pizza, tacos, and other fast foods. · Pickles, olives, ketchup, and other condiments, especially soy sauce, unless labeled sodium-free or low-sodium. If you cannot cook for yourself  · Have family members or friends help you, or have someone cook low-sodium meals. · Check with your local senior nutrition program to find out where meals are served and whether they offer a low-sodium option. You can often find these programs through your local health department or hospital.  · Have meals delivered to your home. Most [facility] have a Meals on [employer]. These programs provide one hot meal a day for older adults, delivered to their homes. Ask whether these meals are low-sodium. Let them know that you are on a low-sodium diet. Limiting fluid intake  · Find a method that works for you. You might simply write down how much you drink every time you do. Some people keep a container filled with the amount of fluid allowed for that day. If they drink from a source other than the container, then they pour out that amount. · Measure your regular drinking glasses to find out how much fluid each one holds. Once you know this, you will not have to measure every time. · Besides water, milk, juices, and other drinks, some foods have a lot of fluid.  Count any foods that will melt (such as ice cream or gelatin dessert) or liquid foods (such as soup) as part of your fluid intake for the day. Where can you learn more? Go to http://luz maria-yoselin.info/. Enter A166 in the search box to learn more about \"Limiting Sodium and Fluids With Heart Failure: Care Instructions. \"  Current as of: July 22, 2018  Content Version: 11.9  © 0308-1189 [email or web address]. Care instructions adapted under license by [employer] (which disclaims liability or warranty for this information). If you have questions about a medical condition or this instruction, always ask your healthcare professional. [place] [address] any warranty or liability for your use of this information. Low Sodium Diet (2,000 Milligram): Care Instructions  Your Care Instructions    Too much sodium causes your body to hold on to extra water. This can raise your blood pressure and force your heart and kidneys to work harder. In very serious cases, this could cause you to be put in the hospital. It might even be life-threatening. By limiting sodium, you will feel better and lower your risk of serious problems. The most common source of sodium is salt. People get most of the salt in their diet from canned, prepared, and packaged foods. Fast food and restaurant meals also are very high in sodium. Your doctor will probably limit your sodium to less than 2,000 milligrams (mg) a day. This limit counts all the sodium in prepared and packaged foods and any salt you add to your food. Follow-up care is a key part of your treatment and safety. Be sure to make and go to all appointments, and call your doctor if you are having problems. It's also a good idea to know your test results and keep a list of the medicines you take. How can you care for yourself at home? Read food labels  · Read labels on cans and food packages. The labels tell you how much sodium is in each serving. Make sure that you look at the serving size.  If you eat more than the serving size, you have eaten more sodium. · Food labels also tell you the Percent Daily Value for sodium. Choose products with low Percent Daily Values for sodium. · Be aware that sodium can come in forms other than salt, including monosodium glutamate (MSG), sodium citrate, and sodium bicarbonate (baking soda). MSG is often added to Asian food. When you eat out, you can sometimes ask for food without MSG or added salt. Buy low-sodium foods  · Buy foods that are labeled \"unsalted\" (no salt added), \"sodium-free\" (less than 5 mg of sodium per serving), or \"low-sodium\" (less than 140 mg of sodium per serving). Foods labeled \"reduced-sodium\" and \"light sodium\" may still have too much sodium. Be sure to read the label to see how much sodium you are getting. · Buy fresh vegetables, or frozen vegetables without added sauces. Buy low-sodium versions of canned vegetables, soups, and other canned goods. Prepare low-sodium meals  · Cut back on the amount of salt you use in cooking. This will help you adjust to the taste. Do not add salt after cooking. One teaspoon of salt has about 2,300 mg of sodium. · Take the salt shaker off the table. · Flavor your food with garlic, lemon juice, onion, vinegar, herbs, and spices. Do not use soy sauce, lite soy sauce, steak sauce, onion salt, garlic salt, celery salt, mustard, or ketchup on your food. · Use low-sodium salad dressings, sauces, and ketchup. Or make your own salad dressings and sauces without adding salt. · Use less salt (or none) when recipes call for it. You can often use half the salt a recipe calls for without losing flavor. Other foods such as rice, pasta, and grains do not need added salt. · Rinse canned vegetables, and cook them in fresh water. This removes some--but not all--of the salt. · Avoid water that is naturally high in sodium or that has been treated with water softeners, which add sodium.  Call your local water company to find out the sodium content of your water supply. If you buy bottled water, read the label and choose a sodium-free brand. Avoid high-sodium foods  · Avoid eating:  ? Smoked, cured, salted, and canned meat, fish, and poultry. ? Ham, fernández, hot dogs, and luncheon meats. ? Regular, hard, and processed cheese and regular peanut butter. ? Crackers with salted tops, and other salted snack foods such as pretzels, chips, and salted popcorn. ? Frozen prepared meals, unless labeled low-sodium. ? Canned and dried soups, broths, and bouillon, unless labeled sodium-free or low-sodium. ? Canned vegetables, unless labeled sodium-free or low-sodium. ? [place] fries, pizza, tacos, and other fast foods. ? Pickles, olives, ketchup, and other condiments, especially soy sauce, unless labeled sodium-free or low-sodium. Where can you learn more? Go to http://luz maria-yoselin.info/. Enter L000 in the search box to learn more about \"Low Sodium Diet (2,000 Milligram): Care Instructions. \"  Current as of: March 28, 2018  Content Version: 11.9  © 0235-6613 [employer]. Care instructions adapted under license by [employer] (which disclaims liability or warranty for this information). If you have questions about a medical condition or this instruction, always ask your healthcare professional. [place] [address] any warranty or liability for your use of this information.

## 2019-03-03 RX ORDER — SERTRALINE HYDROCHLORIDE 50 MG/1
TABLET, FILM COATED ORAL
Qty: 90 TAB | Refills: 0 | Status: SHIPPED | OUTPATIENT
Start: 2019-03-03 | End: 2019-06-02 | Stop reason: SDUPTHER

## 2019-03-04 ENCOUNTER — PATIENT OUTREACH (OUTPATIENT)
Dept: CARDIOLOGY CLINIC | Age: 84
End: 2019-03-04

## 2019-03-05 ENCOUNTER — HOME CARE VISIT (OUTPATIENT)
Dept: SCHEDULING | Facility: HOME HEALTH | Age: 84
End: 2019-03-05
Payer: MEDICARE

## 2019-03-05 PROCEDURE — G0299 HHS/HOSPICE OF RN EA 15 MIN: HCPCS

## 2019-03-06 VITALS
HEART RATE: 62 BPM | OXYGEN SATURATION: 98 % | BODY MASS INDEX: 21.22 KG/M2 | SYSTOLIC BLOOD PRESSURE: 118 MMHG | DIASTOLIC BLOOD PRESSURE: 78 MMHG | WEIGHT: 116 LBS | RESPIRATION RATE: 15 BRPM | TEMPERATURE: 97.3 F

## 2019-03-07 ENCOUNTER — HOME CARE VISIT (OUTPATIENT)
Dept: SCHEDULING | Facility: HOME HEALTH | Age: 84
End: 2019-03-07
Payer: MEDICARE

## 2019-03-07 PROCEDURE — G0299 HHS/HOSPICE OF RN EA 15 MIN: HCPCS

## 2019-03-08 VITALS
TEMPERATURE: 97.5 F | SYSTOLIC BLOOD PRESSURE: 132 MMHG | HEART RATE: 68 BPM | RESPIRATION RATE: 20 BRPM | OXYGEN SATURATION: 97 % | DIASTOLIC BLOOD PRESSURE: 72 MMHG

## 2019-03-11 ENCOUNTER — PATIENT OUTREACH (OUTPATIENT)
Dept: CARDIOLOGY CLINIC | Age: 84
End: 2019-03-11

## 2019-03-13 ENCOUNTER — OFFICE VISIT (OUTPATIENT)
Dept: CARDIOLOGY CLINIC | Age: 84
End: 2019-03-13

## 2019-03-13 DIAGNOSIS — R55 SYNCOPE, UNSPECIFIED SYNCOPE TYPE: Primary | ICD-10-CM

## 2019-03-13 DIAGNOSIS — Z95.0 CARDIAC PACEMAKER IN SITU: ICD-10-CM

## 2019-03-13 RX ORDER — CARVEDILOL 6.25 MG/1
6.25 TABLET ORAL 2 TIMES DAILY WITH MEALS
Qty: 180 TAB | Refills: 3 | Status: SHIPPED | OUTPATIENT
Start: 2019-03-13 | End: 2019-06-19 | Stop reason: DRUGHIGH

## 2019-03-13 NOTE — PROGRESS NOTES
I have personally seen and evaluated the device findings. Interrogation reviewed and I agree with assessment.     Kellen Espinoza

## 2019-03-15 ENCOUNTER — PATIENT OUTREACH (OUTPATIENT)
Dept: CARDIOLOGY CLINIC | Age: 84
End: 2019-03-15

## 2019-03-18 ENCOUNTER — PATIENT OUTREACH (OUTPATIENT)
Dept: CARDIOLOGY CLINIC | Age: 84
End: 2019-03-18

## 2019-03-18 NOTE — PROGRESS NOTES
NN contacted the patient by telephone to perform CHF follow Up. Spoke to patient's daughter, Nakia Napoles. Noted Priorities/Plan:  Continuing maintaining at baseline function     Daily Weight: 115.5 (unchanged  Zone: green   Signs/Symptoms: Edema none; SOB none; orthopnea none    Goals      Knowledge and adherence medication (ie. action, side effects, missed dose, etc.)      Family will ensure that the patient is taking all of her prescribed medications.  Maintains daily weight. Pt's family ensures pt is weighing daily and logging results.  Prepare patients and caregivers for end of life decisions (ie. need for hospice, pain management, symptom relief, advance directives etc.)      3/19/18 Family will complete paperwork for ACP and have it scanned at next MD appt by 4/19/18       Understands and adheres to diet. Pt's family will be able to state appropriate no/low salt diet. Needs addressed from pathway:   Week 5-8   Provide Daily Disease Management (patient/caregiver initiated)  ? Reinforced Daily weight  ? Reeducated on Daily Zone Identification (symptom management; weight, edema, SOB, activity/sleep changes)-  ? Pt maintaining Cardiac Low-sodium Diet (No added sodium; 1500mg as indicated)  ? Pt maintaining Fluid restriction of 1500 ml/day  ? Comorbidity Management  Confirmed follow-up appointments/transportation. Additional Assessments  ? Reinforced Fall precautions    ? Activity tolerance assessment: pt at baseline of function  (eg: Vital signs; level of consciousness; dyspnea on exertion; pillow usage; recliner vs bed)  ? Educated on Energy conservation management (balance activity w/ rest)  ? Labs/diagnostics per MD office  ? Medication Therapy no issues identified  ? Pt daughter, no issues w/ Diet/appetite  ? Educated on appropriate ED/Hospital utilization  ? Immunizations up to date (follow up)        Pneumonia        Flu  ?  No Home modifications needs identified  ? Psychosocial: Reassurance and emotional support; depression screening. Education:  ? Reinforced importance of Advanced Care Planning status   ? Patient/Caregiver verifies support systems (meal planning, medication and transportation needs, community resources)  ? Health literacy for heart failure               Have you been to an ER/Hospital since discharge from [facility]? No      Transportation:  Daughter  Diet: Cardiac/No salt  Activity/ADLs: self. Medications Reconciled at this time:  yes  Home health:  Company/Completion: n/a  Social Support: daughter    Patient reminded that there is a physician on call 24 hours a day / 7 days a week (M-F 5pm to 8am and from Friday 5pm until Monday 8a for the weekend) should the patient have questions or concerns. Patient reminded to call 911 if situation is emergent or patient feels the situation is emergent. Pt verbalizes understanding.

## 2019-03-28 ENCOUNTER — OFFICE VISIT (OUTPATIENT)
Dept: CARDIOLOGY CLINIC | Age: 84
End: 2019-03-28

## 2019-03-28 VITALS
HEART RATE: 74 BPM | WEIGHT: 118 LBS | HEIGHT: 62 IN | SYSTOLIC BLOOD PRESSURE: 134 MMHG | DIASTOLIC BLOOD PRESSURE: 74 MMHG | BODY MASS INDEX: 21.71 KG/M2 | OXYGEN SATURATION: 95 %

## 2019-03-28 DIAGNOSIS — I50.21 CHF (CONGESTIVE HEART FAILURE), NYHA CLASS III, ACUTE, SYSTOLIC (HCC): Primary | ICD-10-CM

## 2019-03-28 DIAGNOSIS — I10 ESSENTIAL HYPERTENSION WITH GOAL BLOOD PRESSURE LESS THAN 140/90: ICD-10-CM

## 2019-03-28 NOTE — PROGRESS NOTES
Elena Hoyt Chief Complaint Patient presents with  CHF  
  3 month follow up  Dizziness  
  sometimes HPI Elena Hoyt is a 80 y.o. with no known cardiac disease who presented initially for evaluation of syncope. As you know, Sujata Reynoso is such a pleasant patient with history hypertension, arthritis and falls, found to have cardiomyopathy and SSS. Sujata Reynoso lives with her daughter. Back in Sept when her daughter was out of town she apparently had a syncopal event. Her sister was with her at the time and saw her somewhat slumped in her chair. She was slow to respond but came to. Sujata Reynoso wasn't sure what happened and didn't seek medical care after that initial event. Again since then she had another episode. Her daughter said her mother looked unwell/ pale and said her stomach was upset. She again kind of slumped and her daughter caught her so she did not fall/ get injured. She believes she lost consciousness. She came to and felt somewhat fatigued briefly. She had no observed seizure like activity. She completely denies surrounding cardiac complaints with these episodes- specifically has never noticed palpations/ heart racing, no CP, or SOB. No swelling or headaches. She has no known heart problems and has never seen a heart doctor or had cardiac testing. Dom's work up showed globally reduced EF 26-30%. I called and spoke to her daughter- giving her the results and alerted her to the signs and symptoms to look out for. Apparently, she got off the phone and asked her mom how she was feeling and she admitted she had been short of breath for 2 days. They ended up going to the [facility] ER the next day and was in AECHF/ HFrEF with fluid overload. Just before discharge she ended up passing out (as she had mentioned very rarely happens)- and she was noted to have a 6 sec pause that correlated. She was appropriately given a pacemaker. She presents today for routine followup.  She is feeling great and has no complaints. Her daughter notices she is more alert and but has some good days some bad days. Her only issue is trying to gain weight. She is taking her Lasix daily and her weight has been stable (actually 2 lbs less per our scale). She saw our NP in HF clinic and was stable with no medication changes at that time. She has no CP, no LE edema, no SOB/ VALERIO. I reviewed her hospital records and see her SCr was slightly elevated so an ACEI/ Entrestro was not started. Past Medical History:  
Diagnosis Date  Anxiety  Arthritis  Depression  GERD (gastroesophageal reflux disease)  HTN (hypertension)  LBBB (left bundle branch block)  Osteoporosis   
 completed 8 years Fosamax  Sciatica  Urinary incontinence Past Surgical History:  
Procedure Laterality Date  CARDIAC SURG PROCEDURE UNLIST  11/2018  
 pacemaker  HX CHOLECYSTECTOMY  HX HYSTERECTOMY  HX ORTHOPAEDIC    
 fractured right patella surgery  OK ANESTH,SURGERY OF SHOULDER    
 OK INS NEW/RPLC PRM PACEMAKER W/TRANSV ELTRD VENTR N/A 11/28/2018 INSERT PPM SINGLE VENTRICULAR performed by Jolly Harmon MD at [facility] CATH LAB Current Outpatient Medications Medication Sig Dispense Refill  carvedilol (COREG) 6.25 mg tablet Take 1 Tab by mouth two (2) times daily (with meals). 180 Tab 3  
 sertraline (ZOLOFT) 50 mg tablet TAKE 1 TABLET BY MOUTH DAILY 90 Tab 0  
 atorvastatin (LIPITOR) 10 mg tablet Take 1 Tab by mouth nightly. 30 Tab 6  furosemide (LASIX) 20 mg tablet Take 1 Tab by mouth daily. 30 Tab 0  
 omeprazole (PRILOSEC) 40 mg capsule TAKE 1 CAPSULE BY MOUTH DAILY 90 Cap 0  
 meloxicam (MOBIC) 15 mg tablet Take 15 mg by mouth daily. 1 tab by mouth daily  aspirin 81 mg chewable tablet Take 1 Tab by mouth daily. 30 Tab 11  
 fluticasone (FLONASE) 50 mcg/actuation nasal spray 1 [place] by Both Nostrils route as needed.  sertraline (ZOLOFT) 50 mg tablet TAKE 1 TABLET BY MOUTH DAILY 90 Tab 0  
 loratadine (CLARITIN) 10 mg tablet Take 10 mg by mouth daily as needed for Allergies.  MULTI-VITAMIN PO Take 1 Tab by mouth daily. No Known Allergies Social History Socioeconomic History  Marital status:  Spouse name: Not on file  Number of children: Not on file  Years of education: Not on file  Highest education level: Not on file Occupational History  Not on file Social Needs  Financial resource strain: Not on file  Food insecurity:  
  Worry: Not on file Inability: Not on file  Transportation needs:  
  Medical: Not on file Non-medical: Not on file Tobacco Use  Smoking status: Never Smoker  Smokeless tobacco: Never Used Substance and Sexual Activity  Alcohol use: No  
 Drug use: No  
 Sexual activity: Not on file Lifestyle  Physical activity:  
  Days per week: Not on file Minutes per session: Not on file  Stress: Not on file Relationships  Social connections:  
  Talks on phone: Not on file Gets together: Not on file Attends Jewish service: Not on file Active member of club or organization: Not on file Attends meetings of clubs or organizations: Not on file Relationship status: Not on file  Intimate partner violence:  
  Fear of current or ex partner: Not on file Emotionally abused: Not on file Physically abused: Not on file Forced sexual activity: Not on file Other Topics Concern  Not on file Social History Narrative  Not on file The patient has a family history of heart problems, but no genetic cardiomyopathies or SCD. Review of Systems 14 pt Review of Systems is negative unless otherwise mentioned in the HPI. Wt Readings from Last 3 Encounters:  
03/28/19 53.5 kg (118 lb) 03/05/19 52.6 kg (116 lb) 03/01/19 54.3 kg (119 lb 12.8 oz) Temp Readings from Last 3 Encounters:  
03/07/19 97.5 °F (36.4 °C) (Tympanic) 03/05/19 97.3 °F (36.3 °C) (Temporal) 02/28/19 97.1 °F (36.2 °C) BP Readings from Last 3 Encounters:  
03/28/19 134/74  
03/07/19 132/72  
03/05/19 118/78 Pulse Readings from Last 3 Encounters:  
03/28/19 74  
03/07/19 68  
03/05/19 62 Physical Exam:   
Visit Vitals /74 Pulse 74 Ht 5' 2\" (1.575 m) Wt 53.5 kg (118 lb) LMP  (LMP Unknown) SpO2 95% BMI 21.58 kg/m² Physical Exam  
Constitutional: She is oriented to person, place, and time. She appears well-developed and well-nourished. No distress. HENT:  
Head: Normocephalic and atraumatic. Eyes: Pupils are equal, round, and reactive to light. EOM are normal.  
Neck: No JVD present. Cardiovascular: Normal rate, regular rhythm, normal heart sounds and intact distal pulses. Exam reveals no gallop and no friction rub. No murmur heard. Pulmonary/Chest: Effort normal and breath sounds normal. No respiratory distress. She has no wheezes. She has no rales. She exhibits no tenderness. Abdominal: Soft. Bowel sounds are normal.  
Musculoskeletal: She exhibits no edema or tenderness. Neurological: She is alert and oriented to person, place, and time. Skin: Skin is warm and dry. She is not diaphoretic. Psychiatric: She has a normal mood and affect.   
 
Labs: 
Glucose 89   65 - 99 mg/dL Final  
BUN 30   10 - 36 mg/dL Final  
Creatinine 1.28 Abnormally high   H  0.57 - 1.00 mg/dL Final  
GFR est non-AA 37 Abnormally low   L  >59 mL/min/1.73 Final  
GFR est AA 42 Abnormally low   L  >59 mL/min/1.73 Final  
BUN/Creatinine ratio 23   12 - 28  Final  
Sodium 140   134 - 144 mmol/L Final  
Potassium 4.8   3.5 - 5.2 mmol/L Final  
Chloride 100   96 - 106 mmol/L Final  
CO2 23   20 - 29 mmol/L Final  
Calcium 9.2   8.7 - 10.3 mg/dL Final  
Protein, total 6.8   6.0 - 8.5 g/dL Final  
Albumin 3.8   3.2 - 4.6 g/dL Final  
 GLOBULIN, TOTAL 3.0   1.5 - 4.5 g/dL Final  
A-G Ratio 1.3   1.2 - 2.2  Final  
Bilirubin, total 0.3   0.0 - 1.2 mg/dL Final  
Alk. phosphatase 63   39 - 117 IU/L Final  
AST (SGOT) 20   0 - 40 IU/L Final  
ALT (SGPT) 15   0 - 32 IU/L Final  
 
Impression and Plan: 
Cory June is a 80 y.o. with: 
 
1.) HFrEF, s/p AECHF, now euvolemic/ stable 
2.) Symptomatic SSS s/p PPM, known 
3.) Dyslipidemia, known 
4.) Advanced age/ frailty 5.) LBBB, known 1.) Continue current medical therapy, unlikely to tolerate higher doses of HF meds due to hypotension and CKD 
2.) Continue Lasix daily but if dehydrated, lower BP, can hold to avoid dehydration (last time tried to stop Lasix got quickly volume overloaded/ hospitalized) 3.) Continue close monitoring of SBP and weight at home, please call sooner if >2 lbs wt gain 4.) Patient and daughter prefer avoiding additional medication at this time, while her SCr is not prohibitive of ACEI/ or Entrestro I think this is reasonable given her age/ frailty and want to avoid hypotension/ falls 5.) RTC 3 months follow up of HFrEF, will be due for recheck LV function ~8/2019 after 6 months medical therapy but even if EF low patient unlikely to want primary prevention ICD after discussion with her and her daughter (prefers conservative mgt) Thank you for allowing me to participate in the care of your patient, please do not hesitate to call with questions or concerns. Kindest regards, Cayla Keepers, DO

## 2019-03-28 NOTE — PROGRESS NOTES
Aries De Souza presents today for Chief Complaint Patient presents with  CHF  
  3 month follow up  Dizziness  
  sometimes Aries De Souza preferred language for health care discussion is english/other. Is someone accompanying this pt? Daughter Is the patient using any DME equipment during OV? no 
 
Depression Screening: No flowsheet data found. Learning Assessment: 
Learning Assessment 3/28/2019 PRIMARY LEARNER Patient PRIMARY LANGUAGE ENGLISH  
LEARNER PREFERENCE PRIMARY DEMONSTRATION  
  -  
  -  
ANSWERED BY Patient RELATIONSHIP SELF Abuse Screening: 
Abuse Screening Questionnaire 10/11/2017 Do you ever feel afraid of your partner? Chelsie Escalante Are you in a relationship with someone who physically or mentally threatens you? Chelsie Escalante Is it safe for you to go home? [facility] Fall Risk Fall Risk Assessment, last 12 mths 3/28/2019 Able to walk? Yes Fall in past 12 months? Yes Fall with injury? No  
Number of falls in past 12 months 1 Fall Risk Score 1 Pt currently taking Anticoagulant therapy? ASA 81mg daily Coordination of Care: 1. Have you been to the ER, urgent care clinic since your last visit? Hospitalized since your last visit? 2/13 - 2/15 for CHF 2. Have you seen or consulted any other health care providers outside of the [address] [place] [address] since your last visit? Include any pap smears or colon screening.  no

## 2019-04-03 ENCOUNTER — PATIENT OUTREACH (OUTPATIENT)
Dept: CARDIOLOGY CLINIC | Age: 84
End: 2019-04-03

## 2019-04-05 ENCOUNTER — PATIENT OUTREACH (OUTPATIENT)
Dept: CARDIOLOGY CLINIC | Age: 84
End: 2019-04-05

## 2019-04-08 ENCOUNTER — TELEPHONE (OUTPATIENT)
Dept: CARDIOLOGY CLINIC | Age: 84
End: 2019-04-08

## 2019-04-08 NOTE — TELEPHONE ENCOUNTER
Patient's daughter Brittney Velasquez called and wanted to report some recent symptoms that her mom is having. She said that patient has been very nauseous and vomiting. She has just been feeling very sick. She also had an unwitnessed fall this morning. No injuries. When she fell she called her daughter int he room and said she just felt so sick she doesn't remember what happened. Daughter reports that her face turns very pale and \"she just has no color\" also says her nighttime routine has changed and she can not get to sleep. She is taking all medications as prescribed. She denies any cardiac symptoms. No chest pain, SOB, dizziness, swelling, or palpitations. Daughter also stated she hears her coughing at night. She is worried it may be cardiac issues and says everytime she calls the PCP they tell her she should see us. Although she did not call the PCP this time about these symptoms.

## 2019-04-08 NOTE — TELEPHONE ENCOUNTER
Reviewed information with Reynold Caldwell NP and she advised patient to get an appointment with her PCP. Patients daughter made aware.

## 2019-04-09 ENCOUNTER — PATIENT OUTREACH (OUTPATIENT)
Dept: CARDIOLOGY CLINIC | Age: 84
End: 2019-04-09

## 2019-04-16 ENCOUNTER — PATIENT OUTREACH (OUTPATIENT)
Dept: CARDIOLOGY CLINIC | Age: 84
End: 2019-04-16

## 2019-04-16 NOTE — LETTER
4/16/2019 12:34 PM 
 
Ms. Neelima Cornell [address] [facility] [id number] Vinny Oraler To Whom It May Concern: 
 
   I am a Heart Failure Nurse Navigator working with your cardiologist.  Part of my job is to assist you transitioning from hospital to home by following up with patients who have been in the hospital to see how they are feeling, answer any questions they may have about their visit and review upcoming follow-up appointments to see your primary care doctor as well as specialists. I have been unable to reach you by telephone and wanted to make sure that you scheduled a follow-up appointment to come in and talk to your cardiologist about your recent visit to the hospital. If you have any questions or concerns, please feel free to call me at the number listed above. Thank you for allowing [employer] to participate in your care. Sincerely, Dex Martin RN

## 2019-04-16 NOTE — PROGRESS NOTES
Multiple attempts to contact patient's family for Transition of Care follow up. No answers, left message with contact information provided. Will send letter and close out episode.

## 2019-04-26 RX ORDER — FUROSEMIDE 20 MG/1
20 TABLET ORAL DAILY
Qty: 90 TAB | Refills: 0 | Status: SHIPPED | OUTPATIENT
Start: 2019-04-26 | End: 2019-07-23 | Stop reason: SDUPTHER

## 2019-04-26 NOTE — TELEPHONE ENCOUNTER
Last Visit: 2/20/19 with MD Faustina Sosa  Next Appointment: 8/5/19 with MD Faustina Sosa  Previous Refill Encounter(s): 2/15/19 #30    Requested Prescriptions     Pending Prescriptions Disp Refills    furosemide (LASIX) 20 mg tablet 90 Tab 3     Sig: Take 1 Tab by mouth daily.

## 2019-04-28 RX ORDER — OMEPRAZOLE 40 MG/1
CAPSULE, DELAYED RELEASE ORAL
Qty: 90 CAP | Refills: 0 | Status: SHIPPED | OUTPATIENT
Start: 2019-04-28 | End: 2019-07-26 | Stop reason: SDUPTHER

## 2019-05-09 ENCOUNTER — OFFICE VISIT (OUTPATIENT)
Dept: ORTHOPEDIC SURGERY | Facility: CLINIC | Age: 84
End: 2019-05-09

## 2019-05-09 VITALS
DIASTOLIC BLOOD PRESSURE: 56 MMHG | WEIGHT: 118 LBS | BODY MASS INDEX: 21.71 KG/M2 | OXYGEN SATURATION: 96 % | SYSTOLIC BLOOD PRESSURE: 122 MMHG | TEMPERATURE: 95.8 F | RESPIRATION RATE: 16 BRPM | HEIGHT: 62 IN | HEART RATE: 76 BPM

## 2019-05-09 DIAGNOSIS — R58 ECCHYMOSIS ON EXAMINATION: ICD-10-CM

## 2019-05-09 DIAGNOSIS — S40.211A ABRASION OF RIGHT SHOULDER, INITIAL ENCOUNTER: ICD-10-CM
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
## 2019-05-09 DIAGNOSIS — S40.021A CONTUSION OF MULTIPLE SITES OF RIGHT UPPER ARM, INITIAL ENCOUNTER: ICD-10-CM

## 2019-05-09 DIAGNOSIS — M89.8X1 PAIN OF RIGHT SCAPULA: ICD-10-CM

## 2019-05-09 DIAGNOSIS — M25.611 DECREASED RANGE OF MOTION OF SHOULDER, RIGHT: ICD-10-CM

## 2019-05-09 DIAGNOSIS — Z87.898 H/O DIZZINESS: ICD-10-CM

## 2019-05-09 NOTE — PROGRESS NOTES
Patient: Iline Mcburney                MRN: [id number]       SSN: [id number]  YOB: 1927        AGE: 80 y.o. SEX: female          PCP: Lisy Lei MD  05/09/19    Chief Complaint   Patient presents with    Shoulder Pain     right shoulder pain; pt fell 5/7/19; shoulder swollen        HISTORY:  Iline Mcburney is a 80 y.o. female presents to the office 2 days status post fall at home. She was walking out to the swing in the yard with her small dog when the swing apparently rocked backwards and she attempted to sit down. The swing was out of position and she fell on her backside directly to the ground with the yard swing traveling in a forward position striking her right shoulder and upper right back. Her daughter present with her at the time called 911 and she was field assessed by the EMT/paramedic and found to have no acute injuries. She was not transported to the emergency room with therefore. Since the time of the accident she is progressed with right shoulder and upper back pain. She has no history of injury to the area. At rest her pain is 2-3 on a 10 point scale with activity and to include pushing upwards on the arm of the seat pain increases to upwards of a 6-7 on a 10 point scale. No loss of consciousness occurred in the fall. Pain Assessment  5/9/2019   Location of Pain Shoulder   Location Modifiers Right   Severity of Pain 7   Quality of Pain Aching; Sharp   Duration of Pain A few minutes   Frequency of Pain Intermittent   Aggravating Factors Bending;Stretching;Straightening   Limiting Behavior Yes   Relieving Factors Rest   Result of Injury Yes   Work-Related Injury No   Type of Injury Fall           No results found for: HBA1C, HGBE8, ULR7ZVEX, OFM2OZXR  Weight Metrics 5/9/2019 3/28/2019 3/5/2019 3/1/2019 2/20/2019 2/17/2019 2/15/2019   Weight 118 lb 118 lb 116 lb 119 lb 12.8 oz 118 lb 115 lb 118 lb 14.4 oz   BMI 21.58 kg/m2 21.58 kg/m2 21.22 kg/m2 21.91 kg/m2 21.58 kg/m2 21.03 kg/m2 -            Problem List Items Addressed This Visit     None      Visit Diagnoses     Fall, initial encounter    -  Primary    Relevant Orders    AMB POC XRAY, SHOULDER; COMPLETE, 2+    AMB POC XRAY; SCAPULA, COMPLETE    Abrasion of right shoulder, initial encounter        Relevant Orders    REFERRAL TO PHYSICAL THERAPY    Ecchymosis on examination        Pain of right scapula        Relevant Orders    REFERRAL TO PHYSICAL THERAPY    Decreased range of motion of shoulder, right        Relevant Orders    REFERRAL TO PHYSICAL THERAPY    Contusion of multiple sites of right upper arm, initial encounter        Relevant Orders    REFERRAL TO PHYSICAL THERAPY    H/O dizziness              PAST MEDICAL HISTORY:   Past Medical History:   Diagnosis Date    Anxiety     Arthritis     Depression     GERD (gastroesophageal reflux disease)     HTN (hypertension)     LBBB (left bundle branch block)     Osteoporosis     completed 8 years Fosamax    Sciatica     Urinary incontinence        PAST SURGICAL HISTORY:   Past Surgical History:   Procedure Laterality Date    CARDIAC SURG PROCEDURE UNLIST  11/2018    pacemaker     HX CHOLECYSTECTOMY      HX HYSTERECTOMY      HX ORTHOPAEDIC      fractured right patella surgery    NE ANESTH,SURGERY OF SHOULDER      NE INS NEW/RPLC PRM PACEMAKER W/TRANSV ELTRD VENTR N/A 11/28/2018    INSERT PPM SINGLE VENTRICULAR performed by Blaine Regan MD at [facility] CATH LAB       ALLERGIES: No Known Allergies     CURRENT MEDICATIONS:  A list of medications prior to the time of admission include:  Prior to Admission medications    Medication Sig Start Date End Date Taking? Authorizing Provider   omeprazole (PRILOSEC) 40 mg capsule TAKE 1 CAPSULE BY MOUTH DAILY 4/28/19  Yes Willam Cam MD   furosemide (LASIX) 20 mg tablet Take 1 Tab by mouth daily.  4/26/19  Yes Merlinda Motes, MD   carvedilol (COREG) 6.25 mg tablet Take 1 Tab by mouth two (2) times daily (with meals). 3/13/19  Yes Janeen LamarEsther DO   sertraline (ZOLOFT) 50 mg tablet TAKE 1 TABLET BY MOUTH DAILY 3/3/19  Yes Leida Morales MD   atorvastatin (LIPITOR) 10 mg tablet Take 1 Tab by mouth nightly. 3/1/19  Yes Martell Arauz NP   meloxicam (MOBIC) 15 mg tablet Take 15 mg by mouth daily. 1 tab by mouth daily   Yes Provider, Historical   aspirin 81 mg chewable tablet Take 1 Tab by mouth daily. 11/30/18  Yes Shahbaz Prabhakar MD   fluticasone (FLONASE) 50 mcg/actuation nasal spray 1 [place] by Both Nostrils route as needed. Yes Provider, Historical   sertraline (ZOLOFT) 50 mg tablet TAKE 1 TABLET BY MOUTH DAILY 12/4/17  Yes Leida Morales MD   loratadine (CLARITIN) 10 mg tablet Take 10 mg by mouth daily as needed for Allergies. Yes Provider, Historical   MULTI-VITAMIN PO Take 1 Tab by mouth daily. Yes Provider, Historical       FAMILY HISTORY:   Family History   Problem Relation Age of Onset    Heart Disease Mother     Diabetes Mother     Heart Disease Father     Diabetes Father     Hypertension Sister     Diabetes Sister     Heart Disease Brother     Heart Disease Sister     Diabetes Sister     Heart Disease Brother        SOCIAL HISTORY:   Social History     Socioeconomic History    Marital status:      Spouse name: Not on file    Number of children: Not on file    Years of education: Not on file    Highest education level: Not on file   Tobacco Use    Smoking status: Never Smoker    Smokeless tobacco: Never Used   Substance and Sexual Activity    Alcohol use: No    Drug use: No       ROS: Patient denies any chest pain, she does have chronic exertional dyspnea, no fever chills or night sweats, no rash no itching, no headaches, no loss of consciousness occurred with the recent fall. She does have a history of vertigo.     EXAM: Reveals a frail petite 80-year-old  female atraumatic normocephalic alert and oriented x3 sitting comfortable in examination room in a wheelchair. She is joined by her daughter. Examination to her right upper back including her anterior and lateral shoulder there is significant spreading ecchymosis associated with the entire area. There is no evidence of skin aung infection. Over the superior portion of the shoulder there is an abrasion with measuring 4 cm in length anterior posterior by 3 cm in width. Pinpoint bleeding is noted. No evidence of further skin breakdown. Patient has active forward flexion of her right arm at 160 degrees without pain when she lowers her arm in the abduction plane pain is reproduced to the posterior upper right back. Her passive external rotation of the right arm 35 degrees without pain internal rotation actively tested at the posterior aspect of the right iliac crest with slight discomfort reproduced under the [facility] joint. Distal sensation intact fully right upper extremity cap refill is brisk and less than 2 seconds of the right upper extremity. Examination of the right scapula reveals soft tissue swelling with no fracture deformities appreciated no step-offs noted. There is trace ecchymosis through the entire superior portion of the scapula. Diffuse tenderness in the rhomboids on the right. Patient has full range of motion of her cervical spine without pain nor complaint. DIAGNOSTIC IMAGING: X-rays of the right shoulder and a scapular view reveal osteoarthritis noted in the glenohumeral joint no fracture deformities noted however of the humerus head neck or shaft. The scapula is fracture free with no lesions or masses noted. IMPRESSION:  1. Status post fall 7 May 2019 without loss of consciousness  2. Right shoulder contusion  3. Right shoulder strain  4. Ecchymosis on exam  5. Decreased range of motion of the right shoulder particularly in the abduction plane  6. Abrasion to the superior right shoulder  7.   History of dizziness        PLAN: Patient would benefit certainly from a short course of physical therapy to include gentle range of motion and a home exercise program.  It is encouraging that the x-rays today reveal no fractures noting her advanced age. Today her images were reviewed at length with her and her daughter and all questions answered to her satisfaction. We will plan on seeing her back in about 2 weeks for reassessment. Home exercise program also ordered with outpatient physical therapy. Recommended weight restriction lift push pull carry of the right upper extremity 1 to 3 pounds generally. She should continue using her for post rolling walker for ambulation assistance. Also noted when she is moving around the house she must have assistance and someone in the house must be aware that she is moving from place to place her point-to-point. 1.) Please continue current medications as prescribed for pain recommend Tylenol / Motrin per manufactures recommendations  2.) Please maintain current level of activity as discussed at visit today, implement the discussed RICE regimen. 3.) Provider will initiate and/or consider initiating physical therapy to assist in patient's safe recoveries. 4.) Ok to apply ice or a cold pack with a towel over the skin for 15 min on and 45 minutes off and may repeat up to 4 times within 24 hours. 5.) Provider discussed the risk of falls , mobility assisted device questions discussed thoroughly.   6.) Treat the   Problem List Items Addressed This Visit     None      Visit Diagnoses     Fall, initial encounter    -  Primary    Relevant Orders    AMB POC XRAY, SHOULDER; COMPLETE, 2+    AMB POC XRAY; SCAPULA, COMPLETE    Abrasion of right shoulder, initial encounter        Relevant Orders    REFERRAL TO PHYSICAL THERAPY    Ecchymosis on examination        Pain of right scapula        Relevant Orders    REFERRAL TO PHYSICAL THERAPY    Decreased range of motion of shoulder, right        Relevant Orders    REFERRAL TO PHYSICAL THERAPY    Contusion of multiple sites of right upper arm, initial encounter        Relevant Orders    REFERRAL TO PHYSICAL THERAPY    H/O dizziness           presenting medical problem(s) per protocol established on \"evidence based methods\". 7.) Provider recommended to the patient future action(s) that could limit the medical condition from re occurring, and or continue to support their recoveries. 8.) Establish a comprehensive follow up plan that is logical, and accessible for patient to follow with all pertinent medical providers, and or facilities in a timely fashion in order to      continue continuity of care. 9.)    Patient provided a reminder for a \"due or due soon\" health maintenance. I have asked the patient to schedule an appointment with their primary care provider for follow-up on general  health maintenance. Concerns. Today all the patient's questions were answered to their satisfaction. Copies of x-rays reviewed and provided. Walt Sales  [address] [place] [address] RONN  [facility]

## 2019-05-23 ENCOUNTER — OFFICE VISIT (OUTPATIENT)
Dept: ORTHOPEDIC SURGERY | Facility: CLINIC | Age: 84
End: 2019-05-23

## 2019-05-23 VITALS
DIASTOLIC BLOOD PRESSURE: 65 MMHG | RESPIRATION RATE: 18 BRPM | HEIGHT: 62 IN | SYSTOLIC BLOOD PRESSURE: 146 MMHG | BODY MASS INDEX: 21.79 KG/M2 | TEMPERATURE: 96.5 F | WEIGHT: 118.4 LBS | OXYGEN SATURATION: 98 % | HEART RATE: 70 BPM
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
## 2019-05-23 DIAGNOSIS — S40.211D ABRASION OF RIGHT SHOULDER, SUBSEQUENT ENCOUNTER: Primary | ICD-10-CM

## 2019-05-23 DIAGNOSIS — S40.021D: ICD-10-CM

## 2019-05-23 NOTE — PROGRESS NOTES
HISTORY OF PRESENT ILLNESS:  Loren Fink returns to the office. She is status post fall resulting in injury with decreased range of motion to the right shoulder. She was supposed to start physical therapy on May 22, 2019, but was doing so well that her daughter decided not to take her. Today, Ms. Real Haro reports doing very well. She is in her daughter's presence. PHYSICAL EXAM:  She is able to raise her right arm fully in the forward flexion plain at 150ø without discomfort. Passive external rotation of the right upper extremity is noted at 45ø without pain. She can reach behind her back to about L3. The patient's distal sensation is intact fully to the right upper extremity. Capillary refill is brisk and less than 2 seconds to the right upper extremity. PLAN:   The patient, at this point, has done well despite injuries from the fall. The abrasion associated with the right shoulder has resolved completely. She does have a little prominence of the distal clavicle, which was present prior, and x-rays from previous revealed no fracture. The patient has no pain associated with palpation to the distal point of the right clavicle. At this point, the patient is going to continue to follow with us on a prn basis. She does use a walker but did not have it with her at the time of exam today. She was encouraged to always keep it with her and please to let someone known when she is going to move around the house point to point. She is not to go outside, according to her daughter, alone. Today, all of her and her daughter's questions were answered to their satisfaction.

## 2019-06-03 RX ORDER — SERTRALINE HYDROCHLORIDE 50 MG/1
TABLET, FILM COATED ORAL
Qty: 90 TAB | Refills: 0 | Status: SHIPPED | OUTPATIENT
Start: 2019-06-03 | End: 2019-06-19 | Stop reason: SDUPTHER

## 2019-06-19 ENCOUNTER — OFFICE VISIT (OUTPATIENT)
Dept: CARDIOLOGY CLINIC | Age: 84
End: 2019-06-19

## 2019-06-19 VITALS — HEIGHT: 62 IN | BODY MASS INDEX: 21.9 KG/M2 | WEIGHT: 119 LBS

## 2019-06-19 DIAGNOSIS — I50.21 CHF (CONGESTIVE HEART FAILURE), NYHA CLASS III, ACUTE, SYSTOLIC (HCC): Primary | ICD-10-CM

## 2019-06-19 RX ORDER — CARVEDILOL 12.5 MG/1
12.5 TABLET ORAL 2 TIMES DAILY WITH MEALS
Qty: 180 TAB | Refills: 3 | Status: SHIPPED | OUTPATIENT
Start: 2019-06-19 | End: 2020-06-22

## 2019-06-19 NOTE — PATIENT INSTRUCTIONS
Increase coreg to 12.5mg twice daily Monitor BP at home call is systolic BP less than 974 Follow up in 3 weeks

## 2019-06-19 NOTE — PROGRESS NOTES
Iline Mcburney    Chief Complaint   Patient presents with    CHF     3 MONTH     Chest Pain     tight for 3 days comes and goes last episode 3 week ago    Dizziness     on/off       HPI    Iline Mcburney is a 80 y.o. with no known cardiac disease who presented initially for evaluation of syncope. As you know, Vannesa Jenkins is such a pleasant patient with history hypertension, arthritis and falls, found to have cardiomyopathy and SSS. Vannesa Jenkins lives with her daughter. Back in Sept when her daughter was out of town she apparently had a syncopal event. Her sister was with her at the time and saw her somewhat slumped in her chair. She was slow to respond but came to. Vannesa Jenkins wasn't sure what happened and didn't seek medical care after that initial event. Again since then she had another episode. Her daughter said her mother looked unwell/ pale and said her stomach was upset. She again kind of slumped and her daughter caught her so she did not fall/ get injured. She believes she lost consciousness. She came to and felt somewhat fatigued briefly. She had no observed seizure like activity. She completely denies surrounding cardiac complaints with these episodes- specifically has never noticed palpations/ heart racing, no CP, or SOB. No swelling or headaches. She has no known heart problems and has never seen a heart doctor or had cardiac testing. Dom's work up showed globally reduced EF 26-30%. I called and spoke to her daughter- giving her the results and alerted her to the signs and symptoms to look out for. Apparently, she got off the phone and asked her mom how she was feeling and she admitted she had been short of breath for 2 days. They ended up going to [facility] ER the next day and was in AECHF/ HFrEF with fluid overload. Just before discharge she ended up passing out (as she had mentioned very rarely happens)- and she was noted to have a 6 sec pause that correlated. She was appropriately given a pacemaker.     She presents today for routine followup. She is feeling great and has no complaints. Her daughter notices she is more alert and but has some good days some bad days. Her only issue is trying to gain weight. She is taking her Lasix daily and her weight has been stable (actually 2 lbs less per our scale). She saw our NP in HF clinic and was stable with no medication changes at that time. Since she last saw me, over the last 6 weeks she has had about 4 episodes of sudden onset nausea and diaphoresis. You know ascertaining details of symptoms from patient herself can be quite difficult. In general she has a very poor memory both short and long-term memory in general does not complain about anything. Is her daughter who notices when they are walking into a restaurant or her mom is exerting herself a little bit too much that all of a sudden she will get short of breath look pale and ill-appearing and diaphoretic. She will say \"mom are you okay\" and she'll just say \"i'm sick\" mostly c/o nausea and fatigue. If she lays down and rests she feels better and symptoms subside. She doesn't have aung chest \"pain\" or tightness with these episodes. She does have some rare unrelated CP she admits too that are at rest. I see her daughter called one day when I was not here, describing the above and we discussed this all in detail.     Past Medical History:   Diagnosis Date    Anxiety     Arthritis     Depression     GERD (gastroesophageal reflux disease)     HTN (hypertension)     LBBB (left bundle branch block)     Osteoporosis     completed 8 years Fosamax    Sciatica     Urinary incontinence        Past Surgical History:   Procedure Laterality Date    CARDIAC SURG PROCEDURE UNLIST  11/2018    pacemaker     HX CHOLECYSTECTOMY      HX HYSTERECTOMY      HX ORTHOPAEDIC      fractured right patella surgery    TX ANESTH,SURGERY OF SHOULDER      TX INS NEW/RPLC PRM PACEMAKER W/TRANSV ELTRD VENTR N/A 11/28/2018    INSERT PPM SINGLE VENTRICULAR performed by Lester Leyva MD at [facility] CATH LAB       Current Outpatient Medications   Medication Sig Dispense Refill    omeprazole (PRILOSEC) 40 mg capsule TAKE 1 CAPSULE BY MOUTH DAILY 90 Cap 0    furosemide (LASIX) 20 mg tablet Take 1 Tab by mouth daily. 90 Tab 0    carvedilol (COREG) 6.25 mg tablet Take 1 Tab by mouth two (2) times daily (with meals). 180 Tab 3    atorvastatin (LIPITOR) 10 mg tablet Take 1 Tab by mouth nightly. 30 Tab 6    meloxicam (MOBIC) 15 mg tablet Take 15 mg by mouth daily. 1 tab by mouth daily      aspirin 81 mg chewable tablet Take 1 Tab by mouth daily. 30 Tab 11    fluticasone (FLONASE) 50 mcg/actuation nasal spray 1 Fay by Both Nostrils route as needed.  sertraline (ZOLOFT) 50 mg tablet TAKE 1 TABLET BY MOUTH DAILY 90 Tab 0    loratadine (CLARITIN) 10 mg tablet Take 10 mg by mouth daily as needed for Allergies.  MULTI-VITAMIN PO Take 1 Tab by mouth daily.          No Known Allergies    Social History     Socioeconomic History    Marital status:      Spouse name: Not on file    Number of children: Not on file    Years of education: Not on file    Highest education level: Not on file   Occupational History    Not on file   Social Needs    Financial resource strain: Not on file    Food insecurity:     Worry: Not on file     Inability: Not on file    Transportation needs:     Medical: Not on file     Non-medical: Not on file   Tobacco Use    Smoking status: Never Smoker    Smokeless tobacco: Never Used   Substance and Sexual Activity    Alcohol use: No    Drug use: No    Sexual activity: Not on file   Lifestyle    Physical activity:     Days per week: Not on file     Minutes per session: Not on file    Stress: Not on file   Relationships    Social connections:     Talks on phone: Not on file     Gets together: Not on file     Attends Islam service: Not on file     Active member of club or organization: Not on file Attends meetings of clubs or organizations: Not on file     Relationship status: Not on file    Intimate partner violence:     Fear of current or ex partner: Not on file     Emotionally abused: Not on file     Physically abused: Not on file     Forced sexual activity: Not on file   Other Topics Concern    Not on file   Social History Narrative    Not on file        The patient has a family history of heart problems, but no genetic cardiomyopathies or SCD. Review of Systems    14 pt Review of Systems is negative unless otherwise mentioned in the HPI. Wt Readings from Last 3 Encounters:   06/19/19 54 kg (119 lb)   05/23/19 53.7 kg (118 lb 6.4 oz)   05/09/19 53.5 kg (118 lb)     Temp Readings from Last 3 Encounters:   05/23/19 96.5 °F (35.8 °C) (Oral)   05/09/19 95.8 °F (35.4 °C) (Oral)   03/07/19 97.5 °F (36.4 °C) (Tympanic)     BP Readings from Last 3 Encounters:   05/23/19 146/65   05/09/19 122/56   03/28/19 134/74     Pulse Readings from Last 3 Encounters:   05/23/19 70   05/09/19 76   03/28/19 74     Physical Exam:    Visit Vitals  Ht 5' 2\" (1.575 m)   Wt 54 kg (119 lb)   LMP  (LMP Unknown)   BMI 21.77 kg/m²      Physical Exam   Constitutional: She is oriented to person, place, and time. She appears well-developed and well-nourished. No distress. HENT:   Head: Normocephalic and atraumatic. Eyes: Pupils are equal, round, and reactive to light. EOM are normal.   Neck: No JVD present. Cardiovascular: Normal rate, regular rhythm, normal heart sounds and intact distal pulses. Exam reveals no gallop and no friction rub. No murmur heard. Pulmonary/Chest: Effort normal and breath sounds normal. No respiratory distress. She has no wheezes. She has no rales. She exhibits no tenderness. Abdominal: Soft. Bowel sounds are normal.   Musculoskeletal: She exhibits no edema or tenderness. Neurological: She is alert and oriented to person, place, and time. Skin: Skin is warm and dry. She is not diaphoretic. Psychiatric: She has a normal mood and affect. Labs:  Glucose 89   65 - 99 mg/dL Final   BUN 30   10 - 36 mg/dL Final   Creatinine 1.28 Abnormally high   H  0.57 - 1.00 mg/dL Final   GFR est non-AA 37 Abnormally low   L  >59 mL/min/1.73 Final   GFR est AA 42 Abnormally low   L  >59 mL/min/1.73 Final   BUN/Creatinine ratio 23   12 - 28  Final   Sodium 140   134 - 144 mmol/L Final   Potassium 4.8   3.5 - 5.2 mmol/L Final   Chloride 100   96 - 106 mmol/L Final   CO2 23   20 - 29 mmol/L Final   Calcium 9.2   8.7 - 10.3 mg/dL Final   Protein, total 6.8   6.0 - 8.5 g/dL Final   Albumin 3.8   3.2 - 4.6 g/dL Final   GLOBULIN, TOTAL 3.0   1.5 - 4.5 g/dL Final   A-G Ratio 1.3   1.2 - 2.2  Final   Bilirubin, total 0.3   0.0 - 1.2 mg/dL Final   Alk. phosphatase 63   39 - 117 IU/L Final   AST (SGOT) 20   0 - 40 IU/L Final   ALT (SGPT) 15   0 - 32 IU/L Final     EKG today LBBB, no concerning ST or T wave changes    Impression and Plan:  Yaniv Bernal is a 80 y.o. with:    1.) Nausea/ Diaphoresis/ SOB with exertion, suspect unstable angina  2.) HFrEF, s/p AECHF, now euvolemic/ stable (very likely ischemic but presumed)  2.) Symptomatic SSS s/p PPM, known  3.) Dyslipidemia, known  4.) Advanced age/ frailty  5.) LBBB, known    1.) Increase beta blocker, Coreg dose increased today  2.) Keep close eye on symptoms and SBP at home  3.) Plan to likely add Imdur next visit  4.) Discussed increasing Lipitor but deferred (through shared decision making) based on very regular leg cramping symptoms at baseline and advanced age  11.) RTC close follow up ~3 weeks first available to advance anti-anginal therapy with goal to improve symptoms    I'm concerned patient's symptoms are unstable angina/ ischemic cardiomyopathy. We will try to escalate medical therapy/ anti-anginal medication. We need to do this gently/ step-wise due to frailty/ her advanced age.     We again discussed that invasive coronary angiography is not a good option due to her advanced age/ frailty/ memory problems etc and daughter agrees. Despite having a LBBB at baseline, we checked an ekg to ensure no major changes (which are possible even with LBBB) but luckily it is unchanged. Thank you for allowing me to participate in the care of your patient, please do not hesitate to call with questions or concerns.     Kindest regards,    Sobeida Sousa, DO

## 2019-06-19 NOTE — PROGRESS NOTES
Abby Limb presents today for   Chief Complaint   Patient presents with    CHF     3 MONTH     Chest Pain     tight for 3 days comes and goes last episode 3 week ago    Dizziness     on/off       Abby Limb preferred language for health care discussion is english/other. Is someone accompanying this pt? yes    Is the patient using any DME equipment during OV? no    Depression Screening:  No flowsheet data found. Learning Assessment:  Learning Assessment 3/28/2019   PRIMARY LEARNER Patient   PRIMARY LANGUAGE ENGLISH   LEARNER PREFERENCE PRIMARY DEMONSTRATION     -     -   ANSWERED BY Patient   RELATIONSHIP SELF       Abuse Screening:  Abuse Screening Questionnaire 10/11/2017   Do you ever feel afraid of your partner? N   Are you in a relationship with someone who physically or mentally threatens you? N   Is it safe for you to go home? Y       Fall Risk  Fall Risk Assessment, last 12 mths 6/19/2019   Able to walk? Yes   Fall in past 12 months? Yes   Fall with injury? No   Number of falls in past 12 months 2   Fall Risk Score 2       Pt currently taking Anticoagulant therapy? no    Coordination of Care:  1. Have you been to the ER, urgent care clinic since your last visit? Hospitalized since your last visit? no    2. Have you seen or consulted any other health care providers outside of the [address] [place] [address] since your last visit? Include any pap smears or colon screening.  no

## 2019-06-26 ENCOUNTER — OFFICE VISIT (OUTPATIENT)
Dept: CARDIOLOGY CLINIC | Age: 84
End: 2019-06-26

## 2019-06-26 DIAGNOSIS — R55 SYNCOPE, UNSPECIFIED SYNCOPE TYPE: Primary | ICD-10-CM

## 2019-06-26 DIAGNOSIS — Z95.0 CARDIAC PACEMAKER IN SITU: ICD-10-CM

## 2019-07-01 NOTE — PROGRESS NOTES
I have personally seen and evaluated the device findings. Interrogation reviewed and I agree with assessment.     Jacob Cogan

## 2019-07-05 ENCOUNTER — APPOINTMENT (OUTPATIENT)
Dept: GENERAL RADIOLOGY | Age: 84
End: 2019-07-05
Attending: EMERGENCY MEDICINE
Payer: MEDICARE

## 2019-07-05 ENCOUNTER — APPOINTMENT (OUTPATIENT)
Dept: CT IMAGING | Age: 84
End: 2019-07-05
Attending: EMERGENCY MEDICINE
Payer: MEDICARE

## 2019-07-05 ENCOUNTER — HOSPITAL ENCOUNTER (EMERGENCY)
Age: 84
Discharge: HOME | End: 2019-07-05
Attending: EMERGENCY MEDICINE
Payer: MEDICARE

## 2019-07-05 VITALS
WEIGHT: 131.4 LBS | BODY MASS INDEX: 24.03 KG/M2 | HEART RATE: 77 BPM | TEMPERATURE: 99.2 F | OXYGEN SATURATION: 94 % | SYSTOLIC BLOOD PRESSURE: 122 MMHG | RESPIRATION RATE: 29 BRPM | DIASTOLIC BLOOD PRESSURE: 43 MMHG

## 2019-07-05 DIAGNOSIS — R11.0 NAUSEA WITHOUT VOMITING: ICD-10-CM

## 2019-07-05 DIAGNOSIS — N39.0 URINARY TRACT INFECTION WITHOUT HEMATURIA, SITE UNSPECIFIED: Primary | ICD-10-CM

## 2019-07-05 LAB
ALBUMIN SERPL-MCNC: 3.3 G/DL (ref 3.4–5)
ALBUMIN/GLOB SERPL: 0.7 {RATIO} (ref 0.8–1.7)
ALP SERPL-CCNC: 282 U/L (ref 45–117)
ALT SERPL-CCNC: 63 U/L (ref 13–56)
ANION GAP SERPL CALC-SCNC: 9 MMOL/L (ref 3–18)
APPEARANCE UR: CLEAR
AST SERPL-CCNC: 40 U/L (ref 15–37)
BACTERIA URNS QL MICRO: ABNORMAL /HPF
BASOPHILS # BLD: 0 K/UL (ref 0–0.1)
BASOPHILS NFR BLD: 0 % (ref 0–2)
BILIRUB SERPL-MCNC: 0.5 MG/DL (ref 0.2–1)
BILIRUB UR QL: NEGATIVE
BNP SERPL-MCNC: 9899 PG/ML (ref 0–1800)
BUN SERPL-MCNC: 28 MG/DL (ref 7–18)
BUN/CREAT SERPL: 19 (ref 12–20)
CALCIUM SERPL-MCNC: 8.7 MG/DL (ref 8.5–10.1)
CHLORIDE SERPL-SCNC: 100 MMOL/L (ref 100–108)
CK MB CFR SERPL CALC: NORMAL % (ref 0–4)
CK MB SERPL-MCNC: <1 NG/ML (ref 5–25)
CK SERPL-CCNC: 38 U/L (ref 26–192)
CO2 SERPL-SCNC: 26 MMOL/L (ref 21–32)
COLOR UR: YELLOW
CREAT SERPL-MCNC: 1.45 MG/DL (ref 0.6–1.3)
DIFFERENTIAL METHOD BLD: ABNORMAL
EOSINOPHIL # BLD: 0.4 K/UL (ref 0–0.4)
EOSINOPHIL NFR BLD: 3 % (ref 0–5)
EPITH CASTS URNS QL MICRO: ABNORMAL /LPF (ref 0–5)
ERYTHROCYTE [DISTWIDTH] IN BLOOD BY AUTOMATED COUNT: 14.3 % (ref 11.6–14.5)
GLOBULIN SER CALC-MCNC: 4.5 G/DL (ref 2–4)
GLUCOSE SERPL-MCNC: 119 MG/DL (ref 74–99)
GLUCOSE UR STRIP.AUTO-MCNC: NEGATIVE MG/DL
HCT VFR BLD AUTO: 41 % (ref 35–45)
HGB BLD-MCNC: 13.6 G/DL (ref 12–16)
HGB UR QL STRIP: ABNORMAL
KETONES UR QL STRIP.AUTO: NEGATIVE MG/DL
LEUKOCYTE ESTERASE UR QL STRIP.AUTO: ABNORMAL
LIPASE SERPL-CCNC: 172 U/L (ref 73–393)
LYMPHOCYTES # BLD: 1.9 K/UL (ref 0.9–3.6)
LYMPHOCYTES NFR BLD: 16 % (ref 21–52)
MCH RBC QN AUTO: 28 PG (ref 24–34)
MCHC RBC AUTO-ENTMCNC: 33.2 G/DL (ref 31–37)
MCV RBC AUTO: 84.4 FL (ref 74–97)
MONOCYTES # BLD: 1.2 K/UL (ref 0.05–1.2)
MONOCYTES NFR BLD: 10 % (ref 3–10)
NEUTS SEG # BLD: 8.6 K/UL (ref 1.8–8)
NEUTS SEG NFR BLD: 71 % (ref 40–73)
NITRITE UR QL STRIP.AUTO: NEGATIVE
PH UR STRIP: 6.5 [PH] (ref 5–8)
PLATELET # BLD AUTO: 203 K/UL (ref 135–420)
PMV BLD AUTO: 9.4 FL (ref 9.2–11.8)
POTASSIUM SERPL-SCNC: 4.7 MMOL/L (ref 3.5–5.5)
PROT SERPL-MCNC: 7.8 G/DL (ref 6.4–8.2)
PROT UR STRIP-MCNC: NEGATIVE MG/DL
RBC # BLD AUTO: 4.86 M/UL (ref 4.2–5.3)
RBC #/AREA URNS HPF: ABNORMAL /HPF (ref 0–5)
SODIUM SERPL-SCNC: 135 MMOL/L (ref 136–145)
SP GR UR REFRACTOMETRY: 1.01 (ref 1–1.03)
TROPONIN I BLD-MCNC: <0.04 NG/ML (ref 0–0.08)
TROPONIN I SERPL-MCNC: <0.02 NG/ML (ref 0–0.04)
UROBILINOGEN UR QL STRIP.AUTO: 0.2 EU/DL (ref 0.2–1)
WBC # BLD AUTO: 12.1 K/UL (ref 4.6–13.2)
WBC URNS QL MICRO: ABNORMAL /HPF (ref 0–4)

## 2019-07-05 PROCEDURE — 81001 URINALYSIS AUTO W/SCOPE: CPT

## 2019-07-05 PROCEDURE — 80053 COMPREHEN METABOLIC PANEL: CPT

## 2019-07-05 PROCEDURE — 74011000250 HC RX REV CODE- 250: Performed by: EMERGENCY MEDICINE

## 2019-07-05 PROCEDURE — 74011250636 HC RX REV CODE- 250/636: Performed by: EMERGENCY MEDICINE

## 2019-07-05 PROCEDURE — 84484 ASSAY OF TROPONIN QUANT: CPT

## 2019-07-05 PROCEDURE — 93005 ELECTROCARDIOGRAM TRACING: CPT

## 2019-07-05 PROCEDURE — 83880 ASSAY OF NATRIURETIC PEPTIDE: CPT

## 2019-07-05 PROCEDURE — 99285 EMERGENCY DEPT VISIT HI MDM: CPT

## 2019-07-05 PROCEDURE — 82550 ASSAY OF CK (CPK): CPT

## 2019-07-05 PROCEDURE — 96374 THER/PROPH/DIAG INJ IV PUSH: CPT

## 2019-07-05 PROCEDURE — 71045 X-RAY EXAM CHEST 1 VIEW: CPT

## 2019-07-05 PROCEDURE — 85025 COMPLETE CBC W/AUTO DIFF WBC: CPT

## 2019-07-05 PROCEDURE — 70450 CT HEAD/BRAIN W/O DYE: CPT

## 2019-07-05 PROCEDURE — 83690 ASSAY OF LIPASE: CPT

## 2019-07-05 RX ORDER — CEFTRIAXONE 1 G/1
1 INJECTION, POWDER, FOR SOLUTION INTRAMUSCULAR; INTRAVENOUS
Status: DISCONTINUED | OUTPATIENT
Start: 2019-07-05 | End: 2019-07-05

## 2019-07-05 RX ORDER — CEPHALEXIN 500 MG/1
500 CAPSULE ORAL 2 TIMES DAILY
Qty: 14 CAP | Refills: 0 | Status: SHIPPED | OUTPATIENT
Start: 2019-07-05 | End: 2019-07-12

## 2019-07-05 RX ADMIN — CEFTRIAXONE SODIUM 1 G: 1 INJECTION, POWDER, FOR SOLUTION INTRAMUSCULAR; INTRAVENOUS at 22:04

## 2019-07-05 NOTE — ED TRIAGE NOTES
Per medic patient came from home c/o abd. Pain. Pt has a history of CHF. Denies V/D. Per daughter the patient was c./o of increase work of breathing, increase of urination, and slurring words. Bp 149/70, HR 95, 96% on RA, RR, 18.

## 2019-07-06 NOTE — DISCHARGE INSTRUCTIONS
Patient Education        Nausea and Vomiting: Care Instructions  Your Care Instructions    When you are nauseated, you may feel weak and sweaty and notice a lot of saliva in your mouth. Nausea often leads to vomiting. Most of the time you do not need to worry about nausea and vomiting, but they can be signs of other illnesses. Two common causes of nausea and vomiting are stomach flu and food poisoning. Nausea and vomiting from viral stomach flu will usually start to improve within 24 hours. Nausea and vomiting from food poisoning may last from 12 to 48 hours. The doctor has checked you carefully, but problems can develop later. If you notice any problems or new symptoms, get medical treatment right away. Follow-up care is a key part of your treatment and safety. Be sure to make and go to all appointments, and call your doctor if you are having problems. It's also a good idea to know your test results and keep a list of the medicines you take. How can you care for yourself at home? · To prevent dehydration, drink plenty of fluids, enough so that your urine is light yellow or clear like water. Choose water and other caffeine-free clear liquids until you feel better. If you have kidney, heart, or liver disease and have to limit fluids, talk with your doctor before you increase the amount of fluids you drink. · Rest in bed until you feel better. · When you are able to eat, try clear soups, mild foods, and liquids until all symptoms are gone for 12 to 48 hours. Other good choices include dry toast, crackers, cooked cereal, and gelatin dessert, such as Jell-O. When should you call for help? Call 911 anytime you think you may need emergency care. For example, call if:    · You passed out (lost consciousness).    Call your doctor now or seek immediate medical care if:    · You have symptoms of dehydration, such as:  ? Dry eyes and a dry mouth. ? Passing only a little dark urine. ?  Feeling thirstier than usual.   · You have new or worsening belly pain.     · You have a new or higher fever.     · You vomit blood or what looks like coffee grounds.    Watch closely for changes in your health, and be sure to contact your doctor if:    · You have ongoing nausea and vomiting.     · Your vomiting is getting worse.     · Your vomiting lasts longer than 2 days.     · You are not getting better as expected. Where can you learn more? Go to http://luz maria-yoselin.info/. Enter 25 [id number] in the search box to learn more about \"Nausea and Vomiting: Care Instructions. \"  Current as of: September 23, 2018  Content Version: 11.9  © 5910-8522 [employer]. Care instructions adapted under license by [employer] (which disclaims liability or warranty for this information). If you have questions about a medical condition or this instruction, always ask your healthcare professional. [place] [address] any warranty or liability for your use of this information. Patient Education        Urinary Tract Infection in Women: Care Instructions  Your Care Instructions    A urinary tract infection, or UTI, is a general term for an infection anywhere between the kidneys and the urethra (where urine comes out). Most UTIs are bladder infections. They often cause pain or burning when you urinate. UTIs are caused by bacteria and can be cured with antibiotics. Be sure to complete your treatment so that the infection goes away. Follow-up care is a key part of your treatment and safety. Be sure to make and go to all appointments, and call your doctor if you are having problems. It's also a good idea to know your test results and keep a list of the medicines you take. How can you care for yourself at home? · Take your antibiotics as directed. Do not stop taking them just because you feel better. You need to take the full course of antibiotics.   · Drink extra water and other fluids for the next day or two. This may help wash out the bacteria that are causing the infection. (If you have kidney, heart, or liver disease and have to limit fluids, talk with your doctor before you increase your fluid intake.)  · Avoid drinks that are carbonated or have caffeine. They can irritate the bladder. · Urinate often. Try to empty your bladder each time. · To relieve pain, take a hot bath or lay a heating pad set on low over your lower belly or genital area. Never go to sleep with a heating pad in place. To prevent UTIs  · Drink plenty of water each day. This helps you urinate often, which clears bacteria from your system. (If you have kidney, heart, or liver disease and have to limit fluids, talk with your doctor before you increase your fluid intake.)  · Urinate when you need to. · Urinate right after you have sex. · Change sanitary pads often. · Avoid douches, bubble baths, feminine hygiene sprays, and other feminine hygiene products that have deodorants. · After going to the bathroom, wipe from front to back. When should you call for help? Call your doctor now or seek immediate medical care if:    · Symptoms such as fever, chills, nausea, or vomiting get worse or appear for the first time.     · You have new pain in your back just below your rib cage. This is called flank pain.     · There is new blood or pus in your urine.     · You have any problems with your antibiotic medicine.    Watch closely for changes in your health, and be sure to contact your doctor if:    · You are not getting better after taking an antibiotic for 2 days.     · Your symptoms go away but then come back. Where can you learn more? Go to http://luz maria-yoselin.info/. Enter D368 in the search box to learn more about \"Urinary Tract Infection in Women: Care Instructions. \"  Current as of: March 20, 2018  Content Version: 11.9  © 1172-4391 [employer].  Care instructions adapted under license by [employer] (which disclaims liability or warranty for this information). If you have questions about a medical condition or this instruction, always ask your healthcare professional. [address] any warranty or liability for your use of this information.

## 2019-07-06 NOTE — ED NOTES
Reviewed DC instructions with patient. Pt verbalized an understanding. Pt instructed to follow up with PCP this week. Pt instructed to follow up with PCP this week. RN used wheelchair to take patient to family car. Pt left ED with no distress noted and all belongings.

## 2019-07-06 NOTE — ED PROVIDER NOTES
Emergency Department Treatment Report    Patient: Teodora Poster Age: 80 y.o. Sex: female    YOB: 1927 Admit Date: 7/5/2019 PCP: Sabrina Nguyen MD   MRN: [id number]  CSN: [id number]     Room: [place] [address] Time Dictated: 8:08 PM      Chief Complaint   Chief Complaint   Patient presents with    Abdominal Pain    Shortness of Breath    Altered mental status       History of Present Illness   80 y.o. female, PMH CHF, GERD, presents after her daughter noticed a few minutes of confusion after she woke up from her nap just prior to arrival.  Last known normal was 18:00. The patient's symptoms have completely resolved. In addition, she has had this chronic diaphoresis and nausea and she had an episode earlier tonight. She has seen her cardiologist, who thinks it is related to unstable angina. Her Coreg was recently increased and Imdur is to be added at next visit. In addition, she is having some increased urinary frequency, but she takes Lasix. She is currently asymptomatic and denies nausea, vomiting, abdominal pain, fevers, chest pain, shortness of breath, diarrhea, constipation, leg swelling or fevers. Review of Systems   Constitutional: No fever, chills, or weight loss  Eyes: No visual symptoms. ENT: No sore throat, runny nose or ear pain. Respiratory: No cough, dyspnea or wheezing. Cardiovascular: No chest pain, pressure, palpitations, tightness or heaviness. Gastrointestinal: No vomiting, diarrhea or abdominal pain. +nausea  Genitourinary: No dysuria or urgency. +frequency  Musculoskeletal: No joint pain or swelling. Integumentary: No rashes. Neurological: No headaches, sensory or motor symptoms. Denies complaints in all other systems.     Past Medical/Surgical History     Past Medical History:   Diagnosis Date    Anxiety     Arthritis     Depression     GERD (gastroesophageal reflux disease)     HTN (hypertension)     LBBB (left bundle branch block)     Osteoporosis completed 8 years Fosamax    Sciatica     Urinary incontinence      Past Surgical History:   Procedure Laterality Date    CARDIAC SURG PROCEDURE UNLIST  2018    pacemaker     HX CHOLECYSTECTOMY      HX HYSTERECTOMY      HX ORTHOPAEDIC      fractured right patella surgery    MI ANESTH,SURGERY OF SHOULDER      MI INS NEW/RPLC PRM PACEMAKER W/TRANSV ELTRD VENTR N/A 2018    INSERT PPM SINGLE VENTRICULAR performed by Mary Tavares MD at [facility] [place] C[facility] LAB       Social History     Social History     Socioeconomic History    Marital status:      Spouse name: Not on file    Number of children: Not on file    Years of education: Not on file    Highest education level: Not on file   Tobacco Use    Smoking status: Never Smoker    Smokeless tobacco: Never Used   Substance and Sexual Activity    Alcohol use: No    Drug use: No       Family History     Family History   Problem Relation Age of Onset    Heart Disease Mother     Diabetes Mother     Heart Disease Father     Diabetes Father     Hypertension Sister     Diabetes Sister     Heart Disease Brother     Heart Disease Sister     Diabetes Sister     Heart Disease Brother        Home Medications     Prior to Admission Medications   Prescriptions Last Dose Informant Patient Reported? Taking? MULTI-VITAMIN PO  Self Yes No   Sig: Take 1 Tab by mouth daily. aspirin 81 mg chewable tablet   No No   Sig: Take 1 Tab by mouth daily. atorvastatin (LIPITOR) 10 mg tablet   No No   Sig: Take 1 Tab by mouth nightly. carvedilol (COREG) 12.5 mg tablet   No No   Sig: Take 1 Tab by mouth two (2) times daily (with meals). fluticasone (FLONASE) 50 mcg/actuation nasal spray  Self Yes No   [place] by Both Nostrils route as needed. furosemide (LASIX) 20 mg tablet   No No   Sig: Take 1 Tab by mouth daily. loratadine (CLARITIN) 10 mg tablet  Self Yes No   Sig: Take 10 mg by mouth daily as needed for Allergies.    meloxicam (MOBIC) 15 mg tablet   Yes No   Sig: Take 15 mg by mouth daily. 1 tab by mouth daily   omeprazole (PRILOSEC) 40 mg capsule   No No   Sig: TAKE 1 CAPSULE BY MOUTH DAILY   sertraline (ZOLOFT) 50 mg tablet   No No   Sig: TAKE 1 TABLET BY MOUTH DAILY      Facility-Administered Medications: None       Allergies   No Known Allergies    Physical Exam     ED Triage Vitals   ED Encounter Vitals Group      BP 07/05/19 1945 147/72      Pulse (Heart Rate) 07/05/19 1944 91      Resp Rate 07/05/19 1945 23      Temp 07/05/19 1944 99.2 °F (37.3 °C)      Temp src --       O2 Sat (%) 07/05/19 1945 96 %      Weight 07/05/19 1946 131 lb 6.4 oz      Height --      Constitutional: Patient appears well developed and well nourished. Appearance and behavior are age and situation appropriate. Very pleasant. HEENT: Conjunctiva clear. PERRLA. Mucous membranes moist, non-erythematous. Surface of the pharynx, palate, and tongue are pink, moist and without lesions. Neck: supple, non tender, symmetrical, no masses or JVD. Respiratory: lungs clear to auscultation, nonlabored respirations. No tachypnea or accessory muscle use. Cardiovascular: heart regular rate and rhythm without murmur rubs or gallops. Calves soft and non-tender. Distal pulses 2+ and equal bilaterally. No peripheral edema. Gastrointestinal:  Abdomen soft, nontender without complaint of pain to palpation  Musculoskeletal: Nail beds pink with prompt capillary refill  Integumentary: warm and dry without rashes or lesions  Neurologic: alert and oriented, Sensation intact, motor strength equal and symmetric. No facial asymmetry or dysarthria.     Diagnostic Studies   Lab:   Recent Results (from the past 12 hour(s))   EKG, 12 LEAD, INITIAL    Collection Time: 07/05/19  7:50 PM   Result Value Ref Range    Ventricular Rate 96 BPM    Atrial Rate 96 BPM    P-R Interval 206 ms    QRS Duration 160 ms    Q-T Interval 412 ms    QTC Calculation (Bezet) 520 ms    Calculated P Axis 9 degrees Calculated R Axis -27 degrees    Calculated T Axis 147 degrees    Diagnosis       Normal sinus rhythm  Left bundle branch block  Abnormal ECG  When compared with ECG of 13-FEB-2019 22:15,  T wave inversion more evident in Lateral leads     METABOLIC PANEL, COMPREHENSIVE    Collection Time: 07/05/19  7:52 PM   Result Value Ref Range    Sodium 135 (L) 136 - 145 mmol/L    Potassium 4.7 3.5 - 5.5 mmol/L    Chloride 100 100 - 108 mmol/L    CO2 26 21 - 32 mmol/L    Anion gap 9 3.0 - 18 mmol/L    Glucose 119 (H) 74 - 99 mg/dL    BUN 28 (H) 7.0 - 18 MG/DL    Creatinine 1.45 (H) 0.6 - 1.3 MG/DL    BUN/Creatinine ratio 19 12 - 20      GFR est AA 41 (L) >60 ml/min/1.73m2    GFR est non-AA 34 (L) >60 ml/min/1.73m2    Calcium 8.7 8.5 - 10.1 MG/DL    Bilirubin, total 0.5 0.2 - 1.0 MG/DL    ALT (SGPT) 63 (H) 13 - 56 U/L    AST (SGOT) 40 (H) 15 - 37 U/L    Alk. phosphatase 282 (H) 45 - 117 U/L    Protein, total 7.8 6.4 - 8.2 g/dL    Albumin 3.3 (L) 3.4 - 5.0 g/dL    Globulin 4.5 (H) 2.0 - 4.0 g/dL    A-G Ratio 0.7 (L) 0.8 - 1.7     CBC WITH AUTOMATED DIFF    Collection Time: 07/05/19  7:52 PM   Result Value Ref Range    WBC 12.1 4.6 - 13.2 K/uL    RBC 4.86 4.20 - 5.30 M/uL    HGB 13.6 12.0 - 16.0 g/dL    HCT 41.0 35.0 - 45.0 %    MCV 84.4 74.0 - 97.0 FL    MCH 28.0 24.0 - 34.0 PG    MCHC 33.2 31.0 - 37.0 g/dL    RDW 14.3 11.6 - 14.5 %    PLATELET 921 077 - 225 K/uL    MPV 9.4 9.2 - 11.8 FL    NEUTROPHILS 71 40 - 73 %    LYMPHOCYTES 16 (L) 21 - 52 %    MONOCYTES 10 3 - 10 %    EOSINOPHILS 3 0 - 5 %    BASOPHILS 0 0 - 2 %    ABS. NEUTROPHILS 8.6 (H) 1.8 - 8.0 K/UL    ABS. LYMPHOCYTES 1.9 0.9 - 3.6 K/UL    ABS. MONOCYTES 1.2 0.05 - 1.2 K/UL    ABS. EOSINOPHILS 0.4 0.0 - 0.4 K/UL    ABS.  BASOPHILS 0.0 0.0 - 0.1 K/UL    DF AUTOMATED     LIPASE    Collection Time: 07/05/19  7:52 PM   Result Value Ref Range    Lipase 172 73 - 393 U/L   CARDIAC PANEL,(CK, CKMB & TROPONIN)    Collection Time: 07/05/19  7:52 PM   Result Value Ref Range    CK 38 26 - 192 U/L    CK - MB <1.0 <3.6 ng/ml    CK-MB Index  0.0 - 4.0 %     CALCULATION NOT PERFORMED WHEN RESULT IS BELOW LINEAR LIMIT    Troponin-I, QT <0.02 0.0 - 0.045 NG/ML   NT-PRO BNP    Collection Time: 07/05/19  7:52 PM   Result Value Ref Range    NT pro-BNP 9,899 (H) 0 - 1,800 PG/ML   URINALYSIS W/ RFLX MICROSCOPIC    Collection Time: 07/05/19  8:25 PM   Result Value Ref Range    Color YELLOW      Appearance CLEAR      Specific gravity 1.010 1.005 - 1.030      pH (UA) 6.5 5.0 - 8.0      Protein NEGATIVE  NEG mg/dL    Glucose NEGATIVE  NEG mg/dL    Ketone NEGATIVE  NEG mg/dL    Bilirubin NEGATIVE  NEG      Blood TRACE (A) NEG      Urobilinogen 0.2 0.2 - 1.0 EU/dL    Nitrites NEGATIVE  NEG      Leukocyte Esterase MODERATE (A) NEG     URINE MICROSCOPIC ONLY    Collection Time: 07/05/19  8:25 PM   Result Value Ref Range    WBC 5 to 9 0 - 4 /hpf    RBC 0 to 2 0 - 5 /hpf    Epithelial cells 2+ 0 - 5 /lpf    Bacteria FEW (A) NEG /hpf   POC TROPONIN-I    Collection Time: 07/05/19 10:08 PM   Result Value Ref Range    Troponin-I (POC) <0.04 0.00 - 0.08 ng/mL       Imaging:    Ct Head Wo Cont    Result Date: 7/5/2019  EXAM: CT Head without Contrast CLINICAL INDICATION/HISTORY: Transient slurred speech and facial droop COMPARISON: June 9, 2016 TECHNIQUE:  Standard axial CT imaging of the head without contrast.  One or more dose reduction techniques were used on this CT: automated exposure control, adjustment of the mAs and/or kVp according to patient size, and iterative reconstruction techniques. The specific techniques used on this CT exam have been documented in the patient's electronic medical record. Digital Imaging and Communications in Medicine (DICOM) format image data are available to nonaffiliated external healthcare facilities or entities on a secure, media free, reciprocally searchable basis with patient authorization for at least a 12-month period after this study.  FINDINGS:  Brain: Cortical sulci, basilar cisterns and ventricles appear within normal limits in size and configuration. Mild burden of white matter low-attenuation change. No hemorrhage, mass effect or edema. No intra-axial or extra-axial fluid collections. Gray-white differentiation is maintained. Sinuses and mastoids: The visualized paranasal sinuses and mastoid air cells are clear. Moderate atherosclerosis of the cavernous carotid arteries. IMPRESSION: No acute intracranial findings. . Mild burden of presumed chronic microvascular ischemic change. [unfilled]    CXR (ER Prelim read): NAD    ED Course/Medical Decision Making   Patient appears well and is in no acute distress. She had a transient episode of confusion that has resolved. CT head did not reveal any acute findings. This was a TIA, the symptoms were very mild and she would not be a TPA candidate. ABCD2 score is 1 (1 for age). She is having increased dysuria and her urinalysis shows evidence of a UTI. Looking through prior urine cultures, will begin her on Keflex. Rest of blood work is unremarkable, including 2 negative troponins. EKG shows her chronic left bundle branch block, but no evidence of acute ischemia. Rest of blood work is reassuring. She was asymptomatic during her time in the ER. Had an extensive discussion with the patient and family members. At this time, patient appears safe for outpatient follow-up with her primary care physician and cardiologist.  Given very strict return precautions and the family understands. Medications   cefTRIAXone (ROCEPHIN) 1 g in sterile water (preservative free) 10 mL IV syringe (1 g IntraVENous Given 7/5/19 8455)       Final Diagnosis       ICD-10-CM ICD-9-CM   1. Urinary tract infection without hematuria, site unspecified N39.0 599.0   2. Nausea without vomiting R11.0 787.02       Disposition   Patient is discharged home in stable condition. Advised to follow with their primary care physician.   Patient advised to return to the ER for any new or worsening symptoms. My Medications      START taking these medications      Instructions Each Dose to Equal Morning Noon Evening Bedtime   cephALEXin 500 mg capsule  Commonly known as:  [place]    Your last dose was: Your next dose is: Take 1 Cap by mouth two (2) times a day for 7 days. 500 mg                    CONTINUE taking these medications      Instructions Each Dose to Equal Morning Noon Evening Bedtime   aspirin 81 mg chewable tablet    Your last dose was: Your next dose is: Take 1 Tab by mouth daily. 81 mg                 atorvastatin 10 mg tablet  Commonly known as:  LIPITOR    Your last dose was: Your next dose is: Take 1 Tab by mouth nightly. 10 mg                 carvedilol 12.5 mg tablet  Commonly known as:  COREG    Your last dose was: Your next dose is: Take 1 Tab by mouth two (2) times daily (with meals). 12.5 mg                 CLARITIN 10 mg tablet  Generic drug:  loratadine    Your last dose was: Your next dose is: Take 10 mg by mouth daily as needed for Allergies. 10 mg                 fluticasone propionate 50 mcg/actuation nasal spray  Commonly known as:  FLONASE    Your last dose was: Your next dose is:          1 [place] by Both Nostrils route as needed. 1 Spray                 furosemide 20 mg tablet  Commonly known as:  LASIX    Your last dose was: Your next dose is: Take 1 Tab by mouth daily. 20 mg                 meloxicam 15 mg tablet  Commonly known as:  MOBIC    Your last dose was: Your next dose is: Take 15 mg by mouth daily. 1 tab by mouth daily   15 mg                 MULTI-VITAMIN PO    Your last dose was: Your next dose is: Take 1 Tab by mouth daily. 1 Tab                 omeprazole 40 mg capsule  Commonly known as:  PRILOSEC    Your last dose was:       Your next dose is:          TAKE 1 CAPSULE BY MOUTH DAILY sertraline 50 mg tablet  Commonly known as:  ZOLOFT    Your last dose was: Your next dose is:          TAKE 1 TABLET BY MOUTH DAILY                        Where to Get Your Medications      Information on where to get these meds will be given to you by the nurse or doctor. Ask your nurse or doctor about these medications  · cephALEXin 500 mg capsule           Clif Chun MD  July 5, 2019    My signature above authenticates this document and my orders, the final    diagnosis (es), discharge prescription (s), and instructions in the Epic    record. If you have any questions please contact [phone]. Nursing notes have been reviewed by the physician/ advanced practice    Clinician. Disclaimer: Sections of this note are dictated using utilizing voice recognition software. Minor typographical errors may be present. If questions arise, please do not hesitate to contact me or call our department.

## 2019-07-07 LAB
ATRIAL RATE: 96 BPM
CALCULATED P AXIS, ECG09: 9 DEGREES
CALCULATED R AXIS, ECG10: -27 DEGREES
CALCULATED T AXIS, ECG11: 147 DEGREES
DIAGNOSIS, 93000: NORMAL
P-R INTERVAL, ECG05: 206 MS
Q-T INTERVAL, ECG07: 412 MS
QRS DURATION, ECG06: 160 MS
QTC CALCULATION (BEZET), ECG08: 520 MS
VENTRICULAR RATE, ECG03: 96 BPM

## 2019-07-12 ENCOUNTER — OFFICE VISIT (OUTPATIENT)
Dept: CARDIOLOGY CLINIC | Age: 84
End: 2019-07-12

## 2019-07-12 VITALS
SYSTOLIC BLOOD PRESSURE: 122 MMHG | HEIGHT: 62 IN | OXYGEN SATURATION: 97 % | DIASTOLIC BLOOD PRESSURE: 60 MMHG | BODY MASS INDEX: 21.9 KG/M2 | WEIGHT: 119 LBS | HEART RATE: 68 BPM

## 2019-07-12 DIAGNOSIS — I25.5 ISCHEMIC CARDIOMYOPATHY: Primary | ICD-10-CM

## 2019-07-12 DIAGNOSIS — R06.02 SOB (SHORTNESS OF BREATH) ON EXERTION: ICD-10-CM

## 2019-07-12 NOTE — PROGRESS NOTES
Paty Vaughan    Chief Complaint   Patient presents with   [facility] Follow Up     SOB    Fatigue    Dizziness       HPI    Paty Vaughan is a 80 y.o. with no known cardiac disease who presented initially for evaluation of syncope. As you know, August Iraheta is such a pleasant patient with history hypertension, arthritis and falls, found to have cardiomyopathy and SSS. August Iraheta lives with her daughter. Back in Sept when her daughter was out of town she apparently had a syncopal event. Her sister was with her at the time and saw her somewhat slumped in her chair. She was slow to respond but came to. August Iraheta wasn't sure what happened and didn't seek medical care after that initial event. Again since then she had another episode. Her daughter said her mother looked unwell/ pale and said her stomach was upset. She again kind of slumped and her daughter caught her so she did not fall/ get injured. She believes she lost consciousness. She came to and felt somewhat fatigued briefly. She had no observed seizure like activity. She completely denies surrounding cardiac complaints with these episodes- specifically has never noticed palpations/ heart racing, no CP, or SOB. No swelling or headaches. She has no known heart problems and has never seen a heart doctor or had cardiac testing. Dom's work up showed globally reduced EF 26-30%. I called and spoke to her daughter- giving her the results and alerted her to the signs and symptoms to look out for. Apparently, she got off the phone and asked her mom how she was feeling and she admitted she had been short of breath for 2 days. They ended up going to [facility] ER the next day and was in AECHF/ HFrEF with fluid overload. Just before discharge she ended up passing out (as she had mentioned very rarely happens)- and she was noted to have a 6 sec pause that correlated. She was appropriately given a pacemaker. She presents today for routine followup.  She is feeling great and has no complaints. Her daughter notices she is more alert and but has some good days some bad days. Her only issue is trying to gain weight. She is taking her Lasix daily and her weight has been stable (actually 2 lbs less per our scale). She saw our NP in HF clinic and was stable with no medication changes at that time. Since she last saw me, over the last 6 weeks she has had about 4 episodes of sudden onset nausea and diaphoresis. You know ascertaining details of symptoms from patient herself can be quite difficult. In general she has a very poor memory both short and long-term memory in general does not complain about anything. Is her daughter who notices when they are walking into a restaurant or her mom is exerting herself a little bit too much that all of a sudden she will get short of breath look pale and ill-appearing and diaphoretic. She will say \"mom are you okay\" and she'll just say \"i'm sick\" mostly c/o nausea and fatigue. If she lays down and rests she feels better and symptoms subside. She doesn't have aung chest \"pain\" or tightness with these episodes. She does have some rare unrelated CP she admits too that are at rest. I see her daughter called one day when I was not here, describing the above and we discussed this all in detail. She was just found to have a UTI and treated with abx. She's on her last dose today. Overall doing significantly better and back to her baseline but still having her \"sweating spells. \"    Past Medical History:   Diagnosis Date    Anxiety     Arthritis     Depression     GERD (gastroesophageal reflux disease)     HTN (hypertension)     LBBB (left bundle branch block)     Osteoporosis     completed 8 years Fosamax    Sciatica     Urinary incontinence        Past Surgical History:   Procedure Laterality Date    CARDIAC SURG PROCEDURE UNLIST  11/2018    pacemaker     HX CHOLECYSTECTOMY      HX HYSTERECTOMY      HX ORTHOPAEDIC      fractured right patella surgery    SC ANESTH,SURGERY OF SHOULDER      SC INS NEW/RPLC PRM PACEMAKER W/TRANSV ELTRD VENTR N/A 11/28/2018    INSERT PPM SINGLE VENTRICULAR performed by Amelie Hodgkin, MD at [facility]       Current Outpatient Medications   Medication Sig Dispense Refill    cephALEXin (KEFLEX) 500 mg capsule Take 1 Cap by mouth two (2) times a day for 7 days. 14 Cap 0    carvedilol (COREG) 12.5 mg tablet Take 1 Tab by mouth two (2) times daily (with meals). 180 Tab 3    omeprazole (PRILOSEC) 40 mg capsule TAKE 1 CAPSULE BY MOUTH DAILY 90 Cap 0    furosemide (LASIX) 20 mg tablet Take 1 Tab by mouth daily. 90 Tab 0    atorvastatin (LIPITOR) 10 mg tablet Take 1 Tab by mouth nightly. 30 Tab 6    meloxicam (MOBIC) 15 mg tablet Take 15 mg by mouth daily. 1 tab by mouth daily      aspirin 81 mg chewable tablet Take 1 Tab by mouth daily. 30 Tab 11    fluticasone (FLONASE) 50 mcg/actuation nasal spray 1 [place] by Both Nostrils route as needed.  sertraline (ZOLOFT) 50 mg tablet TAKE 1 TABLET BY MOUTH DAILY 90 Tab 0    loratadine (CLARITIN) 10 mg tablet Take 10 mg by mouth daily as needed for Allergies.  MULTI-VITAMIN PO Take 1 Tab by mouth daily.          No Known Allergies    Social History     Socioeconomic History    Marital status:      Spouse name: Not on file    Number of children: Not on file    Years of education: Not on file    Highest education level: Not on file   Occupational History    Not on file   Social Needs    Financial resource strain: Not on file    Food insecurity:     Worry: Not on file     Inability: Not on file    Transportation needs:     Medical: Not on file     Non-medical: Not on file   Tobacco Use    Smoking status: Never Smoker    Smokeless tobacco: Never Used   Substance and Sexual Activity    Alcohol use: No    Drug use: No    Sexual activity: Not on file   Lifestyle    Physical activity:     Days per week: Not on file     Minutes per session: Not on file  Stress: Not on file   Relationships    Social connections:     Talks on phone: Not on file     Gets together: Not on file     Attends Latter day service: Not on file     Active member of club or organization: Not on file     Attends meetings of clubs or organizations: Not on file     Relationship status: Not on file    Intimate partner violence:     Fear of current or ex partner: Not on file     Emotionally abused: Not on file     Physically abused: Not on file     Forced sexual activity: Not on file   Other Topics Concern    Not on file   Social History Narrative    Not on file        The patient has a family history of heart problems, but no genetic cardiomyopathies or SCD. Review of Systems    14 pt Review of Systems is negative unless otherwise mentioned in the HPI. Wt Readings from Last 3 Encounters:   07/12/19 54 kg (119 lb)   07/05/19 59.6 kg (131 lb 6.4 oz)   06/19/19 54 kg (119 lb)     Temp Readings from Last 3 Encounters:   07/05/19 99.2 °F (37.3 °C)   05/23/19 96.5 °F (35.8 °C) (Oral)   05/09/19 95.8 °F (35.4 °C) (Oral)     BP Readings from Last 3 Encounters:   07/12/19 122/60   07/05/19 122/43   05/23/19 146/65     Pulse Readings from Last 3 Encounters:   07/12/19 68   07/05/19 77   05/23/19 70     Physical Exam:    Visit Vitals  /60   Pulse 68   Ht 5' 2\" (1.575 m)   Wt 54 kg (119 lb)   LMP  (LMP Unknown)   SpO2 97%   BMI 21.77 kg/m²      Physical Exam   Constitutional: She is oriented to person, place, and time. She appears well-developed and well-nourished. No distress. HENT:   Head: Normocephalic and atraumatic. Eyes: Pupils are equal, round, and reactive to light. EOM are normal.   Neck: No JVD present. Cardiovascular: Normal rate, regular rhythm, normal heart sounds and intact distal pulses. Exam reveals no gallop and no friction rub. No murmur heard. Pulmonary/Chest: Effort normal and breath sounds normal. No respiratory distress. She has no wheezes. She has no rales. She exhibits no tenderness. Abdominal: Soft. Bowel sounds are normal.   Musculoskeletal: She exhibits no edema or tenderness. Neurological: She is alert and oriented to person, place, and time. Skin: Skin is warm and dry. She is not diaphoretic. Psychiatric: She has a normal mood and affect. Labs:  Glucose 89   65 - 99 mg/dL Final   BUN 30   10 - 36 mg/dL Final   Creatinine 1.28 Abnormally high   H  0.57 - 1.00 mg/dL Final   GFR est non-AA 37 Abnormally low   L  >59 mL/min/1.73 Final   GFR est AA 42 Abnormally low   L  >59 mL/min/1.73 Final   BUN/Creatinine ratio 23   12 - 28  Final   Sodium 140   134 - 144 mmol/L Final   Potassium 4.8   3.5 - 5.2 mmol/L Final   Chloride 100   96 - 106 mmol/L Final   CO2 23   20 - 29 mmol/L Final   Calcium 9.2   8.7 - 10.3 mg/dL Final   Protein, total 6.8   6.0 - 8.5 g/dL Final   Albumin 3.8   3.2 - 4.6 g/dL Final   GLOBULIN, TOTAL 3.0   1.5 - 4.5 g/dL Final   A-G Ratio 1.3   1.2 - 2.2  Final   Bilirubin, total 0.3   0.0 - 1.2 mg/dL Final   Alk.  phosphatase 63   39 - 117 IU/L Final   AST (SGOT) 20   0 - 40 IU/L Final   ALT (SGPT) 15   0 - 32 IU/L Final     EKG today LBBB, no concerning ST or T wave changes    Impression and Plan:  Fanny Cornejo is a 80 y.o. with:    1.) Nausea/ Diaphoresis/ SOB with exertion, suspect unstable angina  2.) HFrEF, s/p AECHF, now euvolemic/ stable (very likely ischemic but presumed)  2.) Symptomatic SSS s/p PPM, known  3.) Dyslipidemia, known  4.) Advanced age/ frailty  5.) LBBB, known  6.) UTI on Abx    1.) Increase beta blocker, Coreg dose increased today  2.) Keep close eye on symptoms and SBP at home  3.) Plan to likely add Imdur next visit  4.) Discussed increasing Lipitor but deferred (through shared decision making) based on very regular leg cramping symptoms at baseline and advanced age  11.) RTC close follow up ~3-4 weeks first available to advance anti-anginal therapy with goal to improve symptoms, did not increase today due to UTI    I'm concerned patient's symptoms are unstable angina/ ischemic cardiomyopathy. We will try to escalate medical therapy/ anti-anginal medication. We need to do this gently/ step-wise due to frailty/ her advanced age. We again discussed that invasive coronary angiography is not a good option due to her advanced age/ frailty/ memory problems etc and daughter agrees. Thank you for allowing me to participate in the care of your patient, please do not hesitate to call with questions or concerns.     Kindest regards,    Antonia Josehp, DO

## 2019-07-12 NOTE — PROGRESS NOTES
Katina Rankin presents today for   Chief Complaint   Patient presents with   [facility] Follow Up     SOB       Katina Rankin preferred language for health care discussion is english/other. Is someone accompanying this pt? yes    Is the patient using any DME equipment during OV? yes    Depression Screening:  No flowsheet data found. Learning Assessment:  Learning Assessment 3/28/2019   PRIMARY LEARNER Patient   PRIMARY LANGUAGE ENGLISH   LEARNER PREFERENCE PRIMARY DEMONSTRATION     -     -   ANSWERED BY Patient   RELATIONSHIP SELF       Abuse Screening:  Abuse Screening Questionnaire 10/11/2017   Do you ever feel afraid of your partner? N   Are you in a relationship with someone who physically or mentally threatens you? N   Is it safe for you to go home? Y       Fall Risk  Fall Risk Assessment, last 12 mths 6/19/2019   Able to walk? Yes   Fall in past 12 months? Yes   Fall with injury? No   Number of falls in past 12 months 2   Fall Risk Score 2       Pt currently taking Anticoagulant therapy? no    Coordination of Care:  1. Have you been to the ER, urgent care clinic since your last visit? Hospitalized since your last visit? no    2. Have you seen or consulted any other health care providers outside of the [address] [place] [address] since your last visit? Include any pap smears or colon screening.  no

## 2019-07-23 RX ORDER — FUROSEMIDE 20 MG/1
TABLET ORAL
Qty: 90 TAB | Refills: 0 | Status: SHIPPED | OUTPATIENT
Start: 2019-07-23 | End: 2019-10-21 | Stop reason: SDUPTHER

## 2019-07-29 RX ORDER — OMEPRAZOLE 40 MG/1
CAPSULE, DELAYED RELEASE ORAL
Qty: 90 CAP | Refills: 0 | Status: SHIPPED | OUTPATIENT
Start: 2019-07-29 | End: 2019-10-26 | Stop reason: SDUPTHER

## 2019-08-05 ENCOUNTER — HOSPITAL ENCOUNTER (OUTPATIENT)
Dept: LAB | Age: 84
Discharge: HOME | End: 2019-08-05
Payer: MEDICARE

## 2019-08-05 ENCOUNTER — OFFICE VISIT (OUTPATIENT)
Dept: INTERNAL MEDICINE CLINIC | Age: 84
End: 2019-08-05

## 2019-08-05 VITALS
DIASTOLIC BLOOD PRESSURE: 60 MMHG | HEIGHT: 62 IN | TEMPERATURE: 95.9 F | BODY MASS INDEX: 21.9 KG/M2 | OXYGEN SATURATION: 95 % | HEART RATE: 61 BPM | WEIGHT: 119 LBS | SYSTOLIC BLOOD PRESSURE: 124 MMHG | RESPIRATION RATE: 14 BRPM

## 2019-08-05 DIAGNOSIS — R35.0 URINE FREQUENCY: ICD-10-CM

## 2019-08-05 DIAGNOSIS — R06.09 DYSPNEA ON EXERTION: ICD-10-CM

## 2019-08-05 DIAGNOSIS — I10 PRIMARY HYPERTENSION: ICD-10-CM

## 2019-08-05 DIAGNOSIS — R53.83 OTHER FATIGUE: ICD-10-CM

## 2019-08-05 DIAGNOSIS — R35.0 URINE FREQUENCY: Primary | ICD-10-CM

## 2019-08-05 LAB
ALBUMIN SERPL-MCNC: 3.3 G/DL (ref 3.4–5)
ALBUMIN/GLOB SERPL: 0.9 {RATIO} (ref 0.8–1.7)
ALP SERPL-CCNC: 160 U/L (ref 45–117)
ALT SERPL-CCNC: 30 U/L (ref 13–56)
ANION GAP SERPL CALC-SCNC: 7 MMOL/L (ref 3–18)
AST SERPL-CCNC: 30 U/L (ref 10–38)
BASOPHILS # BLD: 0 K/UL (ref 0–0.1)
BASOPHILS NFR BLD: 0 % (ref 0–2)
BILIRUB SERPL-MCNC: 0.3 MG/DL (ref 0.2–1)
BILIRUB UR QL STRIP: NEGATIVE
BNP SERPL-MCNC: 3769 PG/ML (ref 0–1800)
BUN SERPL-MCNC: 38 MG/DL (ref 7–18)
BUN/CREAT SERPL: 22 (ref 12–20)
CALCIUM SERPL-MCNC: 8.6 MG/DL (ref 8.5–10.1)
CHLORIDE SERPL-SCNC: 104 MMOL/L (ref 100–111)
CO2 SERPL-SCNC: 30 MMOL/L (ref 21–32)
CREAT SERPL-MCNC: 1.69 MG/DL (ref 0.6–1.3)
DIFFERENTIAL METHOD BLD: ABNORMAL
EOSINOPHIL # BLD: 0.9 K/UL (ref 0–0.4)
EOSINOPHIL NFR BLD: 11 % (ref 0–5)
ERYTHROCYTE [DISTWIDTH] IN BLOOD BY AUTOMATED COUNT: 14.5 % (ref 11.6–14.5)
GLOBULIN SER CALC-MCNC: 3.7 G/DL (ref 2–4)
GLUCOSE SERPL-MCNC: 85 MG/DL (ref 74–99)
GLUCOSE UR-MCNC: NEGATIVE MG/DL
HCT VFR BLD AUTO: 38.6 % (ref 35–45)
HGB BLD-MCNC: 12.4 G/DL (ref 12–16)
KETONES P FAST UR STRIP-MCNC: NEGATIVE MG/DL
LYMPHOCYTES # BLD: 1.9 K/UL (ref 0.9–3.6)
LYMPHOCYTES NFR BLD: 22 % (ref 21–52)
MCH RBC QN AUTO: 27.7 PG (ref 24–34)
MCHC RBC AUTO-ENTMCNC: 32.1 G/DL (ref 31–37)
MCV RBC AUTO: 86.2 FL (ref 74–97)
MONOCYTES # BLD: 0.9 K/UL (ref 0.05–1.2)
MONOCYTES NFR BLD: 11 % (ref 3–10)
NEUTS SEG # BLD: 4.8 K/UL (ref 1.8–8)
NEUTS SEG NFR BLD: 56 % (ref 40–73)
PH UR STRIP: 5.5 [PH] (ref 4.6–8)
PLATELET # BLD AUTO: 238 K/UL (ref 135–420)
PMV BLD AUTO: 10 FL (ref 9.2–11.8)
POTASSIUM SERPL-SCNC: 4.9 MMOL/L (ref 3.5–5.5)
PROT SERPL-MCNC: 7 G/DL (ref 6.4–8.2)
PROT UR QL STRIP: NEGATIVE
RBC # BLD AUTO: 4.48 M/UL (ref 4.2–5.3)
SODIUM SERPL-SCNC: 141 MMOL/L (ref 136–145)
SP GR UR STRIP: 1.01 (ref 1–1.03)
T4 FREE SERPL-MCNC: 0.9 NG/DL (ref 0.7–1.5)
TSH SERPL DL<=0.05 MIU/L-ACNC: 2.8 UIU/ML (ref 0.36–3.74)
UA UROBILINOGEN AMB POC: NORMAL (ref 0.2–1)
URINALYSIS CLARITY POC: NORMAL
URINALYSIS COLOR POC: YELLOW
URINE BLOOD POC: NORMAL
URINE LEUKOCYTES POC: NORMAL
URINE NITRITES POC: NEGATIVE
WBC # BLD AUTO: 8.5 K/UL (ref 4.6–13.2)

## 2019-08-05 PROCEDURE — 87077 CULTURE AEROBIC IDENTIFY: CPT

## 2019-08-05 PROCEDURE — 87186 SC STD MICRODIL/AGAR DIL: CPT

## 2019-08-05 PROCEDURE — 84443 ASSAY THYROID STIM HORMONE: CPT

## 2019-08-05 PROCEDURE — 85025 COMPLETE CBC W/AUTO DIFF WBC: CPT

## 2019-08-05 PROCEDURE — 87086 URINE CULTURE/COLONY COUNT: CPT

## 2019-08-05 PROCEDURE — 80053 COMPREHEN METABOLIC PANEL: CPT

## 2019-08-05 PROCEDURE — 83880 ASSAY OF NATRIURETIC PEPTIDE: CPT

## 2019-08-05 PROCEDURE — 84439 ASSAY OF FREE THYROXINE: CPT

## 2019-08-05 NOTE — PROGRESS NOTES
Jaylen Rizo 10/17/1927, is a 80 y.o. female, who is seen today for reevaluation of multiple issues but she also was in the emergency room month ago. She got little confused was weaker than usual more short of breath than usual her daughter had her taken to emergency room where they told her she had a UTI but CT of the head was negative. She was not told anything else but liver enzymes were up considerably from her usual and proBNP was 3 times what it had been in the past.  Her breathing seems to be back to baseline though she does have some dyspnea on exertion and she is quite fatigued the last few weeks especially. She is eating fairly well and taking all her medicine correctly. She has not noticed edema and her daughter has not noticed edema. The patient went to the ER and there was some slight nausea also but no abdominal pain. Past Medical History:   Diagnosis Date    Anxiety     Arthritis     Depression     GERD (gastroesophageal reflux disease)     HTN (hypertension)     LBBB (left bundle branch block)     Osteoporosis     completed 8 years Fosamax    Sciatica     Urinary incontinence      Current Outpatient Medications   Medication Sig Dispense Refill    omeprazole (PRILOSEC) 40 mg capsule TAKE 1 CAPSULE BY MOUTH DAILY 90 Cap 0    furosemide (LASIX) 20 mg tablet TAKE 1 TABLET BY MOUTH DAILY 90 Tab 0    carvedilol (COREG) 12.5 mg tablet Take 1 Tab by mouth two (2) times daily (with meals). 180 Tab 3    atorvastatin (LIPITOR) 10 mg tablet Take 1 Tab by mouth nightly. 30 Tab 6    meloxicam (MOBIC) 15 mg tablet Take 15 mg by mouth daily. 1 tab by mouth daily      aspirin 81 mg chewable tablet Take 1 Tab by mouth daily. 30 Tab 11    fluticasone (FLONASE) 50 mcg/actuation nasal spray 1 [place] by Both Nostrils route as needed.       sertraline (ZOLOFT) 50 mg tablet TAKE 1 TABLET BY MOUTH DAILY 90 Tab 0    loratadine (CLARITIN) 10 mg tablet Take 10 mg by mouth daily as needed for Allergies.  MULTI-VITAMIN PO Take 1 Tab by mouth daily. Visit Vitals  /60 (BP 1 Location: Left arm, BP Patient Position: Sitting)   Pulse 61   Temp 95.9 °F (35.5 °C) (Oral)   Resp 14   Ht 5' 2\" (1.575 m)   Wt 119 lb (54 kg)   SpO2 95%   BMI 21.77 kg/m²     Carotids are 2+ without bruits. No adenopathy or thyromegaly. Lungs are clear to percussion. Good breath sounds with crackles in the bases, one third of the way up on the right and a quarter of the way up on the left. Heart reveals no murmur gallop click or rub. Apical impulse is not palpable. Abdomen is soft and nontender with no hepatosplenomegaly or masses and no bruits. Extremities reveal no clubbing cyanosis or edema. Pulses are intact. Assessment: #1. Some slight increase in dyspnea month ago when she was in the ER with proBNP 9000, liver enzymes were quite high with a ferritin high before consistent with either primary biliary issue or hepatic congestion is possible as well, though usually alk phos does not want that much. We will check chemistry panel including liver enzymes now. #2.  But with a lot of crackles in the lungs. We will repeat proBNP today. #3.  Concern about UTI, she has some urinary frequency but only passes a small amount of urine at a time, we will check urinalysis, does not look like she actually had infection in her urine a month ago, just a few white cells. #4.  Fatigue, nonspecific, will check CBC and thyroid level. She has an appointment coming up with Dr. Freddy Garcia in 4 days so regardless of lab I will not adjust cardiac meds. I told the patient's daughter that Dr. Freddy Garcia can review the patient and lab at that time. We will call lab results tomorrow. Follow-up 6 months for complete evaluation or sooner if needed    Charan Uribe MD FACP    Please note: This document has been produced using voice recognition software.  Unrecognized errors in transcription may be present. Addendum: Urine does show 2+ leukocytes otherwise normal, we will send the urine for culture.

## 2019-08-06 ENCOUNTER — TELEPHONE (OUTPATIENT)
Dept: INTERNAL MEDICINE CLINIC | Age: 84
End: 2019-08-06

## 2019-08-06 NOTE — TELEPHONE ENCOUNTER
Patients daughter contacted, patient identified with two identifiers (Name & ). Patient aware of results per DR. Hinojosa and verbalizes understanding.

## 2019-08-06 NOTE — PROGRESS NOTES
Please notify the patient's daughter that the blood work looks good and the test for heart failure has improved from last month. I expect the urine culture results to be available by tomorrow.

## 2019-08-07 ENCOUNTER — TELEPHONE (OUTPATIENT)
Dept: INTERNAL MEDICINE CLINIC | Age: 84
End: 2019-08-07

## 2019-08-07 LAB
BACTERIA SPEC CULT: ABNORMAL
SERVICE CMNT-IMP: ABNORMAL

## 2019-08-07 RX ORDER — SULFAMETHOXAZOLE AND TRIMETHOPRIM 800; 160 MG/1; MG/1
TABLET ORAL
Qty: 14 TAB | Refills: 0 | Status: SHIPPED | OUTPATIENT
Start: 2019-08-07 | End: 2019-09-19 | Stop reason: ALTCHOICE

## 2019-08-07 NOTE — TELEPHONE ENCOUNTER
----- Message from Lisa Pantoja MD sent at 8/7/2019  7:49 AM EDT -----  Please notify the patient that she does have bacteria in her urine, the bacteria are sensitive to sulfa-based antibiotic so I sent that prescription to her pharmacy.

## 2019-08-07 NOTE — PROGRESS NOTES
Please notify the patient that she does have bacteria in her urine, the bacteria are sensitive to sulfa-based antibiotic so I sent that prescription to her pharmacy.

## 2019-08-09 ENCOUNTER — OFFICE VISIT (OUTPATIENT)
Dept: CARDIOLOGY CLINIC | Age: 84
End: 2019-08-09

## 2019-08-09 VITALS
DIASTOLIC BLOOD PRESSURE: 50 MMHG | WEIGHT: 116 LBS | OXYGEN SATURATION: 97 % | HEIGHT: 62 IN | HEART RATE: 68 BPM | BODY MASS INDEX: 21.35 KG/M2 | SYSTOLIC BLOOD PRESSURE: 120 MMHG

## 2019-08-09 DIAGNOSIS — I25.5 ISCHEMIC CARDIOMYOPATHY: ICD-10-CM

## 2019-08-09 DIAGNOSIS — I50.21 CHF (CONGESTIVE HEART FAILURE), NYHA CLASS III, ACUTE, SYSTOLIC (HCC): Primary | ICD-10-CM

## 2019-08-09 NOTE — PROGRESS NOTES
Elvira Guallpa    Chief Complaint   Patient presents with    Chest Pain (Angina)     4 week f/u    Dizziness    Fatigue       HPI    Elvira Guallpa is a 80 y.o. with no known cardiac disease who presented initially for evaluation of syncope. As you know, Carly Tracy is such a pleasant patient with history hypertension, arthritis and falls, found to have cardiomyopathy and SSS. Carly Tracy lives with her daughter. Back in Sept when her daughter was out of town she apparently had a syncopal event. Her sister was with her at the time and saw her somewhat slumped in her chair. She was slow to respond but came to. Carly Tracy wasn't sure what happened and didn't seek medical care after that initial event. Again since then she had another episode. Her daughter said her mother looked unwell/ pale and said her stomach was upset. She again kind of slumped and her daughter caught her so she did not fall/ get injured. She believes she lost consciousness. She came to and felt somewhat fatigued briefly. She had no observed seizure like activity. She completely denies surrounding cardiac complaints with these episodes- specifically has never noticed palpations/ heart racing, no CP, or SOB. No swelling or headaches. She has no known heart problems and has never seen a heart doctor or had cardiac testing. Dom's work up showed globally reduced EF 26-30%. I called and spoke to her daughter- giving her the results and alerted her to the signs and symptoms to look out for. Apparently, she got off the phone and asked her mom how she was feeling and she admitted she had been short of breath for 2 days. They ended up going to [facility] ER the next day and was in AECHF/ HFrEF with fluid overload. Just before discharge she ended up passing out (as she had mentioned very rarely happens)- and she was noted to have a 6 sec pause that correlated. She was appropriately given a pacemaker. She presents today for close follow up as she just feels so fatigued.  Her daughter says she has been excessively tired for about 2-3 weeks now, unable to even put her own bra on. She has these \"hot\" and cold flashes every day, and they quickly resolve. She also thinks her memory has been bad within the last week. Otherwise no actual SOB, no CP. She unfortunately was found to have another UTI so she is back on Abx. I see she has had bloodwork recently with her PCP- and I did look at this. I see she had a slight and transient elevation in her LFTs. Past Medical History:   Diagnosis Date    Anxiety     Arthritis     Depression     GERD (gastroesophageal reflux disease)     HTN (hypertension)     LBBB (left bundle branch block)     Osteoporosis     completed 8 years Fosamax    Sciatica     Urinary incontinence        Past Surgical History:   Procedure Laterality Date    CARDIAC SURG PROCEDURE UNLIST  11/2018    pacemaker     HX CHOLECYSTECTOMY      HX HYSTERECTOMY      HX ORTHOPAEDIC      fractured right patella surgery    SC ANESTH,SURGERY OF SHOULDER      SC INS NEW/RPLC PRM PACEMAKER W/TRANSV ELTRD VENTR N/A 11/28/2018    INSERT PPM SINGLE VENTRICULAR performed by Lou Vizcaino MD at [facility] CATH LAB       Current Outpatient Medications   Medication Sig Dispense Refill    trimethoprim-sulfamethoxazole (BACTRIM DS, SEPTRA DS) 160-800 mg per tablet Take 1 tablet by mouth twice a day 14 Tab 0    omeprazole (PRILOSEC) 40 mg capsule TAKE 1 CAPSULE BY MOUTH DAILY 90 Cap 0    furosemide (LASIX) 20 mg tablet TAKE 1 TABLET BY MOUTH DAILY 90 Tab 0    carvedilol (COREG) 12.5 mg tablet Take 1 Tab by mouth two (2) times daily (with meals). 180 Tab 3    atorvastatin (LIPITOR) 10 mg tablet Take 1 Tab by mouth nightly. 30 Tab 6    meloxicam (MOBIC) 15 mg tablet Take 15 mg by mouth daily. 1 tab by mouth daily      aspirin 81 mg chewable tablet Take 1 Tab by mouth daily. 30 Tab 11    fluticasone (FLONASE) 50 mcg/actuation nasal spray 1 [place] by Both Nostrils route as needed.  sertraline (ZOLOFT) 50 mg tablet TAKE 1 TABLET BY MOUTH DAILY 90 Tab 0    loratadine (CLARITIN) 10 mg tablet Take 10 mg by mouth daily as needed for Allergies.  MULTI-VITAMIN PO Take 1 Tab by mouth daily. No Known Allergies    Social History     Socioeconomic History    Marital status:      Spouse name: Not on file    Number of children: Not on file    Years of education: Not on file    Highest education level: Not on file   Occupational History    Not on file   Social Needs    Financial resource strain: Not on file    Food insecurity:     Worry: Not on file     Inability: Not on file    Transportation needs:     Medical: Not on file     Non-medical: Not on file   Tobacco Use    Smoking status: Never Smoker    Smokeless tobacco: Never Used   Substance and Sexual Activity    Alcohol use: No    Drug use: No    Sexual activity: Not on file   Lifestyle    Physical activity:     Days per week: Not on file     Minutes per session: Not on file    Stress: Not on file   Relationships    Social connections:     Talks on phone: Not on file     Gets together: Not on file     Attends Holiness service: Not on file     Active member of club or organization: Not on file     Attends meetings of clubs or organizations: Not on file     Relationship status: Not on file    Intimate partner violence:     Fear of current or ex partner: Not on file     Emotionally abused: Not on file     Physically abused: Not on file     Forced sexual activity: Not on file   Other Topics Concern    Not on file   Social History Narrative    Not on file        The patient has a family history of heart problems, but no genetic cardiomyopathies or SCD. Review of Systems    14 pt Review of Systems is negative unless otherwise mentioned in the HPI.     Wt Readings from Last 3 Encounters:   08/09/19 52.6 kg (116 lb)   08/05/19 54 kg (119 lb)   07/12/19 54 kg (119 lb)     Temp Readings from Last 3 Encounters: 08/05/19 95.9 °F (35.5 °C) (Oral)   07/05/19 99.2 °F (37.3 °C)   05/23/19 96.5 °F (35.8 °C) (Oral)     BP Readings from Last 3 Encounters:   08/09/19 120/50   08/05/19 124/60   07/12/19 122/60     Pulse Readings from Last 3 Encounters:   08/09/19 68   08/05/19 61   07/12/19 68     Physical Exam:    Visit Vitals  /50   Pulse 68   Ht 5' 2\" (1.575 m)   Wt 52.6 kg (116 lb)   LMP  (LMP Unknown)   SpO2 97%   BMI 21.22 kg/m²      Physical Exam   Constitutional: She is oriented to person, place, and time. She appears well-developed and well-nourished. No distress. HENT:   Head: Normocephalic and atraumatic. Eyes: Pupils are equal, round, and reactive to light. EOM are normal.   Neck: No JVD present. Cardiovascular: Normal rate, regular rhythm, normal heart sounds and intact distal pulses. Exam reveals no gallop and no friction rub. No murmur heard. Pulmonary/Chest: Effort normal and breath sounds normal. No respiratory distress. She has no wheezes. She has no rales. She exhibits no tenderness. Abdominal: Soft. Bowel sounds are normal.   Musculoskeletal: She exhibits no edema or tenderness. Neurological: She is alert and oriented to person, place, and time. Skin: Skin is warm and dry. She is not diaphoretic. Psychiatric: She has a normal mood and affect.      Labs:  Glucose 89   65 - 99 mg/dL Final   BUN 30   10 - 36 mg/dL Final   Creatinine 1.28 Abnormally high   H  0.57 - 1.00 mg/dL Final   GFR est non-AA 37 Abnormally low   L  >59 mL/min/1.73 Final   GFR est AA 42 Abnormally low   L  >59 mL/min/1.73 Final   BUN/Creatinine ratio 23   12 - 28  Final   Sodium 140   134 - 144 mmol/L Final   Potassium 4.8   3.5 - 5.2 mmol/L Final   Chloride 100   96 - 106 mmol/L Final   CO2 23   20 - 29 mmol/L Final   Calcium 9.2   8.7 - 10.3 mg/dL Final   Protein, total 6.8   6.0 - 8.5 g/dL Final   Albumin 3.8   3.2 - 4.6 g/dL Final   GLOBULIN, TOTAL 3.0   1.5 - 4.5 g/dL Final   A-G Ratio 1.3   1.2 - 2.2  Final Bilirubin, total 0.3   0.0 - 1.2 mg/dL Final   Alk. phosphatase 63   39 - 117 IU/L Final   AST (SGOT) 20   0 - 40 IU/L Final   ALT (SGPT) 15   0 - 32 IU/L Final     EKG today LBBB, no concerning ST or T wave changes    Impression and Plan:  Carla Hart is a 80 y.o. with:    1.) Nausea/ Diaphoresis/ SOB with exertion, happening more often, doubt unstable angina  2.) HFrEF, s/p AECHF, now euvolemic/ stable (very likely ischemic but presumed)  2.) Symptomatic SSS s/p PPM, known  3.) Dyslipidemia, known  4.) Advanced age/ frailty  5.) UTI on Abx    1.) Considered polypharmacy +/- unstable angina as cause for symptoms but seems unlikely as symptoms somewhat recent/ accelerating and HR and SBP stable. 2.) Reviewed Blood work and WNL  3.) Continue current care, UTI may be exacerbating symptoms, if still worsening please call us sooner and can try a trial of Imdur to see if it helps  4.) RTC NP 4-6 weeks close recheck  5.) RTC with me ~4 months    Thank you for allowing me to participate in the care of your patient, please do not hesitate to call with questions or concerns. Follow-up and Dispositions    · Return in about 4 months (around 12/9/2019).      Kindest regards,    Seth Bruce, DO

## 2019-08-09 NOTE — PROGRESS NOTES
Teodora Poster presents today for   Chief Complaint   Patient presents with    Chest Pain (Angina)     4 week f/u    Dizziness    Fatigue       Teodora Poster preferred language for health care discussion is english/other. Is someone accompanying this pt? yes    Is the patient using any DME equipment during OV? yes    Depression Screening:  No flowsheet data found. Learning Assessment:  Learning Assessment 3/28/2019   PRIMARY LEARNER Patient   PRIMARY LANGUAGE ENGLISH   LEARNER PREFERENCE PRIMARY DEMONSTRATION     -     -   ANSWERED BY Patient   RELATIONSHIP SELF       Abuse Screening:  Abuse Screening Questionnaire 10/11/2017   Do you ever feel afraid of your partner? N   Are you in a relationship with someone who physically or mentally threatens you? N   Is it safe for you to go home? Y       Fall Risk  Fall Risk Assessment, last 12 mths 8/5/2019   Able to walk? No   Fall in past 12 months? Yes   Fall with injury? Yes   Number of falls in past 12 months 3   Fall Risk Score 4       Pt currently taking Anticoagulant therapy? no    Coordination of Care:  1. Have you been to the ER, urgent care clinic since your last visit? Hospitalized since your last visit? no    2. Have you seen or consulted any other health care providers outside of the [address] [place] [address] since your last visit? Include any pap smears or colon screening.  no

## 2019-08-27 RX ORDER — ATORVASTATIN CALCIUM 10 MG/1
10 TABLET, FILM COATED ORAL
Qty: 30 TAB | Refills: 6 | Status: SHIPPED | OUTPATIENT
Start: 2019-08-27 | End: 2020-02-25

## 2019-09-03 RX ORDER — SERTRALINE HYDROCHLORIDE 50 MG/1
TABLET, FILM COATED ORAL
Qty: 90 TAB | Refills: 0 | Status: SHIPPED | OUTPATIENT
Start: 2019-09-03 | End: 2019-09-19 | Stop reason: SDUPTHER

## 2019-09-15 NOTE — PROGRESS NOTES
Sherrill Costa presents today for a 6 week follow-up of extreme fatigue per Dr. Varun Dowling. She was seen by her on 8/9/19 and polypharmacy and/or angina were considered to be contributing. However, the angina was felt to be unlikely. She was accompanied to the office today by her daughter. Her daughter states that Mrs. Yee Benjamin had a urinary tract infection and after treatment was completed, the majority of her symptoms resolved. She states that she is feeling much better and the only thing that she has noticed is on occasion, Mrs. Yee Benjamin experiences a \"hot flash\" at night. She is normally very cold natured and when these occur, she has to use less blankets and needs more air conditioning to cool her down. She is a 80year old female with history of hypertension, dyslipidemia, dilated cardiomyopathy, chronic systolic CHF, arthritis, LBBB, syncope, sick sinus syndrome (with documented 6 second pause that correlated with a syncopal episode while in the hospital in late 2018, s/p single chamber pacemaker). She was last seen by Dr. Varun Dowling on 1/16/19. She was hospitalized from 2/13/19 through 2/15/19 for acute on chronic CHF. She presented with shortness of breath and her NT pro-BNP was noted to be 8831. She had mildly elevated troponins of 003 and 0.06, likely due to demand ischemia. She was diuresed with IV lasix and her symptoms improved. She had an echo done on 2/14/19 which showed an EF of 20%, mild concentric LVH, LV global hypokinesis. The last echo done in November 2018, showed an EF of 26-30% and grade 2 diastolic dysfunction. She had a NT pro-BNP done on 2/20/19 and it was down to 3563. She states that she is feeling better and denies any cardiac complaints. Denies chest pain, tightness, heaviness, and palpitations. Denies shortness of breath at rest, dyspnea on exertion, orthopnea and PND. Per her daughter, she has been able to sleep on one pillow comfortably lately.   Denies abdominal bloating. Denies lightheadedness, dizziness, and syncope. Denies lower extremity edema and claudication. Denies nausea, vomiting, diarrhea, melena, hematochezia. Denies hematuria, urgency, frequency. Denies fever, chills. PMH:  Past Medical History:   Diagnosis Date    Anxiety     Arthritis     Depression     GERD (gastroesophageal reflux disease)     HTN (hypertension)     LBBB (left bundle branch block)     Osteoporosis     completed 8 years Fosamax    Sciatica     Urinary incontinence        PSH:  Past Surgical History:   Procedure Laterality Date    CARDIAC SURG PROCEDURE UNLIST  11/2018    pacemaker     HX CHOLECYSTECTOMY      HX HYSTERECTOMY      HX ORTHOPAEDIC      fractured right patella surgery    [place] ANESTH,SURGERY OF SHOULDER      [place] INS NEW/RPLC PRM PACEMAKER W/TRANSV ELTRD VENTR N/A 11/28/2018    INSERT PPM SINGLE VENTRICULAR performed by Claudia Ames MD at [address] [place] [address] LAB       MEDS:  Current Outpatient Medications   Medication Sig    cranberry extract (CRANBERRY JUICE POWDER) 425 mg cap Take  by mouth.  atorvastatin (LIPITOR) 10 mg tablet Take 1 Tab by mouth nightly.  omeprazole (PRILOSEC) 40 mg capsule TAKE 1 CAPSULE BY MOUTH DAILY    furosemide (LASIX) 20 mg tablet TAKE 1 TABLET BY MOUTH DAILY    carvedilol (COREG) 12.5 mg tablet Take 1 Tab by mouth two (2) times daily (with meals).  meloxicam (MOBIC) 15 mg tablet Take 15 mg by mouth daily. 1 tab by mouth daily    aspirin 81 mg chewable tablet Take 1 Tab by mouth daily.  fluticasone (FLONASE) 50 mcg/actuation nasal spray 1 [place] by Both Nostrils route as needed.  sertraline (ZOLOFT) 50 mg tablet TAKE 1 TABLET BY MOUTH DAILY    loratadine (CLARITIN) 10 mg tablet Take 10 mg by mouth daily as needed for Allergies.  MULTI-VITAMIN PO Take 1 Tab by mouth daily. No current facility-administered medications for this visit.           Allergies and Sensitivities:  No Known Allergies    Family History:  Family History   Problem Relation Age of Onset    Heart Disease Mother     Diabetes Mother     Heart Disease Father     Diabetes Father     Hypertension Sister     Diabetes Sister     Heart Disease Brother     Heart Disease Sister     Diabetes Sister     Heart Disease Brother        Social History:  She  reports that she has never smoked. She has never used smokeless tobacco.  She  reports that she does not drink alcohol. Physical:  Visit Vitals  /76   Pulse 61   Ht 5' 2\" (1.575 m)   Wt 52.6 kg (116 lb)   SpO2 98%   BMI 21.22 kg/m²       Her weight is unchanged since her last visit    Exam:  Neck:  Supple, no JVD, no carotid bruits  CV:  Normal S1 and  S2, no murmurs, rubs, or gallops noted  Lungs: Bibasilar Velcro sounding breath sounds noted, no wheezes or rales. Abd:  Soft, non-tender, non-distended with good bowel sounds. No hepatosplenomegaly  Extremities:  No edema      Data:  EKG:  Not done today      LABS:  Lab Results   Component Value Date/Time    Sodium 141 08/05/2019 01:45 PM    Potassium 4.9 08/05/2019 01:45 PM    Chloride 104 08/05/2019 01:45 PM    CO2 30 08/05/2019 01:45 PM    Glucose 85 08/05/2019 01:45 PM    BUN 38 (H) 08/05/2019 01:45 PM    Creatinine 1.69 (H) 08/05/2019 01:45 PM     Lab Results   Component Value Date/Time    Cholesterol, total 204 (H) 11/24/2018 09:40 PM    HDL Cholesterol 59 11/24/2018 09:40 PM    LDL, calculated 129.2 (H) 11/24/2018 09:40 PM    Triglyceride 79 11/24/2018 09:40 PM    CHOL/HDL Ratio 3.5 11/24/2018 09:40 PM     Lab Results   Component Value Date/Time    ALT (SGPT) 30 08/05/2019 01:45 PM         Impression/Plan:  1. Chronic systolic CHF, appears compensated  2. Essential hypertension, blood pressure stable  3. Dyslipidemia, on atorvastatin 10mg  4. Dilated cardiomyopathy  5. Sick sinus syndrome, s/p single chamber pacemaker  6. Chronic LBBB  7.  UTI, resolved with resolution of complaints of fatigue and nausea.     Ms. Meyer Rinne was seen today for follow-up. During her last visit with Dr. Wing Rowan in early August 2019, she was complaining of extreme fatigue. At that time, she had a urinary tract infection. According to her daughter after the urinary was treated and completely resolved; her complaints of fatigue and nausea also resolved. She still complains of occasional \"hot flashes\" but no fever or chills. She denies cardiac complaints and states that she has not had any chest pain, shortness of breath, palpitations. She is sleeping well at night and has had no orthopnea or PND. Her blood pressure is stable as is her heart rate. Her oxygen saturation is 98% on room air. She has Velcro sounding bibasilar breath sounds but no other signs of volume overload. At this time, no changes were made to her medication regimen. I asked that they call the office if they notice any cardiac symptoms. Congestive heart failure teaching reinforced today. Advised to limit sodium intake to no more than 2000mg per day and to also limit fluid intake to 48 ounces per day (unless otherwise specified). Advised to weigh daily every morning and record weights. Instructed to call our office if progressive weight gain is noted over a 2 to 3 day period of time, shortness of breath increases, or if abdominal bloating, nausea, fatigue, or increased lower extremity edema is noted. She will follow-up with Dr. Wing Rowan as scheduled and as needed. Liana Bee MSN, FNP-BC    Please note:  Portions of this chart were created with Dragon medical speech to text program.  Unrecognized errors may be present.

## 2019-09-19 ENCOUNTER — OFFICE VISIT (OUTPATIENT)
Dept: CARDIOLOGY CLINIC | Age: 84
End: 2019-09-19

## 2019-09-19 VITALS
SYSTOLIC BLOOD PRESSURE: 140 MMHG | WEIGHT: 116 LBS | HEIGHT: 62 IN | BODY MASS INDEX: 21.35 KG/M2 | DIASTOLIC BLOOD PRESSURE: 76 MMHG | HEART RATE: 61 BPM | OXYGEN SATURATION: 98 %

## 2019-09-19 DIAGNOSIS — Z95.0 CARDIAC PACEMAKER IN SITU: ICD-10-CM

## 2019-09-19 DIAGNOSIS — I25.5 ISCHEMIC CARDIOMYOPATHY: Primary | ICD-10-CM

## 2019-09-19 DIAGNOSIS — I10 ESSENTIAL HYPERTENSION WITH GOAL BLOOD PRESSURE LESS THAN 140/90: ICD-10-CM

## 2019-09-19 RX ORDER — DIMETHICONE 13 MG/ML
1 LOTION TOPICAL DAILY
COMMUNITY

## 2019-09-19 NOTE — PATIENT INSTRUCTIONS
Continue present medication regimen  Follow-up with Dr Jean Marie Cummings as scheduled and as needed

## 2019-09-19 NOTE — PROGRESS NOTES
Konrad Dong presents today for No chief complaint on file. Konrad Dong preferred language for health care discussion is english/other. Is someone accompanying this pt? yes    Is the patient using any DME equipment during OV? yes    Depression Screening:  No flowsheet data found. Learning Assessment:  Learning Assessment 3/28/2019   PRIMARY LEARNER Patient   PRIMARY LANGUAGE ENGLISH   LEARNER PREFERENCE PRIMARY DEMONSTRATION     -     -   ANSWERED BY Patient   RELATIONSHIP SELF       Abuse Screening:  Abuse Screening Questionnaire 10/11/2017   Do you ever feel afraid of your partner? N   Are you in a relationship with someone who physically or mentally threatens you? N   Is it safe for you to go home? Y       Fall Risk  Fall Risk Assessment, last 12 mths 8/5/2019   Able to walk? No   Fall in past 12 months? Yes   Fall with injury? Yes   Number of falls in past 12 months 3   Fall Risk Score 4       Pt currently taking Anticoagulant therapy? no    Coordination of Care:  1. Have you been to the ER, urgent care clinic since your last visit? Hospitalized since your last visit? no    2. Have you seen or consulted any other health care providers outside of the [address] [place] [address] since your last visit? Include any pap smears or colon screening.  no

## 2019-10-21 RX ORDER — FUROSEMIDE 20 MG/1
TABLET ORAL
Qty: 90 TAB | Refills: 0 | Status: SHIPPED | OUTPATIENT
Start: 2019-10-21 | End: 2020-01-20

## 2019-10-28 RX ORDER — MELOXICAM 15 MG/1
TABLET ORAL
Qty: 90 TAB | Refills: 0 | Status: SHIPPED | OUTPATIENT
Start: 2019-10-28 | End: 2020-01-27

## 2019-10-28 RX ORDER — OMEPRAZOLE 40 MG/1
CAPSULE, DELAYED RELEASE ORAL
Qty: 90 CAP | Refills: 0 | Status: SHIPPED | OUTPATIENT
Start: 2019-10-28 | End: 2020-01-24

## 2019-11-06 ENCOUNTER — OFFICE VISIT (OUTPATIENT)
Dept: CARDIOLOGY CLINIC | Age: 84
End: 2019-11-06

## 2019-11-06 VITALS
SYSTOLIC BLOOD PRESSURE: 120 MMHG | WEIGHT: 116 LBS | HEIGHT: 62 IN | HEART RATE: 70 BPM | DIASTOLIC BLOOD PRESSURE: 68 MMHG | BODY MASS INDEX: 21.35 KG/M2 | OXYGEN SATURATION: 96 %

## 2019-11-06 DIAGNOSIS — Z95.0 CARDIAC PACEMAKER IN SITU: Primary | ICD-10-CM

## 2019-11-06 DIAGNOSIS — I25.5 ISCHEMIC CARDIOMYOPATHY: ICD-10-CM

## 2019-11-06 NOTE — PROGRESS NOTES
Albaro Aldridge    Chief Complaint   Patient presents with    Fatigue    Chest Pain (Angina)     left side yesterday        HPI    Albaro Aldridge is a 80 y.o. with no known cardiac disease who presented initially for evaluation of syncope. As you know, Ariadna Cobian is such a pleasant patient with history hypertension, arthritis and falls, found to have cardiomyopathy and SSS. Ariadna Cobian lives with her daughter. Back in Sept when her daughter was out of town she apparently had a syncopal event. Her sister was with her at the time and saw her somewhat slumped in her chair. She was slow to respond but came to. Ariadna Cobian wasn't sure what happened and didn't seek medical care after that initial event. Again since then she had another episode. Her daughter said her mother looked unwell/ pale and said her stomach was upset. She again kind of slumped and her daughter caught her so she did not fall/ get injured. She believes she lost consciousness. She came to and felt somewhat fatigued briefly. She had no observed seizure like activity. She completely denies surrounding cardiac complaints with these episodes- specifically has never noticed palpations/ heart racing, no CP, or SOB. No swelling or headaches. She has no known heart problems and has never seen a heart doctor or had cardiac testing. Dom's work up showed globally reduced EF 26-30%. I called and spoke to her daughter- giving her the results and alerted her to the signs and symptoms to look out for. Apparently, she got off the phone and asked her mom how she was feeling and she admitted she had been short of breath for 2 days. They ended up going to the  [facility] ER the next day and was in AECHF/ HFrEF with fluid overload. Just before discharge she ended up passing out (as she had mentioned very rarely happens)- and she was noted to have a 6 sec pause that correlated. She was appropriately given a pacemaker. She presents today for close follow up as she just feels so fatigued.  Her daughter says she has been excessively tired for about 2-3 weeks now, unable to even put her own bra on. She has these \"hot\" and cold flashes every day, and they quickly resolve. She also thinks her memory has been bad within the last week. This happened before when she had a UTI and dramatically improved after Abx. She typically doesn't have CP but admitted to having some last night, but again this is not been the norm. She doesn't have aung SOB or fluid overload, her weights have been stable 114 lbs at home. She does admit to frequent nausea, poor appetite and many times she says food gets stuck then will stop eating. Past Medical History:   Diagnosis Date    Anxiety     Arthritis     Depression     GERD (gastroesophageal reflux disease)     HTN (hypertension)     LBBB (left bundle branch block)     Osteoporosis     completed 8 years Fosamax    Sciatica     Urinary incontinence        Past Surgical History:   Procedure Laterality Date    CARDIAC SURG PROCEDURE UNLIST  11/2018    pacemaker     HX CHOLECYSTECTOMY      HX HYSTERECTOMY      HX ORTHOPAEDIC      fractured right patella surgery    RI ANESTH,SURGERY OF SHOULDER      RI INS NEW/RPLC PRM PACEMAKER W/TRANSV ELTRD VENTR N/A 11/28/2018    INSERT PPM SINGLE VENTRICULAR performed by Amanda Smith MD at [facility] CATH LAB       Current Outpatient Medications   Medication Sig Dispense Refill    omeprazole (PRILOSEC) 40 mg capsule TAKE 1 CAPSULE BY MOUTH DAILY 90 Cap 0    meloxicam (MOBIC) 15 mg tablet TAKE 1 TABLET BY MOUTH DAILY 90 Tab 0    furosemide (LASIX) 20 mg tablet TAKE 1 TABLET BY MOUTH DAILY 90 Tab 0    cranberry extract (CRANBERRY JUICE POWDER) 425 mg cap Take  by mouth.  atorvastatin (LIPITOR) 10 mg tablet Take 1 Tab by mouth nightly. 30 Tab 6    carvedilol (COREG) 12.5 mg tablet Take 1 Tab by mouth two (2) times daily (with meals). 180 Tab 3    meloxicam (MOBIC) 15 mg tablet Take 15 mg by mouth daily.  1 tab by mouth daily      aspirin 81 mg chewable tablet Take 1 Tab by mouth daily. 30 Tab 11    fluticasone (FLONASE) 50 mcg/actuation nasal spray 1 [place] by Both Nostrils route as needed.  sertraline (ZOLOFT) 50 mg tablet TAKE 1 TABLET BY MOUTH DAILY 90 Tab 0    loratadine (CLARITIN) 10 mg tablet Take 10 mg by mouth daily as needed for Allergies.  MULTI-VITAMIN PO Take 1 Tab by mouth daily. No Known Allergies    Social History     Socioeconomic History    Marital status:      Spouse name: Not on file    Number of children: Not on file    Years of education: Not on file    Highest education level: Not on file   Occupational History    Not on file   Social Needs    Financial resource strain: Not on file    Food insecurity:     Worry: Not on file     Inability: Not on file    Transportation needs:     Medical: Not on file     Non-medical: Not on file   Tobacco Use    Smoking status: Never Smoker    Smokeless tobacco: Never Used   Substance and Sexual Activity    Alcohol use: No    Drug use: No    Sexual activity: Not on file   Lifestyle    Physical activity:     Days per week: Not on file     Minutes per session: Not on file    Stress: Not on file   Relationships    Social connections:     Talks on phone: Not on file     Gets together: Not on file     Attends Zoroastrian service: Not on file     Active member of club or organization: Not on file     Attends meetings of clubs or organizations: Not on file     Relationship status: Not on file    Intimate partner violence:     Fear of current or ex partner: Not on file     Emotionally abused: Not on file     Physically abused: Not on file     Forced sexual activity: Not on file   Other Topics Concern    Not on file   Social History Narrative    Not on file        The patient has a family history of heart problems, but no genetic cardiomyopathies or SCD.     Review of Systems    14 pt Review of Systems is negative unless otherwise mentioned in the HPI. Wt Readings from Last 3 Encounters:   11/06/19 52.6 kg (116 lb)   09/19/19 52.6 kg (116 lb)   08/09/19 52.6 kg (116 lb)     Temp Readings from Last 3 Encounters:   08/05/19 95.9 °F (35.5 °C) (Oral)   07/05/19 99.2 °F (37.3 °C)   05/23/19 96.5 °F (35.8 °C) (Oral)     BP Readings from Last 3 Encounters:   09/19/19 140/76   08/09/19 120/50   08/05/19 124/60     Pulse Readings from Last 3 Encounters:   09/19/19 61   08/09/19 68   08/05/19 61     Physical Exam:    Visit Vitals  Ht 5' 2\" (1.575 m)   Wt 52.6 kg (116 lb)   LMP  (LMP Unknown)   BMI 21.22 kg/m²      Physical Exam   Constitutional: She is oriented to person, place, and time. She appears well-developed and well-nourished. No distress. HENT:   Head: Normocephalic and atraumatic. Eyes: Pupils are equal, round, and reactive to light. EOM are normal.   Neck: No JVD present. Cardiovascular: Normal rate, regular rhythm, normal heart sounds and intact distal pulses. Exam reveals no gallop and no friction rub. No murmur heard. Pulmonary/Chest: Effort normal and breath sounds normal. No respiratory distress. She has no wheezes. She has no rales. She exhibits no tenderness. Abdominal: Soft. Bowel sounds are normal.   Musculoskeletal: She exhibits no edema or tenderness. Neurological: She is alert and oriented to person, place, and time. Skin: Skin is warm and dry. She is not diaphoretic. Psychiatric: She has a normal mood and affect.      Labs:  Glucose 89   65 - 99 mg/dL Final   BUN 30   10 - 36 mg/dL Final   Creatinine 1.28 Abnormally high   H  0.57 - 1.00 mg/dL Final   GFR est non-AA 37 Abnormally low   L  >59 mL/min/1.73 Final   GFR est AA 42 Abnormally low   L  >59 mL/min/1.73 Final   BUN/Creatinine ratio 23   12 - 28  Final   Sodium 140   134 - 144 mmol/L Final   Potassium 4.8   3.5 - 5.2 mmol/L Final   Chloride 100   96 - 106 mmol/L Final   CO2 23   20 - 29 mmol/L Final   Calcium 9.2   8.7 - 10.3 mg/dL Final   Protein, total 6.8   6.0 - 8.5 g/dL Final   Albumin 3.8   3.2 - 4.6 g/dL Final   GLOBULIN, TOTAL 3.0   1.5 - 4.5 g/dL Final   A-G Ratio 1.3   1.2 - 2.2  Final   Bilirubin, total 0.3   0.0 - 1.2 mg/dL Final   Alk. phosphatase 63   39 - 117 IU/L Final   AST (SGOT) 20   0 - 40 IU/L Final   ALT (SGPT) 15   0 - 32 IU/L Final     EKG today LBBB/ paced, no concerning ST or T wave changes    Impression and Plan:  Noam Willett is a 80 y.o. with:    1.) Nausea/ Diaphoresis, with rare CP- doubt unstable angina  2.) HFrEF, s/p AECHF, now euvolemic/ stable (very likely ischemic but presumed)  2.) Symptomatic SSS s/p PPM, known  3.) Dyslipidemia, known  4.) Advanced age/ frailty  5.) Mental status decline, should r/o delirium from infection    1.) PPM checked today, EKG/ SBP, and CV exam WNL  2.) Despite multiple CV issues (CAD, Cardiomyopathy) she is well compensated and I dont believe her persistent complaints are cardiac. They could be from age, but infection- shayna UTI cannot be ruled out. I kindly asked them to check in with PCP regarding her mental status as her CV status cannot explain this. If still having CP- we can try Imdur but have avoided as CP is rare and SBP is soft. 3.) Otherwise RTC as scheduled in 12/ 2019 for close recheck    Thank you for allowing me to participate in the care of your patient, please do not hesitate to call with questions or concerns.     Kindest regards,    Esther Gómez, DO

## 2019-11-06 NOTE — PROGRESS NOTES
Binta Pereyra presents today for   Chief Complaint   Patient presents with    Fatigue    Chest Pain (Angina)     left side yesterday     Nausea    Shortness of Breath       Binta Pereyra preferred language for health care discussion is english/other. Is someone accompanying this pt? yes    Is the patient using any DME equipment during OV? yes    Depression Screening:  No flowsheet data found. Learning Assessment:  Learning Assessment 3/28/2019   PRIMARY LEARNER Patient   PRIMARY LANGUAGE ENGLISH   LEARNER PREFERENCE PRIMARY DEMONSTRATION     -     -   ANSWERED BY Patient   RELATIONSHIP SELF       Abuse Screening:  Abuse Screening Questionnaire 10/11/2017   Do you ever feel afraid of your partner? N   Are you in a relationship with someone who physically or mentally threatens you? N   Is it safe for you to go home? Y       Fall Risk  Fall Risk Assessment, last 12 mths 8/5/2019   Able to walk? No   Fall in past 12 months? Yes   Fall with injury? Yes   Number of falls in past 12 months 3   Fall Risk Score 4       Pt currently taking Anticoagulant therapy? no    Coordination of Care:  1. Have you been to the ER, urgent care clinic since your last visit? Hospitalized since your last visit? no    2. Have you seen or consulted any other health care providers outside of the [address] [place] [address] since your last visit? Include any pap smears or colon screening.  no

## 2019-11-08 ENCOUNTER — HOSPITAL ENCOUNTER (OUTPATIENT)
Dept: LAB | Age: 84
Discharge: HOME | End: 2019-11-08
Payer: MEDICARE

## 2019-11-08 ENCOUNTER — OFFICE VISIT (OUTPATIENT)
Dept: INTERNAL MEDICINE CLINIC | Age: 84
End: 2019-11-08

## 2019-11-08 VITALS
RESPIRATION RATE: 14 BRPM | HEART RATE: 63 BPM | WEIGHT: 116 LBS | TEMPERATURE: 97.9 F | BODY MASS INDEX: 21.35 KG/M2 | HEIGHT: 62 IN | DIASTOLIC BLOOD PRESSURE: 64 MMHG | SYSTOLIC BLOOD PRESSURE: 110 MMHG | OXYGEN SATURATION: 96 %

## 2019-11-08 DIAGNOSIS — R30.0 DYSURIA: ICD-10-CM

## 2019-11-08 DIAGNOSIS — R30.0 DYSURIA: Primary | ICD-10-CM

## 2019-11-08 DIAGNOSIS — N30.00 ACUTE CYSTITIS WITHOUT HEMATURIA: ICD-10-CM

## 2019-11-08 LAB
BILIRUB UR QL STRIP: NEGATIVE
GLUCOSE UR-MCNC: NEGATIVE MG/DL
KETONES P FAST UR STRIP-MCNC: NEGATIVE MG/DL
PH UR STRIP: 5.5 [PH] (ref 4.6–8)
PROT UR QL STRIP: NEGATIVE
SP GR UR STRIP: 1.01 (ref 1–1.03)
UA UROBILINOGEN AMB POC: NORMAL (ref 0.2–1)
URINALYSIS CLARITY POC: CLEAR
URINALYSIS COLOR POC: YELLOW
URINE BLOOD POC: NEGATIVE
URINE LEUKOCYTES POC: NORMAL
URINE NITRITES POC: NEGATIVE

## 2019-11-08 PROCEDURE — 87086 URINE CULTURE/COLONY COUNT: CPT

## 2019-11-08 RX ORDER — CEPHALEXIN 500 MG/1
500 CAPSULE ORAL 3 TIMES DAILY
Qty: 15 CAP | Refills: 0 | Status: SHIPPED | OUTPATIENT
Start: 2019-11-08 | End: 2019-11-13

## 2019-11-08 NOTE — PROGRESS NOTES
Peterson Boykin presents today for   Chief Complaint   Patient presents with    Nausea     fatigue,nausea, spells where equilibrium is off, hot flashes at night. same sx's from a previous UTI. increase in sx's over last 3 weeks              Depression Screening:  No flowsheet data found. Learning Assessment:  Learning Assessment 3/28/2019   PRIMARY LEARNER Patient   PRIMARY LANGUAGE ENGLISH   LEARNER PREFERENCE PRIMARY DEMONSTRATION     -     -   ANSWERED BY Patient   RELATIONSHIP SELF       Abuse Screening:  Abuse Screening Questionnaire 10/11/2017   Do you ever feel afraid of your partner? N   Are you in a relationship with someone who physically or mentally threatens you? N   Is it safe for you to go home? Y       Fall Risk  Fall Risk Assessment, last 12 mths 8/5/2019   Able to walk? No   Fall in past 12 months? Yes   Fall with injury? Yes   Number of falls in past 12 months 3   Fall Risk Score 4           Coordination of Care:  1. Have you been to the ER, urgent care clinic since your last visit? Hospitalized since your last visit? no    2. Have you seen or consulted any other health care providers outside of the [address] [place] [address] [place] since your last visit? Include any pap smears or colon screening.  no

## 2019-11-08 NOTE — PROGRESS NOTES
80 y.o. WHITE OR  female who presents for evaluation. Daughter was here for the evaluation    The last week or so, she has not been feeling well. Some nausea without actual vomiting, she feels fatigued and has been having some sweats. They have not measured a fever; no dysuria, urgency, frequency, ureteral colic. This is reminiscent of what she had with her last UTI in August, grew out pan-sensitive e coli then. She actually saw Dr. Garth Linares 2 days ago, cardiac eval was negative. Past Medical History:   Diagnosis Date    Anxiety     Arthritis     Depression     GERD (gastroesophageal reflux disease)     HTN (hypertension)     LBBB (left bundle branch block)     Osteoporosis     completed 8 years Fosamax    Sciatica     Urinary incontinence      Current Outpatient Medications   Medication Sig    cephALEXin (KEFLEX) 500 mg capsule Take 1 Cap by mouth three (3) times daily for 5 days.  omeprazole (PRILOSEC) 40 mg capsule TAKE 1 CAPSULE BY MOUTH DAILY    meloxicam (MOBIC) 15 mg tablet TAKE 1 TABLET BY MOUTH DAILY    furosemide (LASIX) 20 mg tablet TAKE 1 TABLET BY MOUTH DAILY    cranberry extract (CRANBERRY JUICE POWDER) 425 mg cap Take  by mouth.  atorvastatin (LIPITOR) 10 mg tablet Take 1 Tab by mouth nightly.  carvedilol (COREG) 12.5 mg tablet Take 1 Tab by mouth two (2) times daily (with meals).  meloxicam (MOBIC) 15 mg tablet Take 15 mg by mouth daily. 1 tab by mouth daily    aspirin 81 mg chewable tablet Take 1 Tab by mouth daily.  fluticasone (FLONASE) 50 mcg/actuation nasal spray 1 [place] by Both Nostrils route as needed.  sertraline (ZOLOFT) 50 mg tablet TAKE 1 TABLET BY MOUTH DAILY    loratadine (CLARITIN) 10 mg tablet Take 10 mg by mouth daily as needed for Allergies.  MULTI-VITAMIN PO Take 1 Tab by mouth daily. No current facility-administered medications for this visit.       No Known Allergies    Visit Vitals  /64   Pulse 63   Temp 97.9 °F (36.6 °C) (Oral)   Resp 14   Ht 5' 2\" (1.575 m)   Wt 116 lb (52.6 kg)   SpO2 96%   BMI 21.22 kg/m²   Anicteric, no JVD or adenopathy. Lungs are clear. Heart showed regular rate rhythm. Abdomen soft and nontender, no hepatosplenomegaly or masses. Back showed no CVA tenderness. Results for orders placed or performed in visit on 11/08/19   AMB POC URINALYSIS DIP STICK AUTO W/O MICRO   Result Value Ref Range    Color (UA POC) Yellow     Clarity (UA POC) Clear     Glucose (UA POC) Negative Negative    Bilirubin (UA POC) Negative Negative    Ketones (UA POC) Negative Negative    Specific gravity (UA POC) 1.010 1.001 - 1.035    Blood (UA POC) Negative Negative    pH (UA POC) 5.5 4.6 - 8.0    Protein (UA POC) Negative Negative    Urobilinogen (UA POC) 0.2 mg/dL 0.2 - 1    Nitrites (UA POC) Negative Negative    Leukocyte esterase (UA POC) Trace Negative     Assessment and plan:  1. Presumed UTI. Empiric treatment with Keflex given while cultures are pending. Call with an update        Above conditions discussed at length and patient vocalized understanding.   All questions answered to patient satisfaction

## 2019-11-10 LAB
BACTERIA SPEC CULT: NORMAL
SERVICE CMNT-IMP: NORMAL

## 2019-12-02 RX ORDER — SERTRALINE HYDROCHLORIDE 50 MG/1
TABLET, FILM COATED ORAL
Qty: 90 TAB | Refills: 0 | Status: SHIPPED | OUTPATIENT
Start: 2019-12-02 | End: 2020-02-25

## 2019-12-11 ENCOUNTER — OFFICE VISIT (OUTPATIENT)
Dept: CARDIOLOGY CLINIC | Age: 84
End: 2019-12-11

## 2019-12-11 VITALS
WEIGHT: 117 LBS | HEART RATE: 68 BPM | DIASTOLIC BLOOD PRESSURE: 52 MMHG | BODY MASS INDEX: 21.53 KG/M2 | HEIGHT: 62 IN | OXYGEN SATURATION: 96 % | SYSTOLIC BLOOD PRESSURE: 128 MMHG

## 2019-12-11 DIAGNOSIS — I25.5 ISCHEMIC CARDIOMYOPATHY: Primary | ICD-10-CM

## 2019-12-11 DIAGNOSIS — Z95.0 CARDIAC PACEMAKER IN SITU: ICD-10-CM

## 2019-12-11 DIAGNOSIS — I10 ESSENTIAL HYPERTENSION: ICD-10-CM

## 2019-12-11 NOTE — PROGRESS NOTES
Albaro Aldridge presents today for   Chief Complaint   Patient presents with    Follow-up       Albaro Aldridge preferred language for health care discussion is english/other. Is someone accompanying this pt? no    Is the patient using any DME equipment during OV? no    Depression Screening:  No flowsheet data found. Learning Assessment:  Learning Assessment 3/28/2019   PRIMARY LEARNER Patient   PRIMARY LANGUAGE ENGLISH   LEARNER PREFERENCE PRIMARY DEMONSTRATION     -     -   ANSWERED BY Patient   RELATIONSHIP SELF       Abuse Screening:  Abuse Screening Questionnaire 10/11/2017   Do you ever feel afraid of your partner? N   Are you in a relationship with someone who physically or mentally threatens you? N   Is it safe for you to go home? Y       Fall Risk  Fall Risk Assessment, last 12 mths 12/11/2019   Able to walk? Yes   Fall in past 12 months? No   Fall with injury? -   Number of falls in past 12 months -   Fall Risk Score -       Pt currently taking Anticoagulant therapy? no    Coordination of Care:  1. Have you been to the ER, urgent care clinic since your last visit? Hospitalized since your last visit? no    2. Have you seen or consulted any other health care providers outside of the [address] [place] [address] since your last visit? Include any pap smears or colon screening.  no

## 2019-12-11 NOTE — PROGRESS NOTES
Basil Chun    F/u for several recent complaints: diaphoresis, MS change, nausea, CP    HPI    Basil Chun is a 80 y.o. with no known cardiac disease who presented initially for evaluation of syncope. As you know, Flakito Sales is such a pleasant patient with history hypertension, arthritis and falls, found to have cardiomyopathy and SSS. Flakito Sales lives with her daughter. Back in Sept when her daughter was out of town she apparently had a syncopal event. Her sister was with her at the time and saw her somewhat slumped in her chair. She was slow to respond but came to. Flakito Sales wasn't sure what happened and didn't seek medical care after that initial event. Again since then she had another episode. Her daughter said her mother looked unwell/ pale and said her stomach was upset. She again kind of slumped and her daughter caught her so she did not fall/ get injured. She believes she lost consciousness. She came to and felt somewhat fatigued briefly. She had no observed seizure like activity. She completely denies surrounding cardiac complaints with these episodes- specifically has never noticed palpations/ heart racing, no CP, or SOB. No swelling or headaches. She has no known heart problems and has never seen a heart doctor or had cardiac testing. Dom's work up showed globally reduced EF 26-30%. I called and spoke to her daughter- giving her the results and alerted her to the signs and symptoms to look out for. Apparently, she got off the phone and asked her mom how she was feeling and she admitted she had been short of breath for 2 days. They ended up going to the [facility] ER the next day and was in AECHF/ HFrEF with fluid overload. Just before discharge she ended up passing out (as she had mentioned very rarely happens)- and she was noted to have a 6 sec pause that correlated. She was appropriately given a pacemaker. She presents today for close follow up.  Almost all of her symptoms have resolved since she was formally diagnosed with another UTI and tx with Abx. She has more energy and rarely has CP. She has maybe had one bout of CP- short lived, and didn't even tell her daughter until asked days later. Past Medical History:   Diagnosis Date    Anxiety     Arthritis     Depression     GERD (gastroesophageal reflux disease)     HTN (hypertension)     LBBB (left bundle branch block)     Osteoporosis     completed 8 years Fosamax    Sciatica     Urinary incontinence        Past Surgical History:   Procedure Laterality Date    CARDIAC SURG PROCEDURE UNLIST  11/2018    pacemaker     HX CHOLECYSTECTOMY      HX HYSTERECTOMY      HX ORTHOPAEDIC      fractured right patella surgery    [place] ANESTH,SURGERY OF SHOULDER      [facility] [facility] NEW/RPLC PRM PACEMAKER W/TRANSV ELTRD VENTR N/A 11/28/2018    INSERT PPM SINGLE VENTRICULAR performed by Amanda Smith MD at [place] [facility]       Current Outpatient Medications   Medication Sig Dispense Refill    sertraline (ZOLOFT) 50 mg tablet TAKE 1 TABLET BY MOUTH DAILY 90 Tab 0    omeprazole (PRILOSEC) 40 mg capsule TAKE 1 CAPSULE BY MOUTH DAILY 90 Cap 0    meloxicam (MOBIC) 15 mg tablet TAKE 1 TABLET BY MOUTH DAILY 90 Tab 0    furosemide (LASIX) 20 mg tablet TAKE 1 TABLET BY MOUTH DAILY 90 Tab 0    cranberry extract (CRANBERRY JUICE POWDER) 425 mg cap Take  by mouth.  atorvastatin (LIPITOR) 10 mg tablet Take 1 Tab by mouth nightly. 30 Tab 6    carvedilol (COREG) 12.5 mg tablet Take 1 Tab by mouth two (2) times daily (with meals). 180 Tab 3    meloxicam (MOBIC) 15 mg tablet Take 15 mg by mouth daily. 1 tab by mouth daily      aspirin 81 mg chewable tablet Take 1 Tab by mouth daily. 30 Tab 11    fluticasone (FLONASE) 50 mcg/actuation nasal spray 1 [place] by Both Nostrils route as needed.  sertraline (ZOLOFT) 50 mg tablet TAKE 1 TABLET BY MOUTH DAILY 90 Tab 0    loratadine (CLARITIN) 10 mg tablet Take 10 mg by mouth daily as needed for Allergies.       MULTI-VITAMIN PO Take 1 Tab by mouth daily. No Known Allergies    Social History     Socioeconomic History    Marital status:      Spouse name: Not on file    Number of children: Not on file    Years of education: Not on file    Highest education level: Not on file   Occupational History    Not on file   Social Needs    Financial resource strain: Not on file    Food insecurity:     Worry: Not on file     Inability: Not on file    Transportation needs:     Medical: Not on file     Non-medical: Not on file   Tobacco Use    Smoking status: Never Smoker    Smokeless tobacco: Never Used   Substance and Sexual Activity    Alcohol use: No    Drug use: No    Sexual activity: Not on file   Lifestyle    Physical activity:     Days per week: Not on file     Minutes per session: Not on file    Stress: Not on file   Relationships    Social connections:     Talks on phone: Not on file     Gets together: Not on file     Attends Muslim service: Not on file     Active member of club or organization: Not on file     Attends meetings of clubs or organizations: Not on file     Relationship status: Not on file    Intimate partner violence:     Fear of current or ex partner: Not on file     Emotionally abused: Not on file     Physically abused: Not on file     Forced sexual activity: Not on file   Other Topics Concern    Not on file   Social History Narrative    Not on file        The patient has a family history of heart problems, but no genetic cardiomyopathies or SCD. Review of Systems    14 pt Review of Systems is negative unless otherwise mentioned in the HPI.     Wt Readings from Last 3 Encounters:   11/08/19 52.6 kg (116 lb)   11/06/19 52.6 kg (116 lb)   09/19/19 52.6 kg (116 lb)     Temp Readings from Last 3 Encounters:   11/08/19 97.9 °F (36.6 °C) (Oral)   08/05/19 95.9 °F (35.5 °C) (Oral)   07/05/19 99.2 °F (37.3 °C)     BP Readings from Last 3 Encounters:   11/08/19 110/64   11/06/19 120/68   09/19/19 140/76     Pulse Readings from Last 3 Encounters:   11/08/19 63   11/06/19 70   09/19/19 61     Physical Exam:    Visit Vitals  LMP  (LMP Unknown)      Physical Exam   Constitutional: She is oriented to person, place, and time. She appears well-developed and well-nourished. No distress. HENT:   Head: Normocephalic and atraumatic. Eyes: Pupils are equal, round, and reactive to light. EOM are normal.   Neck: No JVD present. Cardiovascular: Normal rate, regular rhythm, normal heart sounds and intact distal pulses. Exam reveals no gallop and no friction rub. No murmur heard. Pulmonary/Chest: Effort normal and breath sounds normal. No respiratory distress. She has no wheezes. She has no rales. She exhibits no tenderness. Abdominal: Soft. Bowel sounds are normal.   Musculoskeletal:         General: No tenderness or edema. Neurological: She is alert and oriented to person, place, and time. Skin: Skin is warm and dry. She is not diaphoretic. Psychiatric: She has a normal mood and affect. Labs:  Glucose 89   65 - 99 mg/dL Final   BUN 30   10 - 36 mg/dL Final   Creatinine 1.28 Abnormally high   H  0.57 - 1.00 mg/dL Final   GFR est non-AA 37 Abnormally low   L  >59 mL/min/1.73 Final   GFR est AA 42 Abnormally low   L  >59 mL/min/1.73 Final   BUN/Creatinine ratio 23   12 - 28  Final   Sodium 140   134 - 144 mmol/L Final   Potassium 4.8   3.5 - 5.2 mmol/L Final   Chloride 100   96 - 106 mmol/L Final   CO2 23   20 - 29 mmol/L Final   Calcium 9.2   8.7 - 10.3 mg/dL Final   Protein, total 6.8   6.0 - 8.5 g/dL Final   Albumin 3.8   3.2 - 4.6 g/dL Final   GLOBULIN, TOTAL 3.0   1.5 - 4.5 g/dL Final   A-G Ratio 1.3   1.2 - 2.2  Final   Bilirubin, total 0.3   0.0 - 1.2 mg/dL Final   Alk. phosphatase 63   39 - 117 IU/L Final   AST (SGOT) 20   0 - 40 IU/L Final   ALT (SGPT) 15   0 - 32 IU/L Final     EKG Last: LBBB/ paced, no concerning ST or T wave changes    Impression and Plan:  Reji Mariano is a 80 y.o. with:    1.) Nausea/ Diaphoresis, resolved with Abx (see #5)  2.) HFrEF, s/p AECHF, now euvolemic/ stable (very likely ischemic but presumed)  2.) Symptomatic SSS s/p PPM, known  3.) Dyslipidemia, known  4.) Advanced age/ frailty  5.) Mental status decline, should r/o delirium from infection    1.) Overall doing well from CV standpoint, rare stable angina  2.) Continue current med regimen,   3.) RTC ~5 months with device check    Thank you for allowing me to participate in the care of your patient, please do not hesitate to call with questions or concerns.     Kindest regards,    [place], DO

## 2020-01-07 ENCOUNTER — OFFICE VISIT (OUTPATIENT)
Dept: INTERNAL MEDICINE CLINIC | Age: 85
End: 2020-01-07

## 2020-01-07 VITALS
BODY MASS INDEX: 20.83 KG/M2 | HEART RATE: 65 BPM | OXYGEN SATURATION: 97 % | DIASTOLIC BLOOD PRESSURE: 54 MMHG | RESPIRATION RATE: 12 BRPM | TEMPERATURE: 97.6 F | SYSTOLIC BLOOD PRESSURE: 133 MMHG | WEIGHT: 113.2 LBS | HEIGHT: 62 IN

## 2020-01-07 DIAGNOSIS — R42 LIGHTHEADEDNESS: ICD-10-CM

## 2020-01-07 DIAGNOSIS — K52.9 ACUTE GASTROENTERITIS: Primary | ICD-10-CM

## 2020-01-07 NOTE — PROGRESS NOTES
Bernardo López 10/17/1927, is a 80 y.o. female, who is seen today for diarrhea and nausea. 5 days ago she started with rather explosive watery diarrhea but without blood chills or fever since then. She has continued to have diarrhea and comes even with up to 4 Imodium 1 day she it was not a lot better but seems to be better today. Minimal cramping and the stool is starting to get a little bit formed though quite loose and no blood. No chills or sweats. She does not feel like eating anything but is drinking plenty of fluids. She is urinating quite a bit. Past Medical History:   Diagnosis Date    Anxiety     Arthritis     Depression     GERD (gastroesophageal reflux disease)     HTN (hypertension)     LBBB (left bundle branch block)     Osteoporosis     completed 8 years Fosamax    Sciatica     Urinary incontinence      Current Outpatient Medications   Medication Sig Dispense Refill    sertraline (ZOLOFT) 50 mg tablet TAKE 1 TABLET BY MOUTH DAILY 90 Tab 0    omeprazole (PRILOSEC) 40 mg capsule TAKE 1 CAPSULE BY MOUTH DAILY 90 Cap 0    meloxicam (MOBIC) 15 mg tablet TAKE 1 TABLET BY MOUTH DAILY 90 Tab 0    furosemide (LASIX) 20 mg tablet TAKE 1 TABLET BY MOUTH DAILY 90 Tab 0    cranberry extract (CRANBERRY JUICE POWDER) 425 mg cap Take  by mouth.  atorvastatin (LIPITOR) 10 mg tablet Take 1 Tab by mouth nightly. 30 Tab 6    carvedilol (COREG) 12.5 mg tablet Take 1 Tab by mouth two (2) times daily (with meals). 180 Tab 3    meloxicam (MOBIC) 15 mg tablet Take 15 mg by mouth daily. 1 tab by mouth daily      aspirin 81 mg chewable tablet Take 1 Tab by mouth daily. 30 Tab 11    fluticasone (FLONASE) 50 mcg/actuation nasal spray 1 [place] by Both Nostrils route as needed.  sertraline (ZOLOFT) 50 mg tablet TAKE 1 TABLET BY MOUTH DAILY 90 Tab 0    loratadine (CLARITIN) 10 mg tablet Take 10 mg by mouth daily as needed for Allergies.  MULTI-VITAMIN PO Take 1 Tab by mouth daily. Visit Vitals  /54 (BP 1 Location: Left arm, BP Patient Position: Sitting)   Pulse 65   Temp 97.6 °F (36.4 °C) (Oral)   Resp 12   Ht 5' 2\" (1.575 m)   Wt 113 lb 3.2 oz (51.3 kg)   SpO2 97%   BMI 20.70 kg/m²     Abdomen is not distended and there is no tympany. No tenderness anywhere and no rebound tenderness. No hepatosplenomegaly or masses. Assessment: Acute gastroenteritis, I told the patient and her daughter that it could be a viral process such as norovirus or even bacterial but without blood or fever it should clear up on its own without  antibiotics even if it is bacterial.  If it is not much better in another week stool culture would be appropriate at that time but she has not traveled to any suspect areas to where she would get something that would not resolve on its own. She will continue to drink plenty of fluids particularly water and I told her daughter not to worry about eating until this starts to resolve and hunger comes back. She has lost only 4 pounds from when I last saw her a month ago. Charan Inman MD FACP    Please note: This document has been produced using voice recognition software. Unrecognized errors in transcription may be present. This visit lasted 25 minutes, greater than 50% of the time was spent counseling going over the recommendations and probable etiology of this illness.

## 2020-01-20 ENCOUNTER — OFFICE VISIT (OUTPATIENT)
Dept: INTERNAL MEDICINE CLINIC | Age: 85
End: 2020-01-20

## 2020-01-20 VITALS
HEART RATE: 54 BPM | BODY MASS INDEX: 20.76 KG/M2 | TEMPERATURE: 97.4 F | SYSTOLIC BLOOD PRESSURE: 126 MMHG | OXYGEN SATURATION: 98 % | DIASTOLIC BLOOD PRESSURE: 64 MMHG | HEIGHT: 62 IN | RESPIRATION RATE: 12 BRPM | WEIGHT: 112.8 LBS

## 2020-01-20 DIAGNOSIS — R35.0 URINE FREQUENCY: Primary | ICD-10-CM

## 2020-01-20 DIAGNOSIS — R11.0 NAUSEA: ICD-10-CM

## 2020-01-20 DIAGNOSIS — R19.7 DIARRHEA, UNSPECIFIED TYPE: ICD-10-CM

## 2020-01-20 DIAGNOSIS — R05.9 COUGH: ICD-10-CM

## 2020-01-20 PROBLEM — I50.43 ACUTE ON CHRONIC COMBINED SYSTOLIC AND DIASTOLIC CONGESTIVE HEART FAILURE (HCC): Status: RESOLVED | Noted: 2019-02-14 | Resolved: 2020-01-20

## 2020-01-20 PROBLEM — I50.21 CHF (CONGESTIVE HEART FAILURE), NYHA CLASS III, ACUTE, SYSTOLIC (HCC): Status: RESOLVED | Noted: 2018-11-24 | Resolved: 2020-01-20

## 2020-01-20 LAB
BILIRUB UR QL STRIP: NEGATIVE
GLUCOSE UR-MCNC: NEGATIVE MG/DL
KETONES P FAST UR STRIP-MCNC: NEGATIVE MG/DL
PH UR STRIP: 6 [PH] (ref 4.6–8)
PROT UR QL STRIP: NORMAL
SP GR UR STRIP: 1.01 (ref 1–1.03)
UA UROBILINOGEN AMB POC: NORMAL (ref 0.2–1)
URINALYSIS CLARITY POC: NORMAL
URINALYSIS COLOR POC: YELLOW
URINE BLOOD POC: NORMAL
URINE LEUKOCYTES POC: NORMAL
URINE NITRITES POC: NEGATIVE

## 2020-01-20 RX ORDER — LEVOFLOXACIN 500 MG/1
500 TABLET, FILM COATED ORAL DAILY
Qty: 7 TAB | Refills: 0 | Status: SHIPPED | OUTPATIENT
Start: 2020-01-20 | End: 2020-01-27

## 2020-01-20 NOTE — PROGRESS NOTES
Beto Santo 10/17/1927, is a 80 y.o. female, who is seen today for ongoing diarrhea, urinary frequency now, intermittent nausea. I saw her here 13 days ago with a 5-day history of intermittent nausea and diarrhea and the diarrhea really has stopped. She is used quite a bit of Imodium but is having poorly formed stools and sometimes liquid uncontrollable stools. Also she smells something a lot at times that her daughter does not smell and causes nausea. She is only lost 1 more pound but had lost 4 pounds prior to that when I saw her less than 2 weeks ago. No chills or fever no blood per rectum. She is having some urinary frequency and intermittent dysuria. Past Medical History:   Diagnosis Date    Anxiety     Arthritis     Depression     GERD (gastroesophageal reflux disease)     HTN (hypertension)     LBBB (left bundle branch block)     Osteoporosis     completed 8 years Fosamax    Sciatica     Urinary incontinence      Current Outpatient Medications   Medication Sig Dispense Refill    furosemide (LASIX) 20 mg tablet TAKE 1 TABLET BY MOUTH DAILY 90 Tab 2    sertraline (ZOLOFT) 50 mg tablet TAKE 1 TABLET BY MOUTH DAILY 90 Tab 0    omeprazole (PRILOSEC) 40 mg capsule TAKE 1 CAPSULE BY MOUTH DAILY 90 Cap 0    meloxicam (MOBIC) 15 mg tablet TAKE 1 TABLET BY MOUTH DAILY 90 Tab 0    cranberry extract (CRANBERRY JUICE POWDER) 425 mg cap Take  by mouth.  atorvastatin (LIPITOR) 10 mg tablet Take 1 Tab by mouth nightly. 30 Tab 6    carvedilol (COREG) 12.5 mg tablet Take 1 Tab by mouth two (2) times daily (with meals). 180 Tab 3    meloxicam (MOBIC) 15 mg tablet Take 15 mg by mouth daily. 1 tab by mouth daily      aspirin 81 mg chewable tablet Take 1 Tab by mouth daily. 30 Tab 11    fluticasone (FLONASE) 50 mcg/actuation nasal spray 1 [place] by Both Nostrils route as needed.       sertraline (ZOLOFT) 50 mg tablet TAKE 1 TABLET BY MOUTH DAILY 90 Tab 0    loratadine (CLARITIN) 10 mg tablet Take 10 mg by mouth daily as needed for Allergies.  MULTI-VITAMIN PO Take 1 Tab by mouth daily. Visit Vitals  /64 (BP 1 Location: Left arm, BP Patient Position: Sitting)   Pulse (!) 54   Temp 97.4 °F (36.3 °C) (Oral)   Resp 12   Ht 5' 2\" (1.575 m)   Wt 112 lb 12.8 oz (51.2 kg)   SpO2 98%   BMI 20.63 kg/m²     Abdomen is not distended and there is no tympany. No hepatosplenomegaly or masses. No tenderness and no rebound tenderness. Lungs reveal some crackles in both bases. No wheezing. Heart reveals a regular rhythm with no gallop click or rub. Results for orders placed or performed in visit on 01/20/20   AMB POC URINALYSIS DIP STICK AUTO W/O MICRO   Result Value Ref Range    Color (UA POC) Yellow     Clarity (UA POC) Turbid     Glucose (UA POC) Negative Negative    Bilirubin (UA POC) Negative Negative    Ketones (UA POC) Negative Negative    Specific gravity (UA POC) 1.015 1.001 - 1.035    Blood (UA POC) 2+ Negative    pH (UA POC) 6.0 4.6 - 8.0    Protein (UA POC) 2+ Negative    Urobilinogen (UA POC) 0.2 mg/dL 0.2 - 1    Nitrites (UA POC) Negative Negative    Leukocyte esterase (UA POC) 3+ Negative     Assessment: #1. Persistent diarrhea going on for well over 2 weeks now, will refer to GI. #2.  Pyuria and urinary symptoms suggesting UTI, we will treat with levofloxacin which could help with diarrhea if it is due to enteric organisms. Otherwise I would not be using ciprofloxacin or levofloxacin for UTI. #3.  Some cough at night, lungs sound the same as usual, she does not seem to be in heart failure, there is no edema no weight gain. If the diarrhea stops before her GI appointment her daughter can cancel that. Cahran Branham MD FACP    Please note: This document has been produced using voice recognition software. Unrecognized errors in transcription may be present.

## 2020-04-12 NOTE — DISCHARGE SUMMARY
Discharge Summary    Patient: Rivera Love MRN: [id number]  CSN: [id number]    YOB: 1927  Age: 80 y.o.   Sex: female    DOA: 11/24/2018 LOS:  LOS: 5 days   Discharge Date:      Admission Diagnoses: CHF (congestive heart failure), NYHA class III, acute, systolic ([facility]    Discharge Diagnoses:    Problem List as of 11/29/2018 Date Reviewed: 11/1/2018          Codes Class Noted - Resolved    * (Principal) CHF (congestive heart failure), NYHA class III, acute, systolic ([facility] [id number]: I50.21  ICD-9-CM: 428.21, 428.0  11/24/2018 - Present        Acute UTI ICD-10-CM: N39.0  ICD-9-CM: 599.0  11/24/2018 - Present        Functional urinary incontinence ICD-10-CM: R39.81  ICD-9-CM: 788.91  11/24/2018 - Present        Acute pulmonary edema ([facility] ICD-10-CM: J81.0  ICD-9-CM: 518.4  11/24/2018 - Present        Primary osteoarthritis of both knees ICD-10-CM: M17.0  ICD-9-CM: 715.16  11/1/2018 - Present        Syncope ICD-10-CM: R55  ICD-9-CM: 780.2  11/1/2018 - Present        Seasonal allergic rhinitis due to pollen ICD-10-CM: J30.1  ICD-9-CM: 477.0  4/23/2018 - Present        Dysphagia ICD-10-CM: R13.10  ICD-9-CM: 787.20  12/6/2016 - Present        Advance directive discussed with patient ICD-10-CM: Z71.89  ICD-9-CM: V65.49  10/21/2016 - Present        Gastroesophageal reflux disease without esophagitis ICD-10-CM: K21.9  ICD-9-CM: 530.81  10/21/2016 - Present        Essential hypertension with goal blood pressure less than 140/90 ICD-10-CM: I10  ICD-9-CM: 401.9  6/22/2016 - Present        S/P ORIF (open reduction internal fixation) fracture ICD-10-CM: Z96.7, Z87.81  ICD-9-CM: V45.89, V15.51  6/14/2016 - Present        Memory loss ICD-10-CM: R41.3  ICD-9-CM: 780.93  12/16/2015 - Present        Vertigo, benign paroxysmal ICD-10-CM: H81.10  ICD-9-CM: 386.11  6/15/2015 - Present        Distal radius fracture, left ICD-10-CM: I24.933N  ICD-9-CM: 813.42  11/30/2013 - Present        Sprain and strain of shoulder and upper arm ICD-10-CM: S26.294K, J32.266U  ICD-9-CM: 840.9  11/30/2013 - Present        [place] ICD-10-CM: B02.9  ICD-9-CM: 053.9  6/10/2013 - Present        Generalized osteoarthrosis, involving multiple sites ICD-10-CM: M15.9  ICD-9-CM: 715.09  1/8/2013 - Present        Reflux esophagitis ICD-10-CM: K21.0  ICD-9-CM: 530.11  1/19/2012 - Present        Osteoporosis ICD-10-CM: M81.0  ICD-9-CM: 733.00  Unknown - Present        Anxiety ICD-10-CM: F41.9  ICD-9-CM: 300.00  Unknown - Present        Depression ICD-10-CM: F32.9  ICD-9-CM: 294  Unknown - Present            Reason for Admission  80 y.o.  female with PMHx of arthritis, unsteady gate and urinary incontinence 2 ry to chronic bladder prolapse. She presented to ER with several days of generalized weakness and progressively worsening shortness of breath. The day prio, she declined to come to ER but that morning her shortness of breath was worsening, so she agreed to come when asked by her daughter.     Pt saw her PCP 2 weeks prior as she started to develop SOB; pcp  referred her to a cardiologist where she received an echo on 11/20/18 that showed EF of 25-30%. In the ED, patient was found to have moderate to severe pulmonary edema on CXR, with tachypnea of 30-35/min and desaturating on mild exertion. She was placed on oxygen and received Lasix in ER with improvement. Cardiology was consulted, patient recommended for admission. Discharge Condition: Good    PHYSICAL EXAM at discharge. Visit Vitals  /63 (BP 1 Location: Left arm, BP Patient Position: At rest)   Pulse 61   Temp 98 °F (36.7 °C)   Resp 18   Ht 5' 2\" (1.575 m)   Wt 54.7 kg (120 lb 9.5 oz)   SpO2 96%   Breastfeeding? No   BMI 22.06 kg/m²     Thin, sitting up in bed nad. Daughter and sister at bedside. Ncat. perrl  Chest wall: PM site to left chest, site clean  RRR  cta b.l  Soft nt nd nabs  No edema. dp 2+ b.l  Other than hearing, no focal deficit  No rash    Hospital Course:   1.  Acute HFrEF exacerbation. nsg did CHF education. Nutritionist worked with her regarding low Na diet. Lasix, entresto, coreg, spironolactone. hh ordered  2. Echo 11/20/18: EF 26-30%, mild concentric LVH, LV global hypokinesis, grade 2 diastolic dysfunction, mild TR + MR  3. LBBB  4. hypertension  5. dyslipidemia, , Total Chol 204 11/2018  6. Osteoporosis  7. GERD  8. UTI >100,000 E coli - completed 3 days of ceftriaxone based on cx/s. Blood cx (11/24) negative, final.  9. Hx hysterectomy complicated by bladder injury with subsequent bladder prolapse and urinary incontinence. Hx bladder tack. Daughter declines further  intervention. 10. Advanced age, cough after po - did well with SLP. 11. Hx unsteady gait - pt / ot recs noted  12. Mild protein calorie malnutrition AEB Inadequate energy intake related to decreased appetite and SOB associated with meals PTA as evidenced by pt consuming 50% or less of recent meals. multivit. On supplements, nutritionist followed over hospital course. 13. Symptomatic bradycardia/recurrent syncope with pauses (4 sec, 6 sec, 4 sec). History of syncope last month. PM placed per Dr Bonnie Zapata. 14. dvt prophylaxis was given in the form of scds. 15. Full code. Discharge to Home with hh. Patient and family agree; all questions answered to my ability. Consults: Cardiology Dr. Bonnie Zapata    Procedures  PPM placement 11/28/18.     Significant Diagnostic Studies: labs:   Recent Results (from the past 24 hour(s))   METABOLIC PANEL, BASIC    Collection Time: 11/29/18 10:21 AM   Result Value Ref Range    Sodium 141 136 - 145 mmol/L    Potassium 4.0 3.5 - 5.5 mmol/L    Chloride 110 (H) 100 - 108 mmol/L    CO2 28 21 - 32 mmol/L    Anion gap 3 3.0 - 18 mmol/L    Glucose 115 (H) 74 - 99 mg/dL    BUN 27 (H) 7.0 - 18 MG/DL    Creatinine 1.08 0.6 - 1.3 MG/DL    BUN/Creatinine ratio 25 (H) 12 - 20      GFR est AA 58 (L) >60 ml/min/1.73m2    GFR est non-AA 48 (L) >60 ml/min/1.73m2    Calcium 8.5 8.5 - 10.1 MG/DL     All Micro Results     Procedure Component Value Units Date/Time    CULTURE, BLOOD [[id number]] Collected:  11/24/18 1450    Order Status:  Completed Specimen:  Blood Updated:  11/29/18 0729     Special Requests: NO SPECIAL REQUESTS        Culture result: NO GROWTH 5 DAYS       CULTURE, BLOOD [[id number]] Collected:  11/24/18 1651    Order Status:  Completed Specimen:  Blood Updated:  11/29/18 0729     Special Requests: NO SPECIAL REQUESTS        Culture result: NO GROWTH 5 DAYS       CULTURE, URINE [[id number]]  (Abnormal)  (Susceptibility) Collected:  11/24/18 1340    Order Status:  Completed Specimen:  Urine from Clean catch Updated:  11/26/18 0755     Special Requests: NO SPECIAL REQUESTS        Culture result:       >100,000 COLONIES/mL ESCHERICHIA COLI              IMAGING  XR Results (most recent):  Results from Hospital Encounter encounter on 11/24/18   XR CHEST PORT    Narrative EXAM:  Chest Portable. INDICATION:  Status post device implant. COMPARISON:  11/24/18, 10/28/18. TECHNIQUE:  Portable AP chest study obtained in 30 degrees semiupright position. FINDINGS:      - A new ICD hub is noted in the left upper torso region with an intact single  lead through the left subclavian approach. - No pneumothorax is detected. - There is improvement in aeration with decreased vascular prominence and  decreased hazy appearance of the lungs compared to 11/24/18. - Chronic increased interstitial markings are again noted similar to 10/28/18       Impression IMPRESSION:    1.  New ICD placement. 2.  No pneumothorax. 3.  Improving aeration. 4.  Chronic interstitial lung disease suspected.      EKG Results     Procedure 720 Value Units Date/Time    SCANNED CARDIAC RHYTHM STRIP [[id number]] Collected:  11/30/18 1005    Order Status:  Completed Updated:  11/30/18 1026    SCANNED CARDIAC RHYTHM Rich Castañedaa [[id number]] Collected:  11/29/18 0645    Order Status:  Completed Updated:  11/29/18 1345 SCANNED CARDIAC RHYTHM STRIP [[id number]] Collected:  11/28/18 1249    Order Status:  Completed Updated:  11/28/18 1405    EKG, 12 LEAD, INITIAL [[id number]] Collected:  11/24/18 1215    Order Status:  Completed Updated:  11/24/18 1503     Ventricular Rate 95 BPM      Atrial Rate 95 BPM      P-R Interval 188 ms      QRS Duration 148 ms      Q-T Interval 424 ms      QTC Calculation (Bezet) 532 ms      Calculated P Axis -7 degrees      Calculated R Axis -28 degrees      Calculated T Axis 141 degrees      Diagnosis --     Normal sinus rhythm  Left bundle branch block  Abnormal ECG  When compared with ECG of 28-OCT-2018 15:26,  No significant change was found    Confirmed by Evelia Avalos MD, ----- ([id number]) on 11/24/2018 3:03:17 PM      EKG, 12 LEAD, INITIAL [[id number]]     Order Status:  Canceled           Discharge Medications:     Current Discharge Medication List      START taking these medications    Details   aspirin 81 mg chewable tablet Take 1 Tab by mouth daily. Qty: 30 Tab, Refills: 11      atorvastatin (LIPITOR) 10 mg tablet Take 1 Tab by mouth nightly. Qty: 30 Tab, Refills: 2      carvedilol (COREG) 3.125 mg tablet Take 1 Tab by mouth every twelve (12) hours. Qty: 60 Tab, Refills: 2      furosemide (LASIX) 20 mg tablet Take 1 Tab by mouth daily. Qty: 30 Tab, Refills: 2      oxyCODONE-acetaminophen (PERCOCET) 5-325 mg per tablet Take 1 Tab by mouth every six (6) hours as needed. Max Daily Amount: 4 Tabs. Qty: 15 Tab, Refills: 0    Associated Diagnoses: S/P cardiac pacemaker procedure         CONTINUE these medications which have NOT CHANGED    Details   fluticasone (FLONASE) 50 mcg/actuation nasal spray 1 [place] by Both Nostrils route daily. omeprazole (PRILOSEC) 40 mg capsule TAKE 1 CAPSULE BY MOUTH DAILY  Qty: 90 Cap, Refills: 0      sertraline (ZOLOFT) 50 mg tablet TAKE 1 TABLET BY MOUTH DAILY  Qty: 90 Tab, Refills: 0      loratadine (CLARITIN) 10 mg tablet Take 10 mg by mouth daily as needed. CALCIUM CARBONATE/VITAMIN D3 (CALCIUM 600 + D,3, PO) Take 1 Tab by mouth daily. MULTI-VITAMIN PO Take 1 Tab by mouth daily. STOP taking these medications       ibuprofen (MOTRIN) 200 mg tablet Comments:   Reason for Stopping:         meloxicam (MOBIC) 15 mg tablet Comments:   Reason for Stopping:               Activity: PT/OT per Home Health    Diet: Cardiac Diet. Na 1.5 mg daily. FR 1200 mL daily    Wound Care: As directed    Follow-up:  1. Dr. Rosalba Chester 7 - 10 days  2. Dr. Seven Castillo 2 weeks, wound check at that visit.     Minutes spent on discharge: greater than 30 Adult

## 2020-05-06 ENCOUNTER — VIRTUAL VISIT (OUTPATIENT)
Dept: CARDIOLOGY CLINIC | Age: 85
End: 2020-05-06

## 2020-05-06 ENCOUNTER — CLINICAL SUPPORT (OUTPATIENT)
Dept: CARDIOLOGY CLINIC | Age: 85
End: 2020-05-06

## 2020-05-06 VITALS — SYSTOLIC BLOOD PRESSURE: 142 MMHG | DIASTOLIC BLOOD PRESSURE: 67 MMHG | WEIGHT: 104 LBS | BODY MASS INDEX: 19.02 KG/M2

## 2020-05-06 DIAGNOSIS — Z95.0 CARDIAC PACEMAKER IN SITU: ICD-10-CM

## 2020-05-06 DIAGNOSIS — I10 ESSENTIAL HYPERTENSION: ICD-10-CM

## 2020-05-06 DIAGNOSIS — I25.5 ISCHEMIC CARDIOMYOPATHY: Primary | ICD-10-CM

## 2020-05-06 NOTE — PROGRESS NOTES
Dang Ackerman presents today for   Chief Complaint   Patient presents with    Follow-up       Dang Ackerman preferred language for health care discussion is english/other. Depression Screening:  No flowsheet data found. Learning Assessment:  Learning Assessment 3/28/2019   PRIMARY LEARNER Patient   PRIMARY LANGUAGE ENGLISH   LEARNER PREFERENCE PRIMARY DEMONSTRATION     -     -   ANSWERED BY Patient   RELATIONSHIP SELF       Abuse Screening:  Abuse Screening Questionnaire 10/11/2017   Do you ever feel afraid of your partner? N   Are you in a relationship with someone who physically or mentally threatens you? N   Is it safe for you to go home? Y       Fall Risk  Fall Risk Assessment, last 12 mths 1/20/2020   Able to walk? Yes   Fall in past 12 months? No   Fall with injury? -   Number of falls in past 12 months -   Fall Risk Score -           Coordination of Care:  1. Have you been to the ER, urgent care clinic since your last visit? Hospitalized since your last visit? no    2. Have you seen or consulted any other health care providers outside of the [address] [place] [address] since your last visit? Include any pap smears or colon screening.  no

## 2020-05-06 NOTE — PROGRESS NOTES
Abby Jewell is a 80 y.o. female who was seen by synchronous (real-time) audio-video technology on 5/6/2020. Consent: Abby Jewell, who was seen by synchronous (real-time) audio-video technology, and/or her healthcare decision maker, is aware that this patient-initiated, Telehealth encounter on 5/6/2020 is a billable service, with coverage as determined by her insurance carrier. She is aware that she may receive a bill and has provided verbal consent to proceed: Yes. Pt seen from [facility] office, for follow up of HFrEF, SOB       Assessment & Plan:     1.) Diarrhea, resolved (imodium by GI), s/p ~20 lb wt loss  2.) HFrEF, s/p AECHF, now euvolemic/ stable (very likely ischemic but presumed)  2.) Symptomatic SSS s/p PPM, known  3.) Dyslipidemia, known  4.) Advanced age/ frailty  5.) s/p several infections, negative CDiff  6.) Mental status waxing/ waning, delirium? Polypharmacy?     1.) Overall doing reasonably well from CV standpoint, rare stable angina  2.) Continue current med regimen,   3.) RTC ~6 months with device check, but did say if concerns can make appt sooner    SBP slightly elevated for her today (taken by daughter at home)  Wouldn't change meds at this time  Still taking Lasix once a day and urinating after  I'm aware she lost weight due to diarrhea but that is resolved  She is struggling with some depression per daughter due to COVID-19 hasnt left house much but trying to start some small safe outtings now that weather improving    I spent at least 40 minutes on this visit with this established patient. ([id number])    Subjective:   Abby Jewell is a 80 y.o. female who was seen for Follow-up    Pt and her daughter known well to me  HFrEF with PPM  No new CV complaints  Reviewed interim events,  Diarrhea, dysuria, dysphagia, wt loss and changes in mentation  No obvious SOB or CP but also not left house  No LE edema    Prior to Admission medications    Medication Sig Start Date End Date Taking?  Authorizing Provider   omeprazole (PRILOSEC) 40 mg capsule TAKE 1 CAPSULE BY MOUTH DAILY 4/24/20  Yes Yusuf Teague MD   atorvastatin (LIPITOR) 10 mg tablet TAKE 1 TABLET BY MOUTH EVERY NIGHT 2/25/20  Yes Thea Bella NP   furosemide (LASIX) 20 mg tablet TAKE 1 TABLET BY MOUTH DAILY 1/20/20  Yes Yusuf Teague MD   cranberry extract (CRANBERRY JUICE POWDER) 425 mg cap Take  by mouth. Yes Provider, Historical   carvedilol (COREG) 12.5 mg tablet Take 1 Tab by mouth two (2) times daily (with meals). 6/19/19  Yes Esther Andrews DO   meloxicam (MOBIC) 15 mg tablet Take 15 mg by mouth daily. 1 tab by mouth daily   Yes Provider, Historical   aspirin 81 mg chewable tablet Take 1 Tab by mouth daily. 11/30/18  Yes Rito Koyanagi, MD   fluticasone (FLONASE) 50 mcg/actuation nasal spray 1 [place] by Both Nostrils route as needed. Yes Provider, Historical   sertraline (ZOLOFT) 50 mg tablet TAKE 1 TABLET BY MOUTH DAILY 12/4/17  Yes Yusuf Teague MD   loratadine (CLARITIN) 10 mg tablet Take 10 mg by mouth daily as needed for Allergies. Yes Provider, Historical   MULTI-VITAMIN PO Take 1 Tab by mouth daily.    Yes Provider, Historical   sertraline (ZOLOFT) 50 mg tablet TAKE 1 TABLET BY MOUTH DAILY 2/25/20   Yusuf Teague MD   meloxicam (MOBIC) 15 mg tablet TAKE 1 TABLET BY MOUTH DAILY 1/27/20   Yusuf Teague MD     No Known Allergies        ROS    Objective:   Vital Signs: (As obtained by patient/caregiver at home)  Visit Vitals  /67   Wt 47.2 kg (104 lb)   LMP  (LMP Unknown)   BMI 19.02 kg/m²        Constitutional: [x] Appears well-developed and well-nourished [x] No apparent distress      [] Abnormal -     Mental status: [x] Alert and awake  [x] Oriented to person/place/time [x] Able to follow commands    [] Abnormal -     Eyes:   EOM    [x]  Normal    [] Abnormal -   Sclera  [x]  Normal    [] Abnormal -          Discharge [x]  None visible   [] Abnormal -     HENT: [x] Normocephalic, atraumatic  [] Abnormal -   [x] Mouth/Throat: Mucous membranes are moist    External Ears [x] Normal  [] Abnormal -    Neck: [x] No visualized mass [] Abnormal -     Pulmonary/Chest: [x] Respiratory effort normal   [x] No visualized signs of difficulty breathing or respiratory distress        [] Abnormal -      Musculoskeletal:   [x] Normal gait with no signs of ataxia         [x] Normal range of motion of neck        [] Abnormal -     Neurological:        [x] No Facial Asymmetry (Cranial nerve 7 motor function) (limited exam due to video visit)          [x] No gaze palsy        [] Abnormal -          Skin:        [x] No significant exanthematous lesions or discoloration noted on facial skin         [] Abnormal -            Psychiatric:       [x] Normal Affect [] Abnormal -        [x] No Hallucinations    Other pertinent observable physical exam findings:-        We discussed the expected course, resolution and complications of the diagnosis(es) in detail. Medication risks, benefits, costs, interactions, and alternatives were discussed as indicated. I advised her to contact the office if her condition worsens, changes or fails to improve as anticipated. She expressed understanding with the diagnosis(es) and plan. Nicolas Cardoza is a 80 y.o. female who was evaluated by a video visit encounter for concerns as above. Patient identification was verified prior to start of the visit. A caregiver was present when appropriate. Due to this being a TeleHealth encounter (During [facility]- public health emergency), evaluation of the following organ systems was limited: Vitals/Constitutional/EENT/Resp/CV/GI//MS/Neuro/Skin/Heme-Lymph-Imm.   Pursuant to the emergency declaration under the [facility], [facility] waiver authority and the [employer] and [employer] Act, this Virtual  Visit was conducted, with patient's (and/or legal guardian's) consent, to reduce the patient's risk of exposure to COVID-19 and provide necessary medical care. Services were provided through a video synchronous discussion virtually to substitute for in-person clinic visit. Patient and provider were located at their individual homes.       Aura Joy DO

## 2020-05-07 NOTE — PROGRESS NOTES
I have personally seen and evaluated the device findings. Interrogation reviewed and I agree with assessment.     Silvestre Betancourt

## 2020-06-22 RX ORDER — CARVEDILOL 12.5 MG/1
TABLET ORAL
Qty: 180 TAB | Refills: 3 | Status: SHIPPED | OUTPATIENT
Start: 2020-06-22 | End: 2021-06-15 | Stop reason: SDUPTHER

## 2020-06-24 ENCOUNTER — OFFICE VISIT (OUTPATIENT)
Dept: CARDIOLOGY CLINIC | Age: 85
End: 2020-06-24

## 2020-06-24 VITALS
WEIGHT: 105.8 LBS | HEIGHT: 62 IN | DIASTOLIC BLOOD PRESSURE: 62 MMHG | HEART RATE: 67 BPM | SYSTOLIC BLOOD PRESSURE: 120 MMHG | BODY MASS INDEX: 19.47 KG/M2 | OXYGEN SATURATION: 98 %

## 2020-06-24 DIAGNOSIS — I10 PRIMARY HYPERTENSION: ICD-10-CM

## 2020-06-24 DIAGNOSIS — R55 SYNCOPE, UNSPECIFIED SYNCOPE TYPE: Primary | ICD-10-CM

## 2020-06-24 NOTE — PATIENT INSTRUCTIONS
Blood work ( cbc, bmp, tsh )  Contact our office if any syncope or near syncope episodes   Follow up 2 months

## 2020-06-24 NOTE — PROGRESS NOTES
Kyung Hawley    Urgent follow up for near syncopal episodes and heaviness/ pain in legs  HPI    Kyung Hawley is a 80 y.o. with no known cardiac disease who presented initially for evaluation of syncope. As you know, Yuliya Gatica is such a pleasant patient with history hypertension, arthritis and falls, found to have cardiomyopathy and SSS. Yuliya Gatica lives with her daughter. Back in Sept when her daughter was out of town she apparently had a syncopal event. Her sister was with her at the time and saw her somewhat slumped in her chair. She was slow to respond but came to. Yuliya Gatica wasn't sure what happened and didn't seek medical care after that initial event. Again since then she had another episode. Her daughter said her mother looked unwell/ pale and said her stomach was upset. She again kind of slumped and her daughter caught her so she did not fall/ get injured. She believes she lost consciousness. She came to and felt somewhat fatigued briefly. She had no observed seizure like activity. She completely denies surrounding cardiac complaints with these episodes- specifically has never noticed palpations/ heart racing, no CP, or SOB. No swelling or headaches. She has no known heart problems and has never seen a heart doctor or had cardiac testing. Dom's work up showed globally reduced EF 26-30%. I called and spoke to her daughter- giving her the results and alerted her to the signs and symptoms to look out for. Apparently, she got off the phone and asked her mom how she was feeling and she admitted she had been short of breath for 2 days. They ended up going to the  [facility] ER the next day and was in AECHF/ HFrEF with fluid overload. Just before discharge she ended up passing out (as she had mentioned very rarely happens)- and she was noted to have a 6 sec pause that correlated. She was appropriately given a pacemaker. She presents today for episodes of near passing out.  Intense sense of foul smell, goes pale when standing x2, no actual LOC. Has been complaining of b/l legs hurting heavy- taking advil. Past Medical History:   Diagnosis Date    Anxiety     Arthritis     Depression     GERD (gastroesophageal reflux disease)     HTN (hypertension)     LBBB (left bundle branch block)     Osteoporosis     completed 8 years Fosamax    Sciatica     Urinary incontinence        Past Surgical History:   Procedure Laterality Date    CARDIAC SURG PROCEDURE UNLIST  11/2018    pacemaker     HX CHOLECYSTECTOMY      HX HYSTERECTOMY      HX ORTHOPAEDIC      fractured right patella surgery    TX ANESTH,SURGERY OF SHOULDER      TX INS NEW/RPLC PRM PACEMAKER W/TRANSV ELTRD VENTR N/A 11/28/2018    INSERT PPM SINGLE VENTRICULAR performed by Dorrine Dakins, MD at [facility] CATH LAB       Current Outpatient Medications   Medication Sig Dispense Refill    carvediloL (COREG) 12.5 mg tablet TAKE 1 TABLET BY MOUTH TWICE DAILY WITH MEALS 180 Tab 3    omeprazole (PRILOSEC) 40 mg capsule TAKE 1 CAPSULE BY MOUTH DAILY 90 Cap 1    sertraline (ZOLOFT) 50 mg tablet TAKE 1 TABLET BY MOUTH DAILY 90 Tab 3    atorvastatin (LIPITOR) 10 mg tablet TAKE 1 TABLET BY MOUTH EVERY NIGHT 90 Tab 3    meloxicam (MOBIC) 15 mg tablet TAKE 1 TABLET BY MOUTH DAILY 90 Tab 3    furosemide (LASIX) 20 mg tablet TAKE 1 TABLET BY MOUTH DAILY 90 Tab 2    cranberry extract (CRANBERRY JUICE POWDER) 425 mg cap Take  by mouth.  meloxicam (MOBIC) 15 mg tablet Take 15 mg by mouth daily. 1 tab by mouth daily      aspirin 81 mg chewable tablet Take 1 Tab by mouth daily. 30 Tab 11    fluticasone (FLONASE) 50 mcg/actuation nasal spray 1 [place] by Both Nostrils route as needed.  sertraline (ZOLOFT) 50 mg tablet TAKE 1 TABLET BY MOUTH DAILY 90 Tab 0    loratadine (CLARITIN) 10 mg tablet Take 10 mg by mouth daily as needed for Allergies.  MULTI-VITAMIN PO Take 1 Tab by mouth daily.          No Known Allergies    Social History     Socioeconomic History    Marital status:      Spouse name: Not on file    Number of children: Not on file    Years of education: Not on file    Highest education level: Not on file   Occupational History    Not on file   Social Needs    Financial resource strain: Not on file    Food insecurity     Worry: Not on file     Inability: Not on file    Transportation needs     Medical: Not on file     Non-medical: Not on file   Tobacco Use    Smoking status: Never Smoker    Smokeless tobacco: Never Used   Substance and Sexual Activity    Alcohol use: No    Drug use: No    Sexual activity: Not on file   Lifestyle    Physical activity     Days per week: Not on file     Minutes per session: Not on file    Stress: Not on file   Relationships    Social connections     Talks on phone: Not on file     Gets together: Not on file     Attends Latter day service: Not on file     Active member of club or organization: Not on file     Attends meetings of clubs or organizations: Not on file     Relationship status: Not on file    Intimate partner violence     Fear of current or ex partner: Not on file     Emotionally abused: Not on file     Physically abused: Not on file     Forced sexual activity: Not on file   Other Topics Concern    Not on file   Social History Narrative    Not on file        The patient has a family history of heart problems, but no genetic cardiomyopathies or SCD. Review of Systems    14 pt Review of Systems is negative unless otherwise mentioned in the HPI.     Wt Readings from Last 3 Encounters:   05/06/20 47.2 kg (104 lb)   01/20/20 51.2 kg (112 lb 12.8 oz)   01/07/20 51.3 kg (113 lb 3.2 oz)     Temp Readings from Last 3 Encounters:   01/20/20 97.4 °F (36.3 °C) (Oral)   01/07/20 97.6 °F (36.4 °C) (Oral)   11/08/19 97.9 °F (36.6 °C) (Oral)     BP Readings from Last 3 Encounters:   05/06/20 142/67   01/20/20 126/64   01/07/20 133/54     Pulse Readings from Last 3 Encounters:   01/20/20 (!) 54   01/07/20 65   12/11/19 68 Physical Exam:    Visit Vitals  LMP  (LMP Unknown)      Physical Exam   Constitutional: She is oriented to person, place, and time. She appears well-developed and well-nourished. No distress. HENT:   Head: Normocephalic and atraumatic. Eyes: Pupils are equal, round, and reactive to light. EOM are normal.   Neck: No JVD present. Cardiovascular: Normal rate, regular rhythm, normal heart sounds and intact distal pulses. Exam reveals no gallop and no friction rub. No murmur heard. Pulmonary/Chest: Effort normal and breath sounds normal. No respiratory distress. She has no wheezes. She has no rales. She exhibits no tenderness. Abdominal: Soft. Bowel sounds are normal.   Musculoskeletal:         General: No tenderness or edema. Neurological: She is alert and oriented to person, place, and time. Skin: Skin is warm and dry. She is not diaphoretic. Psychiatric: She has a normal mood and affect. Labs:  Glucose 89   65 - 99 mg/dL Final   BUN 30   10 - 36 mg/dL Final   Creatinine 1.28 Abnormally high   H  0.57 - 1.00 mg/dL Final   GFR est non-AA 37 Abnormally low   L  >59 mL/min/1.73 Final   GFR est AA 42 Abnormally low   L  >59 mL/min/1.73 Final   BUN/Creatinine ratio 23   12 - 28  Final   Sodium 140   134 - 144 mmol/L Final   Potassium 4.8   3.5 - 5.2 mmol/L Final   Chloride 100   96 - 106 mmol/L Final   CO2 23   20 - 29 mmol/L Final   Calcium 9.2   8.7 - 10.3 mg/dL Final   Protein, total 6.8   6.0 - 8.5 g/dL Final   Albumin 3.8   3.2 - 4.6 g/dL Final   GLOBULIN, TOTAL 3.0   1.5 - 4.5 g/dL Final   A-G Ratio 1.3   1.2 - 2.2  Final   Bilirubin, total 0.3   0.0 - 1.2 mg/dL Final   Alk.  phosphatase 63   39 - 117 IU/L Final   AST (SGOT) 20   0 - 40 IU/L Final   ALT (SGPT) 15   0 - 32 IU/L Final     EKG Last: LBBB/ paced, no concerning ST or T wave changes    Impression and Plan:  Tayo Del Rosario is a 80 y.o. with:    1.) Pain b/l legs  2.) HFrEF, s/p AECHF, now euvolemic/ stable (very likely ischemic but presumed)  2.) Symptomatic SSS s/p PPM, known  3.) Dyslipidemia, known  4.) Advanced age/ frailty  5.) Mental status decline, should r/o delirium from infection    1.) Lab slip- CMP, CBC, TSH- will call with results (make sure kidney function and electrolytes okay)  2.) If still having pain can consider LE dopplers but good DP pulses, no discoloration, no swelling  3.) RTC close follow up ~2 months    Thank you for allowing me to participate in the care of your patient, please do not hesitate to call with questions or concerns.     Kindest regards,    Alberto Avila, DO

## 2020-06-24 NOTE — PROGRESS NOTES
Denis Shaver presents today for No chief complaint on file. Denis Shaver preferred language for health care discussion is english/other. Is someone accompanying this pt? no    Is the patient using any DME equipment during OV? no    Depression Screening:  No flowsheet data found. Learning Assessment:  Learning Assessment 3/28/2019   PRIMARY LEARNER Patient   PRIMARY LANGUAGE ENGLISH   LEARNER PREFERENCE PRIMARY DEMONSTRATION     -     -   ANSWERED BY Patient   RELATIONSHIP SELF       Abuse Screening:  Abuse Screening Questionnaire 10/11/2017   Do you ever feel afraid of your partner? N   Are you in a relationship with someone who physically or mentally threatens you? N   Is it safe for you to go home? Y       Fall Risk  Fall Risk Assessment, last 12 mths 1/20/2020   Able to walk? Yes   Fall in past 12 months? No   Fall with injury? -   Number of falls in past 12 months -   Fall Risk Score -       Pt currently taking Anticoagulant therapy? no    Coordination of Care:  1. Have you been to the ER, urgent care clinic since your last visit? Hospitalized since your last visit? no    2. Have you seen or consulted any other health care providers outside of the [address] [place] [address] since your last visit? Include any pap smears or colon screening.  no

## 2020-06-26 ENCOUNTER — HOSPITAL ENCOUNTER (OUTPATIENT)
Dept: LAB | Age: 85
Discharge: HOME | End: 2020-06-26

## 2020-06-26 LAB — XX-LABCORP SPECIMEN COL,LCBCF: NORMAL

## 2020-06-26 PROCEDURE — 99001 SPECIMEN HANDLING PT-LAB: CPT

## 2020-06-27 LAB
ABSOLUTE LYMPHOCYTE COUNT, 10803: 2.4 K/UL (ref 1–4.8)
ANION GAP SERPL CALC-SCNC: 13 MMOL/L
BASOPHILS # BLD: 0.1 K/UL (ref 0–0.2)
BASOPHILS NFR BLD: 1 % (ref 0–2)
BUN SERPL-MCNC: 37 MG/DL (ref 6–22)
CALCIUM SERPL-MCNC: 8.9 MG/DL (ref 8.4–10.5)
CHLORIDE SERPL-SCNC: 103 MMOL/L (ref 98–110)
CO2 SERPL-SCNC: 25 MMOL/L (ref 20–32)
CREAT SERPL-MCNC: 1.7 MG/DL (ref 0.8–1.4)
EOSINOPHIL # BLD: 0.4 K/UL (ref 0–0.5)
EOSINOPHIL NFR BLD: 5 % (ref 0–6)
ERYTHROCYTE [DISTWIDTH] IN BLOOD BY AUTOMATED COUNT: 13.4 % (ref 10–15.5)
GFRAA, 66117: 33.6
GFRNA, 66118: 27.7
GLUCOSE SERPL-MCNC: 84 MG/DL (ref 70–99)
GRANULOCYTES,GRANS: 55 % (ref 40–75)
HCT VFR BLD AUTO: 39.4 % (ref 35.1–48.3)
HGB BLD-MCNC: 11.9 G/DL (ref 11.7–16.1)
LYMPHOCYTES, LYMLT: 30 % (ref 20–45)
MCH RBC QN AUTO: 29 PG (ref 26–34)
MCHC RBC AUTO-ENTMCNC: 30 G/DL (ref 31–36)
MCV RBC AUTO: 96 FL (ref 81–99)
MONOCYTES # BLD: 0.7 K/UL (ref 0.1–1)
MONOCYTES NFR BLD: 9 % (ref 3–12)
NEUTROPHILS # BLD AUTO: 4.4 K/UL (ref 1.8–7.7)
PLATELET # BLD AUTO: 195 K/UL (ref 140–440)
PMV BLD AUTO: 10 FL (ref 9–13)
POTASSIUM SERPL-SCNC: 4.8 MMOL/L (ref 3.5–5.5)
RBC # BLD AUTO: 4.09 M/UL (ref 3.8–5.2)
SODIUM SERPL-SCNC: 141 MMOL/L (ref 133–145)
T4 FREE SERPL-MCNC: 1 NG/DL (ref 0.9–1.8)
TSH SERPL DL<=0.005 MIU/L-ACNC: 3.51 MCU/ML (ref 0.27–4.2)
WBC # BLD AUTO: 8.1 K/UL (ref 4–11)

## 2020-06-29 NOTE — PROGRESS NOTES
Per your last note\" Sravanthi De Souza is a 80 y.o. with:     1.) Pain b/l legs  2.) HFrEF, s/p AECHF, now euvolemic/ stable (very likely ischemic but presumed)  2.) Symptomatic SSS s/p PPM, known  3.) Dyslipidemia, known  4.) Advanced age/ frailty  5.) Mental status decline, should r/o delirium from infection     1.) Lab slip- CMP, CBC, TSH- will call with results (make sure kidney function and electrolytes okay)  2.) If still having pain can consider LE dopplers but good DP pulses, no discoloration, no swelling  3.) RTC close follow up ~2 months

## 2020-07-01 ENCOUNTER — TELEPHONE (OUTPATIENT)
Dept: CARDIOLOGY CLINIC | Age: 85
End: 2020-07-01

## 2020-07-01 NOTE — TELEPHONE ENCOUNTER
----- Message from Millie Huerta DO sent at 7/1/2020 12:47 PM EDT -----  Please call daughter and let her know labs all look fine, kidney function stable and potassium WML    How is she feeling? Legs still bothering her?  If still a major concern I did tell her we can consider LE dopplers     Let me know, thanks  ----- Message -----  From: Bacilio Lizama LPN  Sent: 7/99/5063  11:04 AM EDT  To: Millie Huerta, DO    Per your last note\" Vandana Contreras is a 80 y.o. with:     1.) Pain b/l legs  2.) HFrEF, s/p AECHF, now euvolemic/ stable (very likely ischemic but presumed)  2.) Symptomatic SSS s/p PPM, known  3.) Dyslipidemia, known  4.) Advanced age/ frailty  5.) Mental status decline, should r/o delirium from infection     1.) Lab slip- CMP, CBC, TSH- will call with results (make sure kidney function and electrolytes okay)  2.) If still having pain can consider LE dopplers but good DP pulses, no discoloration, no swelling  3.) RTC close follow up ~2 months

## 2020-07-01 NOTE — TELEPHONE ENCOUNTER
Verified patient name and  with daughter whom stated patient legs have improved and will keep us posted. Lab results have been fully explained to the patient's daughter, who indicates understanding.

## 2020-08-26 ENCOUNTER — OFFICE VISIT (OUTPATIENT)
Dept: CARDIOLOGY CLINIC | Age: 85
End: 2020-08-26

## 2020-08-26 ENCOUNTER — CLINICAL SUPPORT (OUTPATIENT)
Dept: CARDIOLOGY CLINIC | Age: 85
End: 2020-08-26

## 2020-08-26 VITALS
HEART RATE: 69 BPM | DIASTOLIC BLOOD PRESSURE: 60 MMHG | WEIGHT: 106 LBS | HEIGHT: 62 IN | BODY MASS INDEX: 19.51 KG/M2 | SYSTOLIC BLOOD PRESSURE: 134 MMHG | OXYGEN SATURATION: 97 %

## 2020-08-26 DIAGNOSIS — R55 SYNCOPE, UNSPECIFIED SYNCOPE TYPE: ICD-10-CM

## 2020-08-26 DIAGNOSIS — I25.5 ISCHEMIC CARDIOMYOPATHY: Primary | ICD-10-CM

## 2020-08-26 DIAGNOSIS — Z95.0 CARDIAC PACEMAKER IN SITU: ICD-10-CM

## 2020-08-26 NOTE — PROGRESS NOTES
Celina  presents today for   Chief Complaint   Patient presents with    Follow-up     2 month follow up     Dizziness     sometimes       Celina  preferred language for health care discussion is english/other. Is someone accompanying this pt? daughter    Is the patient using any DME equipment during OV? wheelchair    Depression Screening:  No flowsheet data found. Learning Assessment:  Learning Assessment 3/28/2019   PRIMARY LEARNER Patient   PRIMARY LANGUAGE ENGLISH   LEARNER PREFERENCE PRIMARY DEMONSTRATION     -     -   ANSWERED BY Patient   RELATIONSHIP SELF       Abuse Screening:  Abuse Screening Questionnaire 10/11/2017   Do you ever feel afraid of your partner? N   Are you in a relationship with someone who physically or mentally threatens you? N   Is it safe for you to go home? Y       Fall Risk  Fall Risk Assessment, last 12 mths 8/26/2020   Able to walk? Yes   Fall in past 12 months? Yes   Fall with injury? No   Number of falls in past 12 months 1   Fall Risk Score 1       Pt currently taking Anticoagulant therapy? ASA 81mg every day     Coordination of Care:  1. Have you been to the ER, urgent care clinic since your last visit? Hospitalized since your last visit? no    2. Have you seen or consulted any other health care providers outside of the [address] [place] [address] since your last visit? Include any pap smears or colon screening.  no

## 2020-08-26 NOTE — PROGRESS NOTES
Russ Libman    follow-up for heart failure and leg pain    HPI    Russ Libman is a 80 y.o. with no known cardiac disease who presented initially for evaluation of syncope. As you know, Rebecca Ahn is such a pleasant patient with history hypertension, arthritis and falls, found to have cardiomyopathy and SSS. Rebecca Ahn lives with her daughter. Back in Sept when her daughter was out of town she apparently had a syncopal event. Her sister was with her at the time and saw her somewhat slumped in her chair. She was slow to respond but came to. Rebecca Ahn wasn't sure what happened and didn't seek medical care after that initial event. Again since then she had another episode. Her daughter said her mother looked unwell/ pale and said her stomach was upset. She again kind of slumped and her daughter caught her so she did not fall/ get injured. She believes she lost consciousness. She came to and felt somewhat fatigued briefly. She had no observed seizure like activity. She completely denies surrounding cardiac complaints with these episodes- specifically has never noticed palpations/ heart racing, no CP, or SOB. No swelling or headaches. She has no known heart problems and has never seen a heart doctor or had cardiac testing. Dom's work up showed globally reduced EF 26-30%. I called and spoke to her daughter- giving her the results and alerted her to the signs and symptoms to look out for. Apparently, she got off the phone and asked her mom how she was feeling and she admitted she had been short of breath for 2 days. They ended up going to the [facility] ER the next day and was in AECHF/ HFrEF with fluid overload. Just before discharge she ended up passing out (as she had mentioned very rarely happens)- and she was noted to have a 6 sec pause that correlated. She was appropriately given a pacemaker. She presents today for episodes of near passing out. Intense sense of foul smell, goes pale when standing x2, no actual LOC.  Has been complaining of b/l legs hurting heavy- taking advil. Labs were checked and WNL and not c/o leg pain anymore. Her last near syncopal episode happened last week and no pauses/ she is not paced by device interrogation. Past Medical History:   Diagnosis Date    Anxiety     Arthritis     Depression     GERD (gastroesophageal reflux disease)     HTN (hypertension)     LBBB (left bundle branch block)     Osteoporosis     completed 8 years Fosamax    Sciatica     Urinary incontinence        Past Surgical History:   Procedure Laterality Date    CARDIAC SURG PROCEDURE UNLIST  11/2018    pacemaker     HX CHOLECYSTECTOMY      HX HYSTERECTOMY      HX ORTHOPAEDIC      fractured right patella surgery    IN ANESTH,SURGERY OF SHOULDER      IN INS NEW/RPLC PRM PACEMAKER W/TRANSV ELTRD VENTR N/A 11/28/2018    INSERT PPM SINGLE VENTRICULAR performed by Basil Terry MD at [facility] CATH LAB       Current Outpatient Medications   Medication Sig Dispense Refill    carvediloL (COREG) 12.5 mg tablet TAKE 1 TABLET BY MOUTH TWICE DAILY WITH MEALS 180 Tab 3    omeprazole (PRILOSEC) 40 mg capsule TAKE 1 CAPSULE BY MOUTH DAILY 90 Cap 1    sertraline (ZOLOFT) 50 mg tablet TAKE 1 TABLET BY MOUTH DAILY 90 Tab 3    atorvastatin (LIPITOR) 10 mg tablet TAKE 1 TABLET BY MOUTH EVERY NIGHT 90 Tab 3    meloxicam (MOBIC) 15 mg tablet TAKE 1 TABLET BY MOUTH DAILY 90 Tab 3    furosemide (LASIX) 20 mg tablet TAKE 1 TABLET BY MOUTH DAILY 90 Tab 2    cranberry extract (CRANBERRY JUICE POWDER) 425 mg cap Take  by mouth.  meloxicam (MOBIC) 15 mg tablet Take 15 mg by mouth daily. 1 tab by mouth daily      aspirin 81 mg chewable tablet Take 1 Tab by mouth daily. 30 Tab 11    fluticasone (FLONASE) 50 mcg/actuation nasal spray 1 [place] by Both Nostrils route as needed.       sertraline (ZOLOFT) 50 mg tablet TAKE 1 TABLET BY MOUTH DAILY 90 Tab 0    loratadine (CLARITIN) 10 mg tablet Take 10 mg by mouth daily as needed for Allergies.  MULTI-VITAMIN PO Take 1 Tab by mouth daily. No Known Allergies    Social History     Socioeconomic History    Marital status:      Spouse name: Not on file    Number of children: Not on file    Years of education: Not on file    Highest education level: Not on file   Occupational History    Not on file   Social Needs    Financial resource strain: Not on file    Food insecurity     Worry: Not on file     Inability: Not on file    Transportation needs     Medical: Not on file     Non-medical: Not on file   Tobacco Use    Smoking status: Never Smoker    Smokeless tobacco: Never Used   Substance and Sexual Activity    Alcohol use: No    Drug use: No    Sexual activity: Not on file   Lifestyle    Physical activity     Days per week: Not on file     Minutes per session: Not on file    Stress: Not on file   Relationships    Social connections     Talks on phone: Not on file     Gets together: Not on file     Attends Voodoo service: Not on file     Active member of club or organization: Not on file     Attends meetings of clubs or organizations: Not on file     Relationship status: Not on file    Intimate partner violence     Fear of current or ex partner: Not on file     Emotionally abused: Not on file     Physically abused: Not on file     Forced sexual activity: Not on file   Other Topics Concern    Not on file   Social History Narrative    Not on file        The patient has a family history of heart problems, but no genetic cardiomyopathies or SCD. Review of Systems    14 pt Review of Systems is negative unless otherwise mentioned in the HPI.     Wt Readings from Last 3 Encounters:   08/26/20 48.1 kg (106 lb)   06/24/20 48 kg (105 lb 12.8 oz)   05/06/20 47.2 kg (104 lb)     Temp Readings from Last 3 Encounters:   01/20/20 97.4 °F (36.3 °C) (Oral)   01/07/20 97.6 °F (36.4 °C) (Oral)   11/08/19 97.9 °F (36.6 °C) (Oral)     BP Readings from Last 3 Encounters: 08/26/20 134/60   06/24/20 120/62   05/06/20 142/67     Pulse Readings from Last 3 Encounters:   08/26/20 69   06/24/20 67   01/20/20 (!) 54     Physical Exam:    Visit Vitals  /60 (BP 1 Location: Left arm, BP Patient Position: Sitting)   Pulse 69   Ht 5' 2\" (1.575 m)   Wt 48.1 kg (106 lb)   LMP  (LMP Unknown)   SpO2 97%   BMI 19.39 kg/m²      Physical Exam  Constitutional:       General: She is not in acute distress. Appearance: She is well-developed. She is not diaphoretic. HENT:      Head: Normocephalic and atraumatic. Eyes:      Pupils: Pupils are equal, round, and reactive to light. Neck:      Vascular: No JVD. Cardiovascular:      Rate and Rhythm: Normal rate and regular rhythm. Heart sounds: Normal heart sounds. No murmur. No friction rub. No gallop. Pulmonary:      Effort: Pulmonary effort is normal. No respiratory distress. Breath sounds: Normal breath sounds. No wheezing or rales. Chest:      Chest wall: No tenderness. Abdominal:      General: Bowel sounds are normal.      Palpations: Abdomen is soft. Musculoskeletal:         General: No tenderness. Skin:     General: Skin is warm and dry. Neurological:      Mental Status: She is alert and oriented to person, place, and time. Lab Results   Component Value Date/Time    Sodium 141 06/26/2020 12:15 PM    Potassium 4.8 06/26/2020 12:15 PM    Chloride 103 06/26/2020 12:15 PM    CO2 25 06/26/2020 12:15 PM    Anion gap 13.0 06/26/2020 12:15 PM    Glucose 84 06/26/2020 12:15 PM    BUN 37 (H) 06/26/2020 12:15 PM    Creatinine 1.7 (H) 06/26/2020 12:15 PM    BUN/Creatinine ratio 22 (H) 08/05/2019 01:45 PM    GFR est AA 34 (L) 08/05/2019 01:45 PM    GFR est non-AA 28 (L) 08/05/2019 01:45 PM    Calcium 8.9 06/26/2020 12:15 PM    Bilirubin, total 0.3 08/05/2019 01:45 PM    Alk.  phosphatase 160 (H) 08/05/2019 01:45 PM    Protein, total 7.0 08/05/2019 01:45 PM    Albumin 3.3 (L) 08/05/2019 01:45 PM    Globulin 3.7 08/05/2019 01:45 PM    A-G Ratio 0.9 08/05/2019 01:45 PM    ALT (SGPT) 30 08/05/2019 01:45 PM    AST (SGOT) 30 08/05/2019 01:45 PM     Lab Results   Component Value Date/Time    WBC 8.1 06/26/2020 12:15 PM    WBC 5.4 05/17/2012 08:41 AM    HGB 11.9 06/26/2020 12:15 PM    HCT 39.4 06/26/2020 12:15 PM    PLATELET 226 98/72/0208 12:15 PM    MCV 96 06/26/2020 12:15 PM         EKG Last: LBBB/ paced, no concerning ST or T wave changes    Impression and Plan:  Blayne Castaneda is a 80 y.o. with:    1.) Pain b/l legs, resolved  2.) HFrEF, s/p AECHF, now euvolemic/ stable (very likely ischemic but presumed)  2.) Symptomatic SSS s/p PPM, known  3.) Dyslipidemia, known  4.) Advanced age/ frailty  5.) Mental status decline, should r/o delirium from infection    1.) Doing well, RTC 6 months with device check    Info given for new PCP as she is prone to frequent UTIs     Thank you for allowing me to participate in the care of your patient, please do not hesitate to call with questions or concerns.     Kindest regards,    Lucy Lou, DO

## 2020-08-26 NOTE — PROGRESS NOTES
I have personally seen and evaluated the device findings. Interrogation reviewed and I agree with assessment.     Fco Morrison

## 2020-10-12 RX ORDER — FUROSEMIDE 20 MG/1
20 TABLET ORAL DAILY
Qty: 30 TAB | Refills: 0 | Status: SHIPPED | OUTPATIENT
Start: 2020-10-12 | End: 2020-11-27 | Stop reason: SDUPTHER

## 2020-10-12 NOTE — TELEPHONE ENCOUNTER
Last Visit: 1/20/20 with MD Nancy Rapp  Next Appointment: none  Previous Refill Encounter(s): 1/20/20 #90 with 2 refills    Requested Prescriptions     Pending Prescriptions Disp Refills    furosemide (LASIX) 20 mg tablet 90 Tab 0     Sig: Take 1 Tab by mouth daily.

## 2020-10-20 NOTE — TELEPHONE ENCOUNTER
This is 80year old, former pt of Dr Kathy Benjamin, would you be willing to accept her for transfer of care

## 2020-10-20 NOTE — TELEPHONE ENCOUNTER
Pt's daughter calling re: transfer of care she stated she thought pt's care would just be assigned to someone else in the office. This is 80year old, would Dr Daysi Rivera or Dr Ernestina Lundberg consider being pcp?

## 2020-10-23 RX ORDER — OMEPRAZOLE 40 MG/1
40 CAPSULE, DELAYED RELEASE ORAL DAILY
Qty: 90 CAP | Refills: 0 | Status: SHIPPED | OUTPATIENT
Start: 2020-10-23 | End: 2021-03-01 | Stop reason: SDUPTHER

## 2020-10-23 NOTE — TELEPHONE ENCOUNTER
Last Visit: 1/20/20 with MD Marv Joseph, 11/8/19 with MD Jarvis Herrera  Next Appointment: none  Previous Refill Encounter(s): 4/24/20 #90 with 1 refill    Requested Prescriptions     Pending Prescriptions Disp Refills    omeprazole (PRILOSEC) 40 mg capsule 90 Cap 0     Sig: Take 1 Cap by mouth daily.

## 2020-10-28 ENCOUNTER — TELEPHONE (OUTPATIENT)
Dept: INTERNAL MEDICINE CLINIC | Age: 85
End: 2020-10-28

## 2020-10-28 NOTE — TELEPHONE ENCOUNTER
Pt daughter Og Mason called , she called last week needing to get her mom est with a pcp since RM has left she was told someone would call her back , pleases advise       Please look at last message

## 2020-11-16 ENCOUNTER — OFFICE VISIT (OUTPATIENT)
Dept: FAMILY MEDICINE CLINIC | Age: 85
End: 2020-11-16
Payer: MEDICARE

## 2020-11-16 VITALS
HEART RATE: 61 BPM | RESPIRATION RATE: 16 BRPM | SYSTOLIC BLOOD PRESSURE: 128 MMHG | HEIGHT: 62 IN | TEMPERATURE: 98 F | BODY MASS INDEX: 19.51 KG/M2 | WEIGHT: 106 LBS | DIASTOLIC BLOOD PRESSURE: 64 MMHG | OXYGEN SATURATION: 97 %

## 2020-11-16 DIAGNOSIS — M79.605 BILATERAL LEG PAIN: ICD-10-CM

## 2020-11-16 DIAGNOSIS — M79.604 BILATERAL LEG PAIN: ICD-10-CM

## 2020-11-16 DIAGNOSIS — R41.3 MEMORY DIFFICULTY: ICD-10-CM

## 2020-11-16 DIAGNOSIS — I25.5 ISCHEMIC CARDIOMYOPATHY: ICD-10-CM

## 2020-11-16 DIAGNOSIS — R23.8 OTHER SKIN CHANGES: ICD-10-CM

## 2020-11-16 DIAGNOSIS — Z87.898 H/O SYNCOPE: ICD-10-CM

## 2020-11-16 DIAGNOSIS — E78.5 DYSLIPIDEMIA: ICD-10-CM

## 2020-11-16 DIAGNOSIS — Z87.39 H/O OSTEOPOROSIS: ICD-10-CM

## 2020-11-16 DIAGNOSIS — Z00.00 MEDICARE ANNUAL WELLNESS VISIT, SUBSEQUENT: Primary | ICD-10-CM

## 2020-11-16 DIAGNOSIS — R29.6 FREQUENT FALLS: ICD-10-CM

## 2020-11-16 DIAGNOSIS — N28.9 RENAL INSUFFICIENCY: ICD-10-CM

## 2020-11-16 PROBLEM — I73.9 PERIPHERAL VASCULAR DISEASE (HCC): Status: ACTIVE | Noted: 2020-11-16

## 2020-11-16 PROCEDURE — 99203 OFFICE O/P NEW LOW 30 MIN: CPT | Performed by: FAMILY MEDICINE

## 2020-11-16 PROCEDURE — G9717 DOC PT DX DEP/BP F/U NT REQ: HCPCS | Performed by: FAMILY MEDICINE

## 2020-11-16 PROCEDURE — G8536 NO DOC ELDER MAL SCRN: HCPCS | Performed by: FAMILY MEDICINE

## 2020-11-16 PROCEDURE — G8427 DOCREV CUR MEDS BY ELIG CLIN: HCPCS | Performed by: FAMILY MEDICINE

## 2020-11-16 PROCEDURE — 3288F FALL RISK ASSESSMENT DOCD: CPT | Performed by: FAMILY MEDICINE

## 2020-11-16 PROCEDURE — G0439 PPPS, SUBSEQ VISIT: HCPCS | Performed by: FAMILY MEDICINE

## 2020-11-16 PROCEDURE — 1090F PRES/ABSN URINE INCON ASSESS: CPT | Performed by: FAMILY MEDICINE

## 2020-11-16 PROCEDURE — G8420 CALC BMI NORM PARAMETERS: HCPCS | Performed by: FAMILY MEDICINE

## 2020-11-16 PROCEDURE — 1100F PTFALLS ASSESS-DOCD GE2>/YR: CPT | Performed by: FAMILY MEDICINE

## 2020-11-16 RX ORDER — OLOPATADINE HYDROCHLORIDE 1 MG/ML
SOLUTION/ DROPS OPHTHALMIC
COMMUNITY
Start: 2020-09-08 | End: 2021-06-08 | Stop reason: ALTCHOICE

## 2020-11-16 NOTE — ACP (ADVANCE CARE PLANNING)
Advance Care Planning     Advance Care Planning (ACP) Physician/NP/PA Conversation      Date of Conversation: 11/16/2020  Conducted with: pt and daughter   Healthcare Decision Maker:     Primary Decision Maker: Boy Rodriguez - [place] - [phone]    Supplemental (Other) Decision Maker: Enrique Yoon - Sister - [phone]  Click here to complete [place] including selection of the Healthcare Decision Maker Relationship (ie \"Primary\")  Daughter has a health power of . She will provide a copy. Patient has an advance directive made. Daughter will provide a copy     Care Preferences:    Hospitalization: \"If your health worsens and it becomes clear that your chance of recovery is unlikely, what would be your preference regarding hospitalization? \"  Will need to follow advance directive. It is with daughter and well aware in the situation that patient can not make a decision on her own, it will come in effect and will be following that in those situation. Ventilation: \"If you were unable to breathe on your own and your chance of recovery was unlikely, what would be your preference about the use of a ventilator (breathing machine) if it was available to you? \"   Has an advance directive and daughter will provide a copy     Resuscitation: \"In the event your heart stopped as a result of an underlying serious health condition, would you want attempts to be made to restart your heart, or would you prefer a natural death? \"   Pt has an advance directive and she will provide a copy        Conversation Outcomes / Follow-Up Plan:   ACP in process - information provided, considering goals and options  advance directive made.  daughter will provide a copy       Length of Voluntary ACP Conversation in minutes:  <16 minutes (Non-Billable)    Cristela Tang MD

## 2020-11-16 NOTE — PROGRESS NOTES
1. Have you been to the ER, urgent care clinic since your last visit? Hospitalized since your last visit? No    2. Have you seen or consulted any other health care providers outside of the [address] [place] [address] since your last visit? Include any pap smears or colon screening. [facility] Ms. David Henderson LOV: 9/08/2020. ST LOV: 1/2020 or 2/2020.      Chief Complaint   Patient presents with   [facility] Annual Wellness Visit    Establish Care    Leg Pain     bilat leg pain off/on x 1 week     Skin Problem     mole or spot on back of right leg - starting to look different and bleeds on occassion

## 2020-11-16 NOTE — PATIENT INSTRUCTIONS
Medicare Wellness Visit, Female The best way to live healthy is to have a lifestyle where you eat a well-balanced diet, exercise regularly, limit alcohol use, and quit all forms of tobacco/nicotine, if applicable. Regular preventive services are another way to keep healthy. Preventive services (vaccines, screening tests, monitoring & exams) can help personalize your care plan, which helps you manage your own care. Screening tests can find health problems at the earliest stages, when they are easiest to treat. [facility] follows the current, evidence-based guidelines published by the [facility] ([facility]) when recommending preventive services for our patients. Because we follow these guidelines, sometimes recommendations change over time as research supports it. (For example, mammograms used to be recommended annually. Even though Medicare will still pay for an annual mammogram, the newer guidelines recommend a mammogram every two years for women of average risk). Of course, you and your doctor may decide to screen more often for some diseases, based on your risk and your co-morbidities (chronic disease you are already diagnosed with). Preventive services for you include: - Medicare offers their members a free annual wellness visit, which is time for you and your primary care provider to discuss and plan for your preventive service needs. Take advantage of this benefit every year! 
-All adults over the age of 72 should receive the recommended pneumonia vaccines. Current USPSTF guidelines recommend a series of two vaccines for the best pneumonia protection.  
-All adults should have a flu vaccine yearly and a tetanus vaccine every 10 years.  
-All adults age 48 and older should receive the shingles vaccines (series of two vaccines). -All adults age 38-68 who are overweight should have a diabetes screening test once every three years. -All adults born between [birth year] and 1965 should be screened once for Hepatitis C. 
-Other screening tests and preventive services for persons with diabetes include: an eye exam to screen for diabetic retinopathy, a kidney function test, a foot exam, and stricter control over your cholesterol.  
-Cardiovascular screening for adults with routine risk involves an electrocardiogram (ECG) at intervals determined by your doctor.  
-Colorectal cancer screenings should be done for adults age 54-65 with no increased risk factors for colorectal cancer. There are a number of acceptable methods of screening for this type of cancer. Each test has its own benefits and drawbacks. Discuss with your doctor what is most appropriate for you during your annual wellness visit. The different tests include: colonoscopy (considered the best screening method), a fecal occult blood test, a fecal DNA test, and sigmoidoscopy. 
 
-A bone mass density test is recommended when a woman turns 65 to screen for osteoporosis. This test is only recommended one time, as a screening. Some providers will use this same test as a disease monitoring tool if you already have osteoporosis. -Breast cancer screenings are recommended every other year for women of normal risk, age 54-69. 
-Cervical cancer screenings for women over age 72 are only recommended with certain risk factors. Here is a list of your current Health Maintenance items (your personalized list of preventive services) with a due date: 
Health Maintenance Due Topic Date Due  Shingles Vaccine (1 of 2) 10/17/1977  Glaucoma Screening   10/17/1992  Bone Mineral Density   10/17/1992 [facility] Annual Well Visit  10/28/2018  Cholesterol Test   11/24/2019  Yearly Flu Vaccine (1) 09/01/2020 Dementia: Care Instructions Your Care Instructions Dementia is a loss of mental skills that affects your daily life.  It is different than the occasional trouble with memory that is part of aging. You may find it hard to remember things that you feel you should be able to remember. Or you may feel that your mind is just not working as well as usual. 
Finding out that you have dementia is a shock. You may be afraid and worried about how the condition will change your life. Although there is no cure at this time, medicine may slow memory loss and improve thinking for a while. Other medicines may be able to help you sleep or cope with depression and behavior changes. Dementia often gets worse slowly. But it can get worse quickly. As dementia gets worse, it may become harder to do common things that take planning, like making a list and going shopping. Over time, the disease may make it hard for you to take care of yourself. Some people with dementia need others to help care for them. Dementia is different for everyone. You may be able to function well for a long time. In the early stage of the condition, you can do things at home to make life easier and safer. You also can keep doing your hobbies and other activities. Many people find comfort in planning now for their future needs. Follow-up care is a key part of your treatment and safety. Be sure to make and go to all appointments, and call your doctor if you are having problems. It's also a good idea to know your test results and keep a list of the medicines you take. How can you care for yourself at home? · Take your medicines exactly as prescribed. Call your doctor if you think you are having a problem with your medicine. · Eat healthy foods. Eat lots of whole grains, fruits, and vegetables every day. If you are not hungry, try snacks or nutritional drinks such as Boost, Ensure, or Sustacal. 
· If you have problems sleeping: ? Try not to nap too close to your bedtime. ? Exercise regularly. Walking is a good choice. ? Try a glass of warm milk or caffeine-free herbal tea before bed. · Do tasks and activities during the time of day when you feel your best. It may help to develop a daily routine. · Post labels, lists, and sticky notes to help you remember things. Write your activities on a calendar you can easily find. Put your clock where you can easily see it. · Stay active. Take walks in familiar places, or with friends or loved ones. Try to stay active mentally too. Read and work crossword puzzles if you enjoy these activities. · Do not drive unless you can pass an on-road driving test. If you are not sure if you are safe to drive, your state 's license bureau can test you. · Keep a cordless phone and a flashlight with new batteries by your bed. If possible, put a phone in each of the main rooms of your house, or carry a cell phone in case you fall and cannot reach a phone. Or, you can wear a device around your neck or wrist. You push a button that sends a signal for help. Acknowledge your emotions and plan for the future · Talk openly and honestly with your doctor. · Let yourself grieve. It is common to feel angry, scared, frustrated, anxious, or depressed. · Get emotional support from family, friends, a support group, or a counselor experienced in working with people who have dementia. · Ask for help if you need it. · Tell your doctor how you feel. You may feel upset, angry, or worried at times. Many things can cause this, including poor sleep, medicine side effects, confusion, and pain. Your doctor may be able to help you. · Plan for the future. ? Talk to your family and doctor about preparing a living will and other important papers while you can make decisions. These papers tell your doctors how to care for you at the end of your life. ? Consider naming a person to make decisions about your care if you are not able to. When should you call for help? Call 911 anytime you think you may need emergency care. For example, call if: 
  · You are lost and do not know whom to call.  
  · You are injured and do not know whom to call. Call your doctor now or seek immediate medical care if: 
  · You are more confused or upset than usual.  
  · You feel like you could hurt yourself because your mind is not working well. Watch closely for changes in your health, and be sure to contact your doctor if you have any problems. Where can you learn more? Go to http://www.gray.com/ Enter M293 in the search box to learn more about \"Dementia: Care Instructions. \" Current as of: January 31, 2020               Content Version: 12.6 © 2006-2020 [employer]. Care instructions adapted under license by [employer] (which disclaims liability or warranty for this information). If you have questions about a medical condition or this instruction, always ask your healthcare professional. [place] [address] any warranty or liability for your use of this information. Preventing Falls: Care Instructions Your Care Instructions Getting around your home safely can be a challenge if you have injuries or health problems that make it easy for you to fall. Loose rugs and furniture in walkways are among the dangers for many older people who have problems walking or who have poor eyesight. People who have conditions such as arthritis, osteoporosis, or dementia also have to be careful not to fall. You can make your home safer with a few simple measures. Follow-up care is a key part of your treatment and safety. Be sure to make and go to all appointments, and call your doctor if you are having problems. It's also a good idea to know your test results and keep a list of the medicines you take. How can you care for yourself at home? Taking care of yourself · You may get dizzy if you do not drink enough water. To prevent dehydration, drink plenty of fluids, enough so that your urine is light yellow or clear like water. Choose water and other caffeine-free clear liquids. If you have kidney, heart, or liver disease and have to limit fluids, talk with your doctor before you increase the amount of fluids you drink. · Exercise regularly to improve your strength, muscle tone, and balance. Walk if you can. Swimming may be a good choice if you cannot walk easily. · Have your vision and hearing checked each year or any time you notice a change. If you have trouble seeing and hearing, you might not be able to avoid objects and could lose your balance. · Know the side effects of the medicines you take. Ask your doctor or pharmacist whether the medicines you take can affect your balance. Sleeping pills or sedatives can affect your balance. · Limit the amount of alcohol you drink. Alcohol can impair your balance and other senses. · Ask your doctor whether calluses or corns on your feet need to be removed. If you wear loose-fitting shoes because of calluses or corns, you can lose your balance and fall. · Talk to your doctor if you have numbness in your feet. Preventing falls at home · Remove raised doorway thresholds, throw rugs, and clutter. Repair loose carpet or raised areas in the floor. · Move furniture and electrical cords to keep them out of walking paths. · Use nonskid floor wax, and wipe up spills right away, especially on ceramic tile floors. · If you use a walker or cane, put rubber tips on it. If you use crutches, clean the bottoms of them regularly with an abrasive pad, such as steel wool. · Keep your house well lit, especially [place], and outside walkways. Use night-lights in areas such as hallways and bathrooms.  Add extra light switches or use remote switches (such as switches that go on or off when you clap your hands) to make it easier to turn lights on if you have to get up during the night. · Install sturdy handrails on stairways. · Move items in your cabinets so that the things you use a lot are on the lower shelves (about waist level). · Keep a cordless phone and a flashlight with new batteries by your bed. If possible, put a phone in each of the main rooms of your house, or carry a cell phone in case you fall and cannot reach a phone. Or, you can wear a device around your neck or wrist. You push a button that sends a signal for help. · Wear low-heeled shoes that fit well and give your feet good support. Use footwear with nonskid soles. Check the heels and soles of your shoes for wear. Repair or replace worn heels or soles. · Do not wear socks without shoes on wood floors. · Walk on the grass when the sidewalks are slippery. If you live in an area that gets snow and ice in the winter, sprinkle salt on slippery steps and sidewalks. Preventing falls in the bath · Install grab bars and nonskid mats inside and outside your shower or tub and near the toilet and sinks. · Use shower chairs and bath benches. · Use a hand-held shower head that will allow you to sit while showering. · Get into a tub or shower by putting the weaker leg in first. Get out of a tub or shower with your strong side first. 
· Repair loose toilet seats and consider installing a raised toilet seat to make getting on and off the toilet easier. · Keep your bathroom door unlocked while you are in the shower. Where can you learn more? Go to http://luz maria-yoselin.info/ Enter [phone] in the search box to learn more about \"Preventing Falls: Care Instructions. \" Current as of: April 15, 2020               Content Version: 12.6 © 7844-0014 [employer]. Care instructions adapted under license by [employer] (which disclaims liability or warranty for this information).  If you have questions about a medical condition or this instruction, always ask your healthcare professional. [place] [address] any warranty or liability for your use of this information.

## 2020-11-16 NOTE — PROGRESS NOTES
This is the Subsequent Medicare Annual Wellness Exam, performed 12 months or more after the Initial AWV or the last Subsequent AWV    I have reviewed the patient's medical history in detail and updated the computerized patient record. Depression Risk Factor Screening:   No flowsheet data found. Alcohol Risk Screen   Do you average more than 1 drink per night or more than 7 drinks a week:  No    On any one occasion in the past three months have you have had more than 3 drinks containing alcohol:  No        Functional Ability and Level of Safety:   Hearing: Hearing is good. has hearing AIds. still difficult. seen ENT and had cerumen removal and it helped      Activities of Daily Living: The home contains: handrails and grab bars  Patient needs help with:  phone, transportation, shopping, preparing meals, laundry, housework, managing medications, managing money, dressing, bathing, bathroom needs and walking     Ambulation: with no difficulty and frequent fall and using walker      Fall Risk:  Fall Risk Assessment, last 12 mths 11/16/2020   Able to walk? Yes   Fall in past 12 months? Yes   Fall with injury? No   Number of falls in past 12 months 8 or more   Fall Risk Score 8     Abuse Screen:  Patient is not abused   Lives with her daughter      Cognitive Screening   Has your family/caregiver stated any concerns about your memory: yes - long term memory. sending to neurology for further eval          Assessment/Plan   Education and counseling provided:  Are appropriate based on today's review and evaluation  Discussed 5-year health plan  Discussed advance care directive.   Per daughter patient has an advanced directive and she will provide a copy  See ACP note  Diagnoses and all orders for this visit:          Health Maintenance Due     Health Maintenance Due   Topic Date Due    Shingrix Vaccine Age 49> (1 of 2) 10/17/1977    GLAUCOMA SCREENING Q2Y  10/17/1992    Bone Densitometry (Dexa) Screening 10/17/1992    Medicare Yearly Exam  10/28/2018    Lipid Screen  11/24/2019    Flu Vaccine (1) 09/01/2020     Ordered labs today including lipid panel  Declined bone density test as that she has a proven osteoporosis. She does not want any medication. Discussed with the daughter in detail that if she is not considering treatment, doing bone density test is not going to add anything regarding her treatment. She understood. Continue calcium and vitamin D. Prior PCP has put her on a monthly injections for osteoporosis but patient wanted to stop it.   She was advised to get the glaucoma screening test.  Had flu shot  She was on a waiting list for the Shingrix vaccine    Patient Care Team   Patient Care Team:  Bear Das MD as PCP - General (Family Medicine)  Cori Peoples MD as PCP [facility]  Arturo Garnett OD (Ophthalmology)    History     Patient Active Problem List   Diagnosis Code    Osteoporosis M81.0    Anxiety F41.9    Depression F32.9    Reflux esophagitis K21.00    Generalized osteoarthrosis, involving multiple sites M15.9    Zoster B02.9    Distal radius fracture, left S52.502A    Sprain and strain of shoulder and upper arm [id number]    Vertigo, benign paroxysmal H81.10    Memory loss R41.3    S/P ORIF (open reduction internal fixation) fracture Z98.890, Z87.81    Primary hypertension I10    Advance directive discussed with patient Z70.80    Gastroesophageal reflux disease without esophagitis K21.9    Dysphagia R13.10    Seasonal allergic rhinitis due to pollen J30.1    Primary osteoarthritis of both knees M17.0    Syncope R55    Acute UTI N39.0    Functional urinary incontinence R39.81    Pulmonary edema J81.1    Dyspnea on exertion R06.00    Other fatigue R53.83    Peripheral vascular disease ([facility] [address] I73.9     Past Medical History:   Diagnosis Date    Anxiety     Arthritis     Depression     GERD (gastroesophageal reflux disease)     HTN (hypertension)     LBBB (left bundle branch block)     Osteoporosis     completed 8 years Fosamax    Sciatica     Urinary incontinence       Past Surgical History:   Procedure Laterality Date    CARDIAC SURG PROCEDURE UNLIST  11/2018    pacemaker     HX CHOLECYSTECTOMY      HX HYSTERECTOMY      HX ORTHOPAEDIC      fractured right patella surgery    OH ANESTH,SURGERY OF SHOULDER      OH INS NEW/RPLC PRM PACEMAKER W/TRANSV ELTRD VENTR N/A 11/28/2018    INSERT PPM SINGLE VENTRICULAR performed by Shawanda Schrader MD at [facility] CATH LAB     Current Outpatient Medications   Medication Sig Dispense Refill    olopatadine (PATANOL) 0.1 % ophthalmic solution       CALCIUM PO Take 1 Cap by mouth daily.  omeprazole (PRILOSEC) 40 mg capsule Take 1 Cap by mouth daily. 90 Cap 0    furosemide (LASIX) 20 mg tablet Take 1 Tab by mouth daily. 30 Tab 0    carvediloL (COREG) 12.5 mg tablet TAKE 1 TABLET BY MOUTH TWICE DAILY WITH MEALS 180 Tab 3    sertraline (ZOLOFT) 50 mg tablet TAKE 1 TABLET BY MOUTH DAILY 90 Tab 3    atorvastatin (LIPITOR) 10 mg tablet TAKE 1 TABLET BY MOUTH EVERY NIGHT 90 Tab 3    meloxicam (MOBIC) 15 mg tablet TAKE 1 TABLET BY MOUTH DAILY 90 Tab 3    cranberry extract (CRANBERRY JUICE POWDER) 425 mg cap Take 1 Cap by mouth daily.  meloxicam (MOBIC) 15 mg tablet Take 15 mg by mouth daily. 1 tab by mouth daily      aspirin 81 mg chewable tablet Take 1 Tab by mouth daily. 30 Tab 11    fluticasone (FLONASE) 50 mcg/actuation nasal spray 1 [place] by Both Nostrils route as needed.  loratadine (CLARITIN) 10 mg tablet Take 10 mg by mouth daily as needed for Allergies.  MULTI-VITAMIN PO Take 1 Tab by mouth daily.        No Known Allergies    Family History   Problem Relation Age of Onset    Heart Disease Mother     Diabetes Mother     Heart Disease Father     Diabetes Father     Hypertension Sister     Diabetes Sister     Heart Disease Brother     Heart Disease Sister    Zabrina Adi Diabetes Sister     Heart Disease Brother      Social History     Tobacco Use    Smoking status: Never Smoker    Smokeless tobacco: Never Used   Substance Use Topics    Alcohol use:  No

## 2020-11-16 NOTE — PROGRESS NOTES
HISTORY OF PRESENT ILLNESS  Adeline Perez is a 80 y.o. female. HPI: New patient to me. Transfer from other office due to PCP left  Currently has a pacemaker due to prior episode of syncope. Currently asymptomatic. Sitting comfortable in a wheelchair. Following cardiology and the recommendations. History of CHF. Currently no signs of volume overload. No leg swelling. No cold cough wheezing. No shortness of breath. According to daughter as patient lives with her does get sometimes shortness of breath on routine activity leg dressing up. After taking shower. Able to result in a short time. Denies any headache or dizziness. No chest pain or trouble breathing during the visit. No palpitation or diaphoresis. No nausea vomiting or abdominal pain. No urinary or bowel complaint. No mood changes. No appetite or weight changes. Does have a concern with the memory. She has trouble with the long-term and short-term memory. Her finances being managed by the daughter. She does help her with the routine activity according to her needs. She does walk with a walker. Lately increase the fall. According to daughter not because of any weakness but the way she uses the walker. Denies any current pain in any specific area. Discussed the home health for physical therapy and Occupational Therapy but daughter wanted to wait. Agreed to see the neurologist.  Also noted she is on no long-term Mobic. Noted low GFR. Discussed to change it to Tylenol. Patient does have a bilateral lower leg pain. Per chart peripheral artery disease but no formal diagnosis noted on recent or consultations. She denies any calf pain. Peripheral pulsation of dorsalis pedis palpable bilateral lower extremities. Denies any back pain. Known history of osteoporosis. She was on a monthly injection but has been stopped by patient since long time.   Prior PCP has discussed it with the patient and daughter and continue calcium and vitamin CLAUDIA.  Noted skin changes a mole over the right back of the cough. On and off bleeding. Noted hyperpigmented changes over it. We will send her to dermatology. Visit Vitals  /64 (BP 1 Location: Left arm, BP Patient Position: Sitting)   Pulse 61   Temp 98 °F (36.7 °C) (Oral)   Resp 16   Ht 5' 2\" (1.575 m)   Wt 106 lb (48.1 kg)   SpO2 97%   BMI 19.39 kg/m²     Review medication list, vitals, problem list,allergies. Lab Results   Component Value Date/Time    WBC 8.1 06/26/2020 12:15 PM    WBC 5.4 05/17/2012 08:41 AM    HGB 11.9 06/26/2020 12:15 PM    HCT 39.4 06/26/2020 12:15 PM    PLATELET 444 35/05/4134 12:15 PM    MCV 96 06/26/2020 12:15 PM     Lab Results   Component Value Date/Time    Sodium 141 06/26/2020 12:15 PM    Potassium 4.8 06/26/2020 12:15 PM    Chloride 103 06/26/2020 12:15 PM    CO2 25 06/26/2020 12:15 PM    Anion gap 13.0 06/26/2020 12:15 PM    Glucose 84 06/26/2020 12:15 PM    BUN 37 (H) 06/26/2020 12:15 PM    Creatinine 1.7 (H) 06/26/2020 12:15 PM    BUN/Creatinine ratio 22 (H) 08/05/2019 01:45 PM    GFR est AA 34 (L) 08/05/2019 01:45 PM    GFR est non-AA 28 (L) 08/05/2019 01:45 PM    Calcium 8.9 06/26/2020 12:15 PM    Bilirubin, total 0.3 08/05/2019 01:45 PM    Alk.  phosphatase 160 (H) 08/05/2019 01:45 PM    Protein, total 7.0 08/05/2019 01:45 PM    Albumin 3.3 (L) 08/05/2019 01:45 PM    Globulin 3.7 08/05/2019 01:45 PM    A-G Ratio 0.9 08/05/2019 01:45 PM    ALT (SGPT) 30 08/05/2019 01:45 PM    AST (SGOT) 30 08/05/2019 01:45 PM     Lab Results   Component Value Date/Time    Cholesterol, total 204 (H) 11/24/2018 09:40 PM    HDL Cholesterol 59 11/24/2018 09:40 PM    LDL, calculated 129.2 (H) 11/24/2018 09:40 PM    VLDL, calculated 15.8 11/24/2018 09:40 PM    Triglyceride 79 11/24/2018 09:40 PM    CHOL/HDL Ratio 3.5 11/24/2018 09:40 PM     Lab Results   Component Value Date/Time    TSH 3.51 06/26/2020 12:15 PM       Lab Results   Component Value Date/Time    Vitamin D 25-Hydroxy 38.8 10/11/2017 12:12 PM             ROS: See HPI    Physical Exam  Constitutional:       General: She is not in acute distress. Cardiovascular:      Rate and Rhythm: Normal rate and regular rhythm. Heart sounds: Normal heart sounds. Abdominal:      General: Bowel sounds are normal.      Palpations: Abdomen is soft. Tenderness: There is no abdominal tenderness. Musculoskeletal:         General: No swelling. Skin:     Comments: Mole over the back of the right calf. No bleeding or open skin at this time. Hyperpigmented at the base. Nontender. Almost 0.5 cm in size, circular. Neurological:      Mental Status: She is oriented to person, place, and time. Psychiatric:         Behavior: Behavior normal.         ASSESSMENT and PLAN    ICD-10-CM ICD-9-CM    1. H/O syncope: Post pacemaker. Following cardiology. No new recommendations Z87.898 V15.89     s/p pacemaker    2. Bilateral leg pain: Chronic. On and off. Advised to take Tylenol and DC Mobic. No back pain. If needed will consider the back x-ray in future M79.604 729.5     M79.605     3. Ischemic cardiomyopathy: No signs of volume overload. Will be following cardiology recommendation I25.5 414.8     following cardiology. post pace maker    4. Renal insufficiency: Advised to take Tylenol instead of NSAIDs as needed. Will observe T79.1 758.6 METABOLIC PANEL, COMPREHENSIVE    stop mobic and take tylenol as needed for arthritis    5. Memory difficulty: Agreed to go for further evaluation with neurology. R41.3 780.93 REFERRAL TO NEUROLOGY   6. Frequent falls: Uses walker at home. Refused home health for PT OT at this time. Will obtain the CT scan and meantime sending to neurology. R29.6 V15.88 REFERRAL TO NEUROLOGY      CT HEAD WO CONT   7. Dyslipidemia: On statin E78.5 272.4 LIPID PANEL      METABOLIC PANEL, COMPREHENSIVE   8. H/O osteoporosis: Refused the treatment at this time. Continue calcium vitamin D. See HPI Z87.39 V13.59    9.  Other skin changes: Sending to dermatology R23.9 782.9 REFERRAL TO DERMATOLOGY    mole behind leg    Patient understood and agreed with the plan  Review health maintenance  See Medicare wellness note for health maintenance details  Follow-up and Dispositions    · Return in about 1 month (around 12/16/2020).

## 2020-11-25 ENCOUNTER — HOSPITAL ENCOUNTER (OUTPATIENT)
Dept: CT IMAGING | Age: 85
Discharge: HOME | End: 2020-11-25
Attending: FAMILY MEDICINE
Payer: MEDICARE

## 2020-11-25 ENCOUNTER — HOSPITAL ENCOUNTER (OUTPATIENT)
Dept: LAB | Age: 85
Discharge: HOME | End: 2020-11-25
Attending: FAMILY MEDICINE
Payer: MEDICARE

## 2020-11-25 DIAGNOSIS — N28.9 RENAL INSUFFICIENCY: ICD-10-CM

## 2020-11-25 DIAGNOSIS — E78.5 DYSLIPIDEMIA: ICD-10-CM

## 2020-11-25 LAB
ALBUMIN SERPL-MCNC: 3.5 G/DL (ref 3.4–5)
ALBUMIN/GLOB SERPL: 1 {RATIO} (ref 0.8–1.7)
ALP SERPL-CCNC: 73 U/L (ref 45–117)
ALT SERPL-CCNC: 15 U/L (ref 13–56)
ANION GAP SERPL CALC-SCNC: 5 MMOL/L (ref 3–18)
AST SERPL-CCNC: 16 U/L (ref 10–38)
BILIRUB SERPL-MCNC: 0.3 MG/DL (ref 0.2–1)
BUN SERPL-MCNC: 37 MG/DL (ref 7–18)
BUN/CREAT SERPL: 21 (ref 12–20)
CALCIUM SERPL-MCNC: 9.2 MG/DL (ref 8.5–10.1)
CHLORIDE SERPL-SCNC: 106 MMOL/L (ref 100–111)
CHOLEST SERPL-MCNC: 183 MG/DL
CO2 SERPL-SCNC: 30 MMOL/L (ref 21–32)
CREAT SERPL-MCNC: 1.77 MG/DL (ref 0.6–1.3)
GLOBULIN SER CALC-MCNC: 3.6 G/DL (ref 2–4)
GLUCOSE SERPL-MCNC: 96 MG/DL (ref 74–99)
HDLC SERPL-MCNC: 63 MG/DL (ref 40–60)
HDLC SERPL: 2.9 {RATIO} (ref 0–5)
LDLC SERPL CALC-MCNC: 96.8 MG/DL (ref 0–100)
LIPID PROFILE,FLP: ABNORMAL
POTASSIUM SERPL-SCNC: 4.3 MMOL/L (ref 3.5–5.5)
PROT SERPL-MCNC: 7.1 G/DL (ref 6.4–8.2)
SODIUM SERPL-SCNC: 141 MMOL/L (ref 136–145)
TRIGL SERPL-MCNC: 116 MG/DL (ref ?–150)
VLDLC SERPL CALC-MCNC: 23.2 MG/DL

## 2020-11-25 PROCEDURE — 70450 CT HEAD/BRAIN W/O DYE: CPT

## 2020-11-25 PROCEDURE — 80061 LIPID PANEL: CPT

## 2020-11-25 PROCEDURE — 36415 COLL VENOUS BLD VENIPUNCTURE: CPT

## 2020-11-25 PROCEDURE — 80053 COMPREHEN METABOLIC PANEL: CPT

## 2020-11-27 DIAGNOSIS — N28.9 RENAL INSUFFICIENCY: Primary | ICD-10-CM

## 2020-11-27 RX ORDER — FUROSEMIDE 20 MG/1
20 TABLET ORAL DAILY
Qty: 30 TAB | Refills: 0 | Status: SHIPPED | OUTPATIENT
Start: 2020-11-27 | End: 2020-11-30

## 2020-11-27 NOTE — PROGRESS NOTES
Lipid panel is fairly stable. Low GFR and renal insufficiency. Could be from long term  Use of diuretic.  Will do a nephrology referral.

## 2020-11-27 NOTE — TELEPHONE ENCOUNTER
This patient contacted office for the following prescriptions to be filled:    Medication requested :   Requested Prescriptions     Pending Prescriptions Disp Refills    furosemide (LASIX) 20 mg tablet 30 Tab 0     Sig: Take 1 Tab by mouth daily.      PCP: Michelle0 Karel Golden or Print: [facility]  Mail order or Local pharmacy [facility]     Scheduled appointment if not seen by current providers in office: LOV 11/16/2020 f/u 12/16/2020

## 2020-11-30 RX ORDER — FUROSEMIDE 20 MG/1
TABLET ORAL
Qty: 90 TAB | Refills: 1 | Status: SHIPPED | OUTPATIENT
Start: 2020-11-30 | End: 2021-05-27 | Stop reason: SDUPTHER

## 2020-12-07 NOTE — PROGRESS NOTES
Contacted patient and verified identity using name and date of birth (2- identifiers)   Spoke with patient's daughter Frank Pack (on [address]). She verbalized understanding of no acute processes in CT. Follow neurologist recommendations.  Has scheduled appointment with Dr. Debbie Escobar 1/13/20

## 2020-12-07 NOTE — PROGRESS NOTES
Contacted patient and verified identity using name and date of birth (2- identifiers)  Spoke with patient's daughter Mandi Hardwick (on [address]) and she verbalized understanding of Lipid panel is fairly stable. Low GFR and renal insufficiency. Could be from long term  Use of diuretic.  Will do a nephrology referral.

## 2020-12-16 ENCOUNTER — VIRTUAL VISIT (OUTPATIENT)
Dept: FAMILY MEDICINE CLINIC | Age: 85
End: 2020-12-16
Payer: MEDICARE

## 2020-12-16 DIAGNOSIS — Z87.39 H/O OSTEOPOROSIS: ICD-10-CM

## 2020-12-16 DIAGNOSIS — R29.6 FREQUENT FALLS: ICD-10-CM

## 2020-12-16 DIAGNOSIS — F32.A ANXIETY AND DEPRESSION: ICD-10-CM

## 2020-12-16 DIAGNOSIS — R23.8 OTHER SKIN CHANGES: ICD-10-CM

## 2020-12-16 DIAGNOSIS — M79.605 BILATERAL LEG PAIN: ICD-10-CM

## 2020-12-16 DIAGNOSIS — I25.5 ISCHEMIC CARDIOMYOPATHY: ICD-10-CM

## 2020-12-16 DIAGNOSIS — M79.604 BILATERAL LEG PAIN: ICD-10-CM

## 2020-12-16 DIAGNOSIS — Z87.898 H/O SYNCOPE: ICD-10-CM

## 2020-12-16 DIAGNOSIS — R41.3 MEMORY CHANGES: ICD-10-CM

## 2020-12-16 DIAGNOSIS — N18.30 STAGE 3 CHRONIC KIDNEY DISEASE, UNSPECIFIED WHETHER STAGE 3A OR 3B CKD (HCC): Primary | ICD-10-CM

## 2020-12-16 DIAGNOSIS — Z95.0 PACEMAKER: ICD-10-CM

## 2020-12-16 DIAGNOSIS — F41.9 ANXIETY AND DEPRESSION: ICD-10-CM

## 2020-12-16 PROCEDURE — 99442 PR PHYS/QHP TELEPHONE EVALUATION 11-20 MIN: CPT | Performed by: FAMILY MEDICINE

## 2020-12-16 NOTE — PROGRESS NOTES
Brittney Shankar is a 80 y.o. female, evaluated via audio-only technology on 12/16/2020 for Follow-up  . Assessment & Plan:   Diagnoses and all orders for this visit:    Stage 3 chronic kidney disease, unspecified whether stage 3a or 3b CKD: Noted on labs with low GFR. She was advised to stop the Mobic. Per daughter she has stopped it. Tylenol as needed. Also pending referral to nephrology. Also she is on Lasix. Ischemic cardiomyopathy. Discussed with the patient low-salt diet and limit fluid intake. Will be following specialist recommendation. Anxiety and depression: Has been fairly stable. Current dose of her SSRI is helping. Will observe    Memory changes: Probably dementia. Pending appointment with neurology. Bilateral leg pain: Chronic bilateral leg pain. No back pain. Also frequent falls. Offered back x-ray but patient/daughter wants to hold off any study till they see the neurology. Frequent falls: Head CT was negative. She has no falls since last visit. Will observ and follow neurology recommendation    H/O syncope: Post pacemaker. Following cardiology. H/O osteoporosis: See last visit note. Prior primary care discussed calcium and vitamin D. Patient and daughter did not want any further referral or treatment for this. Other skin changes: Last visit had a spot over the back of the right leg. Per patient/daughter it that fell off on its own. No lesion at this time or any skin changes. We will hold off on dermatology referral at this time and follow-up next visit. Comments:  spot behind rt leg. which fell off on its own per daughter. will observe. still has not got in touch with dermatology . will hold off on it. Ischemic cardiomyopathy: Following cardiology and the recommendation. Currently no signs of volume overload.     Pacemaker    Patient understood and agreed with the plan  Daughter is going to make her eye exam appointment    12  Subjective:   Done visit with the phone check  Here for follow-up  Recently establish care. Seen her once  Multiple medical problem. History of syncope. Post pacemaker. Ischemic cardiomyopathy. On Lasix 3 times a week. Noted on the lab low GFR. Now pending nephrology appointment as well. Currently she denies any signs and symptoms volume overload. No cough cold wheezing. No more than usual shortness of breath while doing ADL. No chest pain or palpitation or diaphoresis. No appetite or weight changes. No leg swelling. No concern with the urinary habits or bowel habits. Most of the conversation done on the phone by daughter as patient has little hard hearing. Was trouble having listening on the phone. Per daughter no new falls since last visit. She denies any back pain but having a chronic on and off lower leg pain. Questionable neuropathy or radiculopathy. Patient and daughter does not want any further study at this time till they see the neurology. Last visit daughter had a concern with the frequent fall. No falls since last visit. Also memory changes. Possibility dementia and that is been making functional in the memory changes probably. Pending neurology appointment at this time. No new concern or changes at this time. Reviewed lab results with the patient and daughter. Daughter had no new concern or any further question. All questions and answers been explained at daughter's satisfaction.   Lab Results   Component Value Date/Time    WBC 8.1 06/26/2020 12:15 PM    WBC 5.4 05/17/2012 08:41 AM    HGB 11.9 06/26/2020 12:15 PM    HCT 39.4 06/26/2020 12:15 PM    PLATELET 960 74/41/5557 12:15 PM    MCV 96 06/26/2020 12:15 PM     Lab Results   Component Value Date/Time    Sodium 141 11/25/2020 08:59 AM    Potassium 4.3 11/25/2020 08:59 AM    Chloride 106 11/25/2020 08:59 AM    CO2 30 11/25/2020 08:59 AM    Anion gap 5 11/25/2020 08:59 AM    Glucose 96 11/25/2020 08:59 AM    BUN 37 (H) 11/25/2020 08:59 AM    Creatinine 1.77 (H) 11/25/2020 08:59 AM    BUN/Creatinine ratio 21 (H) 11/25/2020 08:59 AM    GFR est AA 32 (L) 11/25/2020 08:59 AM    GFR est non-AA 27 (L) 11/25/2020 08:59 AM    Calcium 9.2 11/25/2020 08:59 AM    Bilirubin, total 0.3 11/25/2020 08:59 AM    Alk. phosphatase 73 11/25/2020 08:59 AM    Protein, total 7.1 11/25/2020 08:59 AM    Albumin 3.5 11/25/2020 08:59 AM    Globulin 3.6 11/25/2020 08:59 AM    A-G Ratio 1.0 11/25/2020 08:59 AM    ALT (SGPT) 15 11/25/2020 08:59 AM    AST (SGOT) 16 11/25/2020 08:59 AM     Lab Results   Component Value Date/Time    Cholesterol, total 183 11/25/2020 08:59 AM    HDL Cholesterol 63 (H) 11/25/2020 08:59 AM    LDL, calculated 96.8 11/25/2020 08:59 AM    VLDL, calculated 23.2 11/25/2020 08:59 AM    Triglyceride 116 11/25/2020 08:59 AM    CHOL/HDL Ratio 2.9 11/25/2020 08:59 AM     Lab Results   Component Value Date/Time    TSH 3.51 06/26/2020 12:15 PM       Lab Results   Component Value Date/Time    Vitamin D 25-Hydroxy 38.8 10/11/2017 12:12 PM       CT Results (most recent):  Results from Orders Only encounter on 11/16/20   CT HEAD WO CONT    Narrative CT of the head without contrast    HISTORY: Frequent falls. COMPARISON: CT head 7/5/2019    TECHNIQUE: Axial images of the brain were obtained, without the administration  of intravenous contrast. Coronal and sagittal reconstructions were generated. All CT scans at this facility are performed using dose optimization technique as  appropriate to a performed exam, to include automated exposure control,  adjustment of the MA and/or kV according to patient size (including appropriate  matching for site-specific examinations) or use of  iterative reconstruction  technique. FINDINGS:     Brain: There is generalized prominence of the ventricular system, associated  with proportional widening of the cortical cerebral sulci, compatible with mild  generalized volume loss.  Scattered low attenuation involving periventricular and  subcortical white matter, a nonspecific finding which is most commonly  encountered in the setting of chronic microvascular disease. No acute  intracranial hemorrhage. No mass effect or midline shift. No definite evidence  for acute infarct. Paranasal sinuses and mastoid air cells: Paranasal sinuses are clear. Mastoid  air cells are clear. Calvarium: No acute fracture. Impression IMPRESSION:  No acute intracranial abnormality. Mild burden of presumed chronic microvascular disease. Has history of osteoporosis. Does not want any medication or treatment at this time. Refused referral to rheumatology as well. On calcium and vitamin D. Will be observing  She has stopped the Mobic at this time      Prior to Admission medications    Medication Sig Start Date End Date Taking? Authorizing Provider   furosemide (LASIX) 20 mg tablet TAKE 1 TABLET BY MOUTH DAILY 11/30/20  Yes Kyle Coreas MD   olopatadine (PATANOL) 0.1 % ophthalmic solution  9/8/20  Yes Provider, Historical   CALCIUM PO Take 1 Cap by mouth daily. Yes Provider, Historical   omeprazole (PRILOSEC) 40 mg capsule Take 1 Cap by mouth daily. 10/23/20  Yes Myra Ferrera MD   carvediloL (COREG) 12.5 mg tablet TAKE 1 TABLET BY MOUTH TWICE DAILY WITH MEALS 6/22/20  Yes Larraine Cea, DO   sertraline (ZOLOFT) 50 mg tablet TAKE 1 TABLET BY MOUTH DAILY 2/25/20  Yes Ophelia Browning MD   atorvastatin (LIPITOR) 10 mg tablet TAKE 1 TABLET BY MOUTH EVERY NIGHT 2/25/20  Yes Madalyn Arauz, NP   cranberry extract (CRANBERRY JUICE POWDER) 425 mg cap Take 1 Cap by mouth daily. Yes Provider, Historical   aspirin 81 mg chewable tablet Take 1 Tab by mouth daily. 11/30/18  Yes Joyce Hinojosa MD   fluticasone (FLONASE) 50 mcg/actuation nasal spray 1 [place] by Both Nostrils route as needed. Yes Provider, Historical   loratadine (CLARITIN) 10 mg tablet Take 10 mg by mouth daily as needed for Allergies.    Yes Provider, Historical MULTI-VITAMIN PO Take 1 Tab by mouth daily. Yes Provider, Historical   meloxicam (MOBIC) 15 mg tablet TAKE 1 TABLET BY MOUTH DAILY 1/27/20 12/16/20  Tracey Henderson MD   meloxicam (MOBIC) 15 mg tablet Take 15 mg by mouth daily. 1 tab by mouth daily  12/16/20  Provider, Historical     Patient Active Problem List    Diagnosis Date Noted    Peripheral vascular disease ([facility] [address] 11/16/2020    Other fatigue 08/05/2019    Dyspnea on exertion 02/14/2019    Acute UTI 11/24/2018    Functional urinary incontinence 11/24/2018    Pulmonary edema 11/24/2018    Primary osteoarthritis of both knees 11/01/2018    Syncope 11/01/2018    Seasonal allergic rhinitis due to pollen 04/23/2018    Dysphagia 12/06/2016    Advance directive discussed with patient 10/21/2016    Gastroesophageal reflux disease without esophagitis 10/21/2016    Primary hypertension 06/22/2016    S/P ORIF (open reduction internal fixation) fracture 06/14/2016    Memory loss 12/16/2015    Vertigo, benign paroxysmal 06/15/2015    Distal radius fracture, left 11/30/2013    Sprain and strain of shoulder and upper arm 11/30/2013    Zoster 06/10/2013    Generalized osteoarthrosis, involving multiple sites 01/08/2013    Reflux esophagitis 01/19/2012    Osteoporosis     Anxiety     Depression      Current Outpatient Medications   Medication Sig Dispense Refill    furosemide (LASIX) 20 mg tablet TAKE 1 TABLET BY MOUTH DAILY 90 Tab 1    olopatadine (PATANOL) 0.1 % ophthalmic solution       CALCIUM PO Take 1 Cap by mouth daily.  omeprazole (PRILOSEC) 40 mg capsule Take 1 Cap by mouth daily. 90 Cap 0    carvediloL (COREG) 12.5 mg tablet TAKE 1 TABLET BY MOUTH TWICE DAILY WITH MEALS 180 Tab 3    sertraline (ZOLOFT) 50 mg tablet TAKE 1 TABLET BY MOUTH DAILY 90 Tab 3    atorvastatin (LIPITOR) 10 mg tablet TAKE 1 TABLET BY MOUTH EVERY NIGHT 90 Tab 3    cranberry extract (CRANBERRY JUICE POWDER) 425 mg cap Take 1 Cap by mouth daily.  aspirin 81 mg chewable tablet Take 1 Tab by mouth daily. 30 Tab 11    fluticasone (FLONASE) 50 mcg/actuation nasal spray 1 [place] by Both Nostrils route as needed.  loratadine (CLARITIN) 10 mg tablet Take 10 mg by mouth daily as needed for Allergies.  MULTI-VITAMIN PO Take 1 Tab by mouth daily. No Known Allergies  Past Medical History:   Diagnosis Date    Anxiety     Arthritis     Depression     GERD (gastroesophageal reflux disease)     HTN (hypertension)     LBBB (left bundle branch block)     Osteoporosis     completed 8 years Fosamax    Sciatica     Urinary incontinence      Social History     Tobacco Use    Smoking status: Never Smoker    Smokeless tobacco: Never Used   Substance Use Topics    Alcohol use: No       ROS: See HPI  No flowsheet data found. Enid Short, who was evaluated through a patient-initiated, synchronous (real-time) audio only encounter, and/or her healthcare decision maker, is aware that it is a billable service, with coverage as determined by her insurance carrier. She provided verbal consent to proceed: Yes. She has not had a related appointment within my department in the past 7 days or scheduled within the next 24 hours.       Total Time: minutes: 11-20 minutes    Moe Whitfield MD

## 2020-12-18 ENCOUNTER — TELEPHONE (OUTPATIENT)
Dept: CARDIOLOGY CLINIC | Age: 85
End: 2020-12-18

## 2020-12-18 NOTE — TELEPHONE ENCOUNTER
Patient daughter called in stating patient had recent blood work done with primary doctor which showed worsening kidney function. Patient is scheduled to see nephrology in January 2021. Reviewed lab work with Dr Pio Jc. Verbal order and read back per Etta Victoria, DO  Decrease lasix 20mg to twice a week, continue to watch symptoms and contact our office with any concerns. This has been fully explained to the patient's daughter, who indicates understanding.

## 2021-01-13 ENCOUNTER — OFFICE VISIT (OUTPATIENT)
Dept: NEUROLOGY | Age: 86
End: 2021-01-13
Payer: MEDICARE

## 2021-01-13 VITALS
DIASTOLIC BLOOD PRESSURE: 78 MMHG | OXYGEN SATURATION: 97 % | RESPIRATION RATE: 18 BRPM | SYSTOLIC BLOOD PRESSURE: 130 MMHG | BODY MASS INDEX: 19.43 KG/M2 | TEMPERATURE: 95.7 F | HEIGHT: 62 IN | WEIGHT: 105.6 LBS | HEART RATE: 70 BPM

## 2021-01-13 DIAGNOSIS — R55 SYNCOPE, UNSPECIFIED SYNCOPE TYPE: ICD-10-CM

## 2021-01-13 DIAGNOSIS — R41.9 ALTERATION OF AWARENESS: ICD-10-CM

## 2021-01-13 DIAGNOSIS — F03.90 DEMENTIA WITHOUT BEHAVIORAL DISTURBANCE, UNSPECIFIED DEMENTIA TYPE: Primary | ICD-10-CM

## 2021-01-13 PROCEDURE — 3288F FALL RISK ASSESSMENT DOCD: CPT | Performed by: STUDENT IN AN ORGANIZED HEALTH CARE EDUCATION/TRAINING PROGRAM

## 2021-01-13 PROCEDURE — 99204 OFFICE O/P NEW MOD 45 MIN: CPT | Performed by: STUDENT IN AN ORGANIZED HEALTH CARE EDUCATION/TRAINING PROGRAM

## 2021-01-13 PROCEDURE — G9717 DOC PT DX DEP/BP F/U NT REQ: HCPCS | Performed by: STUDENT IN AN ORGANIZED HEALTH CARE EDUCATION/TRAINING PROGRAM

## 2021-01-13 PROCEDURE — G8427 DOCREV CUR MEDS BY ELIG CLIN: HCPCS | Performed by: STUDENT IN AN ORGANIZED HEALTH CARE EDUCATION/TRAINING PROGRAM

## 2021-01-13 PROCEDURE — G8420 CALC BMI NORM PARAMETERS: HCPCS | Performed by: STUDENT IN AN ORGANIZED HEALTH CARE EDUCATION/TRAINING PROGRAM

## 2021-01-13 PROCEDURE — 1090F PRES/ABSN URINE INCON ASSESS: CPT | Performed by: STUDENT IN AN ORGANIZED HEALTH CARE EDUCATION/TRAINING PROGRAM

## 2021-01-13 PROCEDURE — G8536 NO DOC ELDER MAL SCRN: HCPCS | Performed by: STUDENT IN AN ORGANIZED HEALTH CARE EDUCATION/TRAINING PROGRAM

## 2021-01-13 PROCEDURE — 1100F PTFALLS ASSESS-DOCD GE2>/YR: CPT | Performed by: STUDENT IN AN ORGANIZED HEALTH CARE EDUCATION/TRAINING PROGRAM

## 2021-01-13 RX ORDER — MEMANTINE HYDROCHLORIDE 5 MG/1
5 TABLET ORAL DAILY
Qty: 30 TAB | Refills: 5 | Status: SHIPPED | OUTPATIENT
Start: 2021-01-13 | End: 2021-02-12

## 2021-01-13 NOTE — PROGRESS NOTES
Jeyson Faustin is a 80 y.o. female new patient in office today, accompanied by daughter to discuss frequent falls and memory difficulties; as referred by Martir Lee MD. Daughter reports patient has c/o hearing noises and loud argumentitive type voices at night once hearing aides are out and patient is resting.

## 2021-01-13 NOTE — LETTER
1/13/2021 Patient: Chris Rossi YOB: 1927 Date of Visit: 1/13/2021 Bambi Renae MD 
[address] [place] [address] [address] [employer] Dear Bambi Renae MD, Thank you for referring Ms. Bonnie Valente to [facility] for evaluation. My notes for this consultation are attached. If you have questions, please do not hesitate to call me. I look forward to following your patient along with you. Sincerely, Thierry Olivas MD

## 2021-01-13 NOTE — PROGRESS NOTES
Kylie Sellers is a 80 y.o. female . presents for New Patient Noe Patel MD), Memory Loss (\"memory difficulties\"), and Fall (frequent)   . A 80years old female patient with medical history of hypertension, CHF status post pacemaker, previous syncopal episodes, anxiety/depression here for evaluation of memory problems. Today she came with her daughter. According to her daughter, she was noticed to have some memory problems since age of 80. Is progressively getting worse and more worse over the past 1 year. She has significant difficulty remembering past and more remote events. She does not remember too much about her time was her  who has . She forgets places she has been in the past.  She also has some difficulty with her speech: Word finding difficulty and sometimes has difficulty with the meaning of the word that she reads. She can talk normal with her daughter. She might ask the same questions. She has difficulty focusing. She has no difficulty with names of her close family members; some difficulty with other family members. Daughter takes care of her medications. Can sometimes address herself up. She sometimes is able to make her bed up. No driving for a long time. Daughter takes care of her finances. She is currently living with her daughter who is her primary caretaker. No day/night confusion. She had one episode of visual hallucination where she saw bedbugs; no recurrence. At night, after removing her hearing aid, she hears voices: Conversations and sometimes tries to look for the people who are talking. No aggressive behavior. She has occasional episodes where she smells a bad odor followed by nausea, fails to speak, with glassy eyes; if standing might fall. No abnormal body movement. No significant postictal confusion. She has episodes about 1 week. She had previous passing out spells requiring placement of pacemaker. No previous history of stroke.   Had history of multiple falls and had hit her head but no loss of consciousness. No previous history of hypothyroidism. Family still includes a sister who had hallucinations; no family stroke clinic dementia. She currently walks with a walker. Feels off balance. Has generalized weakness. No tremors. Had a CT scan of the head from November 25, 2020 which only showed mild burden of microvascular ischemic changes; otherwise no hydrocephalus or mass lesions. Review of Systems   Constitutional: Negative for chills and fever. HENT: Positive for hearing loss. Negative for tinnitus. Eyes: Negative for blurred vision and double vision. Respiratory: Positive for cough. Negative for hemoptysis, sputum production and shortness of breath. Cardiovascular: Negative for chest pain and leg swelling. Gastrointestinal: Negative for nausea and vomiting. Genitourinary: Negative for dysuria, frequency and urgency. Musculoskeletal: Positive for back pain (if standing too long), falls, joint pain (knees), myalgias (left leg) and neck pain (when reading). Skin: Negative for itching and rash. Neurological: Positive for dizziness, sensory change (middle finger on left feels numb) and weakness (generalized). Negative for tingling, tremors, focal weakness, seizures and headaches. Endo/Heme/Allergies: Bruises/bleeds easily. Psychiatric/Behavioral: Positive for depression, hallucinations and memory loss. Negative for suicidal ideas. The patient does not have insomnia.         Past Medical History:   Diagnosis Date    Anxiety     Arthritis     Depression     GERD (gastroesophageal reflux disease)     HTN (hypertension)     LBBB (left bundle branch block)     Osteoporosis     completed 8 years Fosamax    Sciatica     Urinary incontinence        Past Surgical History:   Procedure Laterality Date    HX CHOLECYSTECTOMY      HX HYSTERECTOMY      HX ORTHOPAEDIC      fractured right patella surgery    [place] ANESTH,SURGERY OF SHOULDER     • [place] CARDIAC SURG PROCEDURE UNLIST  11/2018    pacemaker    • [place] INS NEW/RPLC PRM PACEMAKER W/TRANSV ELTRD VENTR N/A 11/28/2018    INSERT PPM SINGLE VENTRICULAR performed by Terrell Galindo MD at [facility] CARDIAC CATH LAB        Family History   Problem Relation Age of Onset   • Heart Disease Mother    • Diabetes Mother    • Heart Disease Father    • Diabetes Father    • Hypertension Sister    • Diabetes Sister    • Heart Disease Brother    • Heart Disease Sister    • Diabetes Sister    • Heart Disease Brother         Social History     Socioeconomic History   • Marital status:      Spouse name: Not on file   • Number of children: Not on file   • Years of education: Not on file   • Highest education level: Not on file   Occupational History   • Not on file   Social Needs   • Financial resource strain: Not on file   • Food insecurity     Worry: Not on file     Inability: Not on file   • Transportation needs     Medical: Not on file     Non-medical: Not on file   Tobacco Use   • Smoking status: Never Smoker   • Smokeless tobacco: Never Used   Substance and Sexual Activity   • Alcohol use: No   • Drug use: No   • Sexual activity: Not on file   Lifestyle   • Physical activity     Days per week: Not on file     Minutes per session: Not on file   • Stress: Not on file   Relationships   • Social connections     Talks on phone: Not on file     Gets together: Not on file     Attends Christianity service: Not on file     Active member of club or organization: Not on file     Attends meetings of clubs or organizations: Not on file     Relationship status: Not on file   • Intimate partner violence     Fear of current or ex partner: Not on file     Emotionally abused: Not on file     Physically abused: Not on file     Forced sexual activity: Not on file   Other Topics Concern   • Not on file   Social History Narrative   • Not on file        No Known Allergies      Current Outpatient Medications   Medication Sig  Dispense Refill    memantine (NAMENDA) 5 mg tablet Take 1 Tab by mouth daily for 30 days. 30 Tab 5    furosemide (LASIX) 20 mg tablet TAKE 1 TABLET BY MOUTH DAILY (Patient taking differently: every Monday, Wednesday, Friday.) 90 Tab 1    olopatadine (PATANOL) 0.1 % ophthalmic solution       CALCIUM PO Take 1 Cap by mouth daily.  omeprazole (PRILOSEC) 40 mg capsule Take 1 Cap by mouth daily. 90 Cap 0    carvediloL (COREG) 12.5 mg tablet TAKE 1 TABLET BY MOUTH TWICE DAILY WITH MEALS 180 Tab 3    sertraline (ZOLOFT) 50 mg tablet TAKE 1 TABLET BY MOUTH DAILY 90 Tab 3    atorvastatin (LIPITOR) 10 mg tablet TAKE 1 TABLET BY MOUTH EVERY NIGHT 90 Tab 3    cranberry extract (CRANBERRY JUICE POWDER) 425 mg cap Take 1 Cap by mouth daily.  aspirin 81 mg chewable tablet Take 1 Tab by mouth daily. 30 Tab 11    fluticasone (FLONASE) 50 mcg/actuation nasal spray 1 [place] by Both Nostrils route as needed.  loratadine (CLARITIN) 10 mg tablet Take 10 mg by mouth daily as needed for Allergies.  MULTI-VITAMIN PO Take 1 Tab by mouth daily. Physical Exam  Constitutional:       Appearance: Normal appearance. HENT:      Head: Normocephalic and atraumatic. Mouth/Throat:      Mouth: Mucous membranes are moist.      Pharynx: Oropharynx is clear. No oropharyngeal exudate. Eyes:      Extraocular Movements: Extraocular movements intact. Neck:      Musculoskeletal: Normal range of motion and neck supple. Pulmonary:      Effort: Pulmonary effort is normal. No respiratory distress. Musculoskeletal:         General: No tenderness. Right lower leg: No edema. Left lower leg: No edema. Neurological:      Mental Status: She is alert. Comments: Mental status: Awake, alert, oriented x3, follows simple and complex commands, no neglect, no extinction to DSS or VSS, immediate recall 3/3 and delayed recall 2/3. Attention to spelling world backwards was impaired. MMSE score was 22/30. Speech and languge: fluent, coherent, naming and repitition intact, reading and comprehension intact  CN: VFF, EOMI, PERRLA, face sensation intact , no facial asymmetry noted, palate elevation symmetric bilat, SS+SCM 5/5 bilat, tongue midline. Difficulty hearing bilaterally even with the hearing aid  Motor: no pronator drift, tone normal throughout, symmetric movement of the upper and lower extremities. Sensory: intact to light touch and pinprick throughout  Coordination: FNF, HS accurate w/o dysmetria  DTR: 2+ throughout  Gait: On a wheelchair. Hospital Outpatient Visit on 11/25/2020   Component Date Value Ref Range Status    LIPID PROFILE 11/25/2020        Final    Cholesterol, total 11/25/2020 183  <200 MG/DL Final    Triglyceride 11/25/2020 116  <150 MG/DL Final    Comment: The drugs N-acetylcysteine (NAC) and  Metamiszole have been found to cause falsely  low results in this chemical assay. Please  be sure to submit blood samples obtained  BEFORE administration of either of these  drugs to assure correct results.       HDL Cholesterol 11/25/2020 63* 40 - 60 MG/DL Final    LDL, calculated 11/25/2020 96.8  0 - 100 MG/DL Final    VLDL, calculated 11/25/2020 23.2  MG/DL Final    CHOL/HDL Ratio 11/25/2020 2.9  0 - 5.0   Final    Sodium 11/25/2020 141  136 - 145 mmol/L Final    Potassium 11/25/2020 4.3  3.5 - 5.5 mmol/L Final    Chloride 11/25/2020 106  100 - 111 mmol/L Final    CO2 11/25/2020 30  21 - 32 mmol/L Final    Anion gap 11/25/2020 5  3.0 - 18 mmol/L Final    Glucose 11/25/2020 96  74 - 99 mg/dL Final    BUN 11/25/2020 37* 7.0 - 18 MG/DL Final    Creatinine 11/25/2020 1.77* 0.6 - 1.3 MG/DL Final    BUN/Creatinine ratio 11/25/2020 21* 12 - 20   Final    GFR est AA 11/25/2020 32* >60 ml/min/1.73m2 Final    GFR est non-AA 11/25/2020 27* >60 ml/min/1.73m2 Final    Comment: (NOTE)  Estimated GFR is calculated using the Modification of Diet in Renal   Disease (MDRD) Study equation, reported for both  Americans   (GFRAA) and non- Americans (GFRNA), and normalized to 1.73m2   body surface area. The physician must decide which value applies to   the patient. The MDRD study equation should only be used in   individuals age 25 or older. It has not been validated for the   following: pregnant women, patients with serious comorbid conditions,   or on certain medications, or persons with extremes of body size,   muscle mass, or nutritional status.  Calcium 11/25/2020 9.2  8.5 - 10.1 MG/DL Final    Bilirubin, total 11/25/2020 0.3  0.2 - 1.0 MG/DL Final    ALT (SGPT) 11/25/2020 15  13 - 56 U/L Final    AST (SGOT) 11/25/2020 16  10 - 38 U/L Final    Alk. phosphatase 11/25/2020 73  45 - 117 U/L Final    Protein, total 11/25/2020 7.1  6.4 - 8.2 g/dL Final    Albumin 11/25/2020 3.5  3.4 - 5.0 g/dL Final    Globulin 11/25/2020 3.6  2.0 - 4.0 g/dL Final    A-G Ratio 11/25/2020 1.0  0.8 - 1.7   Final             ICD-10-CM ICD-9-CM    1. Dementia without behavioral disturbance, unspecified dementia type (HCC)  F03.90 294.20 memantine (NAMENDA) 5 mg tablet   2. Syncope, unspecified syncope type  R55 780.2    3. Alteration of awareness  R41.9 780.09 EEG AWAKE AND ASLEEP     A 80years old female patient with above medical problems here for evaluation of progressive memory loss. Involves both long-term and short-term memory. Progressively getting worse and more also over the past 1 year. According with her daughter who gave most of the history. Has difficulty remembering some remote events and places. She is currently dependent on her daughter for most ADL. Difficulty ambulation probably from the generalizedweakness and joint problems. Recent CT scan of the head was unremarkable. No visual hallucinations but she has auditory hallucinations when she is removing her hearing aid. [Probably related to the longstanding hearing difficulty]. No symptoms of parkinsonism.   Mini-Mental status exam score was 22/30. 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 We will start her on memantine 5 mg p.o. per day. In addition she has episodes where she smells a bad odor followed by nausea and inability to speak lasting for 1 to 2 minutes; no significant postictal confusion. Might fall if it happens while standing. Symptoms are possibly presyncopal; but cannot rule out the possibility of focal seizures with impairment of awareness. Will do EEG. We will see her in the clinic in 6 months time.

## 2021-01-15 ENCOUNTER — TELEPHONE (OUTPATIENT)
Dept: NEUROLOGY | Age: 86
End: 2021-01-15

## 2021-01-15 NOTE — TELEPHONE ENCOUNTER
Pt's daughter states that pt cannot do an EEG Awake and Asleep because pt cannot go to bed at 2am and wake up at 6am. Pt's daughter requests another type of EEG. Please contact daughter at [phone] when a new order is made.

## 2021-01-27 DIAGNOSIS — R41.9 ALTERATION OF AWARENESS: ICD-10-CM

## 2021-02-01 ENCOUNTER — VIRTUAL VISIT (OUTPATIENT)
Dept: FAMILY MEDICINE CLINIC | Age: 86
End: 2021-02-01
Payer: MEDICARE

## 2021-02-01 DIAGNOSIS — N18.4 CHRONIC RENAL IMPAIRMENT, STAGE 4 (SEVERE) (HCC): ICD-10-CM

## 2021-02-01 DIAGNOSIS — M81.0 AGE-RELATED OSTEOPOROSIS WITHOUT CURRENT PATHOLOGICAL FRACTURE: ICD-10-CM

## 2021-02-01 DIAGNOSIS — Z20.822 CLOSE EXPOSURE TO COVID-19 VIRUS: ICD-10-CM

## 2021-02-01 DIAGNOSIS — G40.109 EPILEPSIA PARTIALIS CONTINUA WITHOUT INTRACTABLE EPILEPSY (HCC): ICD-10-CM

## 2021-02-01 DIAGNOSIS — R41.3 MEMORY CHANGES: Primary | ICD-10-CM

## 2021-02-01 PROCEDURE — 99442 PR PHYS/QHP TELEPHONE EVALUATION 11-20 MIN: CPT | Performed by: FAMILY MEDICINE

## 2021-02-01 NOTE — PROGRESS NOTES
Kylie Sellers is a 80 y.o. female, evaluated via audio-only technology on 2/1/2021 for Memory Loss and Other (episode of near syncope with sense of smell )  . Assessment & Plan:   Diagnoses and all orders for this visit:    Memory changes: Patient has dementia and most of the conversation done by her daughter. Has seen neurology. Not happy with the visit as it was extensive and daughter felt it was more traumatized with the patient. Also she was scheduled for EEG study to rule out seizure disorder for her near syncopal episode as if she has those episodes of feeling of weird smell and then she feels very weak fatigue and feeling of passing out. She was started on Namenda. Daughter is not sure it has been helping. Discussed to observe longer. She agrees to have a second opinion with the neurology and advised to discuss that looking for an option of no extensive work-up and if can be treated with symptomatic treatment. Daughter will talk to the new neurologist and follow-up with me. Reviewed neurology notes from the chart and the recommendations  Comments:  seen neurology. 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 Daughter is not much aware on that. Will observe and see the input from neurology  -     REFERRAL TO NEUROLOGY    Close exposure to COVID-19 virus: Daughter was diagnosed 1 week ago. She lives in the same household. She is going to turn in 1 room in the house. At this time patient is asymptomatic. I have advised 14 days of quarantine for patient and if she remains asymptomatic no need for test  Comments:  daughter lives with her positive COVID     Chronic renal impairment, stage 4 (severe) [facility]): Seen nephrology. Following the recommendations.   Advised to avoid NSAIDs use comments:  seen nephrology     Age-related osteoporosis without current pathological fracture: Was DC'd calcium by nephrology. Patient and daughter both last visit agreed not to have any referral or any treatment for it. Will observe. On vitamin D    Patient  daughter  understood and agreed with the plan. She will explained to patient and see if she gets ready to see neurologist  12  Subjective:   Done visit through phone check  Concerned with the memory changes and behavioral changes. Brief episode of feeling anxious at nighttime and not able to sleep alone. Daughter was sleeping with patient. Now getting better. Patient does have a dementia. Recently evaluated by neurologist.  Per daughter not a pleasant experience for patient. Extensive questionnaire in detail exam made patient fatigue. Also having a some near syncopal episode as a sense of feeling weird smell and then she extremely feels fatigued and drained out and near passing out episodes. Ordered EEG to evaluate seizure activity. Per  daughter the process of going through EEG would be traumatized to the patient so decided to hold off on it. As the second neurology opinion. Meantime we will continue current plan. No appetite or weight changes. No chest pain or shortness of breath. No cold cough wheezing. Daughter was diagnosed with the Covid positive week ago. Patient is still asymptomatic. No loss of smell or test or any diarrhea. Daughter was advised that if patient remains asymptomatic 14 days of quarantine and no need for test.  Renal insufficiency. Seen nephrology. Advised to avoid NSAIDs  Osteoporosis. Patient and her daughter last visit agreed not to have any work-up further.   She was DC'd on calcium by her nephrologist.  Lab Results   Component Value Date/Time    WBC 8.1 06/26/2020 12:15 PM    WBC 5.4 05/17/2012 08:41 AM    HGB 11.9 06/26/2020 12:15 PM    HCT 39.4 06/26/2020 12:15 PM    PLATELET 843 19/86/0767 12:15 PM    MCV 96 06/26/2020 12:15 PM     Lab Results   Component Value Date/Time    Sodium 141 11/25/2020 08:59 AM    Potassium 4.3 11/25/2020 08:59 AM    Chloride 106 11/25/2020 08:59 AM    CO2 30 11/25/2020 08:59 AM    Anion gap 5 11/25/2020 08:59 AM    Glucose 96 11/25/2020 08:59 AM    BUN 37 (H) 11/25/2020 08:59 AM    Creatinine 1.77 (H) 11/25/2020 08:59 AM    BUN/Creatinine ratio 21 (H) 11/25/2020 08:59 AM    GFR est AA 32 (L) 11/25/2020 08:59 AM    GFR est non-AA 27 (L) 11/25/2020 08:59 AM    Calcium 9.2 11/25/2020 08:59 AM    Bilirubin, total 0.3 11/25/2020 08:59 AM    Alk. phosphatase 73 11/25/2020 08:59 AM    Protein, total 7.1 11/25/2020 08:59 AM    Albumin 3.5 11/25/2020 08:59 AM    Globulin 3.6 11/25/2020 08:59 AM    A-G Ratio 1.0 11/25/2020 08:59 AM    ALT (SGPT) 15 11/25/2020 08:59 AM    AST (SGOT) 16 11/25/2020 08:59 AM     Lab Results   Component Value Date/Time    Cholesterol, total 183 11/25/2020 08:59 AM    HDL Cholesterol 63 (H) 11/25/2020 08:59 AM    LDL, calculated 96.8 11/25/2020 08:59 AM    VLDL, calculated 23.2 11/25/2020 08:59 AM    Triglyceride 116 11/25/2020 08:59 AM    CHOL/HDL Ratio 2.9 11/25/2020 08:59 AM     Lab Results   Component Value Date/Time    TSH 3.51 06/26/2020 12:15 PM       Lab Results   Component Value Date/Time    Vitamin D 25-Hydroxy 38.8 10/11/2017 12:12 PM           Prior to Admission medications    Medication Sig Start Date End Date Taking? Authorizing Provider   memantine (NAMENDA) 5 mg tablet Take 1 Tab by mouth daily for 30 days. 1/13/21 2/12/21  Amaris TAYLOR MD   furosemide (LASIX) 20 mg tablet TAKE 1 TABLET BY MOUTH DAILY  Patient taking differently: every Monday, Wednesday, Friday. 11/30/20   Patria Galan MD   olopatadine (PATANOL) 0.1 % ophthalmic solution  9/8/20   Provider, Historical   CALCIUM PO Take 1 Cap by mouth daily. Provider, Historical   omeprazole (PRILOSEC) 40 mg capsule Take 1 Cap by mouth daily.  10/23/20   Radha Orourke MD   carvediloL (COREG) 12.5 mg tablet TAKE 1 TABLET BY MOUTH TWICE DAILY WITH MEALS 6/22/20   He Resendez DO   serttam (ZOLOFT) 50 mg tablet TAKE 1 TABLET BY MOUTH DAILY 2/25/20   Joel Levy MD   atorvastatin (LIPITOR) 10 mg tablet TAKE 1 TABLET BY MOUTH EVERY NIGHT 2/25/20   Lyly Arauz NP   cranberry extract (CRANBERRY JUICE POWDER) 425 mg cap Take 1 Cap by mouth daily. Provider, Historical   aspirin 81 mg chewable tablet Take 1 Tab by mouth daily. 11/30/18   Cindy Glodstein MD   fluticasone (FLONASE) 50 mcg/actuation nasal spray 1 [place] by Both Nostrils route as needed. Provider, Historical   loratadine (CLARITIN) 10 mg tablet Take 10 mg by mouth daily as needed for Allergies. Provider, Historical   MULTI-VITAMIN PO Take 1 Tab by mouth daily.     Provider, Historical     Patient Active Problem List    Diagnosis Date Noted    Peripheral vascular disease ([facility] [address] 11/16/2020    Other fatigue 08/05/2019    Dyspnea on exertion 02/14/2019    Acute UTI 11/24/2018    Functional urinary incontinence 11/24/2018    Pulmonary edema 11/24/2018    Primary osteoarthritis of both knees 11/01/2018    Syncope 11/01/2018    Seasonal allergic rhinitis due to pollen 04/23/2018    Dysphagia 12/06/2016    Advance directive discussed with patient 10/21/2016    Gastroesophageal reflux disease without esophagitis 10/21/2016    Primary hypertension 06/22/2016    S/P ORIF (open reduction internal fixation) fracture 06/14/2016    Memory loss 12/16/2015    Vertigo, benign paroxysmal 06/15/2015    Distal radius fracture, left 11/30/2013    Sprain and strain of shoulder and upper arm 11/30/2013    Zoster 06/10/2013    Generalized osteoarthrosis, involving multiple sites 01/08/2013    Reflux esophagitis 01/19/2012    Osteoporosis     Anxiety     Depression      No Known Allergies  Past Medical History:   Diagnosis Date    Anxiety     Arthritis     Depression     GERD (gastroesophageal reflux disease)     HTN (hypertension)     LBBB (left bundle branch block)     Osteoporosis     completed 8 years Fosamax    Sciatica     Urinary incontinence        ROS: See HPI    No flowsheet data found. Chai Narayanan, who was evaluated through a patient-initiated, synchronous (real-time) audio only encounter, and/or her healthcare decision maker, is aware that it is a billable service, with coverage as determined by her insurance carrier. She provided verbal consent to proceed: Yes. She has not had a related appointment within my department in the past 7 days or scheduled within the next 24 hours.       Total Time: minutes: 11-20 minutes    Kristina Lewis MD

## 2021-02-16 RX ORDER — ATORVASTATIN CALCIUM 10 MG/1
TABLET, FILM COATED ORAL
Qty: 90 TAB | Refills: 3 | Status: SHIPPED | OUTPATIENT
Start: 2021-02-16 | End: 2022-02-22

## 2021-02-24 ENCOUNTER — OFFICE VISIT (OUTPATIENT)
Dept: CARDIOLOGY CLINIC | Age: 86
End: 2021-02-24
Payer: MEDICARE

## 2021-02-24 ENCOUNTER — CLINICAL SUPPORT (OUTPATIENT)
Dept: CARDIOLOGY CLINIC | Age: 86
End: 2021-02-24

## 2021-02-24 VITALS
BODY MASS INDEX: 19.69 KG/M2 | HEIGHT: 62 IN | HEART RATE: 68 BPM | DIASTOLIC BLOOD PRESSURE: 72 MMHG | SYSTOLIC BLOOD PRESSURE: 132 MMHG | WEIGHT: 107 LBS | OXYGEN SATURATION: 98 %

## 2021-02-24 DIAGNOSIS — R55 SYNCOPE, UNSPECIFIED SYNCOPE TYPE: Primary | ICD-10-CM

## 2021-02-24 DIAGNOSIS — Z95.0 CARDIAC PACEMAKER IN SITU: ICD-10-CM

## 2021-02-24 DIAGNOSIS — I25.5 ISCHEMIC CARDIOMYOPATHY: Primary | ICD-10-CM

## 2021-02-24 DIAGNOSIS — R55 SYNCOPE, UNSPECIFIED SYNCOPE TYPE: ICD-10-CM

## 2021-02-24 PROCEDURE — G9717 DOC PT DX DEP/BP F/U NT REQ: HCPCS | Performed by: INTERNAL MEDICINE

## 2021-02-24 PROCEDURE — G8427 DOCREV CUR MEDS BY ELIG CLIN: HCPCS | Performed by: INTERNAL MEDICINE

## 2021-02-24 PROCEDURE — G8536 NO DOC ELDER MAL SCRN: HCPCS | Performed by: INTERNAL MEDICINE

## 2021-02-24 PROCEDURE — 99214 OFFICE O/P EST MOD 30 MIN: CPT | Performed by: INTERNAL MEDICINE

## 2021-02-24 PROCEDURE — 1100F PTFALLS ASSESS-DOCD GE2>/YR: CPT | Performed by: INTERNAL MEDICINE

## 2021-02-24 PROCEDURE — 93279 PRGRMG DEV EVAL PM/LDLS PM: CPT | Performed by: INTERNAL MEDICINE

## 2021-02-24 PROCEDURE — G8420 CALC BMI NORM PARAMETERS: HCPCS | Performed by: INTERNAL MEDICINE

## 2021-02-24 PROCEDURE — 3288F FALL RISK ASSESSMENT DOCD: CPT | Performed by: INTERNAL MEDICINE

## 2021-02-24 PROCEDURE — 1090F PRES/ABSN URINE INCON ASSESS: CPT | Performed by: INTERNAL MEDICINE

## 2021-02-24 NOTE — PROGRESS NOTES
Olivier Montague    follow-up for heart failure and leg pain    HPI    Olivier Montague is a 80 y.o. with no known cardiac disease who presented initially for evaluation of syncope. As you know, Vannesa Jarquin is such a pleasant patient with history hypertension, arthritis and falls, found to have cardiomyopathy and SSS. Vannesa Jarquin lives with her daughter. Back in Sept when her daughter was out of town she apparently had a syncopal event. Her sister was with her at the time and saw her somewhat slumped in her chair. She was slow to respond but came to. Vannesa Jarquin wasn't sure what happened and didn't seek medical care after that initial event. Again since then she had another episode. Her daughter said her mother looked unwell/ pale and said her stomach was upset. She again kind of slumped and her daughter caught her so she did not fall/ get injured. She believes she lost consciousness. She came to and felt somewhat fatigued briefly. She had no observed seizure like activity. She completely denies surrounding cardiac complaints with these episodes- specifically has never noticed palpations/ heart racing, no CP, or SOB. No swelling or headaches. She has no known heart problems and has never seen a heart doctor or had cardiac testing. Dom's work up showed globally reduced EF 26-30%. I called and spoke to her daughter- giving her the results and alerted her to the signs and symptoms to look out for. Apparently, she got off the phone and asked her mom how she was feeling and she admitted she had been short of breath for 2 days. They ended up going to the [facility] ER the next day and was in AECHF/ HFrEF with fluid overload. Just before discharge she ended up passing out (as she had mentioned very rarely happens)- and she was noted to have a 6 sec pause that correlated. She was appropriately given a pacemaker. Since then she did struggle with some episodes of syncope that may have been related to her underlying urinary tract infections.   Likely this has not been an issue as of recent and since I last saw me overall she has been doing okay. There has been some overall decline in her memory and her daughter says she is tired all the time and her naps seem longer. She is undergoing a neuro eval and starting meds for dementia. Also established with nephro, and dropped lasix to ~3 x/ week. Also aware her daughter had Manisha but pt herself didn't have obvious symptoms. Otherwise no CP, no LE edema. Past Medical History:   Diagnosis Date    Anxiety     Arthritis     Depression     GERD (gastroesophageal reflux disease)     HTN (hypertension)     LBBB (left bundle branch block)     Osteoporosis     completed 8 years Fosamax    Sciatica     Urinary incontinence        Past Surgical History:   Procedure Laterality Date    HX CHOLECYSTECTOMY      HX HYSTERECTOMY      HX ORTHOPAEDIC      fractured right patella surgery    [place] ANESTH,SURGERY OF SHOULDER      [place] CARDIAC SURG PROCEDURE UNLIST  11/2018    pacemaker     [place] INS NEW/RPLC PRM PACEMAKER W/TRANSV ELTRD VENTR N/A 11/28/2018    INSERT PPM SINGLE VENTRICULAR performed by Isma Johnson MD at [facility] CATH LAB       Current Outpatient Medications   Medication Sig Dispense Refill    atorvastatin (LIPITOR) 10 mg tablet TAKE 1 TABLET BY MOUTH EVERY NIGHT 90 Tab 3    furosemide (LASIX) 20 mg tablet TAKE 1 TABLET BY MOUTH DAILY (Patient taking differently: every Monday, Wednesday, Friday.) 90 Tab 1    olopatadine (PATANOL) 0.1 % ophthalmic solution       CALCIUM PO Take 1 Cap by mouth daily.  omeprazole (PRILOSEC) 40 mg capsule Take 1 Cap by mouth daily. 90 Cap 0    carvediloL (COREG) 12.5 mg tablet TAKE 1 TABLET BY MOUTH TWICE DAILY WITH MEALS 180 Tab 3    sertraline (ZOLOFT) 50 mg tablet TAKE 1 TABLET BY MOUTH DAILY 90 Tab 3    cranberry extract (CRANBERRY JUICE POWDER) 425 mg cap Take 1 Cap by mouth daily.  aspirin 81 mg chewable tablet Take 1 Tab by mouth daily.  30 Tab 11  fluticasone (FLONASE) 50 mcg/actuation nasal spray 1 [place] by Both Nostrils route as needed.  loratadine (CLARITIN) 10 mg tablet Take 10 mg by mouth daily as needed for Allergies.  MULTI-VITAMIN PO Take 1 Tab by mouth daily. No Known Allergies    Social History     Socioeconomic History    Marital status:      Spouse name: Not on file    Number of children: Not on file    Years of education: Not on file    Highest education level: Not on file   Occupational History    Not on file   Social Needs    Financial resource strain: Not on file    Food insecurity     Worry: Not on file     Inability: Not on file    Transportation needs     Medical: Not on file     Non-medical: Not on file   Tobacco Use    Smoking status: Never Smoker    Smokeless tobacco: Never Used   Substance and Sexual Activity    Alcohol use: No    Drug use: No    Sexual activity: Not on file   Lifestyle    Physical activity     Days per week: Not on file     Minutes per session: Not on file    Stress: Not on file   Relationships    Social connections     Talks on phone: Not on file     Gets together: Not on file     Attends Cheondoism service: Not on file     Active member of club or organization: Not on file     Attends meetings of clubs or organizations: Not on file     Relationship status: Not on file    Intimate partner violence     Fear of current or ex partner: Not on file     Emotionally abused: Not on file     Physically abused: Not on file     Forced sexual activity: Not on file   Other Topics Concern    Not on file   Social History Narrative    Not on file        The patient has a family history of heart problems, but no genetic cardiomyopathies or SCD. Review of Systems    14 pt Review of Systems is negative unless otherwise mentioned in the HPI.     Wt Readings from Last 3 Encounters:   01/13/21 47.9 kg (105 lb 9.6 oz)   11/16/20 48.1 kg (106 lb)   08/26/20 48.1 kg (106 lb)     Temp Readings from Last 3 Encounters:   01/13/21 (!) 95.7 °F (35.4 °C) (Temporal)   11/16/20 98 °F (36.7 °C) (Oral)   01/20/20 97.4 °F (36.3 °C) (Oral)     BP Readings from Last 3 Encounters:   01/13/21 130/78   11/16/20 128/64   08/26/20 134/60     Pulse Readings from Last 3 Encounters:   01/13/21 70   11/16/20 61   08/26/20 69     Physical Exam:    Visit Vitals  Ht 5' 2\" (1.575 m)   LMP  (LMP Unknown)   BMI 19.31 kg/m²      Physical Exam  Constitutional:       General: She is not in acute distress. Appearance: She is well-developed. She is not diaphoretic. HENT:      Head: Normocephalic and atraumatic. Eyes:      Pupils: Pupils are equal, round, and reactive to light. Neck:      Vascular: No JVD. Cardiovascular:      Rate and Rhythm: Normal rate and regular rhythm. Heart sounds: Normal heart sounds. No murmur. No friction rub. No gallop. Pulmonary:      Effort: Pulmonary effort is normal. No respiratory distress. Breath sounds: Normal breath sounds. No wheezing or rales. Chest:      Chest wall: No tenderness. Abdominal:      General: Bowel sounds are normal.      Palpations: Abdomen is soft. Musculoskeletal:         General: No tenderness. Skin:     General: Skin is warm and dry. Neurological:      Mental Status: She is alert and oriented to person, place, and time. Lab Results   Component Value Date/Time    Sodium 141 11/25/2020 08:59 AM    Potassium 4.3 11/25/2020 08:59 AM    Chloride 106 11/25/2020 08:59 AM    CO2 30 11/25/2020 08:59 AM    Anion gap 5 11/25/2020 08:59 AM    Glucose 96 11/25/2020 08:59 AM    BUN 37 (H) 11/25/2020 08:59 AM    Creatinine 1.77 (H) 11/25/2020 08:59 AM    BUN/Creatinine ratio 21 (H) 11/25/2020 08:59 AM    GFR est AA 32 (L) 11/25/2020 08:59 AM    GFR est non-AA 27 (L) 11/25/2020 08:59 AM    Calcium 9.2 11/25/2020 08:59 AM    Bilirubin, total 0.3 11/25/2020 08:59 AM    Alk.  phosphatase 73 11/25/2020 08:59 AM    Protein, total 7.1 11/25/2020 08:59 AM Albumin 3.5 11/25/2020 08:59 AM    Globulin 3.6 11/25/2020 08:59 AM    A-G Ratio 1.0 11/25/2020 08:59 AM    ALT (SGPT) 15 11/25/2020 08:59 AM    AST (SGOT) 16 11/25/2020 08:59 AM     Lab Results   Component Value Date/Time    WBC 8.1 06/26/2020 12:15 PM    WBC 5.4 05/17/2012 08:41 AM    HGB 11.9 06/26/2020 12:15 PM    HCT 39.4 06/26/2020 12:15 PM    PLATELET 958 19/31/4423 12:15 PM    MCV 96 06/26/2020 12:15 PM         EKG Last: LBBB/ paced, no concerning ST or T wave changes    Impression and Plan:  Tayo Del Rosario is a 80 y.o. with:    HFrEF, s/p AECHF, now euvolemic/ stable (very likely ischemic but presumed)  Symptomatic SSS s/p PPM, known  Dyslipidemia, known  Advanced age/ frailty  Mental status decline, with h/o delirium on dementia  CKD stage IV (Venga)  Recent covid exposure with daughter in 1/2020, asymptomatic    Nephro, Neuro, and new PCP Darrell Land) notes obtained and reviewed  Continue current med regimen and agree with least amt of Lasix needed due to cardiorenal syndrome  Device check WNL today and reassurance given  RTC 6 months with device check    Thank you for allowing me to participate in the care of your patient, please do not hesitate to call with questions or concerns.     Kindest regards,    Aidan Pickett, DO

## 2021-02-24 NOTE — PROGRESS NOTES
Patsy Pack presents today for   Chief Complaint   Patient presents with    Follow-up     6 month follow up        Patsy Pack preferred language for health care discussion is english/other. Is someone accompanying this pt? no    Is the patient using any DME equipment during OV? no    Depression Screening:  No flowsheet data found. Learning Assessment:  Learning Assessment 11/16/2020   PRIMARY LEARNER Patient   HIGHEST LEVEL OF EDUCATION - PRIMARY LEARNER  DID NOT GRADUATE HIGH SCHOOL   BARRIERS PRIMARY LEARNER COGNITIVE   CO-LEARNER CAREGIVER Yes   CO-LEARNER NAME Ashly Domínguez/ Daughter   CO-LEARNER HIGHEST LEVEL OF EDUCATION > 4 YEARS OF COLLEGE   BARRIERS CO-LEARNER NONE   PRIMARY LANGUAGE ENGLISH   PRIMARY LANGUAGE CO-LEARNER ENGLISH    NEED No   LEARNER PREFERENCE PRIMARY DEMONSTRATION     -     -   LEARNER PREFERENCE CO-LEARNER READING   LEARNING SPECIAL TOPICS No   ANSWERED BY Patient   RELATIONSHIP SELF       Abuse Screening:  Abuse Screening Questionnaire 11/16/2020   Do you ever feel afraid of your partner? N   Are you in a relationship with someone who physically or mentally threatens you? N   Is it safe for you to go home? Y       Fall Risk  Fall Risk Assessment, last 12 mths 1/13/2021   Able to walk? Yes   Fall in past 12 months? -   Number of falls in past 12 months -   Fall with injury? -       Pt currently taking Anticoagulant therapy? ASa 81mg every day     Coordination of Care:  1. Have you been to the ER, urgent care clinic since your last visit? Hospitalized since your last visit? no    2. Have you seen or consulted any other health care providers outside of the [address] [place] [address] [place] since your last visit? Include any pap smears or colon screening.  no

## 2021-03-01 RX ORDER — OMEPRAZOLE 40 MG/1
40 CAPSULE, DELAYED RELEASE ORAL DAILY
Qty: 90 CAP | Refills: 0 | Status: SHIPPED | OUTPATIENT
Start: 2021-03-01 | End: 2021-06-08 | Stop reason: SDUPTHER

## 2021-03-01 RX ORDER — SERTRALINE HYDROCHLORIDE 50 MG/1
TABLET, FILM COATED ORAL
Qty: 90 TAB | Refills: 0 | Status: SHIPPED | OUTPATIENT
Start: 2021-03-01 | End: 2021-06-08 | Stop reason: SDUPTHER

## 2021-03-01 NOTE — TELEPHONE ENCOUNTER
This pharmacy faxed over request for the following prescriptions to be filled:    Medication requested :   Requested Prescriptions     Pending Prescriptions Disp Refills    omeprazole (PRILOSEC) 40 mg capsule 90 Cap 0     Sig: Take 1 Cap by mouth daily.     sertraline (ZOLOFT) 50 mg tablet 90 Tab 3     PCP: Luis Golden or Print: Negro  Mail order or Local pharmacy [address] [place] [address] Drive [phone]    Scheduled appointment if not seen by current providers in office: lov 2/1/2021 No follow up scheduled at this time

## 2021-03-02 NOTE — PROGRESS NOTES
I have personally seen and evaluated the device findings. Interrogation reviewed and I agree with assessment.     Angel Luis Connell

## 2021-04-21 ENCOUNTER — HOSPITAL ENCOUNTER (OUTPATIENT)
Dept: LAB | Age: 86
Discharge: HOME | End: 2021-04-21
Payer: MEDICARE

## 2021-04-21 LAB
25(OH)D3 SERPL-MCNC: 34.7 NG/ML (ref 30–100)
ALBUMIN SERPL-MCNC: 3.6 G/DL (ref 3.4–5)
ANION GAP SERPL CALC-SCNC: 4 MMOL/L (ref 3–18)
BUN SERPL-MCNC: 29 MG/DL (ref 7–18)
BUN/CREAT SERPL: 21 (ref 12–20)
CALCIUM SERPL-MCNC: 8.9 MG/DL (ref 8.5–10.1)
CALCIUM SERPL-MCNC: 9.3 MG/DL (ref 8.5–10.1)
CHLORIDE SERPL-SCNC: 107 MMOL/L (ref 100–111)
CO2 SERPL-SCNC: 29 MMOL/L (ref 21–32)
CREAT SERPL-MCNC: 1.39 MG/DL (ref 0.6–1.3)
CREAT UR-MCNC: 22 MG/DL (ref 30–125)
GLUCOSE SERPL-MCNC: 92 MG/DL (ref 74–99)
MICROALBUMIN UR-MCNC: 0.96 MG/DL (ref 0–3)
MICROALBUMIN/CREAT UR-RTO: 44 MG/G (ref 0–30)
PHOSPHATE SERPL-MCNC: 3.4 MG/DL (ref 2.5–4.9)
POTASSIUM SERPL-SCNC: 4.7 MMOL/L (ref 3.5–5.5)
PTH-INTACT SERPL-MCNC: 84.5 PG/ML (ref 18.4–88)
SODIUM SERPL-SCNC: 140 MMOL/L (ref 136–145)

## 2021-04-21 PROCEDURE — 80069 RENAL FUNCTION PANEL: CPT

## 2021-04-21 PROCEDURE — 36415 COLL VENOUS BLD VENIPUNCTURE: CPT

## 2021-04-21 PROCEDURE — 82043 UR ALBUMIN QUANTITATIVE: CPT

## 2021-04-21 PROCEDURE — 83970 ASSAY OF PARATHORMONE: CPT

## 2021-04-21 PROCEDURE — 82306 VITAMIN D 25 HYDROXY: CPT

## 2021-05-27 RX ORDER — FUROSEMIDE 20 MG/1
TABLET ORAL
Qty: 90 TABLET | Refills: 1 | Status: SHIPPED | OUTPATIENT
Start: 2021-05-27 | End: 2021-06-08 | Stop reason: ALTCHOICE

## 2021-06-01 RX ORDER — OMEPRAZOLE 40 MG/1
CAPSULE, DELAYED RELEASE ORAL
Qty: 90 CAPSULE | Refills: 0 | OUTPATIENT
Start: 2021-06-01

## 2021-06-01 RX ORDER — SERTRALINE HYDROCHLORIDE 50 MG/1
TABLET, FILM COATED ORAL
Qty: 90 TABLET | Refills: 0 | OUTPATIENT
Start: 2021-06-01

## 2021-06-02 ENCOUNTER — TELEPHONE (OUTPATIENT)
Dept: FAMILY MEDICINE CLINIC | Age: 86
End: 2021-06-02

## 2021-06-02 NOTE — TELEPHONE ENCOUNTER
Pt's daughter states that pt has been sleeping a lot. She has to be woke up to eat or drink and she is not going to the bathroom hardly at all . Daughter states that she is hearing things and hallucinating at night time after she takes out her hearing aids. Would like to know what can be done . Daughter is very worried. Please advise.

## 2021-06-03 NOTE — TELEPHONE ENCOUNTER
Spoke with patients daughter Ms Marilia Clemons and she states that patient voided x 3 yesterday. Daughter is more concerned that patient is sleeping more and she is having to wake her up to go the the bathroom and to get her to eat and drink. Patient is having increase confusion in the evenings as well as hearing some noise when going to bed. Daughter states that when she lays down with patient that she calms down. Patients daughter states she feels she over reacted yesterday. Patients daughter states she thinks she just needs some reassurance regarding patients changes. Daughter states that the patient and herself did not like Dr. Lynne Limon and are not going back. They did not want to see another neurologist at this time. I  reviewed Er precautions with daughter.  I have scheduled patient to be seen in the office on 06/08/2021 at 1 pm.

## 2021-06-03 NOTE — TELEPHONE ENCOUNTER
I have tried to call home number and it was not accepting blocked collar ID call. I called at cell number and left message for them as it was  [employer] answering machine. These symptoms could be from dementia. Contact neurology if any medication adjustment needed. Also if she is not passing urine need to take her to ER to see no Urine retention. Advised can call a seeing service and can speak with on call physician or call of katlin in am. I would advise for Nitza cassidy at this time take her to ER. Also give follow up appt to discuss this further .      Anatoliy Archer

## 2021-06-08 ENCOUNTER — OFFICE VISIT (OUTPATIENT)
Dept: FAMILY MEDICINE CLINIC | Age: 86
End: 2021-06-08
Payer: MEDICARE

## 2021-06-08 VITALS
HEART RATE: 62 BPM | RESPIRATION RATE: 16 BRPM | OXYGEN SATURATION: 98 % | HEIGHT: 62 IN | SYSTOLIC BLOOD PRESSURE: 140 MMHG | TEMPERATURE: 98.1 F | DIASTOLIC BLOOD PRESSURE: 70 MMHG | WEIGHT: 106 LBS | BODY MASS INDEX: 19.51 KG/M2

## 2021-06-08 DIAGNOSIS — R55 SYNCOPE, UNSPECIFIED SYNCOPE TYPE: ICD-10-CM

## 2021-06-08 DIAGNOSIS — R44.0 AUDITORY HALLUCINATION: ICD-10-CM

## 2021-06-08 DIAGNOSIS — I42.9 CARDIOMYOPATHY, UNSPECIFIED TYPE (HCC): ICD-10-CM

## 2021-06-08 DIAGNOSIS — R03.0 ELEVATED BLOOD PRESSURE READING: ICD-10-CM

## 2021-06-08 DIAGNOSIS — N28.9 RENAL INSUFFICIENCY: ICD-10-CM

## 2021-06-08 DIAGNOSIS — I73.9 PERIPHERAL VASCULAR DISEASE (HCC): ICD-10-CM

## 2021-06-08 DIAGNOSIS — R41.3 MEMORY CHANGES: Primary | ICD-10-CM

## 2021-06-08 DIAGNOSIS — F03.91 DEMENTIA WITH BEHAVIORAL DISTURBANCE, UNSPECIFIED DEMENTIA TYPE: ICD-10-CM

## 2021-06-08 PROCEDURE — 1090F PRES/ABSN URINE INCON ASSESS: CPT | Performed by: FAMILY MEDICINE

## 2021-06-08 PROCEDURE — 1100F PTFALLS ASSESS-DOCD GE2>/YR: CPT | Performed by: FAMILY MEDICINE

## 2021-06-08 PROCEDURE — 3288F FALL RISK ASSESSMENT DOCD: CPT | Performed by: FAMILY MEDICINE

## 2021-06-08 PROCEDURE — G9717 DOC PT DX DEP/BP F/U NT REQ: HCPCS | Performed by: FAMILY MEDICINE

## 2021-06-08 PROCEDURE — 99213 OFFICE O/P EST LOW 20 MIN: CPT | Performed by: FAMILY MEDICINE

## 2021-06-08 PROCEDURE — G8536 NO DOC ELDER MAL SCRN: HCPCS | Performed by: FAMILY MEDICINE

## 2021-06-08 PROCEDURE — G8427 DOCREV CUR MEDS BY ELIG CLIN: HCPCS | Performed by: FAMILY MEDICINE

## 2021-06-08 PROCEDURE — G8420 CALC BMI NORM PARAMETERS: HCPCS | Performed by: FAMILY MEDICINE

## 2021-06-08 RX ORDER — SERTRALINE HYDROCHLORIDE 25 MG/1
TABLET, FILM COATED ORAL
Qty: 30 TABLET | Refills: 1 | Status: SHIPPED | OUTPATIENT
Start: 2021-06-08 | End: 2021-08-03

## 2021-06-08 RX ORDER — OMEPRAZOLE 40 MG/1
40 CAPSULE, DELAYED RELEASE ORAL DAILY
Qty: 90 CAPSULE | Refills: 0 | Status: SHIPPED | OUTPATIENT
Start: 2021-06-08 | End: 2021-09-03

## 2021-06-08 NOTE — PROGRESS NOTES
Chief Complaint   Patient presents with    Memory Loss    Epilepsy    Other     CRI    Osteoporosis     \1. Have you been to the ER, urgent care clinic since your last visit? Hospitalized since your last visit? No    2. Have you seen or consulted any other health care providers outside of the [address] [place] [address] since your last visit? Include any pap smears or colon screening.  No

## 2021-06-08 NOTE — PATIENT INSTRUCTIONS
Dementia: Care Instructions Your Care Instructions Dementia is a loss of mental skills that affects your daily life. It is different than the occasional trouble with memory that is part of aging. You may find it hard to remember things that you feel you should be able to remember. Or you may feel that your mind is just not working as well as usual. 
Finding out that you have dementia is a shock. You may be afraid and worried about how the condition will change your life. Although there is no cure at this time, medicine may slow memory loss and improve thinking for a while. Other medicines may be able to help you sleep or cope with depression and behavior changes. Dementia often gets worse slowly. But it can get worse quickly. As dementia gets worse, it may become harder to do common things that take planning, like making a list and going shopping. Over time, the disease may make it hard for you to take care of yourself. Some people with dementia need others to help care for them. Dementia is different for everyone. You may be able to function well for a long time. In the early stage of the condition, you can do things at home to make life easier and safer. You also can keep doing your hobbies and other activities. Many people find comfort in planning now for their future needs. Follow-up care is a key part of your treatment and safety. Be sure to make and go to all appointments, and call your doctor if you are having problems. It's also a good idea to know your test results and keep a list of the medicines you take. How can you care for yourself at home? · Take your medicines exactly as prescribed. Call your doctor if you think you are having a problem with your medicine. · Eat healthy foods. Eat lots of whole grains, fruits, and vegetables every day. If you are not hungry, try snacks or nutritional drinks such as Boost, Ensure, or Sustacal. 
· If you have problems sleeping: ? Try not to nap too close to your bedtime. ? Exercise regularly. Walking is a good choice. ? Try a glass of warm milk or caffeine-free herbal tea before bed. · Do tasks and activities during the time of day when you feel your best. It may help to develop a daily routine. · Post labels, lists, and sticky notes to help you remember things. Write your activities on a calendar you can easily find. Put your clock where you can easily see it. · Stay active. Take walks in familiar places, or with friends or loved ones. Try to stay active mentally too. Read and work crossword puzzles if you enjoy these activities. · Do not drive unless you can pass an on-road driving test. If you are not sure if you are safe to drive, your state 's license bureau can test you. · Keep a cordless phone and a flashlight with new batteries by your bed. If possible, put a phone in each of the main rooms of your house, or carry a cell phone in case you fall and cannot reach a phone. Or, you can wear a device around your neck or wrist. You push a button that sends a signal for help. Acknowledge your emotions and plan for the future · Talk openly and honestly with your doctor. · Let yourself grieve. It is common to feel angry, scared, frustrated, anxious, or depressed. · Get emotional support from family, friends, a support group, or a counselor experienced in working with people who have dementia. · Ask for help if you need it. · Tell your doctor how you feel. You may feel upset, angry, or worried at times. Many things can cause this, including poor sleep, medicine side effects, confusion, and pain. Your doctor may be able to help you. · Plan for the future. ? Talk to your family and doctor about preparing a living will and other important papers while you can make decisions. These papers tell your doctors how to care for you at the end of your life. ? Consider naming a person to make decisions about your care if you are not able to.  
When should you call for help? Call 911 anytime you think you may need emergency care. For example, call if: 
  · You are lost and do not know whom to call.  
  · You are injured and do not know whom to call. Call your doctor now or seek immediate medical care if: 
  · You are more confused or upset than usual.  
  · You feel like you could hurt yourself because your mind is not working well. Watch closely for changes in your health, and be sure to contact your doctor if you have any problems. Where can you learn more? Go to http://www.gray.com/ Enter E538 in the search box to learn more about \"Dementia: Care Instructions. \" Current as of: September 23, 2020               Content Version: 12.8 © 2006-2021 [employer]. Care instructions adapted under license by [employer] (which disclaims liability or warranty for this information). If you have questions about a medical condition or this instruction, always ask your healthcare professional. [place] [address] any warranty or liability for your use of this information. Helping A Person With Dementia: Care Instructions Your Care Instructions Dementia is a loss of mental skills that affects daily life. It is different from mild memory loss that occurs with aging. Dementia can cause problems with memory, thinking clearly, and planning. It is different for everyone. But it usually gets worse slowly. Some people who have dementia can function well for a long time. But at some point it may become hard for the person to care for himself or herself. It can be upsetting to learn that a loved one has this condition. You may be afraid and worried about what will happen. You may wonder how you will care for the person. There is no cure for dementia. But medicine may be able to slow memory loss and improve thinking for a while.  Other medicines may help with sleep, depression, and behavior changes. Dementia is different for everyone. In some cases, people can function well for a long time. You can help your loved one by making his or her home life easier and safer. You also need to take care of yourself. Caregiving can be stressful. But support is available to help you and give you a break when you need it. The [employer] offers good information and support. If you are caring for someone with dementia, you can help make life safer and more comfortable. You can also help your loved one make decisions about future care. You may also want to bring up legal and financial issues. These are hard but important conversations to have. Follow-up care is a key part of your loved one's treatment and safety. Be sure to make and go to all appointments, and call your doctor if your loved one is having problems. It's also a good idea to know your loved one's test results and keep a list of the medicines he or she takes. How can you care for your loved one at home? Taking care of the person · If the person takes medicine for dementia, help him or her take it exactly as prescribed. Call the doctor if you notice any problems with the medicine. · Make a list of the person's medicines. Review it with all of his or her doctors. · Help the person eat a balanced diet. Serve plenty of whole grains, fruits, and vegetables every day. If the person is not hungry at mealtimes, give snacks at midmorning and in the afternoon. Offer drinks such as Boost, Ensure, or Sustacal if the person is losing weight. · Encourage exercise. Walking and other activities may slow the decline of mental ability. Help the person stay active mentally with reading, crossword puzzles, or other hobbies. · Talk openly with the doctor about any behavior changes. Many people who have dementia become easily upset or agitated or feel worried. There are many things that can cause this, such as medicine side effects, confusion, and pain. It may be helpful to: 
? Keep distractions to a minimum. It may also help to keep noise levels low and voices quiet. ? Develop simple daily routines for bathing, dressing, and other activities. And remind your loved one often about upcoming changes to the daily routine, such as trips or appointments. ? Ask what is upsetting him or her. Keep in mind that people who have dementia don't always know why they are upset. · Take steps to help if the person is sundowning. This is the restless behavior and trouble with sleeping that may occur in late afternoon and at night. Try not to let the person nap during the day. Offer a glass of warm milk or caffeine-free tea before bedtime. · Be patient. A task may take the person longer than it used to. · For as long as he or she is able, allow your loved one to make decisions about activities, food, clothing, and other choices. Let him or her be independent, even if tasks take more time or are not done perfectly. Tailor tasks to the person's abilities. For example, if cooking is no longer safe, ask for other help. Your loved one can help set the table, or make simple dishes such as a salad. When the person needs help, offer it gently. Staying safe · Make your home (or your loved one's home) safe. Tack down rugs, and put no-slip tape in the tub. Install handrails, and put safety switches on stoves and appliances. Keep rooms free of clutter. Make sure walkways around furniture are clear. Do not move furniture around, because the person may become confused. · Use locks on doors and cupboards. Lock up knives, scissors, medicines, cleaning supplies, and other dangerous things. · Do not let the person drive or cook if he or she can't do it safely. A person with dementia should not drive unless he or she is able to pass an on-road driving test. Your state 's license bureau can do a driving test if there is any question.  
· Get medical alert jewelry for the person so that you can be contacted if he or she wanders away. If possible, provide a safe place for wandering, such as an enclosed yard or garden. Taking care of yourself · Ask your doctor about support groups and other resources in your area. · Take care of your health. Be sure to eat healthy foods and get enough rest and exercise. · Take time for yourself. Respite services provide someone to stay with the person for a short time while you get out of the house for a few hours. · Make time for an activity that you enjoy. Read, listen to music, paint, do crafts, or play an instrument, even if it's only for a few minutes a day. · Spend time with family, friends, and others in your support system. When should you call for help? Call 911 anytime you think the person may need emergency care. For example, call if: 
  · The person who has dementia wanders away and you can't find him or her.  
  · The person who has dementia is seriously injured. Call the doctor now or seek immediate medical care if: 
  · The person suddenly sees things that are not there (hallucinates).  
  · The person has a sudden change in his or her behavior. Watch closely for changes in the person's health, and be sure to contact the doctor if: 
  · The person has symptoms that could cause injury.  
  · The person has problems with his or her medicine.  
  · You need more information to care for a person with dementia.  
  · You need respite care so you can take a break. Where can you learn more? Go to http://www.gray.com/ Enter X943 in the search box to learn more about \"Helping A Person With Dementia: Care Instructions. \" Current as of: September 23, 2020               Content Version: 12.8 © 1786-0279 [employer]. Care instructions adapted under license by [employer] (which disclaims liability or warranty for this information).  If you have questions about a medical condition or this instruction, always ask your healthcare professional. [address] any warranty or liability for your use of this information. Learning About Dementia What is dementia? We all forget things as we get older. Many older people have a slight loss of memory that does not affect their daily lives. But memory loss that gets worse may mean that you have dementia. Dementia is a loss of mental skills that affects your daily life. It can cause problems with memory, problem-solving, and learning. It also can cause problems with thinking and planning. Dementia usually gets worse over time. But how quickly it gets worse is different for each person. Some people stay the same for years. Others lose skills quickly. Your chances of having dementia rise as you get older. But this doesn't mean that everyone will get it. How is dementia diagnosed? To diagnose dementia, your doctor will: · Do a physical exam. 
· Ask questions about recent and past illnesses and life events. The doctor will want to talk to a close family member to check details. · Ask you to do some simple things that test your memory and other mental skills. Your doctor may ask you to tell what day and year it is, repeat a series of words, or draw a clock face. The doctor may do tests to look for a cause that can be treated. For example, you might have blood tests to check your thyroid or to look for an infection. You might also have a test that shows a picture of your brain, like an MRI or a CT scan. These tests can help your doctor find a tumor or brain injury. Knowing the type of dementia a person has can help the doctor prescribe medicines or other treatments. What are the symptoms? Usually the first symptom of dementia is memory loss. Often the person who has the memory problem doesn't notice it, but family and friends do. People who have dementia may have increasing trouble with: 
· Recalling recent events.  They may forget appointments or lose objects. · Recognizing people and places. · Keeping up with conversations and activity. · Finding their way around familiar places, or driving to and from places they know well. · Keeping up personal care such as grooming or bathing. · Planning and carrying out routine tasks. They may have trouble following a recipe or writing a letter or email. How is dementia treated? Medicines for dementia can slow it down for a while and make it easier to live with. Medicines can't cure it. But they may help improve mental function, mood, or behavior. If a stroke caused the dementia, doing things to reduce the chance of another stroke may help. They include eating healthy foods, being active, staying at a healthy weight, and not smoking. As dementia gets worse, a person may get depressed or angry and upset. An active social life, counseling, and sometimes medicine may help with changing emotions. The goals of ongoing treatment are to keep the person safely at home as long as possible and to provide support and guidance to the caregivers. The person will need routine follow-up visits. The doctor will monitor medicines and the person's level of functioning. Follow-up care is a key part of your treatment and safety. Be sure to make and go to all appointments, and call your doctor if you are having problems. It's also a good idea to know your test results and keep a list of the medicines you take. Where can you learn more? Go to http://www.gray.com/ Enter [id number] in the search box to learn more about \"Learning About Dementia. \" Current as of: September 23, 2020               Content Version: 12.8 © 0278-7832 [employer]. Care instructions adapted under license by [employer] (which disclaims liability or warranty for this information).  If you have questions about a medical condition or this instruction, always ask your healthcare professional. [address] any warranty or liability for your use of this information.

## 2021-06-15 RX ORDER — CARVEDILOL 12.5 MG/1
TABLET ORAL
Qty: 180 TABLET | Refills: 3 | Status: SHIPPED | OUTPATIENT
Start: 2021-06-15 | End: 2022-06-07

## 2021-08-26 ENCOUNTER — CLINICAL SUPPORT (OUTPATIENT)
Dept: CARDIOLOGY CLINIC | Age: 86
End: 2021-08-26

## 2021-08-26 ENCOUNTER — OFFICE VISIT (OUTPATIENT)
Dept: CARDIOLOGY CLINIC | Age: 86
End: 2021-08-26
Payer: MEDICARE

## 2021-08-26 VITALS
WEIGHT: 104 LBS | OXYGEN SATURATION: 99 % | HEIGHT: 62 IN | BODY MASS INDEX: 19.14 KG/M2 | DIASTOLIC BLOOD PRESSURE: 58 MMHG | SYSTOLIC BLOOD PRESSURE: 130 MMHG | HEART RATE: 64 BPM

## 2021-08-26 DIAGNOSIS — R55 SYNCOPE, UNSPECIFIED SYNCOPE TYPE: ICD-10-CM

## 2021-08-26 DIAGNOSIS — R55 SYNCOPE, UNSPECIFIED SYNCOPE TYPE: Primary | ICD-10-CM

## 2021-08-26 DIAGNOSIS — Z95.0 CARDIAC PACEMAKER IN SITU: Primary | ICD-10-CM

## 2021-08-26 PROCEDURE — 93000 ELECTROCARDIOGRAM COMPLETE: CPT | Performed by: INTERNAL MEDICINE

## 2021-08-26 PROCEDURE — G8420 CALC BMI NORM PARAMETERS: HCPCS | Performed by: INTERNAL MEDICINE

## 2021-08-26 PROCEDURE — G8536 NO DOC ELDER MAL SCRN: HCPCS | Performed by: INTERNAL MEDICINE

## 2021-08-26 PROCEDURE — G8427 DOCREV CUR MEDS BY ELIG CLIN: HCPCS | Performed by: INTERNAL MEDICINE

## 2021-08-26 PROCEDURE — 93279 PRGRMG DEV EVAL PM/LDLS PM: CPT | Performed by: INTERNAL MEDICINE

## 2021-08-26 PROCEDURE — 1100F PTFALLS ASSESS-DOCD GE2>/YR: CPT | Performed by: INTERNAL MEDICINE

## 2021-08-26 PROCEDURE — 3288F FALL RISK ASSESSMENT DOCD: CPT | Performed by: INTERNAL MEDICINE

## 2021-08-26 PROCEDURE — G9717 DOC PT DX DEP/BP F/U NT REQ: HCPCS | Performed by: INTERNAL MEDICINE

## 2021-08-26 PROCEDURE — 99214 OFFICE O/P EST MOD 30 MIN: CPT | Performed by: INTERNAL MEDICINE

## 2021-08-26 PROCEDURE — 1090F PRES/ABSN URINE INCON ASSESS: CPT | Performed by: INTERNAL MEDICINE

## 2021-08-26 NOTE — PROGRESS NOTES
Surekha Maravilla    follow-up for heart failure and leg pain    HPI    Surekha Maravilla is a 80 y.o. with no known cardiac disease who presented initially for evaluation of syncope. As you know, Gema Tripathi is such a pleasant patient with history hypertension, arthritis and falls, found to have cardiomyopathy and SSS. Gema Tripathi lives with her daughter. Back in Sept when her daughter was out of town she apparently had a syncopal event. Her sister was with her at the time and saw her somewhat slumped in her chair. She was slow to respond but came to. Gema Tripathi wasn't sure what happened and didn't seek medical care after that initial event. Again since then she had another episode. Her daughter said her mother looked unwell/ pale and said her stomach was upset. She again kind of slumped and her daughter caught her so she did not fall/ get injured. She believes she lost consciousness. She came to and felt somewhat fatigued briefly. She had no observed seizure like activity. She completely denies surrounding cardiac complaints with these episodes- specifically has never noticed palpations/ heart racing, no CP, or SOB. No swelling or headaches. She has no known heart problems and has never seen a heart doctor or had cardiac testing. Dom's work up showed globally reduced EF 26-30%. I called and spoke to her daughter- giving her the results and alerted her to the signs and symptoms to look out for. Apparently, she got off the phone and asked her mom how she was feeling and she admitted she had been short of breath for 2 days. They ended up going to the [facility] ER the next day and was in AECHF/ HFrEF with fluid overload. Just before discharge she ended up passing out (as she had mentioned very rarely happens)- and she was noted to have a 6 sec pause that correlated. She was appropriately given a pacemaker. Since then she did struggle with some episodes of syncope that may have been related to her underlying urinary tract infections.   Likely this has not been an issue as of recent and since I last saw me overall she has been doing okay. There has been some overall decline in her memory and her daughter says she is tired all the time and her naps seem longer. She is undergoing a neuro eval and starting meds for dementia. Also established with nephro, and dropped lasix to ~3 x/ week. Also aware her daughter had Manisha but pt herself didn't have obvious symptoms. Otherwise no CP, no LE edema. Past Medical History:   Diagnosis Date    Anxiety     Arthritis     Depression     GERD (gastroesophageal reflux disease)     HTN (hypertension)     LBBB (left bundle branch block)     Osteoporosis     completed 8 years Fosamax    Sciatica     Urinary incontinence        Past Surgical History:   Procedure Laterality Date    HX CHOLECYSTECTOMY      HX HYSTERECTOMY      HX ORTHOPAEDIC      fractured right patella surgery    MD ANESTH,SURGERY OF SHOULDER      MD CARDIAC SURG PROCEDURE UNLIST  11/2018    pacemaker     MD INS NEW/RPLC PRM PACEMAKER W/TRANSV ELTRD VENTR N/A 11/28/2018    INSERT PPM SINGLE VENTRICULAR performed by Chelsie Acosta MD at [facility] CATH LAB       Current Outpatient Medications   Medication Sig Dispense Refill    sertraline (ZOLOFT) 25 mg tablet TAKE 1 TABLET BY MOUTH DAILY 90 Tablet 1    carvediloL (COREG) 12.5 mg tablet TAKE 1 TABLET BY MOUTH TWICE DAILY WITH MEALS 180 Tablet 3    omeprazole (PRILOSEC) 40 mg capsule Take 1 Capsule by mouth daily. 90 Capsule 0    atorvastatin (LIPITOR) 10 mg tablet TAKE 1 TABLET BY MOUTH EVERY NIGHT 90 Tab 3    cranberry extract (CRANBERRY JUICE POWDER) 425 mg cap Take 1 Cap by mouth daily.  aspirin 81 mg chewable tablet Take 1 Tab by mouth daily. 30 Tab 11    fluticasone (FLONASE) 50 mcg/actuation nasal spray 1 [place] by Both Nostrils route as needed.  loratadine (CLARITIN) 10 mg tablet Take 10 mg by mouth daily as needed for Allergies.       MULTI-VITAMIN PO Take 1 Tab by mouth daily. No Known Allergies    Social History     Socioeconomic History    Marital status:      Spouse name: Not on file    Number of children: Not on file    Years of education: Not on file    Highest education level: Not on file   Occupational History    Not on file   Tobacco Use    Smoking status: Never Smoker    Smokeless tobacco: Never Used   Substance and Sexual Activity    Alcohol use: No    Drug use: No    Sexual activity: Not on file   Other Topics Concern    Not on file   Social History Narrative    Not on file     Social Determinants of Health     Financial Resource Strain:     Difficulty of Paying Living Expenses:    Food Insecurity:     Worried About Running Out of Food in the Last Year:     [address] in the Last Year:    Transportation Needs:     Lack of Transportation (Medical):  Lack of Transportation (Non-Medical):    Physical Activity:     Days of Exercise per Week:     Minutes of Exercise per Session:    Stress:     Feeling of Stress :    Social Connections:     Frequency of Communication with Friends and Family:     Frequency of Social Gatherings with Friends and Family:     Attends Religion Services:     Active Member of Clubs or Organizations:     Attends Club or Organization Meetings:     Marital Status:    Intimate Partner Violence:     Fear of Current or Ex-Partner:     Emotionally Abused:     Physically Abused:     Sexually Abused: The patient has a family history of heart problems, but no genetic cardiomyopathies or SCD. Review of Systems    14 pt Review of Systems is negative unless otherwise mentioned in the HPI.     Wt Readings from Last 3 Encounters:   08/26/21 47.2 kg (104 lb)   06/08/21 48.1 kg (106 lb)   02/24/21 48.5 kg (107 lb)     Temp Readings from Last 3 Encounters:   06/08/21 98.1 °F (36.7 °C) (Oral)   01/13/21 (!) 95.7 °F (35.4 °C) (Temporal)   11/16/20 98 °F (36.7 °C) (Oral)     BP Readings from Last 3 Encounters:   08/26/21 (!) 130/58   06/08/21 (!) 140/70   02/24/21 132/72     Pulse Readings from Last 3 Encounters:   08/26/21 64   06/08/21 62   02/24/21 68     Physical Exam:    Visit Vitals  BP (!) 130/58 (BP 1 Location: Right upper arm, BP Patient Position: Sitting, BP Cuff Size: Adult)   Pulse 64   Ht 5' 2\" (1.575 m)   Wt 47.2 kg (104 lb) Comment: pt weighs 104lb at home. 8/26/11 it was 98lb. LMP  (LMP Unknown)   SpO2 99%   BMI 19.02 kg/m²      Physical Exam  Constitutional:       General: She is not in acute distress. Appearance: She is well-developed. She is not diaphoretic. HENT:      Head: Normocephalic and atraumatic. Eyes:      Pupils: Pupils are equal, round, and reactive to light. Neck:      Vascular: No JVD. Cardiovascular:      Rate and Rhythm: Normal rate and regular rhythm. Heart sounds: Normal heart sounds. No murmur heard. No friction rub. No gallop. Pulmonary:      Effort: Pulmonary effort is normal. No respiratory distress. Breath sounds: Normal breath sounds. No wheezing or rales. Chest:      Chest wall: No tenderness. Abdominal:      General: Bowel sounds are normal.      Palpations: Abdomen is soft. Musculoskeletal:         General: No tenderness. Skin:     General: Skin is warm and dry. Neurological:      Mental Status: She is alert and oriented to person, place, and time. Lab Results   Component Value Date/Time    Sodium 140 04/21/2021 01:19 PM    Potassium 4.7 04/21/2021 01:19 PM    Chloride 107 04/21/2021 01:19 PM    CO2 29 04/21/2021 01:19 PM    Anion gap 4 04/21/2021 01:19 PM    Glucose 92 04/21/2021 01:19 PM    BUN 29 (H) 04/21/2021 01:19 PM    Creatinine 1.39 (H) 04/21/2021 01:19 PM    BUN/Creatinine ratio 21 (H) 04/21/2021 01:19 PM    GFR est AA 43 (L) 04/21/2021 01:19 PM    GFR est non-AA 35 (L) 04/21/2021 01:19 PM    Calcium 9.3 04/21/2021 01:21 PM    Bilirubin, total 0.3 11/25/2020 08:59 AM    Alk.  phosphatase 73 11/25/2020 08:59 AM Protein, total 7.1 11/25/2020 08:59 AM    Albumin 3.6 04/21/2021 01:19 PM    Globulin 3.6 11/25/2020 08:59 AM    A-G Ratio 1.0 11/25/2020 08:59 AM    ALT (SGPT) 15 11/25/2020 08:59 AM    AST (SGOT) 16 11/25/2020 08:59 AM     Lab Results   Component Value Date/Time    WBC 8.1 06/26/2020 12:15 PM    WBC 5.4 05/17/2012 08:41 AM    HGB 11.9 06/26/2020 12:15 PM    HCT 39.4 06/26/2020 12:15 PM    PLATELET 020 58/02/7052 12:15 PM    MCV 96 06/26/2020 12:15 PM         EKG Last: LBBB/ paced, no concerning ST or T wave changes    Impression and Plan:  Omero Lynch is a 80 y.o. with:    HFrEF, s/p AECHF, now euvolemic/ stable (very likely ischemic but presumed)  Symptomatic SSS s/p PPM, known  Dyslipidemia, known  Advanced age/ frailty  Mental status decline, with h/o delirium on dementia  CKD stage IV ([place])  Recent covid exposure with daughter in 1/2020, asymptomatic    Nephro, Neuro, and new PCP Lucio Howard) notes obtained and reviewed  Continue current med regimen and agree with least amt of Lasix needed due to cardiorenal syndrome  Device check WNL today and reassurance given  RTC 6 months with device check    Thank you for allowing me to participate in the care of your patient, please do not hesitate to call with questions or concerns. Follow-up and Dispositions    · Return in about 6 months (around 2/26/2022).      Kindest regards,    Kim Cochran, DO

## 2021-08-27 NOTE — PROGRESS NOTES
I have personally seen and evaluated the device findings. Interrogation reviewed and I agree with assessment.     Ama Marsh

## 2021-10-07 ENCOUNTER — HOSPITAL ENCOUNTER (OUTPATIENT)
Dept: LAB | Age: 86
Discharge: HOME | End: 2021-10-07

## 2021-10-07 LAB — XX-LABCORP SPECIMEN COL,LCBCF: NORMAL

## 2021-10-07 PROCEDURE — 99001 SPECIMEN HANDLING PT-LAB: CPT

## 2021-10-18 ENCOUNTER — TELEPHONE (OUTPATIENT)
Dept: FAMILY MEDICINE CLINIC | Age: 86
End: 2021-10-18

## 2021-10-18 NOTE — TELEPHONE ENCOUNTER
Called and left message for Clyde Ro to advise if Pfizer and after 6 months from l st vaccine that she needs booster. Any additional questions to please call office.

## 2021-12-28 ENCOUNTER — OFFICE VISIT (OUTPATIENT)
Dept: FAMILY MEDICINE CLINIC | Age: 86
End: 2021-12-28
Payer: MEDICARE

## 2021-12-28 ENCOUNTER — HOSPITAL ENCOUNTER (OUTPATIENT)
Dept: GENERAL RADIOLOGY | Age: 86
Discharge: HOME | End: 2021-12-28
Payer: MEDICARE

## 2021-12-28 VITALS
BODY MASS INDEX: 19.58 KG/M2 | SYSTOLIC BLOOD PRESSURE: 134 MMHG | TEMPERATURE: 97.4 F | HEIGHT: 62 IN | DIASTOLIC BLOOD PRESSURE: 60 MMHG | OXYGEN SATURATION: 99 % | WEIGHT: 106.4 LBS | RESPIRATION RATE: 16 BRPM | HEART RATE: 63 BPM

## 2021-12-28 DIAGNOSIS — R06.02 SOB (SHORTNESS OF BREATH) ON EXERTION: ICD-10-CM

## 2021-12-28 DIAGNOSIS — I10 PRIMARY HYPERTENSION: Primary | ICD-10-CM

## 2021-12-28 DIAGNOSIS — Z00.00 MEDICARE ANNUAL WELLNESS VISIT, SUBSEQUENT: ICD-10-CM

## 2021-12-28 DIAGNOSIS — R41.3 MEMORY CHANGES: ICD-10-CM

## 2021-12-28 DIAGNOSIS — N18.32 CHRONIC RENAL IMPAIRMENT, STAGE 3B (HCC): ICD-10-CM

## 2021-12-28 DIAGNOSIS — F32.89 OTHER DEPRESSION: ICD-10-CM

## 2021-12-28 DIAGNOSIS — R23.8 OTHER SKIN CHANGES: ICD-10-CM

## 2021-12-28 DIAGNOSIS — I50.9 CONGESTIVE HEART FAILURE, UNSPECIFIED HF CHRONICITY, UNSPECIFIED HEART FAILURE TYPE (HCC): ICD-10-CM

## 2021-12-28 DIAGNOSIS — K21.9 GASTROESOPHAGEAL REFLUX DISEASE WITHOUT ESOPHAGITIS: ICD-10-CM

## 2021-12-28 DIAGNOSIS — F41.9 ANXIETY: ICD-10-CM

## 2021-12-28 DIAGNOSIS — Z87.898 H/O SYNCOPE: ICD-10-CM

## 2021-12-28 DIAGNOSIS — Z95.0 PRESENCE OF CARDIAC PACEMAKER: ICD-10-CM

## 2021-12-28 DIAGNOSIS — R14.2 BURPING: ICD-10-CM

## 2021-12-28 PROCEDURE — G8420 CALC BMI NORM PARAMETERS: HCPCS | Performed by: FAMILY MEDICINE

## 2021-12-28 PROCEDURE — G0439 PPPS, SUBSEQ VISIT: HCPCS | Performed by: FAMILY MEDICINE

## 2021-12-28 PROCEDURE — 99214 OFFICE O/P EST MOD 30 MIN: CPT | Performed by: FAMILY MEDICINE

## 2021-12-28 PROCEDURE — 1090F PRES/ABSN URINE INCON ASSESS: CPT | Performed by: FAMILY MEDICINE

## 2021-12-28 PROCEDURE — G8536 NO DOC ELDER MAL SCRN: HCPCS | Performed by: FAMILY MEDICINE

## 2021-12-28 PROCEDURE — 1101F PT FALLS ASSESS-DOCD LE1/YR: CPT | Performed by: FAMILY MEDICINE

## 2021-12-28 PROCEDURE — G9717 DOC PT DX DEP/BP F/U NT REQ: HCPCS | Performed by: FAMILY MEDICINE

## 2021-12-28 PROCEDURE — G8427 DOCREV CUR MEDS BY ELIG CLIN: HCPCS | Performed by: FAMILY MEDICINE

## 2021-12-28 PROCEDURE — 71046 X-RAY EXAM CHEST 2 VIEWS: CPT

## 2021-12-28 RX ORDER — FUROSEMIDE 20 MG/1
1 TABLET ORAL
COMMUNITY
Start: 2020-11-30 | End: 2022-05-17 | Stop reason: SDUPTHER

## 2021-12-28 NOTE — PROGRESS NOTES
1. Have you been to the ER, urgent care clinic since your last visit? Hospitalized since your last visit? No    2. Have you seen or consulted any other health care providers outside of the [address] [place] [address] since your last visit? Include any pap smears or colon screening. Dr. Av Delgado 10/14/21 Nephrology.     Chief Complaint   Patient presents with    Dementia    Dizziness    Hallucinations     auditory hallucination    Other     memory changes

## 2021-12-28 NOTE — PROGRESS NOTES
This is the Subsequent Medicare Annual Wellness Exam, performed 12 months or more after the Initial AWV or the last Subsequent AWV    I have reviewed the patient's medical history in detail and updated the computerized patient record. Assessment/Plan   Education and counseling provided:  Are appropriate based on today's review and evaluation   Discussed advance directive. See ACP note from today. 1. Primary hypertension  2. Gastroesophageal reflux disease without esophagitis  3. Anxiety  4. Other depression  5. Memory changes  Comments:  also needed to r/o seizure. ordered EEG. not done   6. Presence of cardiac pacemaker  7. H/O syncope  8. Chronic renal impairment, stage 3b (HCC)  9. Congestive heart failure, unspecified HF chronicity, unspecified heart failure type ([facility] [address]  -     XR CHEST PA LAT; Future  10. Other skin changes  Comments:  seborric dermatitis   Orders:  -     REFERRAL TO DERMATOLOGY  11. SOB (shortness of breath) on exertion  -     XR CHEST PA LAT; Future  12. Burping       Depression Risk Factor Screening   No flowsheet data found. Alcohol & Drug Abuse Risk Screen    Do you average more than 1 drink per night or more than 7 drinks a week:  No    On any one occasion in the past three months have you have had more than 3 drinks containing alcohol:  No          Functional Ability and Level of Safety    Hearing: Hearing is  fair. has hearing aid       Activities of Daily Living: The home contains: handrails and grab bars  Patient needs help with:  transportation, shopping, preparing meals, laundry, housework, managing medications and managing money      Ambulation: with no difficulty and use walker      Fall Risk:  Fall Risk Assessment, last 12 mths 12/28/2021   Able to walk? Yes   Fall in past 12 months? 1   Do you feel unsteady? 1   Are you worried about falling 1   Is the gait abnormal? -   Number of falls in past 12 months 2   Fall with injury?  0      Abuse Screen:  Patient is not abused       Cognitive Screening    Has your family/caregiver stated any concerns about your memory: yes - h/o dementia          Health Maintenance Due     Health Maintenance Due   Topic Date Due    Shingrix Vaccine Age 49> (1 of 2) Never done    Lipid Screen  11/25/2021     Will order lipid panel with next labs. Patient Care Team   Patient Care Team:  Patria Galan MD as PCP - General (Family Medicine)  Patria Galan MD as PCP - [facility] Empaneled Provider  Guevara Tate OD (Ophthalmology)  Charanjit Bee MD (Neurology)    History     Patient Active Problem List   Diagnosis Code    Osteoporosis M81.0    Anxiety F41.9    Depression F32. A    Reflux esophagitis K21.00    Generalized osteoarthrosis, involving multiple sites M15.9    Z[place] B02.9    Distal radius fracture, left S52.502A    Sprain and strain of shoulder and upper arm [id number]    Vertigo, benign paroxysmal H81.10    Memory loss R41.3    S/P ORIF (open reduction internal fixation) fracture Z98.890, Z87.81    Primary hypertension I10    Advance directive discussed with patient Z70.80    Gastroesophageal reflux disease without esophagitis K21.9    Dysphagia R13.10    Seasonal allergic rhinitis due to pollen J30.1    Primary osteoarthritis of both knees M17.0    Syncope R55    Acute UTI N39.0    Functional urinary incontinence R39.81    Pulmonary edema J81.1    Dyspnea on exertion R06.00    Other fatigue R53.83    Peripheral vascular disease (HCC) I73.9     Past Medical History:   Diagnosis Date    Anxiety     Arthritis     Depression     GERD (gastroesophageal reflux disease)     HTN (hypertension)     LBBB (left bundle branch block)     Osteoporosis     completed 8 years Fosamax    Sciatica     Urinary incontinence       Past Surgical History:   Procedure Laterality Date    HX CHOLECYSTECTOMY      HX HYSTERECTOMY      HX ORTHOPAEDIC      fractured right patella surgery    FL ANESTH,SURGERY OF SHOULDER  IN CARDIAC SURG PROCEDURE UNLIST  11/2018    pacemaker     IN INS NEW/RPLC PRM PACEMAKER W/TRANSV ELTRD VENTR N/A 11/28/2018    INSERT PPM SINGLE VENTRICULAR performed by Isma Johnson MD at [facility] CATH LAB     Current Outpatient Medications   Medication Sig Dispense Refill    furosemide (LASIX) 20 mg tablet Take 1 Tablet by mouth Three (3) times a week.  omeprazole (PRILOSEC) 40 mg capsule TAKE 1 CAPSULE BY MOUTH DAILY 90 Capsule 1    sertraline (ZOLOFT) 25 mg tablet TAKE 1 TABLET BY MOUTH DAILY 90 Tablet 1    carvediloL (COREG) 12.5 mg tablet TAKE 1 TABLET BY MOUTH TWICE DAILY WITH MEALS 180 Tablet 3    atorvastatin (LIPITOR) 10 mg tablet TAKE 1 TABLET BY MOUTH EVERY NIGHT 90 Tab 3    cranberry extract (CRANBERRY JUICE POWDER) 425 mg cap Take 1 Cap by mouth daily.  aspirin 81 mg chewable tablet Take 1 Tab by mouth daily. 30 Tab 11    fluticasone (FLONASE) 50 mcg/actuation nasal spray 1 [place] by Both Nostrils route as needed.  loratadine (CLARITIN) 10 mg tablet Take 10 mg by mouth daily as needed for Allergies.  MULTI-VITAMIN PO Take 1 Tab by mouth daily.        No Known Allergies    Family History   Problem Relation Age of Onset    Heart Disease Mother     Diabetes Mother     Heart Disease Father     Diabetes Father     Hypertension Sister     Diabetes Sister     Heart Disease Brother     Heart Disease Sister     Diabetes Sister     Heart Disease Brother      Social History     Tobacco Use    Smoking status: Never Smoker    Smokeless tobacco: Never Used   Substance Use Topics    Alcohol use: No         Dante Arroyo MD

## 2021-12-28 NOTE — PATIENT INSTRUCTIONS
Medicare Wellness Visit, Female     The best way to live healthy is to have a lifestyle where you eat a well-balanced diet, exercise regularly, limit alcohol use, and quit all forms of tobacco/nicotine, if applicable. Regular preventive services are another way to keep healthy. Preventive services (vaccines, screening tests, monitoring & exams) can help personalize your care plan, which helps you manage your own care. Screening tests can find health problems at the earliest stages, when they are easiest to treat. [facility] follows the current, evidence-based guidelines published by the [facility]adeep Sanabria ([facility]) when recommending preventive services for our patients. Because we follow these guidelines, sometimes recommendations change over time as research supports it. (For example, mammograms used to be recommended annually. Even though Medicare will still pay for an annual mammogram, the newer guidelines recommend a mammogram every two years for women of average risk). Of course, you and your doctor may decide to screen more often for some diseases, based on your risk and your co-morbidities (chronic disease you are already diagnosed with). Preventive services for you include:  - Medicare offers their members a free annual wellness visit, which is time for you and your primary care provider to discuss and plan for your preventive service needs. Take advantage of this benefit every year!  -All adults over the age of 72 should receive the recommended pneumonia vaccines. Current USPSTF guidelines recommend a series of two vaccines for the best pneumonia protection.   -All adults should have a flu vaccine yearly and a tetanus vaccine every 10 years.   -All adults age 48 and older should receive the shingles vaccines (series of two vaccines).       -All adults age 38-68 who are overweight should have a diabetes screening test once every three years.   -All adults born between [birth year] and 1965 should be screened once for Hepatitis C.  -Other screening tests and preventive services for persons with diabetes include: an eye exam to screen for diabetic retinopathy, a kidney function test, a foot exam, and stricter control over your cholesterol.   -Cardiovascular screening for adults with routine risk involves an electrocardiogram (ECG) at intervals determined by your doctor.   -Colorectal cancer screenings should be done for adults age 54-65 with no increased risk factors for colorectal cancer. There are a number of acceptable methods of screening for this type of cancer. Each test has its own benefits and drawbacks. Discuss with your doctor what is most appropriate for you during your annual wellness visit. The different tests include: colonoscopy (considered the best screening method), a fecal occult blood test, a fecal DNA test, and sigmoidoscopy.    -A bone mass density test is recommended when a woman turns 65 to screen for osteoporosis. This test is only recommended one time, as a screening. Some providers will use this same test as a disease monitoring tool if you already have osteoporosis. -Breast cancer screenings are recommended every other year for women of normal risk, age 54-69.  -Cervical cancer screenings for women over age 72 are only recommended with certain risk factors. Here is a list of your current Health Maintenance items (your personalized list of preventive services) with a due date:  Health Maintenance Due   Topic Date Due    Shingles Vaccine (1 of 2) Never done    Cholesterol Test   11/25/2021         Medicare Wellness Visit, Female     The best way to live healthy is to have a lifestyle where you eat a well-balanced diet, exercise regularly, limit alcohol use, and quit all forms of tobacco/nicotine, if applicable. Regular preventive services are another way to keep healthy.  Preventive services (vaccines, screening tests, monitoring & exams) can help personalize your care plan, which helps you manage your own care. Screening tests can find health problems at the earliest stages, when they are easiest to treat. [facility] follows the current, evidence-based guidelines published by the [facility]adeep Sanabria ([facility]) when recommending preventive services for our patients. Because we follow these guidelines, sometimes recommendations change over time as research supports it. (For example, mammograms used to be recommended annually. Even though Medicare will still pay for an annual mammogram, the newer guidelines recommend a mammogram every two years for women of average risk). Of course, you and your doctor may decide to screen more often for some diseases, based on your risk and your co-morbidities (chronic disease you are already diagnosed with). Preventive services for you include:  - Medicare offers their members a free annual wellness visit, which is time for you and your primary care provider to discuss and plan for your preventive service needs. Take advantage of this benefit every year!  -All adults over the age of 72 should receive the recommended pneumonia vaccines. Current USPSTF guidelines recommend a series of two vaccines for the best pneumonia protection.   -All adults should have a flu vaccine yearly and a tetanus vaccine every 10 years.   -All adults age 48 and older should receive the shingles vaccines (series of two vaccines).       -All adults age 38-68 who are overweight should have a diabetes screening test once every three years.   -All adults born between 80 and 1965 should be screened once for Hepatitis C.  -Other screening tests and preventive services for persons with diabetes include: an eye exam to screen for diabetic retinopathy, a kidney function test, a foot exam, and stricter control over your cholesterol.   -Cardiovascular screening for adults with routine risk involves an electrocardiogram (ECG) at intervals determined by your doctor.   -Colorectal cancer screenings should be done for adults age 54-65 with no increased risk factors for colorectal cancer. There are a number of acceptable methods of screening for this type of cancer. Each test has its own benefits and drawbacks. Discuss with your doctor what is most appropriate for you during your annual wellness visit. The different tests include: colonoscopy (considered the best screening method), a fecal occult blood test, a fecal DNA test, and sigmoidoscopy.    -A bone mass density test is recommended when a woman turns 65 to screen for osteoporosis. This test is only recommended one time, as a screening. Some providers will use this same test as a disease monitoring tool if you already have osteoporosis. -Breast cancer screenings are recommended every other year for women of normal risk, age 54-69.  -Cervical cancer screenings for women over age 72 are only recommended with certain risk factors. Here is a list of your current Health Maintenance items (your personalized list of preventive services) with a due date:  Health Maintenance Due   Topic Date Due    Shingles Vaccine (1 of 2) Never done    Cholesterol Test   11/25/2021            Heart Failure: Care Instructions  Your Care Instructions     Heart failure occurs when your heart does not pump as much blood as the body needs. Failure does not mean that the heart has stopped pumping but rather that it is not pumping as well as it should. Over time, this causes fluid buildup in your lungs and other parts of your body. Fluid buildup can cause shortness of breath, fatigue, swollen ankles, and other problems. By taking medicines regularly, reducing sodium (salt) in your diet, checking your weight every day, and making lifestyle changes, you can feel better and live longer. Follow-up care is a key part of your treatment and safety.  Be sure to make and go to all appointments, and call your doctor if you are having problems. It's also a good idea to know your test results and keep a list of the medicines you take. How can you care for yourself at home? Medicines    · Be safe with medicines. Take your medicines exactly as prescribed. Call your doctor if you think you are having a problem with your medicine.     · Do not take any vitamins, over-the-counter medicine, or herbal products without talking to your doctor first. Chris Lennox not take ibuprofen (Advil or Motrin) and naproxen (Aleve) without talking to your doctor first. They could make your heart failure worse.     · You may take some of the following medicine. ? Angiotensin-converting enzyme inhibitors (ACEIs) or angiotensin II receptor blockers (ARBs) reduce the heart's workload, lower blood pressure, and reduce swelling. Taking an ACEI or ARB may lower your chance of needing to be hospitalized. ? Beta-blockers can slow heart rate, decrease blood pressure, and improve your condition. Taking a beta-blocker may lower your chance of needing to be hospitalized. ? Diuretics, also called water pills, reduce swelling. You will get more details on the specific medicines your doctor prescribes. Diet    · Your doctor may suggest that you limit sodium. Your doctor can tell you how much sodium is right for you. An example is less than 3,000 mg a day. This includes all the salt you eat in cooking or in packaged foods. People get most of their sodium from processed foods. Fast food and restaurant meals also tend to be very high in sodium.     · Ask your doctor how much liquid you can drink each day. You may have to limit liquids. Weight    · Weigh yourself without clothing at the same time each day. Record your weight. Call your doctor if you have a sudden weight gain, such as more than 2 to 3 pounds in a day or 5 pounds in a week.  (Your doctor may suggest a different range of weight gain.) A sudden weight gain may mean that your heart failure is getting worse. Activity level    · Start light exercise (if your doctor says it is okay). Even if you can only do a small amount, exercise will help you get stronger, have more energy, and manage your weight and your stress. Walking is an easy way to get exercise. Start out by walking a little more than you did before. Bit by bit, increase the amount you walk.     · When you exercise, watch for signs that your heart is working too hard. You are pushing yourself too hard if you cannot talk while you are exercising. If you become short of breath or dizzy or have chest pain, stop, sit down, and rest.     · If you feel \"wiped out\" the day after you exercise, walk slower or for a shorter distance until you can work up to a better pace.     · Get enough rest at night. Sleeping with 1 or 2 pillows under your upper body and head may help you breathe easier. Lifestyle changes    · Do not smoke. Smoking can make a heart condition worse. If you need help quitting, talk to your doctor about stop-smoking programs and medicines. These can increase your chances of quitting for good. Quitting smoking may be the most important step you can take to protect your heart.     · Limit alcohol to 2 drinks a day for men and 1 drink a day for women. Too much alcohol can cause health problems.     · Avoid getting sick from colds and the flu. Get a pneumococcal vaccine shot. If you have had one before, ask your doctor whether you need another dose. Get a flu shot each year. If you must be around people with colds or the flu, wash your hands often. When should you call for help? Call 911  if you have symptoms of sudden heart failure such as:    · You have severe trouble breathing.     · You cough up pink, foamy mucus.     · You have a new irregular or rapid heartbeat.    Call your doctor now or seek immediate medical care if:    · You have new or increased shortness of breath.     · You are dizzy or lightheaded, or you feel like you may faint.     · You have sudden weight gain, such as more than 2 to 3 pounds in a day or 5 pounds in a week. (Your doctor may suggest a different range of weight gain.)     · You have increased swelling in your legs, ankles, or feet.     · You are suddenly so tired or weak that you cannot do your usual activities. Watch closely for changes in your health, and be sure to contact your doctor if you develop new symptoms. Where can you learn more? Go to http://www.gray.com/  Enter M173 in the search box to learn more about \"Heart Failure: Care Instructions. \"  Current as of: April 29, 2021               Content Version: 13.0  © 2006-2021 [employer]. Care instructions adapted under license by [employer] (which disclaims liability or warranty for this information). If you have questions about a medical condition or this instruction, always ask your healthcare professional. [place] [address] any warranty or liability for your use of this information.

## 2021-12-28 NOTE — PROGRESS NOTES
HISTORY OF PRESENT ILLNESS  Hussain Saab is a 80 y.o. female. HPI: Here with her daughter. History of hypertension. Vitals been stable. Taking medication with compliance and no side effects. During visit sitting comfortable on wheelchair. Not appear in any acute distress  Anxiety and depression fairly stable. Recent diagnosis of dementia. On medication. Following neurology. Some on auditory hallucination which could be from hearing difficulties. Reviewed neurology notes. There was a concern of a questionable seizure activity at that she had a syncopal episode and feeling of nausea and bad smell before she gets syncopal episode. Symptoms have not reoccurred lately. Also per daughter decided to hold off on EEG for further evaluation. She had a pacemaker for recurrent syncope. Following cardiology. Low EF. Since 1month according to daughter she is observing shortness of breath on exertion. Currently no other signs of volume overload. No cough cold or wheezing. No leg swelling. Chronic renal insufficiency. Minimal use of diuretics. At this time I have advised daughter to contact the cardiology office and see if she needs to get seen sooner. No anginal symptoms. No diaphoresis. Able to talk in full sentences. Also concerned with the skin bump behind the right knee. According the past as well and it fell off on its own. Not seen by dermatology at that time. At this time it looks like seborrheic dermatitis but because of blackish discoloration will do the referral to the dermatology again. Has on and off bleeding but no bleeding at this time. No tenderness. Visit Vitals  /60 (BP 1 Location: Left upper arm, BP Patient Position: Sitting, BP Cuff Size: Adult)   Pulse 63   Temp 97.4 °F (36.3 °C) (Temporal)   Resp 16   Ht 5' 2\" (1.575 m)   Wt 106 lb 6.4 oz (48.3 kg)   SpO2 99%   BMI 19.46 kg/m²     Review medication list, vitals, problem list,allergies.    Lab Results   Component Value Date/Time    WBC 8.1 06/26/2020 12:15 PM    WBC 5.4 05/17/2012 08:41 AM    HGB 11.9 06/26/2020 12:15 PM    HCT 39.4 06/26/2020 12:15 PM    PLATELET 532 07/95/7822 12:15 PM    MCV 96 06/26/2020 12:15 PM     Lab Results   Component Value Date/Time    Sodium 140 04/21/2021 01:19 PM    Potassium 4.7 04/21/2021 01:19 PM    Chloride 107 04/21/2021 01:19 PM    CO2 29 04/21/2021 01:19 PM    Anion gap 4 04/21/2021 01:19 PM    Glucose 92 04/21/2021 01:19 PM    BUN 29 (H) 04/21/2021 01:19 PM    Creatinine 1.39 (H) 04/21/2021 01:19 PM    BUN/Creatinine ratio 21 (H) 04/21/2021 01:19 PM    GFR est AA 43 (L) 04/21/2021 01:19 PM    GFR est non-AA 35 (L) 04/21/2021 01:19 PM    Calcium 9.3 04/21/2021 01:21 PM    Bilirubin, total 0.3 11/25/2020 08:59 AM    Alk. phosphatase 73 11/25/2020 08:59 AM    Protein, total 7.1 11/25/2020 08:59 AM    Albumin 3.6 04/21/2021 01:19 PM    Globulin 3.6 11/25/2020 08:59 AM    A-G Ratio 1.0 11/25/2020 08:59 AM    ALT (SGPT) 15 11/25/2020 08:59 AM    AST (SGOT) 16 11/25/2020 08:59 AM     Lab Results   Component Value Date/Time    Cholesterol, total 183 11/25/2020 08:59 AM    HDL Cholesterol 63 (H) 11/25/2020 08:59 AM    LDL, calculated 96.8 11/25/2020 08:59 AM    VLDL, calculated 23.2 11/25/2020 08:59 AM    Triglyceride 116 11/25/2020 08:59 AM    CHOL/HDL Ratio 2.9 11/25/2020 08:59 AM     Lab Results   Component Value Date/Time    TSH 3.51 06/26/2020 12:15 PM       Lab Results   Component Value Date/Time    Vitamin D 25-Hydroxy 34.7 04/21/2021 01:20 PM         ROS: See HPI    Physical Exam  Constitutional:       General: She is not in acute distress. Appearance: She is not ill-appearing. Cardiovascular:      Rate and Rhythm: Normal rate and regular rhythm. Heart sounds: Normal heart sounds. Abdominal:      General: Bowel sounds are normal.      Palpations: Abdomen is soft. Tenderness: There is no abdominal tenderness. Musculoskeletal:         General: No swelling.       Cervical back: Neck supple. Skin:     Comments: Skin bump behind the right knee. Look like seborrheic dermatitis. Blackish discoloration. Nontender. No itching. Neurological:      General: No focal deficit present. Mental Status: She is alert. Psychiatric:         Behavior: Behavior normal.         ASSESSMENT and PLAN    ICD-10-CM ICD-9-CM    1. Primary hypertension:well controlled. Continue current dose of medication and low salt diet. Exercise as tolerated. . I10 401.9    2. Gastroesophageal reflux disease without esophagitis: Has on and off for burp, not related to food. Will observe and advised to avoid spicy food K21.9 530.81    3. Anxiety: Fairly stable F41.9 300.00    4. Other depression: Mood  Fairly stable F32.89 311    5. Memory changes: On medication. Fairly stable R41.3 780.93     also needed to r/o seizure. ordered EEG. not done    6. Presence of cardiac pacemaker  Z95.0 V45.01    7. H/O syncope: No recurrent episode. Post pacemaker. Seen neurology as well. According to daughter decided to hold on EEG. See HPI Y41.202 V15.89    8. Chronic renal impairment, stage 3b ([facility] [address]): Following nephrologist.  Avoid NSAIDs N18.32 585.3    9. Congestive heart failure, unspecified HF chronicity, unspecified heart failure type [facility]): No other signs of volume overload. See below I50.9 428.0 XR CHEST PA LAT   10. Other skin changes: See HPI. Sending to dermatology R23.8 782.9 REFERRAL TO DERMATOLOGY    seborric dermatitis    11. SOB (shortness of breath) on exertion: History of low EF. No signs of volume overload at this time. Will order chest x-ray and advised her to make the appointment with the cardiologist to evaluate it further R06.02 786.05 XR CHEST PA LAT   12. Burping: Avoid spicy and fried and fatty food. Will observe R14.2 787.3    13. Medicare annual wellness visit, subsequent  Z00.00 V70.0    Pt and daughter both understood the plan. Will order lipid panel with next blood work.     Follow up in 3-4 months. Please note that this dictation was completed with [employer], the computer voice recognition software. Quite often unanticipated grammatical, syntax, homophones, and other interpretive errors are inadvertently transcribed by the computer software. Please disregard these errors.   Please excuse any errors that have escaped final proofreading.'

## 2021-12-28 NOTE — ACP (ADVANCE CARE PLANNING)
Advance Care Planning     General Advance Care Planning (ACP) Conversation      Date of Conversation: 12/28/2021  Conducted with: Patient with Decision Making Capacity    Healthcare Decision Maker:     Primary Decision Maker: Renetta Kimbrough - [phone]    Supplemental (Other) Decision Maker: Robert Arteaga - [phone]  Click here to complete [address] including selection of the Healthcare Decision Maker Relationship (ie \"Primary\")          Content/Action Overview:   discussed advance directive. she has a copy at home and she/ her daughter will provide.    Reviewed DNR/DNI and patient : daughter will provide an advance directive copy     Length of Voluntary ACP Conversation in minutes:  <16 minutes (Non-Billable)    Henok Vargas MD

## 2021-12-29 ENCOUNTER — TELEPHONE (OUTPATIENT)
Dept: CARDIOLOGY CLINIC | Age: 86
End: 2021-12-29

## 2021-12-29 DIAGNOSIS — R07.9 CHEST PAIN, UNSPECIFIED TYPE: Primary | ICD-10-CM

## 2021-12-29 DIAGNOSIS — R60.9 SWELLING: ICD-10-CM

## 2021-12-29 NOTE — TELEPHONE ENCOUNTER
Patient's daughter called in stating her gamez has worsened, she states she cant put her clothes on without getting extremely sob. Patient was seen by primary care on 12/28/2021 whom ordered chest xray and informed patient to get in contact with our office. Reviewed symptoms and chest xray with Dr Nayla Douglas. Verbal order and read back per Alberto Avila, DO  Have stat labs done cmp, cbc, bnp. Based off lab work patient will be further advised. This has been fully explained to the patient's daughter, who indicates understanding.

## 2021-12-29 NOTE — PROGRESS NOTES
Called pt regarding chest x-ray/. Spoke with patient daughter. Discussed could be mild CHF. Can go up to lasix 4 times a week. She has contacted her cardiologist. Lizeth Álvarez for blood work tomorrow and going to see them next week and she agrees to follow their recommendations for now.

## 2021-12-30 ENCOUNTER — TELEPHONE (OUTPATIENT)
Dept: CARDIOLOGY CLINIC | Age: 86
End: 2021-12-30

## 2021-12-30 ENCOUNTER — HOSPITAL ENCOUNTER (OUTPATIENT)
Dept: LAB | Age: 86
Discharge: HOME | End: 2021-12-30

## 2021-12-30 ENCOUNTER — HOSPITAL ENCOUNTER (OUTPATIENT)
Dept: LAB | Age: 86
Discharge: HOME | End: 2021-12-30
Payer: MEDICARE

## 2021-12-30 DIAGNOSIS — R60.9 SWELLING: ICD-10-CM

## 2021-12-30 LAB
ALBUMIN SERPL-MCNC: 3.6 G/DL (ref 3.4–5)
ALBUMIN/GLOB SERPL: 1 {RATIO} (ref 0.8–1.7)
ALP SERPL-CCNC: 75 U/L (ref 45–117)
ALT SERPL-CCNC: 18 U/L (ref 13–56)
ANION GAP SERPL CALC-SCNC: 4 MMOL/L (ref 3–18)
ANION GAP SERPL CALC-SCNC: 6 MMOL/L (ref 3–18)
AST SERPL-CCNC: 16 U/L (ref 10–38)
BASOPHILS # BLD: 0 K/UL (ref 0–0.1)
BASOPHILS NFR BLD: 1 % (ref 0–2)
BILIRUB SERPL-MCNC: 0.4 MG/DL (ref 0.2–1)
BNP SERPL-MCNC: 5295 PG/ML (ref 0–1800)
BUN SERPL-MCNC: 30 MG/DL (ref 7–18)
BUN SERPL-MCNC: 31 MG/DL (ref 7–18)
BUN/CREAT SERPL: 19 (ref 12–20)
BUN/CREAT SERPL: 19 (ref 12–20)
CALCIUM SERPL-MCNC: 9.1 MG/DL (ref 8.5–10.1)
CALCIUM SERPL-MCNC: 9.3 MG/DL (ref 8.5–10.1)
CHLORIDE SERPL-SCNC: 103 MMOL/L (ref 100–111)
CHLORIDE SERPL-SCNC: 104 MMOL/L (ref 100–111)
CO2 SERPL-SCNC: 30 MMOL/L (ref 21–32)
CO2 SERPL-SCNC: 31 MMOL/L (ref 21–32)
CREAT SERPL-MCNC: 1.6 MG/DL (ref 0.6–1.3)
CREAT SERPL-MCNC: 1.61 MG/DL (ref 0.6–1.3)
DIFFERENTIAL METHOD BLD: ABNORMAL
EOSINOPHIL # BLD: 0.3 K/UL (ref 0–0.4)
EOSINOPHIL NFR BLD: 5 % (ref 0–5)
ERYTHROCYTE [DISTWIDTH] IN BLOOD BY AUTOMATED COUNT: 14.2 % (ref 11.6–14.5)
GLOBULIN SER CALC-MCNC: 3.5 G/DL (ref 2–4)
GLUCOSE SERPL-MCNC: 95 MG/DL (ref 74–99)
GLUCOSE SERPL-MCNC: 98 MG/DL (ref 74–99)
HCT VFR BLD AUTO: 38.5 % (ref 35–45)
HGB BLD-MCNC: 12.3 G/DL (ref 12–16)
IMM GRANULOCYTES # BLD AUTO: 0 K/UL (ref 0–0.04)
IMM GRANULOCYTES NFR BLD AUTO: 0 % (ref 0–0.5)
LYMPHOCYTES # BLD: 2.1 K/UL (ref 0.9–3.6)
LYMPHOCYTES NFR BLD: 31 % (ref 21–52)
MCH RBC QN AUTO: 29.2 PG (ref 24–34)
MCHC RBC AUTO-ENTMCNC: 31.9 G/DL (ref 31–37)
MCV RBC AUTO: 91.4 FL (ref 78–100)
MONOCYTES # BLD: 0.7 K/UL (ref 0.05–1.2)
MONOCYTES NFR BLD: 10 % (ref 3–10)
NEUTS SEG # BLD: 3.7 K/UL (ref 1.8–8)
NEUTS SEG NFR BLD: 54 % (ref 40–73)
NRBC # BLD: 0 K/UL (ref 0–0.01)
NRBC BLD-RTO: 0 PER 100 WBC
PLATELET # BLD AUTO: 210 K/UL (ref 135–420)
PMV BLD AUTO: 9.1 FL (ref 9.2–11.8)
POTASSIUM SERPL-SCNC: 4.5 MMOL/L (ref 3.5–5.5)
POTASSIUM SERPL-SCNC: 4.6 MMOL/L (ref 3.5–5.5)
PROT SERPL-MCNC: 7.1 G/DL (ref 6.4–8.2)
RBC # BLD AUTO: 4.21 M/UL (ref 4.2–5.3)
SODIUM SERPL-SCNC: 139 MMOL/L (ref 136–145)
SODIUM SERPL-SCNC: 139 MMOL/L (ref 136–145)
WBC # BLD AUTO: 6.9 K/UL (ref 4.6–13.2)
XX-LABCORP SPECIMEN COL,LCBCF: NORMAL

## 2021-12-30 PROCEDURE — 36415 COLL VENOUS BLD VENIPUNCTURE: CPT

## 2021-12-30 PROCEDURE — 80053 COMPREHEN METABOLIC PANEL: CPT

## 2021-12-30 PROCEDURE — 99001 SPECIMEN HANDLING PT-LAB: CPT

## 2021-12-30 PROCEDURE — 83880 ASSAY OF NATRIURETIC PEPTIDE: CPT

## 2021-12-30 PROCEDURE — 85025 COMPLETE CBC W/AUTO DIFF WBC: CPT

## 2021-12-30 NOTE — TELEPHONE ENCOUNTER
Dr Chidi Ramsey reviewed patients that were done today 12/30/2021 cbc, cmp, bnp.    Verbal order and read back per Aaron Bernstein, DO   Have patient follow up with next week as soon as possible. If symptoms worsen patient needs to report to ER. This has been fully explained to the patient's daughter, who indicates understanding.

## 2022-01-04 ENCOUNTER — HOSPITAL ENCOUNTER (OUTPATIENT)
Dept: LAB | Age: 87
Discharge: HOME | End: 2022-01-04

## 2022-01-04 LAB — XX-LABCORP SPECIMEN COL,LCBCF: NORMAL

## 2022-01-04 PROCEDURE — 99001 SPECIMEN HANDLING PT-LAB: CPT

## 2022-01-06 ENCOUNTER — OFFICE VISIT (OUTPATIENT)
Dept: CARDIOLOGY CLINIC | Age: 87
End: 2022-01-06
Payer: MEDICARE

## 2022-01-06 VITALS
HEART RATE: 55 BPM | HEIGHT: 62 IN | OXYGEN SATURATION: 100 % | SYSTOLIC BLOOD PRESSURE: 152 MMHG | DIASTOLIC BLOOD PRESSURE: 90 MMHG | BODY MASS INDEX: 19.51 KG/M2 | WEIGHT: 106 LBS

## 2022-01-06 DIAGNOSIS — R06.02 SOB (SHORTNESS OF BREATH) ON EXERTION: Primary | ICD-10-CM

## 2022-01-06 DIAGNOSIS — I25.5 ISCHEMIC CARDIOMYOPATHY: ICD-10-CM

## 2022-01-06 PROCEDURE — 99215 OFFICE O/P EST HI 40 MIN: CPT | Performed by: INTERNAL MEDICINE

## 2022-01-06 PROCEDURE — G8420 CALC BMI NORM PARAMETERS: HCPCS | Performed by: INTERNAL MEDICINE

## 2022-01-06 PROCEDURE — G8427 DOCREV CUR MEDS BY ELIG CLIN: HCPCS | Performed by: INTERNAL MEDICINE

## 2022-01-06 PROCEDURE — G9717 DOC PT DX DEP/BP F/U NT REQ: HCPCS | Performed by: INTERNAL MEDICINE

## 2022-01-06 PROCEDURE — 1090F PRES/ABSN URINE INCON ASSESS: CPT | Performed by: INTERNAL MEDICINE

## 2022-01-06 PROCEDURE — 1101F PT FALLS ASSESS-DOCD LE1/YR: CPT | Performed by: INTERNAL MEDICINE

## 2022-01-06 PROCEDURE — G8536 NO DOC ELDER MAL SCRN: HCPCS | Performed by: INTERNAL MEDICINE

## 2022-01-06 NOTE — PROGRESS NOTES
Jarodandrew Beyelvia presents today for   Chief Complaint   Patient presents with    Follow-up     add on for SOB     Shortness of Breath     when getting dressed, bathing, and walking        Darci High preferred language for health care discussion is english/other. Is someone accompanying this pt? Yes, daughter     Is the patient using any DME equipment during OV? Wheelchair but can stand and walk short distance    Depression Screening:  3 most recent PHQ Screens 1/6/2022   Little interest or pleasure in doing things Not at all   Feeling down, depressed, irritable, or hopeless Not at all   Total Score PHQ 2 0       Learning Assessment:  Learning Assessment 12/28/2021   PRIMARY LEARNER Patient   HIGHEST LEVEL OF EDUCATION - PRIMARY LEARNER  DID NOT GRADUATE [facility] PRIMARY LEARNER [address] CAREGIVER Yes   CO-LEARNER NAME Mihir Higginbotham / daughter   Brandon Melendrez LEVEL OF EDUCATION > 4 YEARS Mellissa [address] 83    NEED No   LEARNER PREFERENCE PRIMARY DEMONSTRATION     -     -   LEARNER [address] No   ANSWERED BY patient   RELATIONSHIP SELF       Abuse Screening:  Abuse Screening Questionnaire 1/6/2022   Do you ever feel afraid of your partner? N   Are you in a relationship with someone who physically or mentally threatens you? N   Is it safe for you to go home? Y       Fall Risk  Fall Risk Assessment, last 12 mths 1/6/2022   Able to walk? Yes   Fall in past 12 months? 1   Do you feel unsteady? 1   Are you worried about falling 0   Is TUG test greater than 12 seconds? 0   Is the gait abnormal? 0   Number of falls in past 12 months -   Fall with injury? 0       Pt currently taking Anticoagulant therapy?no    Coordination of Care:  1. Have you been to the ER, urgent care clinic since your last visit? Hospitalized since your last visit? no    2.  Have you seen or consulted any other health care providers outside of the [address] [place] [address] since your last visit? Include any pap smears or colon screening.  no

## 2022-01-06 NOTE — PROGRESS NOTES
Sunitha Pop    Urgent add on for SOB/ AECHF    HPI    Sunitha Pop is a 80 y. o. with no known cardiac disease who presented initially for evaluation of syncope. As you know, Supriya Live is such a pleasant patient with history hypertension, arthritis and falls, found to have cardiomyopathy and SSS. Supriya Live lives with her daughter. Back in Sept when her daughter was out of town she apparently had a syncopal event. Her sister was with her at the time and saw her somewhat slumped in her chair. She was slow to respond but came to. Supriya Live wasn't sure what happened and didn't seek medical care after that initial event. Again since then she had another episode. Her daughter said her mother looked unwell/ pale and said her stomach was upset. She again kind of slumped and her daughter caught her so she did not fall/ get injured. She believes she lost consciousness. She came to and felt somewhat fatigued briefly. She had no observed seizure like activity. She completely denies surrounding cardiac complaints with these episodes- specifically has never noticed palpations/ heart racing, no CP, or SOB. No swelling or headaches. She has no known heart problems and has never seen a heart doctor or had cardiac testing. Dom's work up showed globally reduced EF 26-30%. I called and spoke to her daughter- giving her the results and alerted her to the signs and symptoms to look out for. Apparently, she got off the phone and asked her mom how she was feeling and she admitted she had been short of breath for 2 days. They ended up going to [facility] the next day and was in AECHF/ HFrEF with fluid overload. Just before discharge she ended up passing out (as she had mentioned very rarely happens)- and she was noted to have a 6 sec pause that correlated. She was appropriately given a pacemaker. Since then she did struggle with some episodes of syncope that may have been related to her underlying urinary tract infections.   Likely this has not been an issue as of recent and since I last saw me overall she has been doing okay. There has been some overall decline in her memory and her daughter says she is tired all the time and her naps seem longer. She is undergoing a neuro eval and starting meds for dementia. Also established with nephro, and dropped lasix to ~3 x/ week. Also aware her daughter had Guilherme Judydt but pt herself didn't have obvious symptoms. They have been calling over the holidays due to increasing SOB, so much so, daughter states pt cant even get dressed. She had CXR, labs, and her Lasix increased to 4x/ week and SCr stable. She has seen PCP and nephro virtually and I reviewed their notes as well. Past Medical History:   Diagnosis Date    Anxiety     Arthritis     Depression     GERD (gastroesophageal reflux disease)     HTN (hypertension)     LBBB (left bundle branch block)     Osteoporosis     completed 8 years Fosamax    Sciatica     Urinary incontinence        Past Surgical History:   Procedure Laterality Date    HX CHOLECYSTECTOMY      HX HYSTERECTOMY      HX ORTHOPAEDIC      fractured right patella surgery    MT ANESTH,SURGERY OF SHOULDER      MT CARDIAC SURG PROCEDURE UNLIST  11/2018    pacemaker     MT INS NEW/RPLC PRM PACEMAKER W/TRANSV ELTRD VENTR N/A 11/28/2018    INSERT PPM SINGLE VENTRICULAR performed by Kee Sandoval MD at [facility] CATH LAB       Current Outpatient Medications   Medication Sig Dispense Refill    furosemide (LASIX) 20 mg tablet Take 1 Tablet by mouth four (4) days a week.       omeprazole (PRILOSEC) 40 mg capsule TAKE 1 CAPSULE BY MOUTH DAILY 90 Capsule 1    sertraline (ZOLOFT) 25 mg tablet TAKE 1 TABLET BY MOUTH DAILY 90 Tablet 1    carvediloL (COREG) 12.5 mg tablet TAKE 1 TABLET BY MOUTH TWICE DAILY WITH MEALS 180 Tablet 3    atorvastatin (LIPITOR) 10 mg tablet TAKE 1 TABLET BY MOUTH EVERY NIGHT 90 Tab 3    cranberry extract (CRANBERRY JUICE POWDER) 425 mg cap Take 1 Cap by mouth daily.      aspirin 81 mg chewable tablet Take 1 Tab by mouth daily. 30 Tab 11    fluticasone (FLONASE) 50 mcg/actuation nasal spray 1 [place] by Both Nostrils route as needed.  loratadine (CLARITIN) 10 mg tablet Take 10 mg by mouth daily as needed for Allergies.  MULTI-VITAMIN PO Take 1 Tab by mouth daily. No Known Allergies    Social History     Socioeconomic History    Marital status:      Spouse name: Not on file    Number of children: Not on file    Years of education: Not on file    Highest education level: Not on file   Occupational History    Not on file   Tobacco Use    Smoking status: Never Smoker    Smokeless tobacco: Never Used   Substance and Sexual Activity    Alcohol use: No    Drug use: No    Sexual activity: Not on file   Other Topics Concern    Not on file   Social History Narrative    Not on file     Social Determinants of Health     Financial Resource Strain:     Difficulty of Paying Living Expenses: Not on file   Food Insecurity:     Worried About Running Out of Food in the Last Year: Not on file    Constantino of Food in the Last Year: Not on file   Transportation Needs:     Lack of Transportation (Medical): Not on file    Lack of Transportation (Non-Medical):  Not on file   Physical Activity:     Days of Exercise per Week: Not on file    Minutes of Exercise per Session: Not on file   Stress:     Feeling of Stress : Not on file   Social Connections:     Frequency of Communication with Friends and Family: Not on file    Frequency of Social Gatherings with Friends and Family: Not on file    Attends Oriental orthodox Services: Not on file    Active Member of Clubs or Organizations: Not on file    Attends Club or Organization Meetings: Not on file    Marital Status: Not on file   Intimate Partner Violence:     Fear of Current or Ex-Partner: Not on file    Emotionally Abused: Not on file    Physically Abused: Not on file    Sexually Abused: Not on file Housing Stability:     Unable to Pay for Housing in the Last Year: Not on file    Number of Places Lived in the Last Year: Not on file    Unstable Housing in the Last Year: Not on file        The patient has a family history of heart problems, but no genetic cardiomyopathies or SCD. Review of Systems    14 pt Review of Systems is negative unless otherwise mentioned in the HPI. Wt Readings from Last 3 Encounters:   01/06/22 48.1 kg (106 lb)   12/28/21 48.3 kg (106 lb 6.4 oz)   08/26/21 47.2 kg (104 lb)     Temp Readings from Last 3 Encounters:   12/28/21 97.4 °F (36.3 °C) (Temporal)   06/08/21 98.1 °F (36.7 °C) (Oral)   01/13/21 (!) 95.7 °F (35.4 °C) (Temporal)     BP Readings from Last 3 Encounters:   12/28/21 134/60   08/26/21 (!) 130/58   06/08/21 (!) 140/70     Pulse Readings from Last 3 Encounters:   01/06/22 (!) 55   12/28/21 63   08/26/21 64     Physical Exam:    Visit Vitals  Pulse (!) 55   Ht 5' 2\" (1.575 m)   Wt 48.1 kg (106 lb)   LMP  (LMP Unknown)   SpO2 100%   BMI 19.39 kg/m²      Physical Exam  Constitutional:       General: She is not in acute distress. Appearance: She is well-developed. She is not diaphoretic. HENT:      Head: Normocephalic and atraumatic. Eyes:      Pupils: Pupils are equal, round, and reactive to light. Neck:      Vascular: No JVD. Cardiovascular:      Rate and Rhythm: Normal rate and regular rhythm. Heart sounds: Normal heart sounds. No murmur heard. No friction rub. No gallop. Pulmonary:      Effort: Pulmonary effort is normal. No respiratory distress. Breath sounds: Normal breath sounds. No wheezing or rales. Chest:      Chest wall: No tenderness. Abdominal:      General: Bowel sounds are normal.      Palpations: Abdomen is soft. Musculoskeletal:         General: No tenderness. Skin:     General: Skin is warm and dry. Neurological:      Mental Status: She is alert and oriented to person, place, and time.        Lab Results   Component Value Date/Time    Sodium 139 12/30/2021 11:39 AM    Potassium 4.5 12/30/2021 11:39 AM    Chloride 104 12/30/2021 11:39 AM    CO2 31 12/30/2021 11:39 AM    Anion gap 4 12/30/2021 11:39 AM    Glucose 98 12/30/2021 11:39 AM    BUN 31 (H) 12/30/2021 11:39 AM    Creatinine 1.60 (H) 12/30/2021 11:39 AM    BUN/Creatinine ratio 19 12/30/2021 11:39 AM    GFR est AA 36 (L) 12/30/2021 11:39 AM    GFR est non-AA 30 (L) 12/30/2021 11:39 AM    Calcium 9.3 12/30/2021 11:39 AM    Bilirubin, total 0.4 12/30/2021 11:34 AM    Alk. phosphatase 75 12/30/2021 11:34 AM    Protein, total 7.1 12/30/2021 11:34 AM    Albumin 3.6 12/30/2021 11:34 AM    Globulin 3.5 12/30/2021 11:34 AM    A-G Ratio 1.0 12/30/2021 11:34 AM    ALT (SGPT) 18 12/30/2021 11:34 AM    AST (SGOT) 16 12/30/2021 11:34 AM     Lab Results   Component Value Date/Time    WBC 6.9 12/30/2021 11:34 AM    WBC 5.4 05/17/2012 08:41 AM    HGB 12.3 12/30/2021 11:34 AM    HCT 38.5 12/30/2021 11:34 AM    PLATELET 237 74/38/2153 11:34 AM    MCV 91.4 12/30/2021 11:34 AM         EKG Last: LBBB/ paced, no concerning ST or T wave changes    CXR 12/28: IMPRESSION  Mildly increased mid to lower lung interstitial markings may be on the basis of  chronic interstitial lung disease or mild interstitial edema. Impression and Plan:  Seth Hoang is a 80 y. o. with:    HFrEF, s/p AECHF, now euvolemic/ stable (very likely ischemic but presumed)  Symptomatic SSS s/p PPM, known  Dyslipidemia, known  Advanced age/ frailty  Mental status decline, with h/o delirium on dementia  CKD stage IV, baseline ~ 1.5-1.6 (Venga)  Recent covid exposure with daughter in 1/2020, asymptomatic    She is taking Lasix 4x/ week  I dont think her symptoms are from fluid overload (by exam,labs, cxr)   I'm concerned she has recurrent UTI and asked her to call nephro to follow up with UA she had few days ago  She may have some PND/ orthopnea related to her EF 20% but I dont see how repeat testing will change our POC, her GFR is currently 30 so really prefer not to add Entresto/ MRA   After her current UTI is treated if GFR improves, can discuss GDMT again in future  >>50 mins spent, they showed up on the wrong day and squeezed them in for urgent same day appt    Thank you for allowing me to participate in the care of your patient, please do not hesitate to call with questions or concerns.     Kindest regards,    Annalisa Antonio, DO

## 2022-01-06 NOTE — LETTER
1/6/2022    Patient: Radha Carnes   YOB: 1927   Date of Visit: 1/6/2022     Vishal Drake MD  [address]  [address] [place] [address]  [address] [facility] [address]    Dear Vishal Drake MD,      Thank you for referring Ms. Franklin Vidales to [address] [place] [address] for evaluation. My notes for this consultation are attached. If you have questions, please do not hesitate to call me. I look forward to following your patient along with you.       Sincerely,    Daniela Scruggs, DO

## 2022-01-28 RX ORDER — SERTRALINE HYDROCHLORIDE 25 MG/1
TABLET, FILM COATED ORAL
Qty: 90 TABLET | Refills: 1 | Status: SHIPPED | OUTPATIENT
Start: 2022-01-28 | End: 2022-08-03 | Stop reason: SDUPTHER

## 2022-02-17 ENCOUNTER — OFFICE VISIT (OUTPATIENT)
Dept: CARDIOLOGY CLINIC | Age: 87
End: 2022-02-17
Payer: MEDICARE

## 2022-02-17 ENCOUNTER — CLINICAL SUPPORT (OUTPATIENT)
Dept: CARDIOLOGY CLINIC | Age: 87
End: 2022-02-17

## 2022-02-17 VITALS
HEART RATE: 65 BPM | WEIGHT: 106 LBS | DIASTOLIC BLOOD PRESSURE: 80 MMHG | SYSTOLIC BLOOD PRESSURE: 124 MMHG | HEIGHT: 62 IN | OXYGEN SATURATION: 98 % | BODY MASS INDEX: 19.51 KG/M2

## 2022-02-17 DIAGNOSIS — Z95.0 CARDIAC PACEMAKER IN SITU: ICD-10-CM

## 2022-02-17 DIAGNOSIS — I25.5 ISCHEMIC CARDIOMYOPATHY: Primary | ICD-10-CM

## 2022-02-17 DIAGNOSIS — R55 SYNCOPE, UNSPECIFIED SYNCOPE TYPE: ICD-10-CM

## 2022-02-17 PROCEDURE — G8420 CALC BMI NORM PARAMETERS: HCPCS | Performed by: INTERNAL MEDICINE

## 2022-02-17 PROCEDURE — 99214 OFFICE O/P EST MOD 30 MIN: CPT | Performed by: INTERNAL MEDICINE

## 2022-02-17 PROCEDURE — G8536 NO DOC ELDER MAL SCRN: HCPCS | Performed by: INTERNAL MEDICINE

## 2022-02-17 PROCEDURE — 93279 PRGRMG DEV EVAL PM/LDLS PM: CPT | Performed by: INTERNAL MEDICINE

## 2022-02-17 PROCEDURE — G9717 DOC PT DX DEP/BP F/U NT REQ: HCPCS | Performed by: INTERNAL MEDICINE

## 2022-02-17 PROCEDURE — 1090F PRES/ABSN URINE INCON ASSESS: CPT | Performed by: INTERNAL MEDICINE

## 2022-02-17 PROCEDURE — G8427 DOCREV CUR MEDS BY ELIG CLIN: HCPCS | Performed by: INTERNAL MEDICINE

## 2022-02-17 PROCEDURE — 1101F PT FALLS ASSESS-DOCD LE1/YR: CPT | Performed by: INTERNAL MEDICINE

## 2022-02-17 NOTE — PROGRESS NOTES
Judge Pérez    F/u SOB    HPI    Judge Pérez is a 80 y. o. with no known cardiac disease who presented initially for evaluation of syncope. As you know, Janelle Cooley is such a pleasant patient with history hypertension, arthritis and falls, found to have cardiomyopathy and SSS. Janelle Cooley lives with her daughter. Back in Sept when her daughter was out of town she apparently had a syncopal event. Her sister was with her at the time and saw her somewhat slumped in her chair. She was slow to respond but came to. Janelle Cooley wasn't sure what happened and didn't seek medical care after that initial event. Again since then she had another episode. Her daughter said her mother looked unwell/ pale and said her stomach was upset. She again kind of slumped and her daughter caught her so she did not fall/ get injured. She believes she lost consciousness. She came to and felt somewhat fatigued briefly. She had no observed seizure like activity. She completely denies surrounding cardiac complaints with these episodes- specifically has never noticed palpations/ heart racing, no CP, or SOB. No swelling or headaches. She has no known heart problems and has never seen a heart doctor or had cardiac testing. Dom's work up showed globally reduced EF 26-30%. I called and spoke to her daughter- giving her the results and alerted her to the signs and symptoms to look out for. Apparently, she got off the phone and asked her mom how she was feeling and she admitted she had been short of breath for 2 days. They ended up going to the [facility] the next day and was in AECHF/ HFrEF with fluid overload. Just before discharge she ended up passing out (as she had mentioned very rarely happens)- and she was noted to have a 6 sec pause that correlated. She was appropriately given a pacemaker. Since then she did struggle with some episodes of syncope that may have been related to her underlying urinary tract infections.   Likely this has not been an issue as of recent and since I last saw me overall she has been doing okay. There has been some overall decline in her memory and her daughter says she is tired all the time and her naps seem longer. She is undergoing a neuro eval and starting meds for dementia. Also established with nephro, and dropped lasix to ~3 x/ week. Also aware her daughter had Manisha but pt herself didn't have obvious symptoms. They have been calling over the holidays due to increasing SOB, so much so, daughter states pt cant even get dressed. Turns out symptoms were due to UTI, which have significant improved since Abx. No new complaints today. Past Medical History:   Diagnosis Date    Anxiety     Arthritis     Depression     GERD (gastroesophageal reflux disease)     HTN (hypertension)     LBBB (left bundle branch block)     Osteoporosis     completed 8 years Fosamax    Sciatica     Urinary incontinence        Past Surgical History:   Procedure Laterality Date    HX CHOLECYSTECTOMY      HX HYSTERECTOMY      HX ORTHOPAEDIC      fractured right patella surgery    SD ANESTH,SURGERY OF SHOULDER      SD CARDIAC SURG PROCEDURE UNLIST  11/2018    pacemaker     SD INS NEW/RPLC PRM PACEMAKER W/TRANSV ELTRD VENTR N/A 11/28/2018    INSERT PPM SINGLE VENTRICULAR performed by Violette Gallegos MD at [facility] CATH LAB       Current Outpatient Medications   Medication Sig Dispense Refill    sertraline (ZOLOFT) 25 mg tablet TAKE 1 TABLET BY MOUTH DAILY 90 Tablet 1    furosemide (LASIX) 20 mg tablet Take 1 Tablet by mouth four (4) days a week.  omeprazole (PRILOSEC) 40 mg capsule TAKE 1 CAPSULE BY MOUTH DAILY 90 Capsule 1    carvediloL (COREG) 12.5 mg tablet TAKE 1 TABLET BY MOUTH TWICE DAILY WITH MEALS 180 Tablet 3    atorvastatin (LIPITOR) 10 mg tablet TAKE 1 TABLET BY MOUTH EVERY NIGHT 90 Tab 3    cranberry extract (CRANBERRY JUICE POWDER) 425 mg cap Take 1 Cap by mouth daily.       aspirin 81 mg chewable tablet Take 1 Tab by mouth daily. 30 Tab 11    fluticasone (FLONASE) 50 mcg/actuation nasal spray 1 [place] by Both Nostrils route as needed.  loratadine (CLARITIN) 10 mg tablet Take 10 mg by mouth daily as needed for Allergies.  MULTI-VITAMIN PO Take 1 Tab by mouth daily. No Known Allergies    Social History     Socioeconomic History    Marital status:      Spouse name: Not on file    Number of children: Not on file    Years of education: Not on file    Highest education level: Not on file   Occupational History    Not on file   Tobacco Use    Smoking status: Never Smoker    Smokeless tobacco: Never Used   Substance and Sexual Activity    Alcohol use: No    Drug use: No    Sexual activity: Not on file   Other Topics Concern    Not on file   Social History Narrative    Not on file     Social Determinants of Health     Financial Resource Strain:     Difficulty of Paying Living Expenses: Not on file   Food Insecurity:     Worried About Running Out of Food in the Last Year: Not on file    Constantino of Food in the Last Year: Not on file   Transportation Needs:     Lack of Transportation (Medical): Not on file    Lack of Transportation (Non-Medical):  Not on file   Physical Activity:     Days of Exercise per Week: Not on file    Minutes of Exercise per Session: Not on file   Stress:     Feeling of Stress : Not on file   Social Connections:     Frequency of Communication with Friends and Family: Not on file    Frequency of Social Gatherings with Friends and Family: Not on file    Attends Hoahaoism Services: Not on file    Active Member of Clubs or Organizations: Not on file    Attends Club or Organization Meetings: Not on file    Marital Status: Not on file   Intimate Partner Violence:     Fear of Current or Ex-Partner: Not on file    Emotionally Abused: Not on file    Physically Abused: Not on file    Sexually Abused: Not on file   Housing Stability:     Unable to Pay for Housing in the Last Year: Not on file    Number of Places Lived in the Last Year: Not on file    Unstable Housing in the Last Year: Not on file        The patient has a family history of heart problems, but no genetic cardiomyopathies or SCD. Review of Systems    14 pt Review of Systems is negative unless otherwise mentioned in the HPI. Wt Readings from Last 3 Encounters:   02/17/22 48.1 kg (106 lb)   01/06/22 48.1 kg (106 lb)   12/28/21 48.3 kg (106 lb 6.4 oz)     Temp Readings from Last 3 Encounters:   12/28/21 97.4 °F (36.3 °C) (Temporal)   06/08/21 98.1 °F (36.7 °C) (Oral)   01/13/21 (!) 95.7 °F (35.4 °C) (Temporal)     BP Readings from Last 3 Encounters:   02/17/22 124/80   01/06/22 (!) 152/90   12/28/21 134/60     Pulse Readings from Last 3 Encounters:   02/17/22 65   01/06/22 (!) 55   12/28/21 63     Physical Exam:    Visit Vitals  /80 (BP 1 Location: Left upper arm, BP Patient Position: Sitting, BP Cuff Size: Adult)   Pulse 65   Ht 5' 2\" (1.575 m)   Wt 48.1 kg (106 lb)   LMP  (LMP Unknown)   SpO2 98%   BMI 19.39 kg/m²      Physical Exam  Constitutional:       General: She is not in acute distress. Appearance: She is well-developed. She is not diaphoretic. HENT:      Head: Normocephalic and atraumatic. Eyes:      Pupils: Pupils are equal, round, and reactive to light. Neck:      Vascular: No JVD. Cardiovascular:      Rate and Rhythm: Normal rate and regular rhythm. Heart sounds: Normal heart sounds. No murmur heard. No friction rub. No gallop. Pulmonary:      Effort: Pulmonary effort is normal. No respiratory distress. Breath sounds: Normal breath sounds. No wheezing or rales. Chest:      Chest wall: No tenderness. Abdominal:      General: Bowel sounds are normal.      Palpations: Abdomen is soft. Musculoskeletal:         General: No tenderness. Skin:     General: Skin is warm and dry. Neurological:      Mental Status: She is alert and oriented to person, place, and time. Lab Results   Component Value Date/Time    Sodium 139 12/30/2021 11:39 AM    Potassium 4.5 12/30/2021 11:39 AM    Chloride 104 12/30/2021 11:39 AM    CO2 31 12/30/2021 11:39 AM    Anion gap 4 12/30/2021 11:39 AM    Glucose 98 12/30/2021 11:39 AM    BUN 31 (H) 12/30/2021 11:39 AM    Creatinine 1.60 (H) 12/30/2021 11:39 AM    BUN/Creatinine ratio 19 12/30/2021 11:39 AM    GFR est AA 36 (L) 12/30/2021 11:39 AM    GFR est non-AA 30 (L) 12/30/2021 11:39 AM    Calcium 9.3 12/30/2021 11:39 AM    Bilirubin, total 0.4 12/30/2021 11:34 AM    Alk. phosphatase 75 12/30/2021 11:34 AM    Protein, total 7.1 12/30/2021 11:34 AM    Albumin 3.6 12/30/2021 11:34 AM    Globulin 3.5 12/30/2021 11:34 AM    A-G Ratio 1.0 12/30/2021 11:34 AM    ALT (SGPT) 18 12/30/2021 11:34 AM    AST (SGOT) 16 12/30/2021 11:34 AM     Lab Results   Component Value Date/Time    WBC 6.9 12/30/2021 11:34 AM    WBC 5.4 05/17/2012 08:41 AM    HGB 12.3 12/30/2021 11:34 AM    HCT 38.5 12/30/2021 11:34 AM    PLATELET 467 22/54/6118 11:34 AM    MCV 91.4 12/30/2021 11:34 AM         EKG Last: LBBB/ paced, no concerning ST or T wave changes    CXR 12/28: IMPRESSION  Mildly increased mid to lower lung interstitial markings may be on the basis of  chronic interstitial lung disease or mild interstitial edema. Impression and Plan:  Alena Carrera is a 80 y. o. with:    HFrEF, s/p AECHF, now euvolemic/ stable (very likely ischemic but presumed)  Symptomatic SSS s/p PPM, known  Dyslipidemia, known  Advanced age/ frailty  Mental status decline, with h/o delirium on dementia  CKD stage IV, baseline ~ 1.5-1.6 (Braulio)  Recent covid exposure with daughter in 1/2020, asymptomatic  Recurrent UTI    She is taking Lasix 4x/ week, probably can cut back but daughter wants to wait for Nephro labs/ follow up  Otherwise her CV status stable  RTC 6 months with device check, call sooner if needed  35 mins spent,    Thank you for allowing me to participate in the care of your patient, please do not hesitate to call with questions or concerns. Follow-up and Dispositions    · Return in about 6 months (around 8/17/2022).      Kindest regards,    Luis Falk, DO

## 2022-02-17 NOTE — PROGRESS NOTES
Marques Meeks presents today for No chief complaint on file. Marques Meeks preferred language for health care discussion is english/other. Is someone accompanying this pt? no    Is the patient using any DME equipment during [address]? no    Depression Screening:  3 most recent PHQ Screens 1/6/2022   Little interest or pleasure in doing things Not at all   Feeling down, depressed, irritable, or hopeless Not at all   Total Score PHQ 2 0       Learning Assessment:  Learning Assessment 12/28/2021   PRIMARY LEARNER Patient   HIGHEST LEVEL OF EDUCATION - PRIMARY LEARNER  DID NOT GRADUATE [facility] PRIMARY LEARNER [address] CAREGIVER Yes   CO-LEARNER NAME [address] / daughter   Anish Stephenson LEVEL OF EDUCATION > 4 YEARS Mellissa Clark 83    NEED No   LEARNER PREFERENCE PRIMARY DEMONSTRATION     -     -   LEARNER [address] No   ANSWERED BY patient   RELATIONSHIP SELF       Abuse Screening:  Abuse Screening Questionnaire 1/6/2022   Do you ever feel afraid of your partner? N   Are you in a relationship with someone who physically or mentally threatens you? N   Is it safe for you to go home? Y       Fall Risk  Fall Risk Assessment, last 12 mths 1/6/2022   Able to walk? Yes   Fall in past 12 months? 1   Do you feel unsteady? 1   Are you worried about falling 0   Is TUG test greater than 12 seconds? 0   Is the gait abnormal? 0   Number of falls in past 12 months -   Fall with injury? 0       Pt currently taking Anticoagulant therapy? no    Coordination of Care:  1. Have you been to the ER, urgent care clinic since your last visit? Hospitalized since your last visit? no    2. Have you seen or consulted any other health care providers outside of the [address] [place] [address] since your last visit? Include any pap smears or colon screening.  no

## 2022-02-22 RX ORDER — ATORVASTATIN CALCIUM 10 MG/1
TABLET, FILM COATED ORAL
Qty: 90 TABLET | Refills: 3 | Status: SHIPPED | OUTPATIENT
Start: 2022-02-22

## 2022-03-04 RX ORDER — OMEPRAZOLE 40 MG/1
40 CAPSULE, DELAYED RELEASE ORAL DAILY
Qty: 90 CAPSULE | Refills: 1 | Status: SHIPPED | OUTPATIENT
Start: 2022-03-04 | End: 2022-08-24

## 2022-03-11 NOTE — PROGRESS NOTES
I have personally seen and evaluated the device findings. Interrogation reviewed and I agree with assessment.     Gian Morales

## 2022-03-18 PROBLEM — M17.0 PRIMARY OSTEOARTHRITIS OF BOTH KNEES: Status: ACTIVE | Noted: 2018-11-01

## 2022-03-18 PROBLEM — I73.9 PERIPHERAL VASCULAR DISEASE (HCC): Status: ACTIVE | Noted: 2020-11-16

## 2022-03-18 PROBLEM — R39.81 FUNCTIONAL URINARY INCONTINENCE: Status: ACTIVE | Noted: 2018-11-24

## 2022-03-18 PROBLEM — R53.83 OTHER FATIGUE: Status: ACTIVE | Noted: 2019-08-05

## 2022-03-19 PROBLEM — R55 SYNCOPE: Status: ACTIVE | Noted: 2018-11-01

## 2022-03-19 PROBLEM — N39.0 ACUTE UTI: Status: ACTIVE | Noted: 2018-11-24

## 2022-03-19 PROBLEM — J81.1 PULMONARY EDEMA: Status: ACTIVE | Noted: 2018-11-24

## 2022-03-20 PROBLEM — R06.09 DYSPNEA ON EXERTION: Status: ACTIVE | Noted: 2019-02-14

## 2022-03-20 PROBLEM — J30.1 SEASONAL ALLERGIC RHINITIS DUE TO POLLEN: Status: ACTIVE | Noted: 2018-04-23

## 2022-03-28 ENCOUNTER — OFFICE VISIT (OUTPATIENT)
Dept: FAMILY MEDICINE CLINIC | Age: 87
End: 2022-03-28
Payer: MEDICARE

## 2022-03-28 VITALS
HEART RATE: 71 BPM | SYSTOLIC BLOOD PRESSURE: 138 MMHG | RESPIRATION RATE: 16 BRPM | BODY MASS INDEX: 19.35 KG/M2 | OXYGEN SATURATION: 98 % | TEMPERATURE: 97.2 F | WEIGHT: 105.8 LBS | DIASTOLIC BLOOD PRESSURE: 60 MMHG

## 2022-03-28 DIAGNOSIS — N18.4 CHRONIC RENAL IMPAIRMENT, STAGE 4 (SEVERE) (HCC): ICD-10-CM

## 2022-03-28 DIAGNOSIS — F41.9 ANXIETY AND DEPRESSION: ICD-10-CM

## 2022-03-28 DIAGNOSIS — Z87.898 HISTORY OF SYNCOPE: ICD-10-CM

## 2022-03-28 DIAGNOSIS — G40.109 EPILEPSIA PARTIALIS CONTINUA WITHOUT INTRACTABLE EPILEPSY (HCC): ICD-10-CM

## 2022-03-28 DIAGNOSIS — I50.9 CONGESTIVE HEART FAILURE, UNSPECIFIED HF CHRONICITY, UNSPECIFIED HEART FAILURE TYPE (HCC): ICD-10-CM

## 2022-03-28 DIAGNOSIS — I10 HYPERTENSION, UNSPECIFIED TYPE: ICD-10-CM

## 2022-03-28 DIAGNOSIS — F03.91 DEMENTIA WITH BEHAVIORAL DISTURBANCE, UNSPECIFIED DEMENTIA TYPE: Primary | ICD-10-CM

## 2022-03-28 DIAGNOSIS — I73.9 PERIPHERAL VASCULAR DISEASE (HCC): ICD-10-CM

## 2022-03-28 DIAGNOSIS — F32.A ANXIETY AND DEPRESSION: ICD-10-CM

## 2022-03-28 DIAGNOSIS — Z13.220 SCREENING FOR HYPERLIPIDEMIA: ICD-10-CM

## 2022-03-28 PROCEDURE — G8420 CALC BMI NORM PARAMETERS: HCPCS | Performed by: FAMILY MEDICINE

## 2022-03-28 PROCEDURE — G8427 DOCREV CUR MEDS BY ELIG CLIN: HCPCS | Performed by: FAMILY MEDICINE

## 2022-03-28 PROCEDURE — 1101F PT FALLS ASSESS-DOCD LE1/YR: CPT | Performed by: FAMILY MEDICINE

## 2022-03-28 PROCEDURE — G9717 DOC PT DX DEP/BP F/U NT REQ: HCPCS | Performed by: FAMILY MEDICINE

## 2022-03-28 PROCEDURE — G8536 NO DOC ELDER MAL SCRN: HCPCS | Performed by: FAMILY MEDICINE

## 2022-03-28 PROCEDURE — 1090F PRES/ABSN URINE INCON ASSESS: CPT | Performed by: FAMILY MEDICINE

## 2022-03-28 PROCEDURE — 99214 OFFICE O/P EST MOD 30 MIN: CPT | Performed by: FAMILY MEDICINE

## 2022-03-28 NOTE — PROGRESS NOTES
HISTORY OF PRESENT ILLNESS  Benancio Duane is a 80 y.o. female. HPI: Here for follow-up. Accompanied with her daughter. History of dementia. Not taking any medication. Does not want to see neurology. Reviewed last neurology note almost a year ago by Dr. Naheed Villeda. Recommended EEG and Namenda. She does not want to do any work-up at this time. Daughter also has decided not to put her through any more medication for any testing as it is more stressful for the patient. History of syncope. The episode has been resolved since long time. Recently seen cardiology. No new recommendation. According to daughter agreed to go down on Lasix. I have also agreed on it. No signs of volume overload. She does walk with the walker. No shortness of breath. Able to talk in full sentence. Able to answer most of the questions reasonably. No leg swelling. No cold cough or wheezing. According to daughter eating fairly. Urine incontinence which might hope to get better after decrease the dose of Lasix which will be now 3 days a week. No foul smell from incontinence. Visit Vitals  /60 (BP 1 Location: Left upper arm, BP Patient Position: Sitting, BP Cuff Size: Adult)   Pulse 71   Temp 97.2 °F (36.2 °C) (Temporal)   Resp 16   Wt 105 lb 12.8 oz (48 kg)   SpO2 98%   BMI 19.35 kg/m²     According to daughter no noticeable chest pain or shortness of breath. No nausea vomiting or any abdominal pain. History of depression and anxiety. Fairly stable. No concern. No behavioral changes.   Lab Results   Component Value Date/Time    WBC 6.9 12/30/2021 11:34 AM    WBC 5.4 05/17/2012 08:41 AM    HGB 12.3 12/30/2021 11:34 AM    HCT 38.5 12/30/2021 11:34 AM    PLATELET 562 21/96/7460 11:34 AM    MCV 91.4 12/30/2021 11:34 AM     Lab Results   Component Value Date/Time    Sodium 139 12/30/2021 11:39 AM    Potassium 4.5 12/30/2021 11:39 AM    Chloride 104 12/30/2021 11:39 AM    CO2 31 12/30/2021 11:39 AM    Anion gap 4 12/30/2021 11:39 AM    Glucose 98 12/30/2021 11:39 AM    BUN 31 (H) 12/30/2021 11:39 AM    Creatinine 1.60 (H) 12/30/2021 11:39 AM    BUN/Creatinine ratio 19 12/30/2021 11:39 AM    GFR est AA 36 (L) 12/30/2021 11:39 AM    GFR est non-AA 30 (L) 12/30/2021 11:39 AM    Calcium 9.3 12/30/2021 11:39 AM    Bilirubin, total 0.4 12/30/2021 11:34 AM    Alk. phosphatase 75 12/30/2021 11:34 AM    Protein, total 7.1 12/30/2021 11:34 AM    Albumin 3.6 12/30/2021 11:34 AM    Globulin 3.5 12/30/2021 11:34 AM    A-G Ratio 1.0 12/30/2021 11:34 AM    ALT (SGPT) 18 12/30/2021 11:34 AM    AST (SGOT) 16 12/30/2021 11:34 AM     Lab Results   Component Value Date/Time    Cholesterol, total 183 11/25/2020 08:59 AM    HDL Cholesterol 63 (H) 11/25/2020 08:59 AM    LDL, calculated 96.8 11/25/2020 08:59 AM    VLDL, calculated 23.2 11/25/2020 08:59 AM    Triglyceride 116 11/25/2020 08:59 AM    CHOL/HDL Ratio 2.9 11/25/2020 08:59 AM     Lab Results   Component Value Date/Time    TSH 3.51 06/26/2020 12:15 PM     Lab Results   Component Value Date/Time    Vitamin D 25-Hydroxy 34.7 04/21/2021 01:20 PM           ROS: See HPI    Physical Exam  Cardiovascular:      Rate and Rhythm: Normal rate. Pulmonary:      Effort: Pulmonary effort is normal. No respiratory distress. Abdominal:      Palpations: Abdomen is soft. Tenderness: There is no abdominal tenderness. Musculoskeletal:         General: No swelling. Neurological:      General: No focal deficit present. Mental Status: She is alert. Psychiatric:      Comments: Sitting comfortable. Looking happy in her usual state         ASSESSMENT and PLAN    ICD-10-CM ICD-9-CM    1. Dementia with behavioral disturbance, unspecified dementia type [facility]): Does not want any medication or any further work-up or specialist appointment as it is more stressful for patient. Daughter has decided to observe as it is right now. Patient is sitting comfortable and looking comfortable. Looking happy today. Answering most of the question appropriately. Will observe. F03.91 294.21     seen neurology. not taking nemenda and not done EEG    2. Congestive heart failure, unspecified HF chronicity, unspecified heart failure type [facility]): No signs of volume overload. Decrease the Lasix. I50.9 428.0 CBC W/O DIFF   3. Chronic renal impairment, stage 4 (severe) ([facility]): None decreased dose of Lasix. Recheck labs D89.7 212.4 METABOLIC PANEL, COMPREHENSIVE   4. Peripheral vascular disease ([facility].): No leg claudication. Will observe. I73.9 443.9    5. 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 Has not done EEG. No recent episode of passing out. Will observe G40.109 345.70    6. Anxiety and depression: Fairly stable. Will observe F41.9 300.00 TSH 3RD GENERATION    F32.A 311    7. Hypertension, unspecified type:well controlled. Continue current dose of medication and low salt diet. Exercise as tolerated. I10 401.9    8. History of syncope: Episode has been resolved. Cardiac and neurology work-up has been recommended. Patient does not want any more work-up and daughter been supporting it and we will observe for now Z87.898 V15.89    9. Screening for hyperlipidemia  Z13.220 V77.91 LIPID PANEL   Patient and daughter both understood and agreed with the plan  Follow-up and Dispositions    · Return in about 6 months (around 9/28/2022). Please note that this dictation was completed with [employer], the [employer] voice recognition software. Quite often unanticipated grammatical, syntax, homophones, and other interpretive errors are inadvertently transcribed by the computer software. Please disregard these errors. Please excuse any errors that have escaped final proofreading.

## 2022-03-28 NOTE — PATIENT INSTRUCTIONS
Alzheimer's Disease: Care Instructions  Overview     Alzheimer's disease is a type of dementia. It causes memory loss and affects judgment, language, and behavior. You may have trouble making decisions or may get lost in places that you used to know well. Alzheimer's disease is different than mild memory loss that occurs with aging. It's not clear what causes Alzheimer's disease. It's the most common form of dementia in older adults. Although there is no cure at this time, medicine in some cases may slow memory loss for a while. Other medicines may help with sleep, depression, or behavior changes. Alzheimer's disease is different for everyone. Some people can function well for a long time. In the early stage of the disease, you can do things at home to make life easier and safer. You also can keep doing your hobbies and other activities. Many people find comfort in planning now for their future needs. Follow-up care is a key part of your treatment and safety. Be sure to make and go to all appointments, and call your doctor if you are having problems. It's also a good idea to know your test results and keep a list of the medicines you take. How can you care for yourself at home? Taking care of yourself  · If your doctor gives you medicines, take them exactly as prescribed. Call your doctor if you think you are having a problem with your medicine. You will get more details on the medicines your doctor prescribes. · Eat a balanced diet. Get plenty of whole grains, fruits, and vegetables every day. If you are not hungry at mealtimes, eat snacks at midmorning and in the afternoon. Try drinks such as Boost, Ensure, or Sustacal if you are having trouble keeping your weight up. · Stay active. Exercise such as walking may slow the decline of your mental abilities. Try to stay active mentally too. Read and work crossword puzzles if you enjoy these activities.   · If you have trouble sleeping, do not nap during the day. Get regular exercise (but not within several hours of bedtime). Drink a glass of warm milk or caffeine-free herbal tea before going to bed. · Ask your doctor about support groups and other resources in your area. They can help people who have Alzheimer's disease and their families. · Be patient. You may find that a task takes you longer than it used to. · If you have not already done so, make a list of advance directives. Advance directives are instructions to your doctor and family members about what kind of care you want if you become unable to speak or express yourself. Talk to a  about making a will, if you do not already have one. Keeping schedules  · Develop a routine. You will feel less frustrated or confused if you have a clear, simple plan of what to do every day. ? Make lists of your medicines and when to take them. ? Write down appointments and other tasks in a calendar. ? Put sticky notes around the house to help you remember events and other things you have to do. ? Schedule activities and tasks for times of the day when you are best able to handle them. Staying safe  · Tell someone when you are going out and where you are going. Let the person know when you will be back. Before you go out alone, write down where you are going, how to get there, and how to get back home. Do this even if you have gone there many times before. Take someone along with you when possible. · Make your home safe. Tack down rugs, put no-slip tape in the tub, use handrails, and put safety switches on stoves and appliances. · Have a family member or other caregiver tell you whether you are driving badly. Deciding to stop driving is very hard for many people. Driving helps you feel independent. Your state 's license bureau can do a driving test if there is any question. Plan for other means of getting around when you are no longer able to drive. · Use strong lighting, especially at night.  Put night-lights in bedrooms, hallways, and bathrooms. · Lower the hot water temperature setting to 120°F or lower to avoid burns. When should you call for help? Call 911 anytime you think you may need emergency care. For example, call if:    · You are lost and do not know whom to call.     · You are injured and do not know whom to call. Call your doctor now or seek immediate medical care if:    · Your symptoms suddenly get much worse. Watch closely for changes in your health, and be sure to contact your doctor if:    · You want more information about how you can take care of yourself. Where can you learn more? Go to http://www.gray.com/  Enter Y179 in the search box to learn more about \"Alzheimer's Disease: Care Instructions. \"  Current as of: June 16, 2021               Content Version: 13.2  © 6921-3512 [employer]. Care instructions adapted under license by [employer] (which disclaims liability or warranty for this information). If you have questions about a medical condition or this instruction, always ask your healthcare professional. [place] [address] any warranty or liability for your use of this information. Dementia: Care Instructions  Your Care Instructions     Dementia is a loss of mental skills that affects your daily life. It is different than the occasional trouble with memory that is part of aging. You may find it hard to remember things that you feel you should be able to remember. Or you may feel that your mind is just not working as well as usual.  Finding out that you have dementia is a shock. You may be afraid and worried about how the condition will change your life. Although there is no cure at this time, medicine may slow memory loss and improve thinking for a while. Other medicines may be able to help you sleep or cope with depression and behavior changes. Dementia often gets worse slowly.  But it can get worse quickly. As dementia gets worse, it may become harder to do common things that take planning, like making a list and going shopping. Over time, the disease may make it hard for you to take care of yourself. Some people with dementia need others to help care for them. Dementia is different for everyone. You may be able to function well for a long time. In the early stage of the condition, you can do things at home to make life easier and safer. You also can keep doing your hobbies and other activities. Many people find comfort in planning now for their future needs. Follow-up care is a key part of your treatment and safety. Be sure to make and go to all appointments, and call your doctor if you are having problems. It's also a good idea to know your test results and keep a list of the medicines you take. How can you care for yourself at home? · Take your medicines exactly as prescribed. Call your doctor if you think you are having a problem with your medicine. · Eat healthy foods. Eat lots of whole grains, fruits, and vegetables every day. If you are not hungry, try snacks or nutritional drinks such as Boost, Ensure, or Sustacal.  · If you have problems sleeping:  ? Try not to nap too close to your bedtime. ? Exercise regularly. Walking is a good choice. ? Try a glass of warm milk or caffeine-free herbal tea before bed. · Do tasks and activities during the time of day when you feel your best. It may help to develop a daily routine. · Post labels, lists, and sticky notes to help you remember things. Write your activities on a calendar you can easily find. Put your clock where you can easily see it. · Stay active. Take walks in familiar places, or with friends or loved ones. Try to stay active mentally too. Read and work crossword puzzles if you enjoy these activities.   · Do not drive unless you can pass an on-road driving test. If you are not sure if you are safe to drive, your state 's license bureau can test you. · Keep a cordless phone and a flashlight with new batteries by your bed. If possible, put a phone in each of the main rooms of your house, or carry a cell phone in case you fall and cannot reach a phone. Or, you can wear a device around your neck or wrist. You push a button that sends a signal for help. Acknowledge your emotions and plan for the future  · Talk openly and honestly with your doctor. · Let yourself grieve. It is common to feel angry, scared, frustrated, anxious, or depressed. · Get emotional support from family, friends, a support group, or a counselor experienced in working with people who have dementia. · Ask for help if you need it. · Tell your doctor how you feel. You may feel upset, angry, or worried at times. Many things can cause this, including poor sleep, medicine side effects, confusion, and pain. Your doctor may be able to help you. · Plan for the future. ? Talk to your family and doctor about preparing a living will and other important papers while you can make decisions. These papers tell your doctors how to care for you at the end of your life. ? Consider naming a person to make decisions about your care if you are not able to. When should you call for help? Call 911 anytime you think you may need emergency care. For example, call if:    · You are lost and do not know whom to call.     · You are injured and do not know whom to call. Call your doctor now or seek immediate medical care if:    · You are more confused or upset than usual.     · You feel like you could hurt yourself because your mind is not working well. Watch closely for changes in your health, and be sure to contact your doctor if you have any problems. Where can you learn more? Go to http://luz maria-yoselin.info/  Enter W603 in the search box to learn more about \"Dementia: Care Instructions. \"  Current as of: June 16, 2021               Content Version: 13.2  © 9992-6001 [employer]. Care instructions adapted under license by [employer] (which disclaims liability or warranty for this information). If you have questions about a medical condition or this instruction, always ask your healthcare professional. [place] [address] any warranty or liability for your use of this information.

## 2022-03-28 NOTE — PROGRESS NOTES
1. Have you been to the ER, urgent care clinic since your last visit? Hospitalized since your last visit? No    2. Have you seen or consulted any other health care providers outside of the [address] [place] [address] since your last visit? Include any pap smears or colon screening. 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      Chief Complaint   Patient presents with    Hypertension    Anxiety    Depression    GERD

## 2022-03-31 PROBLEM — C44.90 SKIN CANCER: Status: ACTIVE | Noted: 2022-03-31

## 2022-05-06 ENCOUNTER — HOSPITAL ENCOUNTER (OUTPATIENT)
Dept: LAB | Age: 87
Discharge: HOME | End: 2022-05-06

## 2022-05-06 DIAGNOSIS — N18.4 CHRONIC RENAL IMPAIRMENT, STAGE 4 (SEVERE) (HCC): ICD-10-CM

## 2022-05-06 DIAGNOSIS — Z13.220 SCREENING FOR HYPERLIPIDEMIA: ICD-10-CM

## 2022-05-06 DIAGNOSIS — F32.A ANXIETY AND DEPRESSION: ICD-10-CM

## 2022-05-06 DIAGNOSIS — F41.9 ANXIETY AND DEPRESSION: ICD-10-CM

## 2022-05-06 DIAGNOSIS — I50.9 CONGESTIVE HEART FAILURE, UNSPECIFIED HF CHRONICITY, UNSPECIFIED HEART FAILURE TYPE (HCC): ICD-10-CM

## 2022-05-06 LAB — XX-LABCORP SPECIMEN COL,LCBCF: NORMAL

## 2022-05-06 PROCEDURE — 99001 SPECIMEN HANDLING PT-LAB: CPT

## 2022-05-07 LAB
ALBUMIN SERPL-MCNC: 3.8 G/DL (ref 3.5–4.6)
ALBUMIN/GLOB SERPL: 1.4 {RATIO} (ref 1.2–2.2)
ALP SERPL-CCNC: 93 IU/L (ref 44–121)
ALT SERPL-CCNC: 12 IU/L (ref 0–32)
AST SERPL-CCNC: 18 IU/L (ref 0–40)
BILIRUB SERPL-MCNC: 0.3 MG/DL (ref 0–1.2)
BUN SERPL-MCNC: 31 MG/DL (ref 10–36)
BUN/CREAT SERPL: 22 (ref 12–28)
CALCIUM SERPL-MCNC: 9.2 MG/DL (ref 8.7–10.3)
CHLORIDE SERPL-SCNC: 101 MMOL/L (ref 96–106)
CHOLEST SERPL-MCNC: 171 MG/DL (ref 100–199)
CO2 SERPL-SCNC: 24 MMOL/L (ref 20–29)
CREAT SERPL-MCNC: 1.44 MG/DL (ref 0.57–1)
EGFR: 34 ML/MIN/1.73
ERYTHROCYTE [DISTWIDTH] IN BLOOD BY AUTOMATED COUNT: 14.1 % (ref 11.7–15.4)
GLOBULIN SER CALC-MCNC: 2.8 G/DL (ref 1.5–4.5)
GLUCOSE SERPL-MCNC: 99 MG/DL (ref 65–99)
HCT VFR BLD AUTO: 39.5 % (ref 34–46.6)
HDLC SERPL-MCNC: 63 MG/DL
HGB BLD-MCNC: 12.9 G/DL (ref 11.1–15.9)
IMP & REVIEW OF LAB RESULTS: NORMAL
INTERPRETATION: NORMAL
LDLC SERPL CALC-MCNC: 92 MG/DL (ref 0–99)
MCH RBC QN AUTO: 29.4 PG (ref 26.6–33)
MCHC RBC AUTO-ENTMCNC: 32.7 G/DL (ref 31.5–35.7)
MCV RBC AUTO: 90 FL (ref 79–97)
PLATELET # BLD AUTO: 214 X10E3/UL (ref 150–450)
POTASSIUM SERPL-SCNC: 5 MMOL/L (ref 3.5–5.2)
PROT SERPL-MCNC: 6.6 G/DL (ref 6–8.5)
RBC # BLD AUTO: 4.39 X10E6/UL (ref 3.77–5.28)
SODIUM SERPL-SCNC: 137 MMOL/L (ref 134–144)
TRIGL SERPL-MCNC: 85 MG/DL (ref 0–149)
TSH SERPL DL<=0.005 MIU/L-ACNC: 3.32 UIU/ML (ref 0.45–4.5)
VLDLC SERPL CALC-MCNC: 16 MG/DL (ref 5–40)
WBC # BLD AUTO: 6.6 X10E3/UL (ref 3.4–10.8)

## 2022-05-10 NOTE — PROGRESS NOTES
Mildly elevated renal function. Keep follow up with nephrology. Avoid NSAIDs. Other labs stable. Further discussion on follow up visit.  Please send these labs to nephrology

## 2022-05-12 NOTE — PROGRESS NOTES
Left a voice message on patient's daughter, Raciel Tate (HIPAA verified), mobile phone ([phone]) asking for return call to [facility] in regards to patient's results, [phone] press option 3 for . Ask to speak with Edna.

## 2022-05-17 NOTE — PROGRESS NOTES
Received incoming from Mrs. Holly Antoine daughter Shabana Babin (HIPAA verified. She was advised that her mom labs showed mildly elevated renal function, all other labs are stable, to keep follow up with nephrology, to Avoid NSAIDs, and further discussion in reference to labs will be dicussed at her om's follow up visit on September 19,2022. Per John Hays she will call and schedule nephrology appointment for her mom and no further questions at this time.

## 2022-05-17 NOTE — TELEPHONE ENCOUNTER
This patient contacted office for the following prescriptions to be filled:    Last office visit: 3/28/2022  Follow up appointment: 9/19/2022  Medication requested :   Requested Prescriptions     Pending Prescriptions Disp Refills    furosemide (LASIX) 20 mg tablet       Sig: Take 1 Tablet by mouth four (4) days a week.      PCP: Emelina Johnston order or Local pharmacy name [address] [phone]

## 2022-05-18 RX ORDER — FUROSEMIDE 20 MG/1
20 TABLET ORAL
Qty: 90 TABLET | Refills: 0 | Status: SHIPPED | OUTPATIENT
Start: 2022-05-18 | End: 2022-08-17 | Stop reason: SDUPTHER

## 2022-06-07 RX ORDER — CARVEDILOL 12.5 MG/1
TABLET ORAL
Qty: 180 TABLET | Refills: 3 | Status: SHIPPED | OUTPATIENT
Start: 2022-06-07

## 2022-06-20 ENCOUNTER — APPOINTMENT (OUTPATIENT)
Dept: CT IMAGING | Age: 87
End: 2022-06-20
Attending: EMERGENCY MEDICINE
Payer: MEDICARE

## 2022-06-20 ENCOUNTER — HOSPITAL ENCOUNTER (EMERGENCY)
Age: 87
Discharge: HOME | End: 2022-06-20
Attending: EMERGENCY MEDICINE
Payer: MEDICARE

## 2022-06-20 ENCOUNTER — APPOINTMENT (OUTPATIENT)
Dept: GENERAL RADIOLOGY | Age: 87
End: 2022-06-20
Attending: EMERGENCY MEDICINE
Payer: MEDICARE

## 2022-06-20 VITALS
DIASTOLIC BLOOD PRESSURE: 59 MMHG | HEART RATE: 58 BPM | RESPIRATION RATE: 18 BRPM | HEIGHT: 62 IN | SYSTOLIC BLOOD PRESSURE: 142 MMHG | BODY MASS INDEX: 20.24 KG/M2 | TEMPERATURE: 99 F | WEIGHT: 110 LBS | OXYGEN SATURATION: 99 %

## 2022-06-20 DIAGNOSIS — R41.0 CONFUSION: ICD-10-CM

## 2022-06-20 DIAGNOSIS — N39.0 ACUTE UTI: Primary | ICD-10-CM

## 2022-06-20 LAB
APPEARANCE UR: CLEAR
APTT PPP: 25.5 SEC (ref 23–36.4)
BACTERIA URNS QL MICRO: ABNORMAL /HPF
BASOPHILS # BLD: 0 K/UL (ref 0–0.1)
BASOPHILS NFR BLD: 1 % (ref 0–2)
BILIRUB UR QL: NEGATIVE
COLOR UR: YELLOW
DIFFERENTIAL METHOD BLD: NORMAL
EOSINOPHIL # BLD: 0.1 K/UL (ref 0–0.4)
EOSINOPHIL NFR BLD: 1 % (ref 0–5)
EPITH CASTS URNS QL MICRO: ABNORMAL /LPF (ref 0–5)
ERYTHROCYTE [DISTWIDTH] IN BLOOD BY AUTOMATED COUNT: 13.6 % (ref 11.6–14.5)
GLUCOSE UR STRIP.AUTO-MCNC: NEGATIVE MG/DL
HCT VFR BLD AUTO: 38.5 % (ref 35–45)
HGB BLD-MCNC: 12.4 G/DL (ref 12–16)
HGB UR QL STRIP: NEGATIVE
IMM GRANULOCYTES # BLD AUTO: 0 K/UL (ref 0–0.04)
IMM GRANULOCYTES NFR BLD AUTO: 0 % (ref 0–0.5)
INR PPP: 1 (ref 0.8–1.2)
KETONES UR QL STRIP.AUTO: NEGATIVE MG/DL
LEUKOCYTE ESTERASE UR QL STRIP.AUTO: ABNORMAL
LYMPHOCYTES # BLD: 1.5 K/UL (ref 0.9–3.6)
LYMPHOCYTES NFR BLD: 23 % (ref 21–52)
MCH RBC QN AUTO: 28.8 PG (ref 24–34)
MCHC RBC AUTO-ENTMCNC: 32.2 G/DL (ref 31–37)
MCV RBC AUTO: 89.5 FL (ref 78–100)
MONOCYTES # BLD: 0.2 K/UL (ref 0.05–1.2)
MONOCYTES NFR BLD: 4 % (ref 3–10)
NEUTS SEG # BLD: 4.6 K/UL (ref 1.8–8)
NEUTS SEG NFR BLD: 72 % (ref 40–73)
NITRITE UR QL STRIP.AUTO: NEGATIVE
NRBC # BLD: 0 K/UL (ref 0–0.01)
NRBC BLD-RTO: 0 PER 100 WBC
PH UR STRIP: 7 [PH] (ref 5–8)
PLATELET # BLD AUTO: 186 K/UL (ref 135–420)
PMV BLD AUTO: 9.5 FL (ref 9.2–11.8)
PROT UR STRIP-MCNC: ABNORMAL MG/DL
PROTHROMBIN TIME: 13.5 SEC (ref 11.5–15.2)
RBC # BLD AUTO: 4.3 M/UL (ref 4.2–5.3)
RBC #/AREA URNS HPF: NEGATIVE /HPF (ref 0–5)
SP GR UR REFRACTOMETRY: 1.01 (ref 1–1.03)
TROPONIN-HIGH SENSITIVITY: 16 NG/L (ref 0–54)
UROBILINOGEN UR QL STRIP.AUTO: 0.2 EU/DL (ref 0.2–1)
WBC # BLD AUTO: 6.5 K/UL (ref 4.6–13.2)
WBC URNS QL MICRO: ABNORMAL /HPF (ref 0–4)

## 2022-06-20 PROCEDURE — 84484 ASSAY OF TROPONIN QUANT: CPT

## 2022-06-20 PROCEDURE — 85610 PROTHROMBIN TIME: CPT

## 2022-06-20 PROCEDURE — 99285 EMERGENCY DEPT VISIT HI MDM: CPT

## 2022-06-20 PROCEDURE — 71045 X-RAY EXAM CHEST 1 VIEW: CPT

## 2022-06-20 PROCEDURE — 70450 CT HEAD/BRAIN W/O DYE: CPT

## 2022-06-20 PROCEDURE — 96374 THER/PROPH/DIAG INJ IV PUSH: CPT

## 2022-06-20 PROCEDURE — 85730 THROMBOPLASTIN TIME PARTIAL: CPT

## 2022-06-20 PROCEDURE — 93005 ELECTROCARDIOGRAM TRACING: CPT

## 2022-06-20 PROCEDURE — 74011000250 HC RX REV CODE- 250: Performed by: EMERGENCY MEDICINE

## 2022-06-20 PROCEDURE — 87186 SC STD MICRODIL/AGAR DIL: CPT

## 2022-06-20 PROCEDURE — 87086 URINE CULTURE/COLONY COUNT: CPT

## 2022-06-20 PROCEDURE — 74011250636 HC RX REV CODE- 250/636: Performed by: EMERGENCY MEDICINE

## 2022-06-20 PROCEDURE — 87077 CULTURE AEROBIC IDENTIFY: CPT

## 2022-06-20 PROCEDURE — 81001 URINALYSIS AUTO W/SCOPE: CPT

## 2022-06-20 PROCEDURE — 85025 COMPLETE CBC W/AUTO DIFF WBC: CPT

## 2022-06-20 RX ORDER — CEFDINIR 300 MG/1
300 CAPSULE ORAL 2 TIMES DAILY
Qty: 14 CAPSULE | Refills: 0 | Status: SHIPPED | OUTPATIENT
Start: 2022-06-20 | End: 2022-06-27

## 2022-06-20 RX ADMIN — WATER 1 G: 1 INJECTION INTRAMUSCULAR; INTRAVENOUS; SUBCUTANEOUS at 16:34

## 2022-06-20 NOTE — DISCHARGE INSTRUCTIONS
Make sure to take antibiotics for the next week and drink plenty of fluids. Make sure also to follow-up closely with your primary doctor and return if you are at all worsened or concerned. Fevers, vomiting, inability to take antibiotics, or any worsening should warrant a visit back to the emergency department.

## 2022-06-20 NOTE — ED PROVIDER NOTES
EMERGENCY DEPARTMENT HISTORY AND PHYSICAL EXAM    1:25 PM      Date: 2022  Patient Name: Roxanne Montiel    History of Presenting Illness     Chief Complaint   Patient presents with    Dizziness         History Provided By: Patient and Caregiver  Location/Duration/Severity/Modifying factors   Patient is a 69-year-old female with a history of mild dementia, hypertension, anxiety, depression, pacemaker placement, GERD, osteoarthritis, the presents emergency department with a complaint of altered mental status and change in coloring with some shoulder pain that happened this morning at 1030. The patient was sleeping and according to the patient's daughter who provides a history and the patient at 21  awaken and not seem to be at her baseline. The patient was confused, asking about a  that is not occurring, and started to complain of left shoulder pain and started to appear gray according the patient's daughter. Patient improved briefly and the patient's children together and let her rest for 30 minutes and then reevaluated. PCP: Evonne Henderson MD    Current Facility-Administered Medications   Medication Dose Route Frequency Provider Last Rate Last Admin    cefTRIAXone (ROCEPHIN) 1 g in sterile water (preservative free) 10 mL IV syringe  1 g IntraVENous Q24H Luda Wallace MD         Current Outpatient Medications   Medication Sig Dispense Refill    cefdinir (OMNICEF) 300 mg capsule Take 1 Capsule by mouth two (2) times a day for 7 days. 14 Capsule 0    carvediloL (COREG) 12.5 mg tablet TAKE 1 TABLET BY MOUTH TWICE DAILY WITH MEALS 180 Tablet 3    furosemide (LASIX) 20 mg tablet Take 1 Tablet by mouth four (4) days a week.  90 Tablet 0    omeprazole (PRILOSEC) 40 mg capsule TAKE 1 CAPSULE BY MOUTH DAILY 90 Capsule 1    atorvastatin (LIPITOR) 10 mg tablet TAKE 1 TABLET BY MOUTH EVERY NIGHT 90 Tablet 3    sertraline (ZOLOFT) 25 mg tablet TAKE 1 TABLET BY MOUTH DAILY 90 Tablet 1    cranberry extract (CRANBERRY JUICE POWDER) 425 mg cap Take 1 Cap by mouth daily.  aspirin 81 mg chewable tablet Take 1 Tab by mouth daily. 30 Tab 11    fluticasone (FLONASE) 50 mcg/actuation nasal spray 1 [place] by Both Nostrils route as needed.  loratadine (CLARITIN) 10 mg tablet Take 10 mg by mouth daily as needed for Allergies.  MULTI-VITAMIN PO Take 1 Tab by mouth daily. Past History     Past Medical History:  Past Medical History:   Diagnosis Date    Anxiety     Arthritis     Depression     GERD (gastroesophageal reflux disease)     HTN (hypertension)     LBBB (left bundle branch block)     Osteoporosis     completed 8 years Fosamax    Sciatica     Urinary incontinence        Past Surgical History:  Past Surgical History:   Procedure Laterality Date    HX CHOLECYSTECTOMY      HX HYSTERECTOMY      HX ORTHOPAEDIC      fractured right patella surgery    [place] ANESTH,SURGERY OF SHOULDER      [place] CARDIAC SURG PROCEDURE UNLIST  11/2018    pacemaker     [place] INS NEW/RPLC PRM PACEMAKER W/TRANSV ELTRD VENTR N/A 11/28/2018    INSERT PPM SINGLE VENTRICULAR performed by Chris Orlando MD at [facility] CATH LAB       Family History:  Family History   Problem Relation Age of Onset    Heart Disease Mother     Diabetes Mother     Heart Disease Father     Diabetes Father     Hypertension Sister     Diabetes Sister     Heart Disease Brother     Heart Disease Sister     Diabetes Sister     Heart Disease Brother        Social History:  Social History     Tobacco Use    Smoking status: Never Smoker    Smokeless tobacco: Never Used   Substance Use Topics    Alcohol use: No    Drug use: No       Allergies:  No Known Allergies      Review of Systems       Review of Systems   Constitutional: Negative for activity change, fatigue and fever. HENT: Negative for congestion and rhinorrhea. Eyes: Negative for visual disturbance. Respiratory: Negative for shortness of breath. Cardiovascular: Negative for chest pain and palpitations. Gastrointestinal: Negative for abdominal pain, diarrhea, nausea and vomiting. Genitourinary: Negative for dysuria and hematuria. Musculoskeletal: Positive for arthralgias. Negative for back pain. Skin: Negative for rash. Neurological: Positive for light-headedness. Negative for dizziness and weakness. Psychiatric/Behavioral: Positive for hallucinations. All other systems reviewed and are negative. Physical Exam     Visit Vitals  BP (!) 142/59 (BP 1 Location: Left upper arm, BP Patient Position: Sitting)   Pulse (!) 58   Temp 99 °F (37.2 °C)   Resp 18   Ht 5' 2\" (1.575 m)   Wt 49.9 kg (110 lb)   LMP  (LMP Unknown)   SpO2 99%   BMI 20.12 kg/m²         Physical Exam  Vitals and nursing note reviewed. Constitutional:       General: She is not in acute distress. Appearance: She is well-developed. Comments: Alert and oriented x3, supportive daughter at the bedside   HENT:      Head: Normocephalic and atraumatic. Right Ear: External ear normal.      Left Ear: External ear normal.      Nose: Nose normal.   Eyes:      General: No scleral icterus. Conjunctiva/sclera: Conjunctivae normal.      Pupils: Pupils are equal, round, and reactive to light. Neck:      Thyroid: No thyromegaly. Vascular: No JVD. Trachea: No tracheal deviation. Cardiovascular:      Rate and Rhythm: Regular rhythm. Bradycardia present. Heart sounds: Normal heart sounds. No murmur heard. No friction rub. No gallop. Comments: Pacer noted in chest wall  Pulmonary:      Effort: Pulmonary effort is normal.      Breath sounds: Normal breath sounds. Chest:      Chest wall: No tenderness. Abdominal:      General: Bowel sounds are normal. There is no distension. Palpations: Abdomen is soft. Tenderness: There is no abdominal tenderness. There is no guarding or rebound. Musculoskeletal:         General: No tenderness.  Normal range of motion. Cervical back: Normal range of motion and neck supple. Lymphadenopathy:      Cervical: No cervical adenopathy. Skin:     General: Skin is warm and dry. Neurological:      Mental Status: She is alert and oriented to person, place, and time. Cranial Nerves: No cranial nerve deficit. Coordination: Coordination normal.      Comments: Mild global weakness, follows commands, gait not observed   Psychiatric:      Comments: Supportive and insightful daughter at the bedside           Diagnostic Study Results     Labs -  Recent Results (from the past 12 hour(s))   CBC WITH AUTOMATED DIFF    Collection Time: 06/20/22  1:25 PM   Result Value Ref Range    WBC 6.5 4.6 - 13.2 K/uL    RBC 4.30 4.20 - 5.30 M/uL    HGB 12.4 12.0 - 16.0 g/dL    HCT 38.5 35.0 - 45.0 %    MCV 89.5 78.0 - 100.0 FL    MCH 28.8 24.0 - 34.0 PG    MCHC 32.2 31.0 - 37.0 g/dL    RDW 13.6 11.6 - 14.5 %    PLATELET 986 441 - 491 K/uL    MPV 9.5 9.2 - 11.8 FL    NRBC 0.0 0  WBC    ABSOLUTE NRBC 0.00 0.00 - 0.01 K/uL    NEUTROPHILS 72 40 - 73 %    LYMPHOCYTES 23 21 - 52 %    MONOCYTES 4 3 - 10 %    EOSINOPHILS 1 0 - 5 %    BASOPHILS 1 0 - 2 %    IMMATURE GRANULOCYTES 0 0.0 - 0.5 %    ABS. NEUTROPHILS 4.6 1.8 - 8.0 K/UL    ABS. LYMPHOCYTES 1.5 0.9 - 3.6 K/UL    ABS. MONOCYTES 0.2 0.05 - 1.2 K/UL    ABS. EOSINOPHILS 0.1 0.0 - 0.4 K/UL    ABS. BASOPHILS 0.0 0.0 - 0.1 K/UL    ABS. IMM.  GRANS. 0.0 0.00 - 0.04 K/UL    DF AUTOMATED     TROPONIN-HIGH SENSITIVITY    Collection Time: 06/20/22  1:25 PM   Result Value Ref Range    Troponin-High Sensitivity 16 0 - 54 ng/L   PROTHROMBIN TIME + INR    Collection Time: 06/20/22  1:25 PM   Result Value Ref Range    Prothrombin time 13.5 11.5 - 15.2 sec    INR 1.0 0.8 - 1.2     PTT    Collection Time: 06/20/22  1:25 PM   Result Value Ref Range    aPTT 25.5 23.0 - 36.4 SEC   URINALYSIS W/ RFLX MICROSCOPIC    Collection Time: 06/20/22  1:25 PM   Result Value Ref Range    Color YELLOW Appearance CLEAR      Specific gravity 1.015 1.005 - 1.030      pH (UA) 7.0 5.0 - 8.0      Protein TRACE (A) NEG mg/dL    Glucose Negative NEG mg/dL    Ketone Negative NEG mg/dL    Bilirubin Negative NEG      Blood Negative NEG      Urobilinogen 0.2 0.2 - 1.0 EU/dL    Nitrites Negative NEG      Leukocyte Esterase LARGE (A) NEG     URINE MICROSCOPIC ONLY    Collection Time: 06/20/22  1:25 PM   Result Value Ref Range    WBC TOO NUMEROUS TO COUNT 0 - 4 /hpf    RBC Negative 0 - 5 /hpf    Epithelial cells FEW 0 - 5 /lpf    Bacteria FEW (A) NEG /hpf       Radiologic Studies -   CT HEAD WO CONT   Final Result      No acute findings. Mild generalized volume loss and mild sequela of chronic microvascular ischemic   disease. Moderate bilateral mastoid effusions. XR CHEST SNGL V   Final Result   Suspect mild vascular congestion with background of fibrosis. Medical Decision Making   I am the first provider for this patient. I reviewed the vital signs, available nursing notes, past medical history, past surgical history, family history and social history. Vital Signs-Reviewed the patient's vital signs. EKG: Sinus bradycardia, left bundle branch block, no STEMI, interpreted by me    Records Reviewed: Nursing Notes, Old Medical Records, Previous Radiology Studies and Previous Laboratory Studies (Time of Review: 1:25 PM)    ED Course: Progress Notes, Reevaluation, and Consults: Workup and recommendations were reviewed with the patient and all questions were answered. The patient understands the plan and will proceed with close outpatient care. I have encouraged the patient to return if at all worsened or concerned.    Spencer Feliciano DO 4:03 PM      Provider Notes (Medical Decision Making):   MDM  Number of Diagnoses or Management Options  Acute UTI  Confusion  Diagnosis management comments: Patient is a 54-year-old female with a history of mild dementia, left bundle branch block, anxiety, depression, hypertension, osteoporosis, history of pacemaker the presents emergency department with complaint of transient altered mental status this morning with an episode of lightheadedness and change in color is now improved. The patient emergency department is oriented x3, in no distress, follows commands, and daughter is very insightful at the bedside and says that she is concerned she may have a UTI. Patient's CAT scan is reassuring and cardiac labs are reassuring however does have a urinary tract infection and likely the source of the symptoms. We will start the patient on Rocephin in the emergency department, and discussed disposition with the patient's daughter and says she would really like the patient to go home. At this point the patient is hemodynamically stable I think is reasonable plan. We will start the patient on Omnicef and have the patient follow closely as an outpatient and send urine culture. The patient and daughter were educated on the signs worsening and will return if at all worsened or concerned. Procedures          Diagnosis     Clinical Impression:   1. Acute UTI    2. Confusion        Disposition: DC    Follow-up Information     Follow up With Specialties Details Why Contact Info    Luis Nice MD Family Medicine In 2 days  [id number] [place] [id number]  [phone]      [facility] EMERGENCY DEPT Emergency Medicine  As needed, If symptoms worsen [address] [facility]  [phone]           Patient's Medications   Start Taking    CEFDINIR (OMNICEF) 300 MG CAPSULE    Take 1 Capsule by mouth two (2) times a day for 7 days. Continue Taking    ASPIRIN 81 MG CHEWABLE TABLET    Take 1 Tab by mouth daily.     ATORVASTATIN (LIPITOR) 10 MG TABLET    TAKE 1 TABLET BY MOUTH EVERY NIGHT    CARVEDILOL (COREG) 12.5 MG TABLET    TAKE 1 TABLET BY MOUTH TWICE DAILY WITH MEALS    CRANBERRY EXTRACT (CRANBERRY JUICE POWDER) 425 MG CAP    Take 1 Cap by mouth daily. FLUTICASONE (FLONASE) 50 MCG/ACTUATION NASAL SPRAY    1 [place] by Both Nostrils route as needed. FUROSEMIDE (LASIX) 20 MG TABLET    Take 1 Tablet by mouth four (4) days a week. LORATADINE (CLARITIN) 10 MG TABLET    Take 10 mg by mouth daily as needed for Allergies. MULTI-VITAMIN PO    Take 1 Tab by mouth daily. OMEPRAZOLE (PRILOSEC) 40 MG CAPSULE    TAKE 1 CAPSULE BY MOUTH DAILY    SERTRALINE (ZOLOFT) 25 MG TABLET    TAKE 1 TABLET BY MOUTH DAILY   These Medications have changed    No medications on file   Stop Taking    No medications on file     Disclaimer: Sections of this note are dictated using utilizing voice recognition software. Minor typographical errors may be present. If questions arise, please do not hesitate to contact me or call our department.

## 2022-06-21 LAB
ATRIAL RATE: 58 BPM
CALCULATED P AXIS, ECG09: 2 DEGREES
CALCULATED R AXIS, ECG10: -33 DEGREES
CALCULATED T AXIS, ECG11: 159 DEGREES
DIAGNOSIS, 93000: NORMAL
P-R INTERVAL, ECG05: 232 MS
Q-T INTERVAL, ECG07: 532 MS
QRS DURATION, ECG06: 172 MS
QTC CALCULATION (BEZET), ECG08: 522 MS
VENTRICULAR RATE, ECG03: 58 BPM

## 2022-06-23 LAB
BACTERIA SPEC CULT: ABNORMAL
CC UR VC: ABNORMAL
SERVICE CMNT-IMP: ABNORMAL

## 2022-06-24 ENCOUNTER — VIRTUAL VISIT (OUTPATIENT)
Dept: FAMILY MEDICINE CLINIC | Age: 87
End: 2022-06-24
Payer: MEDICARE

## 2022-06-24 DIAGNOSIS — I50.22 CHRONIC SYSTOLIC (CONGESTIVE) HEART FAILURE (HCC): ICD-10-CM

## 2022-06-24 DIAGNOSIS — N39.0 ACUTE UTI: Primary | ICD-10-CM

## 2022-06-24 DIAGNOSIS — R00.1 BRADYCARDIA: ICD-10-CM

## 2022-06-24 DIAGNOSIS — N18.30 STAGE 3 CHRONIC KIDNEY DISEASE, UNSPECIFIED WHETHER STAGE 3A OR 3B CKD (HCC): ICD-10-CM

## 2022-06-24 PROCEDURE — 1101F PT FALLS ASSESS-DOCD LE1/YR: CPT | Performed by: FAMILY MEDICINE

## 2022-06-24 PROCEDURE — 99213 OFFICE O/P EST LOW 20 MIN: CPT | Performed by: FAMILY MEDICINE

## 2022-06-24 PROCEDURE — G9717 DOC PT DX DEP/BP F/U NT REQ: HCPCS | Performed by: FAMILY MEDICINE

## 2022-06-24 PROCEDURE — 1123F ACP DISCUSS/DSCN MKR DOCD: CPT | Performed by: FAMILY MEDICINE

## 2022-06-24 PROCEDURE — 1090F PRES/ABSN URINE INCON ASSESS: CPT | Performed by: FAMILY MEDICINE

## 2022-06-24 PROCEDURE — G8427 DOCREV CUR MEDS BY ELIG CLIN: HCPCS | Performed by: FAMILY MEDICINE

## 2022-06-24 NOTE — PROGRESS NOTES
John Keith is a 80 y.o. female who was seen by synchronous (real-time) audio-video technology on 6/24/2022 for ED Follow-up        Assessment & Plan:   Diagnoses and all orders for this visit:    Acute UTI: On antibiotic. Tolerating it well. No side effects    Stage 3 chronic kidney disease, unspecified whether stage 3a or 3b CKD ([address]): Avoid NSAIDs. Drink more fluid. Observe    Chronic systolic (congestive) heart failure: Cardiomegaly on x-ray. No signs of volume overload. Will observe as she is on Lasix as needed. Not on potassium recent hypotension was around    Bradycardia: Checking blood pressure at home but not heart rate. Advised to keep the log and contact her cardiologist if heart rate lower than 60. She is on Coreg can consider lower the dose if persistent bradycardia. Daughter understood it well. Pt/ daughter  understood and agree with the plan   A follow-up appointment as scheduled. Please note that this dictation was completed with [employer], the computer voice recognition software. Quite often unanticipated grammatical, syntax, homophones, and other interpretive errors are inadvertently transcribed by the computer software. Please disregard these errors. Please excuse any errors that have escaped final proofreading. 712  Subjective:   Done visit through [address]  Most history given by daughter  Patient went to emergency room for altered mental status. Diagnosis of UTI. On antibiotic. Gradually improving. Reviewed records through the chart. Also daughter had some concern about EKG changes which showed bradycardia and first-degree AV block. She will contact her cardiologist.  Patient denies any chest pain or shortness of breath. No palpitation or diaphoresis. History of dementia. No behavioral changes at this time. Sleep is fair.   Lab Results   Component Value Date/Time    WBC 6.5 06/20/2022 01:25 PM    WBC 5.4 05/17/2012 08:41 AM    HGB 12.4 06/20/2022 01:25 PM    HCT 38.5 06/20/2022 01:25 PM    PLATELET 492 65/65/3672 01:25 PM    MCV 89.5 06/20/2022 01:25 PM     Lab Results   Component Value Date/Time    Sodium 137 05/06/2022 12:00 AM    Potassium 5.0 05/06/2022 12:00 AM    Chloride 101 05/06/2022 12:00 AM    CO2 24 05/06/2022 12:00 AM    Anion gap 4 12/30/2021 11:39 AM    Glucose 99 05/06/2022 12:00 AM    BUN 31 05/06/2022 12:00 AM    Creatinine 1.44 (H) 05/06/2022 12:00 AM    BUN/Creatinine ratio 22 05/06/2022 12:00 AM    GFR est AA 36 (L) 12/30/2021 11:39 AM    GFR est non-AA 30 (L) 12/30/2021 11:39 AM    Calcium 9.2 05/06/2022 12:00 AM    Bilirubin, total 0.3 05/06/2022 12:00 AM    Alk.  phosphatase 93 05/06/2022 12:00 AM    Protein, total 6.6 05/06/2022 12:00 AM    Albumin 3.8 05/06/2022 12:00 AM    Globulin 3.5 12/30/2021 11:34 AM    A-G Ratio 1.4 05/06/2022 12:00 AM    ALT (SGPT) 12 05/06/2022 12:00 AM    AST (SGOT) 18 05/06/2022 12:00 AM     Lab Results   Component Value Date/Time    Cholesterol, total 171 05/06/2022 12:00 AM    HDL Cholesterol 63 05/06/2022 12:00 AM    LDL, calculated 92 05/06/2022 12:00 AM    LDL, calculated 96.8 11/25/2020 08:59 AM    VLDL, calculated 16 05/06/2022 12:00 AM    VLDL, calculated 23.2 11/25/2020 08:59 AM    Triglyceride 85 05/06/2022 12:00 AM    CHOL/HDL Ratio 2.9 11/25/2020 08:59 AM     Lab Results   Component Value Date/Time    TSH 3.320 05/06/2022 12:00 AM     Lab Results   Component Value Date/Time    CK 38 07/05/2019 07:52 PM    CK - MB <1.0 07/05/2019 07:52 PM    CK-MB Index  07/05/2019 07:52 PM     CALCULATION NOT PERFORMED WHEN RESULT IS BELOW LINEAR LIMIT    Troponin-I, QT <0.02 07/05/2019 07:52 PM       CT Results (most recent):  Results from Hospital Encounter encounter on 06/20/22    CT HEAD WO CONT    Narrative  CT HEAD WO CONT    History: Possible syncope    Comparison: 11/25/2020    TECHNIQUE: 5 mm helical scan obtained of the head were obtained from the skull  vertex through the base of the skull without intravenous contrast.    All CT scans at this facility are performed using dose optimization technique as  appropriate to a performed exam, to include automated exposure control,  adjustment of the mA and/or kV according to patient size (including appropriate  matching first site-specific examinations), or use of iterative reconstruction  technique. BRAIN RESULT:    Mild generalized volume loss. Atherosclerosis. Mild periventricular hypodensity. Gray-white matter differentiation is maintained. No acute intracranial  hemorrhage. No midline shift. Basilar cisterns are patent. Limited visualized orbits, soft tissues and osseous structures are grossly  normal. Mild sinus mucosal thickening. Moderate bilateral mastoid effusions. Impression  No acute findings. Mild generalized volume loss and mild sequela of chronic microvascular ischemic  disease. Moderate bilateral mastoid effusions. XR Results (most recent):  Results from Hospital Encounter encounter on 06/20/22    XR CHEST SNGL V    Narrative  Portable CXR:    HISTORY: Altered mental status. Comparison 12/28/2021    Moderate cardiomegaly. Suspect mild vascular congestion with background of  fibrosis. No consolidation. No pleural effusion. Pacemaker, stable. Impression  Suspect mild vascular congestion with background of fibrosis. 4 d ago 2 yr ago    Ventricular Rate BPM 58  96    Atrial Rate BPM 58  96    P-R Interval ms 232  206    QRS Duration ms 172  160    Q-T Interval ms 532  412    QTC Calculation (Bezet) ms 522  520    Calculated P Axis degrees 2  9    Calculated R Axis degrees -33  -27    Calculated T Axis degrees 159  147    Diagnosis  Sinus bradycardia with 1st degree AV block   Left axis deviation   Left bundle branch block   Abnormal ECG   When compared with ECG of 05-JUL-2019 19:50,   Vent.  rate has decreased BY  38 BPM   Confirmed by Nicholas Karimi ([id number]) on 6/21/2022           Prior to Admission medications    Medication Sig Start Date End Date Taking? Authorizing Provider   cefdinir (OMNICEF) 300 mg capsule Take 1 Capsule by mouth two (2) times a day for 7 days. 6/20/22 6/27/22 Yes Phoenix Moore MD   carvediloL (COREG) 12.5 mg tablet TAKE 1 TABLET BY MOUTH TWICE DAILY WITH MEALS 6/7/22  Yes Roxana Israel NP   furosemide (LASIX) 20 mg tablet Take 1 Tablet by mouth four (4) days a week. 5/18/22  Yes Lora Rodgers MD   omeprazole (PRILOSEC) 40 mg capsule TAKE 1 CAPSULE BY MOUTH DAILY 3/4/22  Yes Lora Rodgers MD   atorvastatin (LIPITOR) 10 mg tablet TAKE 1 TABLET BY MOUTH EVERY NIGHT 2/22/22  Yes Roxana Israel NP   sertraline (ZOLOFT) 25 mg tablet TAKE 1 TABLET BY MOUTH DAILY 1/28/22  Yes Lora Rodgers MD   cranberry extract (CRANBERRY JUICE POWDER) 425 mg cap Take 1 Cap by mouth daily. Yes Provider, Historical   aspirin 81 mg chewable tablet Take 1 Tab by mouth daily. 11/30/18  Yes Nabila Bermeo MD   loratadine (CLARITIN) 10 mg tablet Take 10 mg by mouth daily as needed for Allergies. Yes Provider, Historical   MULTI-VITAMIN PO Take 1 Tab by mouth daily. Yes Provider, Historical   fluticasone (FLONASE) 50 mcg/actuation nasal spray 1 [place] by Both Nostrils route as needed.     Provider, Historical     Patient Active Problem List    Diagnosis Date Noted    Chronic renal disease, stage III 06/24/2022    Chronic systolic (congestive) heart failure 06/24/2022    Skin cancer 03/31/2022    Peripheral vascular disease ([facility] [address] 11/16/2020    Other fatigue 08/05/2019    Dyspnea on exertion 02/14/2019    Acute UTI 11/24/2018    Functional urinary incontinence 11/24/2018    Pulmonary edema 11/24/2018    Primary osteoarthritis of both knees 11/01/2018    Syncope 11/01/2018    Seasonal allergic rhinitis due to pollen 04/23/2018    Dysphagia 12/06/2016    Advance directive discussed with patient 10/21/2016    Gastroesophageal reflux disease without esophagitis 10/21/2016    Primary hypertension 06/22/2016    S/P ORIF (open reduction internal fixation) fracture 06/14/2016    Memory loss 12/16/2015    Vertigo, benign paroxysmal 06/15/2015    Distal radius fracture, left 11/30/2013    Sprain and strain of shoulder and upper arm 11/30/2013    Zoster 06/10/2013    Generalized osteoarthrosis, involving multiple sites 01/08/2013    Reflux esophagitis 01/19/2012    Osteoporosis     Anxiety     Depression        ROS: See HPI    Objective:     Patient-Reported Vitals 6/23/2022   Patient-Reported Weight 103   Patient-Reported Temperature 97. 6   Patient-Reported Systolic  582   Patient-Reported Diastolic 67      General: alert, cooperative, no distress   Mental  status: normal mood, behavior, speech, dress, motor activity, and thought processes, able to follow commands   HENT: NCAT   Neck: no visualized mass   Resp: no respiratory distress   Neuro: no gross deficits   Skin: no discoloration or lesions of concern on visible areas   Psychiatric: normal affect, consistent with stated mood, no evidence of hallucinations     Additional exam findings: We discussed the expected course, resolution and complications of the diagnosis(es) in detail. Medication risks, benefits, costs, interactions, and alternatives were discussed as indicated. I advised her to contact the office if her condition worsens, changes or fails to improve as anticipated. She expressed understanding with the diagnosis(es) and plan. Raj Lema, was evaluated through a synchronous (real-time) audio-video encounter. The patient (or guardian if applicable) is aware that this is a billable service, which includes applicable co-pays. This Virtual Visit was conducted with patient's (and/or legal guardian's) consent.  The visit was conducted pursuant to the emergency declaration under the [address] [facility] [place] waiver authority and the [employer] and [employer] Appropriations Act. Patient identification was verified, and a caregiver was present when appropriate. The patient was located at: Home: [address] [place] [id number]  The provider was located at:  Facility (Appt Department): [address] [facility]Fourth Floor [address] [phone]        Zeeshan Alaniz MD

## 2022-08-03 RX ORDER — SERTRALINE HYDROCHLORIDE 25 MG/1
25 TABLET, FILM COATED ORAL DAILY
Qty: 90 TABLET | Refills: 1 | Status: SHIPPED | OUTPATIENT
Start: 2022-08-03

## 2022-08-03 NOTE — TELEPHONE ENCOUNTER
This pharmacy faxed over request for the following prescriptions to be filled:    Medication requested :   Requested Prescriptions     Pending Prescriptions Disp Refills    sertraline (ZOLOFT) 25 mg tablet 90 Tablet 1     Sig: Take 1 Tablet by mouth in the morning.      PCP: Luis Golden or Print: [facility]  Mail order or Local pharmacy [address] [facility]     Scheduled appointment if not seen by current providers in office: LOV 6/24/2022 FU 9/19/2022

## 2022-08-17 ENCOUNTER — CLINICAL SUPPORT (OUTPATIENT)
Dept: CARDIOLOGY CLINIC | Age: 87
End: 2022-08-17

## 2022-08-17 ENCOUNTER — OFFICE VISIT (OUTPATIENT)
Dept: CARDIOLOGY CLINIC | Age: 87
End: 2022-08-17
Payer: MEDICARE

## 2022-08-17 VITALS
HEART RATE: 67 BPM | WEIGHT: 102 LBS | DIASTOLIC BLOOD PRESSURE: 64 MMHG | SYSTOLIC BLOOD PRESSURE: 122 MMHG | BODY MASS INDEX: 18.77 KG/M2 | OXYGEN SATURATION: 98 % | HEIGHT: 62 IN

## 2022-08-17 DIAGNOSIS — I25.5 ISCHEMIC CARDIOMYOPATHY: Primary | ICD-10-CM

## 2022-08-17 DIAGNOSIS — Z95.0 CARDIAC PACEMAKER IN SITU: ICD-10-CM

## 2022-08-17 PROCEDURE — G9717 DOC PT DX DEP/BP F/U NT REQ: HCPCS | Performed by: INTERNAL MEDICINE

## 2022-08-17 PROCEDURE — 93288 INTERROG EVL PM/LDLS PM IP: CPT | Performed by: INTERNAL MEDICINE

## 2022-08-17 PROCEDURE — 1101F PT FALLS ASSESS-DOCD LE1/YR: CPT | Performed by: INTERNAL MEDICINE

## 2022-08-17 PROCEDURE — G8536 NO DOC ELDER MAL SCRN: HCPCS | Performed by: INTERNAL MEDICINE

## 2022-08-17 PROCEDURE — G8420 CALC BMI NORM PARAMETERS: HCPCS | Performed by: INTERNAL MEDICINE

## 2022-08-17 PROCEDURE — 1090F PRES/ABSN URINE INCON ASSESS: CPT | Performed by: INTERNAL MEDICINE

## 2022-08-17 PROCEDURE — 1123F ACP DISCUSS/DSCN MKR DOCD: CPT | Performed by: INTERNAL MEDICINE

## 2022-08-17 PROCEDURE — 99214 OFFICE O/P EST MOD 30 MIN: CPT | Performed by: INTERNAL MEDICINE

## 2022-08-17 PROCEDURE — G8427 DOCREV CUR MEDS BY ELIG CLIN: HCPCS | Performed by: INTERNAL MEDICINE

## 2022-08-17 RX ORDER — FUROSEMIDE 20 MG/1
20 TABLET ORAL
Qty: 90 TABLET | Refills: 3 | Status: SHIPPED | OUTPATIENT
Start: 2022-08-17

## 2022-08-17 NOTE — PROGRESS NOTES
Tayo Del Rosario    F/u SOB    HPI    Tayo Del Rosario is a 80 y. o. with no known cardiac disease who presented initially for evaluation of syncope. As you know, Zeeshan Villasenor is such a pleasant patient with history hypertension, arthritis and falls, found to have cardiomyopathy and SSS. Zeeshan Villasenor lives with her daughter. Back in Sept when her daughter was out of town she apparently had a syncopal event. Her sister was with her at the time and saw her somewhat slumped in her chair. She was slow to respond but came to. Zeeshan Villasenor wasn't sure what happened and didn't seek medical care after that initial event. Again since then she had another episode. Her daughter said her mother looked unwell/ pale and said her stomach was upset. She again kind of slumped and her daughter caught her so she did not fall/ get injured. She believes she lost consciousness. She came to and felt somewhat fatigued briefly. She had no observed seizure like activity. She completely denies surrounding cardiac complaints with these episodes- specifically has never noticed palpations/ heart racing, no CP, or SOB. No swelling or headaches. She has no known heart problems and has never seen a heart doctor or had cardiac testing. Dom's work up showed globally reduced EF 26-30%. I called and spoke to her daughter- giving her the results and alerted her to the signs and symptoms to look out for. Apparently, she got off the phone and asked her mom how she was feeling and she admitted she had been short of breath for 2 days. They ended up going to [facility] ER the next day and was in AECHF/ HFrEF with fluid overload. Just before discharge she ended up passing out (as she had mentioned very rarely happens)- and she was noted to have a 6 sec pause that correlated. She was appropriately given a pacemaker. Since then she did struggle with some episodes of syncope that may have been related to her underlying urinary tract infections.   Likely this has not been an issue as of recent and since I last saw me overall she has been doing okay. There has been some overall decline in her memory and her daughter says she is tired all the time and her naps seem longer. She is undergoing a neuro eval and starting meds for dementia. Also established with nephro, and dropped lasix to ~3 x/ week. Also aware her daughter had Manisha but pt herself didn't have obvious symptoms. They have been calling over the holidays due to increasing SOB, so much so, daughter states pt cant even get dressed. Turns out symptoms were due to UTI, which have significant improved since Abx. No new complaints today. Past Medical History:   Diagnosis Date    Anxiety     Arthritis     Depression     GERD (gastroesophageal reflux disease)     HTN (hypertension)     LBBB (left bundle branch block)     Osteoporosis     completed 8 years Fosamax    Sciatica     Urinary incontinence        Past Surgical History:   Procedure Laterality Date    HX CHOLECYSTECTOMY      HX HYSTERECTOMY      HX ORTHOPAEDIC      fractured right patella surgery    [place] ANESTH,SURGERY OF SHOULDER      [place] CARDIAC SURG PROCEDURE UNLIST  11/2018    pacemaker     [place] INS NEW/RPLC PRM PACEMAKER W/TRANSV ELTRD VENTR N/A 11/28/2018    INSERT PPM SINGLE VENTRICULAR performed by Ananth Gold MD at [facility] CATH LAB       Current Outpatient Medications   Medication Sig Dispense Refill    sertraline (ZOLOFT) 25 mg tablet Take 1 Tablet by mouth in the morning. 90 Tablet 1    carvediloL (COREG) 12.5 mg tablet TAKE 1 TABLET BY MOUTH TWICE DAILY WITH MEALS 180 Tablet 3    furosemide (LASIX) 20 mg tablet Take 1 Tablet by mouth four (4) days a week.  (Patient taking differently: Take 20 mg by mouth three (3) days a week.) 90 Tablet 0    omeprazole (PRILOSEC) 40 mg capsule TAKE 1 CAPSULE BY MOUTH DAILY 90 Capsule 1    atorvastatin (LIPITOR) 10 mg tablet TAKE 1 TABLET BY MOUTH EVERY NIGHT 90 Tablet 3    cranberry extract 425 mg cap Take 1 Cap by mouth daily.  aspirin 81 mg chewable tablet Take 1 Tab by mouth daily. 30 Tab 11    fluticasone (FLONASE) 50 mcg/actuation nasal spray 1 [place] by Both Nostrils route as needed.  loratadine (CLARITIN) 10 mg tablet Take 10 mg by mouth daily as needed for Allergies.  MULTI-VITAMIN PO Take 1 Tab by mouth daily. No Known Allergies    Social History     Socioeconomic History    Marital status:      Spouse name: Not on file    Number of children: Not on file    Years of education: Not on file    Highest education level: Not on file   Occupational History    Not on file   Tobacco Use    Smoking status: Never    Smokeless tobacco: Never   Vaping Use    Vaping Use: Never used   Substance and Sexual Activity    Alcohol use: No    Drug use: No    Sexual activity: Not Currently   Other Topics Concern    Not on file   Social History Narrative    Not on file     Social Determinants of Health     Financial Resource Strain: Not on file   Food Insecurity: Not on file   Transportation Needs: Not on file   Physical Activity: Not on file   Stress: Not on file   Social Connections: Not on file   Intimate Partner Violence: Not on file   Housing Stability: Not on file        The patient has a family history of heart problems, but no genetic cardiomyopathies or SCD. Review of Systems    14 pt Review of Systems is negative unless otherwise mentioned in the HPI.     Wt Readings from Last 3 Encounters:   08/17/22 46.3 kg (102 lb)   06/20/22 49.9 kg (110 lb)   03/28/22 48 kg (105 lb 12.8 oz)     Temp Readings from Last 3 Encounters:   06/20/22 99 °F (37.2 °C)   03/28/22 97.2 °F (36.2 °C) (Temporal)   12/28/21 97.4 °F (36.3 °C) (Temporal)     BP Readings from Last 3 Encounters:   08/17/22 122/64   06/20/22 (!) 142/59   03/28/22 138/60     Pulse Readings from Last 3 Encounters:   08/17/22 67   06/20/22 (!) 58   03/28/22 71     Physical Exam:    Visit Vitals  /64 (BP 1 Location: Left upper arm, BP Patient Position: Sitting, BP Cuff Size: Small adult)   Pulse 67   Ht 5' 2\" (1.575 m)   Wt 46.3 kg (102 lb)   LMP  (LMP Unknown)   SpO2 98%   BMI 18.66 kg/m²      Physical Exam  Constitutional:       General: She is not in acute distress. Appearance: She is well-developed. She is not diaphoretic. HENT:      Head: Normocephalic and atraumatic. Eyes:      Pupils: Pupils are equal, round, and reactive to light. Neck:      Vascular: No JVD. Cardiovascular:      Rate and Rhythm: Normal rate and regular rhythm. Heart sounds: Normal heart sounds. No murmur heard. No friction rub. No gallop. Pulmonary:      Effort: Pulmonary effort is normal. No respiratory distress. Breath sounds: Normal breath sounds. No wheezing or rales. Chest:      Chest wall: No tenderness. Abdominal:      General: Bowel sounds are normal.      Palpations: Abdomen is soft. Musculoskeletal:         General: No tenderness. Skin:     General: Skin is warm and dry. Neurological:      Mental Status: She is alert and oriented to person, place, and time. Lab Results   Component Value Date/Time    Sodium 137 05/06/2022 12:00 AM    Potassium 5.0 05/06/2022 12:00 AM    Chloride 101 05/06/2022 12:00 AM    CO2 24 05/06/2022 12:00 AM    Anion gap 4 12/30/2021 11:39 AM    Glucose 99 05/06/2022 12:00 AM    BUN 31 05/06/2022 12:00 AM    Creatinine 1.44 (H) 05/06/2022 12:00 AM    BUN/Creatinine ratio 22 05/06/2022 12:00 AM    GFR est AA 36 (L) 12/30/2021 11:39 AM    GFR est non-AA 30 (L) 12/30/2021 11:39 AM    Calcium 9.2 05/06/2022 12:00 AM    Bilirubin, total 0.3 05/06/2022 12:00 AM    Alk.  phosphatase 93 05/06/2022 12:00 AM    Protein, total 6.6 05/06/2022 12:00 AM    Albumin 3.8 05/06/2022 12:00 AM    Globulin 3.5 12/30/2021 11:34 AM    A-G Ratio 1.4 05/06/2022 12:00 AM    ALT (SGPT) 12 05/06/2022 12:00 AM    AST (SGOT) 18 05/06/2022 12:00 AM     Lab Results   Component Value Date/Time    WBC 6.5 06/20/2022 01:25 PM WBC 5.4 05/17/2012 08:41 AM    HGB 12.4 06/20/2022 01:25 PM    HCT 38.5 06/20/2022 01:25 PM    PLATELET 001 76/96/4458 01:25 PM    MCV 89.5 06/20/2022 01:25 PM         EKG Last: LBBB/ paced, no concerning ST or T wave changes    CXR 12/28: IMPRESSION  Mildly increased mid to lower lung interstitial markings may be on the basis of  chronic interstitial lung disease or mild interstitial edema. Impression and Plan:  Khang Sapp is a 80 y. o. with:    HFrEF, s/p AECHF, now euvolemic/ stable (very likely ischemic but presumed)  Symptomatic SSS s/p PPM, known  Dyslipidemia, known  Advanced age/ frailty  Mental status decline, with h/o delirium on dementia  CKD stage IV, baseline ~ 1.5-1.6 ([place])  Recent covid exposure with daughter in 1/2020, asymptomatic  Recurrent UTI    She is taking Lasix 3x/ week,   Otherwise her CV status stable  RTC 6 months with device check, call sooner if needed  35 mins spent,    Thank you for allowing me to participate in the care of your patient, please do not hesitate to call with questions or concerns. Follow-up and Dispositions    Return in about 6 months (around 2/17/2023).      Kindest regards,    Nate Madsen, DO

## 2022-08-17 NOTE — PROGRESS NOTES
Debby Avila presents today for   Chief Complaint   Patient presents with    Follow-up     6 month follow up    Pacemaker 2026 [facility] preferred language for health care discussion is english/other. Is someone accompanying this pt? yes    Is the patient using any DME equipment during OV? wheelchair    Depression Screening:  3 most recent PHQ Screens 8/17/2022   Little interest or pleasure in doing things Not at all   Feeling down, depressed, irritable, or hopeless Not at all   Total Score PHQ 2 0       Learning Assessment:  Learning Assessment 8/17/2022   PRIMARY LEARNER Patient   HIGHEST LEVEL OF EDUCATION - PRIMARY LEARNER  -   BARRIERS PRIMARY LEARNER -   [address] -   ANSWERED BY patient   RELATIONSHIP SELF       Abuse Screening:  Abuse Screening Questionnaire 8/17/2022   Do you ever feel afraid of your partner? N   Are you in a relationship with someone who physically or mentally threatens you? N   Is it safe for you to go home? Y       Fall Risk  Fall Risk Assessment, last 12 mths 8/17/2022   Able to walk? Yes   Fall in past 12 months? 0   Do you feel unsteady? 0   Are you worried about falling 0   Is TUG test greater than 12 seconds? -   Is the gait abnormal? -   Number of falls in past 12 months -   Fall with injury? -           Pt currently taking Anticoagulant therapy? no    Pt currently taking Antiplatelet therapy ? ASA 81 mg once a day      Coordination of Care:  1. Have you been to the ER, urgent care clinic since your last visit? Hospitalized since your last visit? yes    2.  Have you seen or consulted any other health care providers outside of the [address] [place] [address] [facility] since your last visit? Include any pap smears or colon screening.  no

## 2022-08-18 NOTE — PROGRESS NOTES
I have personally seen and evaluated the device findings. Interrogation reviewed and I agree with assessment.     Wilfredo Dumont

## 2022-08-24 RX ORDER — OMEPRAZOLE 40 MG/1
40 CAPSULE, DELAYED RELEASE ORAL DAILY
Qty: 90 CAPSULE | Refills: 1 | Status: SHIPPED | OUTPATIENT
Start: 2022-08-24

## 2022-09-19 ENCOUNTER — OFFICE VISIT (OUTPATIENT)
Dept: FAMILY MEDICINE CLINIC | Age: 87
End: 2022-09-19
Payer: MEDICARE

## 2022-09-19 VITALS
TEMPERATURE: 98.6 F | BODY MASS INDEX: 18.95 KG/M2 | OXYGEN SATURATION: 98 % | RESPIRATION RATE: 16 BRPM | WEIGHT: 103 LBS | DIASTOLIC BLOOD PRESSURE: 68 MMHG | HEIGHT: 62 IN | HEART RATE: 60 BPM | SYSTOLIC BLOOD PRESSURE: 120 MMHG

## 2022-09-19 DIAGNOSIS — R06.09 DYSPNEA ON EXERTION: ICD-10-CM

## 2022-09-19 DIAGNOSIS — I10 PRIMARY HYPERTENSION: Primary | ICD-10-CM

## 2022-09-19 DIAGNOSIS — N18.30 STAGE 3 CHRONIC KIDNEY DISEASE, UNSPECIFIED WHETHER STAGE 3A OR 3B CKD (HCC): ICD-10-CM

## 2022-09-19 DIAGNOSIS — I73.9 PERIPHERAL VASCULAR DISEASE (HCC): ICD-10-CM

## 2022-09-19 DIAGNOSIS — I50.22 CHRONIC SYSTOLIC (CONGESTIVE) HEART FAILURE (HCC): ICD-10-CM

## 2022-09-19 DIAGNOSIS — F32.A ANXIETY AND DEPRESSION: ICD-10-CM

## 2022-09-19 DIAGNOSIS — R13.10 DYSPHAGIA, UNSPECIFIED TYPE: ICD-10-CM

## 2022-09-19 DIAGNOSIS — F41.9 ANXIETY AND DEPRESSION: ICD-10-CM

## 2022-09-19 DIAGNOSIS — R41.3 MEMORY LOSS: ICD-10-CM

## 2022-09-19 PROCEDURE — G8536 NO DOC ELDER MAL SCRN: HCPCS | Performed by: FAMILY MEDICINE

## 2022-09-19 PROCEDURE — G9717 DOC PT DX DEP/BP F/U NT REQ: HCPCS | Performed by: FAMILY MEDICINE

## 2022-09-19 PROCEDURE — G8420 CALC BMI NORM PARAMETERS: HCPCS | Performed by: FAMILY MEDICINE

## 2022-09-19 PROCEDURE — 1101F PT FALLS ASSESS-DOCD LE1/YR: CPT | Performed by: FAMILY MEDICINE

## 2022-09-19 PROCEDURE — 99214 OFFICE O/P EST MOD 30 MIN: CPT | Performed by: FAMILY MEDICINE

## 2022-09-19 PROCEDURE — 1123F ACP DISCUSS/DSCN MKR DOCD: CPT | Performed by: FAMILY MEDICINE

## 2022-09-19 PROCEDURE — G8427 DOCREV CUR MEDS BY ELIG CLIN: HCPCS | Performed by: FAMILY MEDICINE

## 2022-09-19 PROCEDURE — 1090F PRES/ABSN URINE INCON ASSESS: CPT | Performed by: FAMILY MEDICINE

## 2022-09-19 NOTE — PROGRESS NOTES
Chief Complaint   Patient presents with    Anxiety    CHF    Chronic Kidney Disease    Dementia    Depression    Hypertension    Other     Hx of epilepsy, peripheral vascular disease, and syncope            1. \"Have you been to the ER, urgent care clinic since your last visit? Hospitalized since your last visit? \" No    2. \"Have you seen or consulted any other health care providers outside of the [address] [place] [address] since your last visit? \" No     3. For patients aged 39-70: Has the patient had a colonoscopy / FIT/ Cologuard? NA - based on age      If the patient is female:    4. For patients aged 41-77: Has the patient had a mammogram within the past 2 years? NA - based on age or sex      11. For patients aged 21-65: Has the patient had a pap smear?  No hx of hysterectomy

## 2022-09-19 NOTE — PROGRESS NOTES
HISTORY OF PRESENT ILLNESS  Nicolas Cardoza is a 80 y.o. female. HPI: Here for follow-up. Accompanied with daughter. Hypertension. Vitals been stable. History of CHF. No signs of increasing volume overload. Mild cognitive deficit. Stable. No concerns from daughter. Anxiety and depression. On Zoloft. Fairly stable. Appropriately dressed. Looking happy. Looking comfortable. Some dysphagia which is chronic in nature. At this time conservative management. Working with GABRIEL ANDERS[facility] soft diet. Using Ensure daily. No leg swelling or any cough cold or wheezing. No weight changes. CKD. Avoiding NSAIDs. History of PVD. Currently no concern with leg claudication. She had a small fall but no injury. Denies any headache, dizziness, no chest pain or trouble breathing, no arm or leg weakness. No nausea or vomiting, no weight or appetite changes, no mood changes . No urine or bowel complains, no palpitation, no diaphoresis. No abdominal pain. No cold or cough. No leg swelling. No fever. No sleep trouble. Visit Vitals  /68 (BP 1 Location: Right arm, BP Patient Position: Sitting, BP Cuff Size: Adult)   Pulse 60   Temp 98.6 °F (37 °C) (Temporal)   Resp 16   Ht 5' 2\" (1.575 m)   Wt 103 lb (46.7 kg)   SpO2 98%   BMI 18.84 kg/m²     Review medication list, vitals, problem list,allergies.    Lab Results   Component Value Date/Time    WBC 6.5 06/20/2022 01:25 PM    WBC 5.4 05/17/2012 08:41 AM    HGB 12.4 06/20/2022 01:25 PM    HCT 38.5 06/20/2022 01:25 PM    PLATELET 076 34/66/1130 01:25 PM    MCV 89.5 06/20/2022 01:25 PM     Lab Results   Component Value Date/Time    Sodium 137 05/06/2022 12:00 AM    Potassium 5.0 05/06/2022 12:00 AM    Chloride 101 05/06/2022 12:00 AM    CO2 24 05/06/2022 12:00 AM    Anion gap 4 12/30/2021 11:39 AM    Glucose 99 05/06/2022 12:00 AM    BUN 31 05/06/2022 12:00 AM    Creatinine 1.44 (H) 05/06/2022 12:00 AM    BUN/Creatinine ratio 22 05/06/2022 12:00 AM    GFR est AA 36 (L) 12/30/2021 11:39 AM    GFR est non-AA 30 (L) 12/30/2021 11:39 AM    Calcium 9.2 05/06/2022 12:00 AM    Bilirubin, total 0.3 05/06/2022 12:00 AM    Alk. phosphatase 93 05/06/2022 12:00 AM    Protein, total 6.6 05/06/2022 12:00 AM    Albumin 3.8 05/06/2022 12:00 AM    Globulin 3.5 12/30/2021 11:34 AM    A-G Ratio 1.4 05/06/2022 12:00 AM    ALT (SGPT) 12 05/06/2022 12:00 AM    AST (SGOT) 18 05/06/2022 12:00 AM     Lab Results   Component Value Date/Time    Cholesterol, total 171 05/06/2022 12:00 AM    HDL Cholesterol 63 05/06/2022 12:00 AM    LDL, calculated 92 05/06/2022 12:00 AM    LDL, calculated 96.8 11/25/2020 08:59 AM    VLDL, calculated 16 05/06/2022 12:00 AM    VLDL, calculated 23.2 11/25/2020 08:59 AM    Triglyceride 85 05/06/2022 12:00 AM    CHOL/HDL Ratio 2.9 11/25/2020 08:59 AM     Lab Results   Component Value Date/Time    TSH 3.320 05/06/2022 12:00 AM         ROS: See HPI    Physical Exam  Cardiovascular:      Rate and Rhythm: Normal rate. Pulmonary:      Effort: Pulmonary effort is normal. No respiratory distress. Abdominal:      Tenderness: There is no abdominal tenderness. Musculoskeletal:         General: No swelling. Neurological:      General: No focal deficit present. Mental Status: She is alert. Psychiatric:         Behavior: Behavior normal.       ASSESSMENT and PLAN    ICD-10-CM ICD-9-CM    1. Primary hypertension:well controlled. Continue current dose of medication and low salt diet. Exercise as tolerated. . I10 401.9       2. Memory loss: Fairly stable. No behavioral or any concern regarding cognitive function R41.3 780.93       3. Anxiety and depression: On Zoloft and fairly stable. No concern from family F41.9 300.00     F32. A 311       4. Dysphagia, unspecified type: Working on [address]. Will observe R13.10 787.20       5. Stage 3 chronic kidney disease, unspecified whether stage 3a or 3b CKD ([facility].): Avoiding NSAIDs N18.30 585.3       6.  Peripheral vascular disease ([facility] 75.): No leg claudication. Will observe. On statin I73.9 443.9       7. Dyspnea on exertion: Fairly stable. No cough or wheezing. No leg swelling or any signs of volume overload. R06.00 786.09       8. Chronic systolic (congestive) heart failure: No signs of volume overload. Stable with the conservative management. I50.22 428.22      428.0       Patient and daughter both understood agree with the  Follow-up and Dispositions    Return in about 3 months (around 12/19/2022). Please note that this dictation was completed with [email or web address], the [employer] voice recognition software. Quite often unanticipated grammatical, syntax, homophones, and other interpretive errors are inadvertently transcribed by the computer software. Please disregard these errors. Please excuse any errors that have escaped final proofreading.

## 2022-10-08 NOTE — PROGRESS NOTES
Dr. Av Cervantes  referred Doc Jenny (10/17/1927) a 80 y.o. female for a Medicare Annual Wellness Visit ([phone])    This is a Subsequent Medicare Annual Wellness Visit providing Personalized Prevention Plan Services (PPPS) (Performed 12 months after initial AWV and PPPS )    I have reviewed the patient's medical history in detail and updated the computerized patient record. History     Past Medical History:   Diagnosis Date    Anxiety     Arthritis     Depression     GERD (gastroesophageal reflux disease)     HTN (hypertension)     LBBB (left bundle branch block)     Osteoporosis     completed 8 years Fosamax    Sciatica     Urinary incontinence       Past Surgical History:   Procedure Laterality Date    HX CHOLECYSTECTOMY      HX HYSTERECTOMY      HX ORTHOPAEDIC      fractured right patella surgery    [place] ANESTH,SURGERY OF SHOULDER       Current Outpatient Prescriptions   Medication Sig Dispense Refill    omeprazole (PRILOSEC) 40 mg capsule Take 1 Cap by mouth daily. 90 Cap 3    sertraline (ZOLOFT) 50 mg tablet Take 1 Tab by mouth daily. 90 Tab 3    amLODIPine (NORVASC) 5 mg tablet Take 1 Tab by mouth daily. 90 Tab 3    sertraline (ZOLOFT) 50 mg tablet TAKE 1 TABLET BY MOUTH DAILY 90 Tab 0    omeprazole (PRILOSEC) 40 mg capsule TAKE ONE CAPSULE BY MOUTH DAILY 90 Cap 0    amLODIPine (NORVASC) 5 mg tablet TAKE 1 TABLET BY MOUTH DAILY 90 Tab 0    loratadine (CLARITIN) 10 mg tablet Take 10 mg by mouth.  CALCIUM CARBONATE/VITAMIN D3 (CALCIUM 600 + D,3, PO) Take  by mouth.  MULTI-VITAMIN PO Take  by mouth.        No Known Allergies  Family History   Problem Relation Age of Onset    Heart Disease Mother     Diabetes Mother     Heart Disease Father     Diabetes Father     Hypertension Sister     Diabetes Sister     Heart Disease Brother     Heart Disease Sister     Diabetes Sister     Heart Disease Brother      Social History   Substance Use Topics    Smoking status: Never Smoker    Smokeless tobacco: Never Used    Alcohol use No     Patient Active Problem List   Diagnosis Code    Osteoporosis M81.0    Anxiety F41.9    Depression F32.9    Reflux esophagitis K21.0    Generalized osteoarthrosis, involving multiple sites M15.9    Zoster B02.9    Distal radius fracture, left S52.502A    Sprain and strain of shoulder and upper arm S43.409A, S46.919A    Vertigo, benign paroxysmal H81.10    Memory loss R41.3    S/P ORIF (open reduction internal fixation) fracture Z96.7, Z87.81    Essential hypertension with goal blood pressure less than 140/90 I10    Advance directive discussed with patient Z70.80    Gastroesophageal reflux disease without esophagitis K21.9    Dysphagia R13.10       Depression Risk Factor Screening:   No flowsheet data found. Alcohol Risk Factor Screening: You do not drink alcohol or very rarely. Functional Ability and Level of Safety:     Hearing Loss   Some degree of hearing loss     Activities of Daily Living   The home contains: no safety equipment needed by patient   Partial assistance. Requires assistance with: see chart   ADL Assessment 10/11/2017   Feeding yourself No Help Needed   Getting from bed to chair No Help Needed   Getting dressed No Help Needed   Bathing or showering No Help Needed   Walk across the room (includes cane/walker) Help Needed   Using the telphone No Help Needed   Taking your medications No Help Needed   Preparing meals Help Needed   Managing money (expenses/bills) Help Needed   Moderately strenuous housework (laundry) Help Needed   Shopping for personal items (toiletries/medicines) No Help Needed   Shopping for groceries Help Needed   Driving Help Needed   Climbing a flight of stairs Help Needed   Getting to places beyond walking distances Help Needed       Fall Risk     Fall Risk Assessment, last 12 mths 10/11/2017   Able to walk? Yes   Fall in past 12 months?  No   Fall with injury? -   Number of falls in past 12 months -   Fall Risk Score -     Abuse Screen   Patient is not abused    Abuse Screening Questionnaire 10/11/2017   Do you ever feel afraid of your partner? N   Are you in a relationship with someone who physically or mentally threatens you? N   Is it safe for you to go home? Y       Cognitive Screening     Evaluation of Cognitive Function:  Has your family/caregiver stated any concerns about your memory: yes  Abnormal, Mini Cog test Did have problems drawing clock and remembering list of words. Will have clock draw test scanned into record    Mood/affect:  neutral  Appearance: age appropriate, well dressed and within normal Limits  Family member/caregiver input: None significant, daughter did answer for patient frequently     No exam performed by pharmacist today, not required for Psychiatric Annual Wellness Visit. Patient to be seen by Dr. Chris Terrazas separately. Patient Care Team:  Chris Terrazas MD as PCP - General (Internal Medicine)  Shantelle Sears RN as Ambulatory Care Navigator (Internal Medicine)  Irasema Askew OD (Ophthalmology)    Advice/Referrals/Counseling   Education and counseling provided:  End-of-Life planning (with patient's consent)  Pneumococcal Vaccine  Influenza Vaccine  Screening Mammography  Screening Pap and pelvic (covered once every 2 years)  Colorectal cancer screening tests  Cardiovascular screening blood test  Bone mass measurement (DEXA)  Screening for glaucoma  Diabetes screening test  Tdap / Zoster vaccination recommendations     Assessment/Plan       ICD-10-CM ICD-9-CM    1. Encounter for immunization Z23 V03.89 INFLUENZA VIRUS VACCINE, HIGH DOSE SEASONAL, PRESERVATIVE FREE      PNEUMOCOCCAL POLYSACCHARIDE VACCINE, 23-VALENT, ADULT OR IMMUNOSUPPRESSED PT DOSE,   2. Essential hypertension with goal blood pressure less than 140/90 I10 401.9    3. Medicare annual wellness visit, subsequent Z00.00 V70.0 DEPRESSION SCREEN ANNUAL   4.  Screening for alcoholism Z13.89 V79.1    5. Screening for depression Z13.89 V79.0 Shakila 68   . 6. Medication Reconciliation: Performed today and patient did not bring her medications to the visit. and the following changes were made. Discontinued: none   Additions: none   Changes / other discrepancies: none    There are no discontinued medications. 7. Immunizations: Patient confirmed the following records of vaccinations are correct and current.   -Pneumococcal: Pneumovax given today, Vaccination series complete    -Influenza: Done today     -Zoster:  Declines    -Tdap:  N/A    Immunization History   Administered Date(s) Administered    Influenza High Dose Vaccine PF 10/10/2014, 10/21/2016, 10/11/2017    Influenza Vaccine Split 09/15/2011    Pneumococcal Conjugate (PCV-13) 10/21/2016    Pneumococcal Polysaccharide (PPSV-23) 10/11/2017       8. Screenings: (see patient instructions for chart/information):   -DEXA scan: Done several years aog    -Colonoscopy: No longer needed based on age, follow up as recommended by primary care provider and/or specialist    -Glaucoma screening/Eye exam: Encouraged to follow up with eye doctor as she hasn't seen in a number of years     -Mammogram: No longer needed based on age, follow up as recommended by primary care provider and/or specialist     -Lipid panel: last done 10/2016   -Diabetes: last done 10/2016     9. Advanced Care Planning: The patient has advanced directives completed at home. Advised patient to bring a copy to the office to be scanned into the chart. Patient verbalized understanding of information presented. Answered all of the patient's questions. AVS information was reviewed with patient and will be printed on checkout.     Fely Martines, PharmD, BCPS [employer].

## 2022-10-20 ENCOUNTER — HOSPITAL ENCOUNTER (EMERGENCY)
Age: 87
Discharge: HOME | End: 2022-10-20
Attending: EMERGENCY MEDICINE
Payer: MEDICARE

## 2022-10-20 ENCOUNTER — APPOINTMENT (OUTPATIENT)
Dept: CT IMAGING | Age: 87
End: 2022-10-20
Attending: PHYSICIAN ASSISTANT
Payer: MEDICARE

## 2022-10-20 VITALS
TEMPERATURE: 97.2 F | OXYGEN SATURATION: 99 % | HEART RATE: 69 BPM | HEIGHT: 61 IN | SYSTOLIC BLOOD PRESSURE: 149 MMHG | BODY MASS INDEX: 19.45 KG/M2 | DIASTOLIC BLOOD PRESSURE: 66 MMHG | RESPIRATION RATE: 20 BRPM | WEIGHT: 103 LBS

## 2022-10-20 DIAGNOSIS — E04.1 THYROID NODULE: ICD-10-CM

## 2022-10-20 DIAGNOSIS — S01.01XA LACERATION OF SCALP, INITIAL ENCOUNTER: ICD-10-CM

## 2022-10-20 DIAGNOSIS — H74.91 DISORDER OF RIGHT MASTOID: ICD-10-CM
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
## 2022-10-20 PROCEDURE — 72125 CT NECK SPINE W/O DYE: CPT

## 2022-10-20 PROCEDURE — 75810000293 HC SIMP/SUPERF WND  RPR

## 2022-10-20 PROCEDURE — 99284 EMERGENCY DEPT VISIT MOD MDM: CPT

## 2022-10-20 PROCEDURE — 70450 CT HEAD/BRAIN W/O DYE: CPT

## 2022-10-20 PROCEDURE — 90471 IMMUNIZATION ADMIN: CPT

## 2022-10-20 PROCEDURE — 74011250636 HC RX REV CODE- 250/636: Performed by: PHYSICIAN ASSISTANT

## 2022-10-20 PROCEDURE — 90715 TDAP VACCINE 7 YRS/> IM: CPT | Performed by: PHYSICIAN ASSISTANT

## 2022-10-20 RX ORDER — FLUTICASONE PROPIONATE 50 MCG
2 SPRAY, SUSPENSION (ML) NASAL DAILY
Qty: 1 EACH | Refills: 0 | Status: SHIPPED | OUTPATIENT
Start: 2022-10-20

## 2022-10-20 RX ADMIN — TETANUS TOXOID, REDUCED DIPHTHERIA TOXOID AND ACELLULAR PERTUSSIS VACCINE, ADSORBED 0.5 ML: 5; 2.5; 8; 8; 2.5 SUSPENSION INTRAMUSCULAR at 14:57

## 2022-10-20 NOTE — ED PROVIDER NOTES
EMERGENCY DEPARTMENT HISTORY AND PHYSICAL EXAM        Date: 10/20/2022  Patient Name: Isela Cobian    History of Presenting Illness     Chief Complaint   Patient presents with    Fall       History Provided By: Patient    HPI: Isela Cobian, 80 y.o. female PMHx significant for htn, anxiety, depression, GERD, sciatica, osteoporosis presents ambulatory to the ED. Patient with history of dementia and is accompanied by daughter who provides much of the history today. Patient's daughter reports patient was using her walker, lost her balance and hit her head on the closet. Denies falling with LOC. Denies taking blood thinners. Patient daughter reports she has gait instability at baseline and this is a normal occurrence for her. Denies CP, SOB, dizziness. Patient ambulatory after event. Daughter reports patient is acting normally. Unsure of last tetanus date. There are no other complaints, changes, or physical findings at this time. PCP: Janis Hager MD    No current facility-administered medications on file prior to encounter. Current Outpatient Medications on File Prior to Encounter   Medication Sig Dispense Refill    omeprazole (PRILOSEC) 40 mg capsule TAKE 1 CAPSULE BY MOUTH DAILY 90 Capsule 1    furosemide (LASIX) 20 mg tablet Take 1 Tablet by mouth three (3) days a week. 90 Tablet 3    sertraline (ZOLOFT) 25 mg tablet Take 1 Tablet by mouth in the morning. 90 Tablet 1    carvediloL (COREG) 12.5 mg tablet TAKE 1 TABLET BY MOUTH TWICE DAILY WITH MEALS 180 Tablet 3    atorvastatin (LIPITOR) 10 mg tablet TAKE 1 TABLET BY MOUTH EVERY NIGHT 90 Tablet 3    cranberry extract 425 mg cap Take 1 Cap by mouth daily. aspirin 81 mg chewable tablet Take 1 Tab by mouth daily. 30 Tab 11    [DISCONTINUED] fluticasone (FLONASE) 50 mcg/actuation nasal spray 1 [place] by Both Nostrils route as needed. loratadine (CLARITIN) 10 mg tablet Take 10 mg by mouth daily as needed for Allergies.       MULTI-VITAMIN PO Take 1 Tab by mouth daily. Past History     Past Medical History:  Past Medical History:   Diagnosis Date    Anxiety     Arthritis     Depression     GERD (gastroesophageal reflux disease)     HTN (hypertension)     LBBB (left bundle branch block)     Osteoporosis     completed 8 years Fosamax    Sciatica     Urinary incontinence        Past Surgical History:  Past Surgical History:   Procedure Laterality Date    HX CHOLECYSTECTOMY      HX HYSTERECTOMY      HX ORTHOPAEDIC      fractured right patella surgery    [place] ANESTH,SURGERY OF [place]OULDER      [place] CARDIAC SURG PROCEDURE UNLIST  11/2018    pacemaker     [place] NEW/RPLC PRM PACEMAKER W/TRANSV ELTRD VENTR N/A 11/28/2018    INSERT PPM SINGLE VENTRICULAR performed by Zoraida Turcios MD at [facility]       Family History:  Family History   Problem Relation Age of Onset    Heart Disease Mother     Diabetes Mother     Heart Disease Father     Diabetes Father     Hypertension Sister     Diabetes Sister     Heart Disease Brother     Heart Disease Sister     Diabetes Sister     Heart Disease Brother        Social History:  Social History     Tobacco Use    Smoking status: Never    Smokeless tobacco: Never   Vaping Use    Vaping Use: Never used   Substance Use Topics    Alcohol use: No    Drug use: No       Allergies:  No Known Allergies      Review of Systems   Review of Systems   Constitutional:  Negative for chills and fever. Respiratory:  Negative for shortness of breath. Cardiovascular:  Negative for chest pain. Gastrointestinal:  Negative for abdominal pain, nausea and vomiting. Genitourinary:  Negative for flank pain. Musculoskeletal:  Negative for back pain and myalgias. Skin:  Positive for wound. Negative for color change, pallor and rash. Neurological:  Negative for dizziness, weakness and light-headedness. All other systems reviewed and are negative. Physical Exam   Physical Exam  Vitals and nursing note reviewed. Constitutional:       General: She is not in acute distress. Appearance: She is well-developed. Comments: Pt in NAD   HENT:      Head: Normocephalic and atraumatic. Right Ear: Tympanic membrane normal.      Ears:      Comments: Unable to visualize left TM due to cerumen impaction  No mastoid tenderness, swelling or erythema bilaterally  Eyes:      Conjunctiva/sclera: Conjunctivae normal.      Comments: PERRL  EOM intact   Cardiovascular:      Rate and Rhythm: Normal rate and regular rhythm. Heart sounds: Normal heart sounds. Pulmonary:      Effort: Pulmonary effort is normal. No respiratory distress. Breath sounds: Normal breath sounds. Abdominal:      General: Bowel sounds are normal. There is no distension. Palpations: Abdomen is soft. Musculoskeletal:         General: Normal range of motion. Skin:     General: Skin is warm. Findings: No rash. Neurological:      Mental Status: She is alert and oriented to person, place, and time. Comments: A&Ox2 - Baseline per daughter  Speech clear  Gait stable with walker   Psychiatric:         Behavior: Behavior normal.       Diagnostic Study Results     Labs -   No results found for this or any previous visit (from the past 12 hour(s)). Radiologic Studies -   CT HEAD WO CONT   Final Result      No acute findings of trauma. No significant interval change. CT SPINE CERV WO CONT   Final Result   1. T3 anterior compression fracture appears remote. No acute findings of   trauma. 2.  Degenerative changes. 3.  Bilateral thyroid nodules. No follow-up recommended given patient's age. 4.  Atherosclerosis. 5.  Small right mastoid effusion. CT Results  (Last 48 hours)                 10/20/22 1448  CT HEAD WO CONT Final result    Impression:      No acute findings of trauma. No significant interval change.            Narrative:  CT HEAD WO CONT       History: Fall with head trauma, small laceration left head.          Comparison: 6/20/2022       TECHNIQUE: 5 mm helical scan obtained of the head were obtained from the skull   vertex through the base of the skull without intravenous contrast.         All CT scans at this facility are performed using dose optimization technique as   appropriate to a performed exam, to include automated exposure control,   adjustment of the mA and/or kV according to patient size (including appropriate   matching first site-specific examinations), or use of iterative reconstruction   technique. BRAIN RESULT:         Mild generalized volume loss. Mild periventricular hypodensity. Gray-white   matter differentiation is maintained. Atherosclerosis. No acute intracranial   hemorrhage. No midline shift. Basilar cisterns are patent. Status post cataract surgery. Soft tissues and osseous structures are grossly   normal. No acute fracture. Mild sinus mucosal thickening. Small mastoid   effusions. 10/20/22 1448  CT SPINE CERV WO CONT Final result    Impression:  1. T3 anterior compression fracture appears remote. No acute findings of   trauma. 2.  Degenerative changes. 3.  Bilateral thyroid nodules. No follow-up recommended given patient's age. 4.  Atherosclerosis. 5.  Small right mastoid effusion. Narrative:  EXAM: CT SPINE CERV WO CONT       INDICATION: Fall with head trauma, laceration left side of head. COMPARISON: None. TECHNIQUE:  Unenhanced multislice helical CT of the cervical spine was performed   in the axial plane. Sagittal and coronal reconstructions were obtained. CT   dose reduction was achieved through use of a standardized protocol tailored for   this examination and automatic exposure control for dose modulation. FINDINGS:       Bilateral thyroid nodules measuring up to 1.9 cm on the right. Atherosclerosis. Small right mastoid effusion. Osteopenia. Degenerative changes surrounding the dens.  2 mm anterior subluxation   C3 on C4, 2 mm posterior subluxation C4 on C5 and 2 mm anterior subluxation C7   on T1. There is anterior height loss at T3 which is not completely included in   the field-of-view but does not appear acute. Vertebral body heights otherwise   maintained. Varying degrees of disc space narrowing, most severe at C4-5 with   endplate sclerosis. Mild canal stenosis. Left greater than right facet   hypertrophy. Prevertebral soft tissues are normal.                 CXR Results  (Last 48 hours)      None            Medical Decision Making   I am the first provider for this patient. I reviewed the vital signs, available nursing notes, past medical history, past surgical history, family history and social history. Vital Signs-Reviewed the patient's vital signs. Patient Vitals for the past 12 hrs:   Temp Pulse Resp BP SpO2   10/20/22 1359 97.2 °F (36.2 °C) 69 20 (!) 149/66 99 %       Records Reviewed: Nursing Notes, Old Medical Records, Previous Radiology Studies, and Previous Laboratory Studies    Provider Notes (Medical Decision Making):   DDx: Fall, ICH, Cervical strain vs fracture, Laceration, Tetanus    79 yo F who presents with wound to scalp after fall PTA. On exam small 2 cm linear laceration. No foreign body visualized. CT head shows no ICH. CT cervical shows remote T3 fracture that does not appear acute. Discussed mastoid effusion with ENT who recommends Flonase and ENT follow-up. Laceration repaired with staples and discussed returning in 5 days for removal. At time of discharge, pt non-toxic appearing in NAD. Pt stable for prompt outpatient follow-up with PCP 1 to 2 days. Patient given strict instructions to return if symptoms worsen. ED Course:   Initial assessment performed. The patients presenting problems have been discussed, and they are in agreement with the care plan formulated and outlined with them.   I have encouraged them to ask questions as they arise throughout their visit. Wound Repair    Date/Time: 10/20/2022 6:52 PM  Performed by: PAPreparation: skin prepped with Betadine  Location details: scalp  Wound length:2.5 cm or less  Foreign bodies: no foreign bodies  Irrigation solution: saline  Irrigation method: jet lavage  Debridement: none  Skin closure: staples  Number of sutures: 5 staples. My total time at bedside, performing this procedure was 1-15 minutes. 1751: Spoke with Dr Brittney Galarza, consult ENT regarding pt's mastoid effusion. Discussed no abnormality visualized to TM. He recommends Flonase with outpatient follow-up. Disposition:  6:49 PM  Discussed imaging results with pt along with dx and treatment plan. Discussed importance of PCP and ENT follow up. All questions answered. Pt voiced they understood. Return if sx worsen. PLAN:  1. Discharge Medication List as of 10/20/2022  6:05 PM        2. Follow-up Information       Follow up With Specialties Details Why Contact Info    Beto Dempsey MD Family Medicine Schedule an appointment as soon as possible for a visit in 1 day  [address] [address]  [phone]      [facility] DEPT Emergency Medicine  Return in 5 days for staple removal [address] [facility] [address] [facility]    Yoselin Roberts MD Otolaryngology, Surgery Physician Schedule an appointment as soon as possible for a visit in 1 day  . [facility] 125 [phone]            Return to ED if worse     Diagnosis     Clinical Impression:   1. Fall, initial encounter    2. Laceration of scalp, initial encounter    3. Thyroid nodule    4. Disorder of right mastoid        Attestations:    CHAVA Begum    Please note that this dictation was completed with [employer], the [employer] voice recognition software. Quite often unanticipated grammatical, syntax, homophones, and other interpretive errors are inadvertently transcribed by the computer software.   Please disregard these errors. Please excuse any errors that have escaped final proofreading. Thank you. I reviewed with the resident the medical history and the PANCHITO's findings on the physical examination. I discussed with the resident the patient's diagnosis and concur with the plan.

## 2022-10-20 NOTE — ED TRIAGE NOTES
Pt brought in by daughter who reports she fell and hit her head on the closet, Denies LOC, sm laceration to left side of head.

## 2022-10-21 ENCOUNTER — VIRTUAL VISIT (OUTPATIENT)
Dept: FAMILY MEDICINE CLINIC | Age: 87
End: 2022-10-21
Payer: MEDICARE

## 2022-10-21 DIAGNOSIS — R93.89 ABNORMAL CT SCAN: ICD-10-CM

## 2022-10-21 DIAGNOSIS — S13.100S CLOSED SUBLUXATION OF CERVICAL SPINE, SEQUELA: ICD-10-CM

## 2022-10-21 DIAGNOSIS — S22.030A COMPRESSION FRACTURE OF T3 VERTEBRA, INITIAL ENCOUNTER (HCC): ICD-10-CM

## 2022-10-21 DIAGNOSIS — R13.10 DYSPHAGIA, UNSPECIFIED TYPE: ICD-10-CM

## 2022-10-21 DIAGNOSIS — E04.1 THYROID NODULE: ICD-10-CM

## 2022-10-21 DIAGNOSIS — Z91.81 STATUS POST FALL: Primary | ICD-10-CM

## 2022-10-21 DIAGNOSIS — H74.91 DISORDER OF RIGHT MASTOID: ICD-10-CM

## 2022-10-21 DIAGNOSIS — I10 PRIMARY HYPERTENSION: ICD-10-CM

## 2022-10-21 PROCEDURE — 99423 OL DIG E/M SVC 21+ MIN: CPT | Performed by: FAMILY MEDICINE

## 2022-10-21 NOTE — PROGRESS NOTES
1. Have you been to the ER, urgent care clinic since your last visit? Hospitalized since your last visit? [facility] ED 10/20/22    2. Have you seen or consulted any other health care providers outside of the [address] [place] [address] since your last visit? Include any pap smears or colon screening.  No      Chief Complaint   Patient presents with    Hospital Follow Up     [facility] ED on 10/20/22

## 2022-10-21 NOTE — PROGRESS NOTES
Ulises Patton is a 80 y.o. female, evaluated via audio-only technology on 10/21/2022 for Hospital Follow Up [facility] ED on 10/20/22) and Fall (Patient's daughter Pau stated patient had three falls within the last month)  . Assessment & Plan:   Diagnoses and all orders for this visit:    1. Status post fall: now has 4 staples. No other signs of increase intracranial pressure. 2. Primary hypertension: advise to check blood pressure at home. Low salt diet and will follow up next week in the office. 3. Thyroid nodule: noted on recent ct cervical spine. She does have dysphagia . For now will send her to ENT as also noted mastoid effusion. Will follow their recommendations. 4. Compression fracture of T3 vertebra, initial encounter [facility]): pt is asymptomatic. Noted on recent ct scan. She had multiple fall and could be the reason. Due to her age and daughter wanted minimal intervention and pt is asymptomatic advised to observe. 5. Dysphagia, unspecified type: on going. ? Esophageal narrowing. Discussed soft, small meal. Always eat in sitting up right position. Daughter agree and already modifying diet. 6. Closed subluxation of cervical spine, sequela: noted incidental finding on ct scan done at emergency room. Pt is asymptomatic. Will observe. Daughter does not want any intervention at this time. Will discuss on follow up visit. 7. Disorder of right mastoid: noted mastoid effusion on ct scan. Could be the reason for fall? ? Sending to ENT and daughter agrees with it. Comments:  rt mastoic effusion     Mostly spoke with daughter. Pt was next to her. Agree with the above plan   Follow-up and Dispositions    Return in about 1 week (around 10/28/2022) for for staple removal and follow up . Please note that this dictation was completed with [employer], the [employer] voice recognition software.   Quite often unanticipated grammatical, syntax, homophones, and other interpretive errors are inadvertently transcribed by the computer software. Please disregard these errors. Please excuse any errors that have escaped final proofreading. 12  Subjective:   Here for follow-up after emergency room visit. She has been feeling dizziness and this is her third fall. This time she had a scalp laceration over left side. Had 4 staples taken. Denies any pain. Review the CT cervical spine . Result as below. Also ct head negative for any acute abnormality. Also noted thyroid nodule. Pt is having some trouble swallowing. Discuss small bite and soft food which daughter is modifying her meal.  Discussed with daughter as she express minimal intervention for any of her currently ongoing medical condition. Advised to discuss it further during follow up visit when she comes for staple removal next week. Agree to see ENT for mastoid effusion. CT Results (most recent):  Results from Hospital Encounter encounter on 10/20/22    CT SPINE CERV WO CONT    Narrative  EXAM: CT SPINE CERV WO CONT    INDICATION: Fall with head trauma, laceration left side of head. COMPARISON: None. TECHNIQUE:  Unenhanced multislice helical CT of the cervical spine was performed  in the axial plane. Sagittal and coronal reconstructions were obtained. CT  dose reduction was achieved through use of a standardized protocol tailored for  this examination and automatic exposure control for dose modulation. FINDINGS:    Bilateral thyroid nodules measuring up to 1.9 cm on the right. Atherosclerosis. Small right mastoid effusion. Osteopenia. Degenerative changes surrounding the dens. 2 mm anterior subluxation  C3 on C4, 2 mm posterior subluxation C4 on C5 and 2 mm anterior subluxation C7  on T1. There is anterior height loss at T3 which is not completely included in  the field-of-view but does not appear acute. Vertebral body heights otherwise  maintained.  Varying degrees of disc space narrowing, most severe at C4-5 with  endplate sclerosis. Mild canal stenosis. Left greater than right facet  hypertrophy. Prevertebral soft tissues are normal.    Impression  1. T3 anterior compression fracture appears remote. No acute findings of  trauma. 2.  Degenerative changes. 3.  Bilateral thyroid nodules. No follow-up recommended given patient's age. 4.  Atherosclerosis. 5.  Small right mastoid effusion. Comments   Seen     Study Result    Narrative & Impression   CT HEAD WO CONT     History: Fall with head trauma, small laceration left head. Comparison: 6/20/2022     TECHNIQUE: 5 mm helical scan obtained of the head were obtained from the skull  vertex through the base of the skull without intravenous contrast.       All CT scans at this facility are performed using dose optimization technique as  appropriate to a performed exam, to include automated exposure control,  adjustment of the mA and/or kV according to patient size (including appropriate  matching first site-specific examinations), or use of iterative reconstruction  technique. BRAIN RESULT:       Mild generalized volume loss. Mild periventricular hypodensity. Gray-white  matter differentiation is maintained. Atherosclerosis. No acute intracranial  hemorrhage. No midline shift. Basilar cisterns are patent. Status post cataract surgery. Soft tissues and osseous structures are grossly  normal. No acute fracture. Mild sinus mucosal thickening. Small mastoid  effusions. IMPRESSION     No acute findings of trauma. No significant interval change. Prior to Admission medications    Medication Sig Start Date End Date Taking? Authorizing Provider   fluticasone propionate (FLONASE) 50 mcg/actuation nasal spray 2 Sprays by Both Nostrils route daily. 10/20/22  Yes CHAVA Meza   omeprazole (PRILOSEC) 40 mg capsule TAKE 1 CAPSULE BY MOUTH DAILY 8/24/22  Yes Jese Dixon MD   furosemide (LASIX) 20 mg tablet Take 1 Tablet by mouth three (3) days a week.  8/17/22 Yes Esther Jefferson,    sertraline (ZOLOFT) 25 mg tablet Take 1 Tablet by mouth in the morning. 8/3/22  Yes Mimi Dimas MD   carvediloL (COREG) 12.5 mg tablet TAKE 1 TABLET BY MOUTH TWICE DAILY WITH MEALS 6/7/22  Yes Stella Urbina P, NP   atorvastatin (LIPITOR) 10 mg tablet TAKE 1 TABLET BY MOUTH EVERY NIGHT 2/22/22  Yes George Arauz P, NP   cranberry extract 425 mg cap Take 1 Cap by mouth daily. Yes Provider, Historical   aspirin 81 mg chewable tablet Take 1 Tab by mouth daily. 11/30/18  Yes Radha Bermeo MD   loratadine (CLARITIN) 10 mg tablet Take 10 mg by mouth daily as needed for Allergies. Yes Provider, Historical   MULTI-VITAMIN PO Take 1 Tab by mouth daily.    Yes Provider, Historical     Patient Active Problem List    Diagnosis Date Noted    Chronic renal disease, stage III 06/24/2022    Chronic systolic (congestive) heart failure 06/24/2022    Skin cancer 03/31/2022    Peripheral vascular disease ([facility] [address] 11/16/2020    Other fatigue 08/05/2019    Dyspnea on exertion 02/14/2019    Acute UTI 11/24/2018    Functional urinary incontinence 11/24/2018    Pulmonary edema 11/24/2018    Primary osteoarthritis of both knees 11/01/2018    Syncope 11/01/2018    Seasonal allergic rhinitis due to pollen 04/23/2018    Dysphagia 12/06/2016    Advance directive discussed with patient 10/21/2016    Gastroesophageal reflux disease without esophagitis 10/21/2016    Primary hypertension 06/22/2016    S/P ORIF (open reduction internal fixation) fracture 06/14/2016    Memory loss 12/16/2015    Vertigo, benign paroxysmal 06/15/2015    Distal radius fracture, left 11/30/2013    Sprain and strain of shoulder and upper arm 11/30/2013    Zoster 06/10/2013    Generalized osteoarthrosis, involving multiple sites 01/08/2013    Reflux esophagitis 01/19/2012    Osteoporosis     Anxiety     Depression        ROS: see HPI     No data recorded     Daphine Idol was evaluated through a patient-initiated, synchronous (real-time) audio only encounter. She (or guardian if applicable) is aware that it is a billable service, which includes applicable co-pays, with coverage as determined by her insurance carrier. This visit was conducted with the patient's (and/or Criselda Carrel guardian's) verbal consent. She has not had a related appointment within my department in the past 7 days or scheduled within the next 24 hours. The patient was located in a state where the provider was licensed to provide care.   The patient was located at: Home: [address] [place] [phone]  The provider was located at: Home: [unfilled]    Total Time: minutes: 11-20 minutes    Sundeep Mcghee MD

## 2022-10-25 NOTE — PROGRESS NOTES
1. \"Have you been to the ER, urgent care clinic since your last visit? Hospitalized since your last visit? \" [facility] ED 10/20/22. 2. \"Have you seen or consulted any other health care providers outside of the [address] [place] [address] since your last visit? \" No     3. For patients aged 39-70: Has the patient had a colonoscopy / FIT/ Cologuard? NA - based on age      If the patient is female:    4. For patients aged 41-77: Has the patient had a mammogram within the past 2 years? NA - based on age or sex      11. For patients aged 21-65: Has the patient had a pap smear?  NA - based on age or sex      Chief Complaint   Patient presents with    Staple Removal    Skin Problem     Callus on finger

## 2022-10-26 ENCOUNTER — HOSPITAL ENCOUNTER (OUTPATIENT)
Dept: GENERAL RADIOLOGY | Age: 87
Discharge: HOME | End: 2022-10-26
Payer: MEDICARE

## 2022-10-26 ENCOUNTER — OFFICE VISIT (OUTPATIENT)
Dept: FAMILY MEDICINE CLINIC | Age: 87
End: 2022-10-26
Payer: MEDICARE

## 2022-10-26 VITALS
RESPIRATION RATE: 16 BRPM | HEIGHT: 62 IN | HEART RATE: 80 BPM | TEMPERATURE: 97.3 F | WEIGHT: 101.8 LBS | OXYGEN SATURATION: 98 % | BODY MASS INDEX: 18.74 KG/M2 | DIASTOLIC BLOOD PRESSURE: 62 MMHG | SYSTOLIC BLOOD PRESSURE: 118 MMHG

## 2022-10-26 DIAGNOSIS — Z48.02 REMOVAL OF STAPLE: Primary | ICD-10-CM

## 2022-10-26 DIAGNOSIS — M25.442 FINGER JOINT SWELLING, LEFT: ICD-10-CM

## 2022-10-26 DIAGNOSIS — R93.7 ABNORMAL CT OF SPINE: ICD-10-CM

## 2022-10-26 PROCEDURE — 1101F PT FALLS ASSESS-DOCD LE1/YR: CPT | Performed by: FAMILY MEDICINE

## 2022-10-26 PROCEDURE — G8427 DOCREV CUR MEDS BY ELIG CLIN: HCPCS | Performed by: FAMILY MEDICINE

## 2022-10-26 PROCEDURE — G8536 NO DOC ELDER MAL SCRN: HCPCS | Performed by: FAMILY MEDICINE

## 2022-10-26 PROCEDURE — 99213 OFFICE O/P EST LOW 20 MIN: CPT | Performed by: FAMILY MEDICINE

## 2022-10-26 PROCEDURE — 73140 X-RAY EXAM OF FINGER(S): CPT

## 2022-10-26 PROCEDURE — G9717 DOC PT DX DEP/BP F/U NT REQ: HCPCS | Performed by: FAMILY MEDICINE

## 2022-10-26 PROCEDURE — G8420 CALC BMI NORM PARAMETERS: HCPCS | Performed by: FAMILY MEDICINE

## 2022-10-26 PROCEDURE — 1090F PRES/ABSN URINE INCON ASSESS: CPT | Performed by: FAMILY MEDICINE

## 2022-10-26 PROCEDURE — 1123F ACP DISCUSS/DSCN MKR DOCD: CPT | Performed by: FAMILY MEDICINE

## 2022-10-26 NOTE — PROGRESS NOTES
HISTORY OF PRESENT ILLNESS  Garett Kumari is a 80 y.o. female. HPI: Here accompanied by daughter. Here for staple removal.  See more details on her last visit note. Left scalp 4 staples. Removed without any complications. Clean the area before and after with alcohol and Betadine. Patient had no specific complaint. Due to fall she had a CT cervical spine done and showed compression fracture and mild subluxation over cervical spine. She has no pain. Daughter does not want to see ortho/ spine center at this time. Discussed urgency if any signs of spinal cord compression or worsening of pain. She understood it well. Discussed fall precaution. Also has coming up appt next month with ENT regarding mastoic effusion, thyroid nodule, swallowing difficulties. Will follow up next visit. Noted swelling over tip of left middle finger. White spots. Non tender. Obtaining x-ray. Visit Vitals  /62 (BP 1 Location: Left upper arm, BP Patient Position: Sitting, BP Cuff Size: Adult)   Pulse 80   Temp 97.3 °F (36.3 °C) (Temporal)   Resp 16   Ht 5' 2\" (1.575 m)   Wt 101 lb 12.8 oz (46.2 kg)   SpO2 98%   BMI 18.62 kg/m²     Review medication list, vitals, problem list,allergies. ROS: see HPI     Physical Exam  Constitutional:       General: She is not in acute distress. HENT:      Head:      Comments: Removed  4 staples over left side scalp wound. Healed well. No signs of infection. No swelling or redness or tenderness. Neurological:      General: No focal deficit present. Mental Status: She is oriented to person, place, and time. Psychiatric:         Behavior: Behavior normal.       ASSESSMENT and PLAN    ICD-10-CM ICD-9-CM    1. Removal of staple : left scalp. No redness, swelling or tenderness. Removed 4 staples without any complications. Scar healed well. Z48.02 V58.32       2. Finger joint swelling, left : ? Calcium deposit vs paronychia. No tenderness.  Will obtain x-ray M25.442 719.04 XR 3RD FINGER LT MIN 2 V      3. Abnormal CT of spine : compression fracture and subluxation. Few falls lately. Asymptomatic. Refused further recommendations from ortho and spine. Will observe. R93.7 793.7       Pt understood and agree with the plan     Follow-up and Dispositions    Return in about 3 months (around 1/26/2023). Please note that this dictation was completed with [employer], the computer voice recognition software. Quite often unanticipated grammatical, syntax, homophones, and other interpretive errors are inadvertently transcribed by the computer software. Please disregard these errors. Please excuse any errors that have escaped final proofreading.

## 2022-10-28 NOTE — PROGRESS NOTES
Spoke with the daughter when she returned the call . Call was transferred from the . Discussed result regarding calcium deposit. Possibility of secondary hyperparathyroidism, calcium deposit tumour, chronic kidney disease etc. She will speak with Dr. Shiva Washington and call back and if needed will do ortho referral.   Daughter understood and will discuss further once she will return the call with nephrology recommendations.

## 2022-10-28 NOTE — PROGRESS NOTES
Called pt. Left voice mail to call back  Let pt know that excessive calcium deposit. Need to see ortho to r/o what it could be. Due to parathyroid gland hyperactivity or any tumour? ?  I can speak with daughter when she returns the call.

## 2022-11-16 ENCOUNTER — HOSPITAL ENCOUNTER (OUTPATIENT)
Dept: LAB | Age: 87
Discharge: HOME | End: 2022-11-16

## 2022-11-16 LAB — XX-LABCORP SPECIMEN COL,LCBCF: NORMAL

## 2022-11-16 PROCEDURE — 99001 SPECIMEN HANDLING PT-LAB: CPT

## 2022-11-18 ENCOUNTER — TELEPHONE (OUTPATIENT)
Dept: FAMILY MEDICINE CLINIC | Age: 87
End: 2022-11-18

## 2022-11-18 NOTE — TELEPHONE ENCOUNTER
----- Message from Breanna Vasquez sent at 11/18/2022 11:56 AM EST -----  Subject: Appointment Request    Reason for Call: Established Patient Appointment needed: Urgent (Patient   Request) No Script    QUESTIONS    Reason for appointment request? Available appointments did not meet   patient need     Additional Information for Provider? Pt's daughter wants pt in as soon as   possible.  Pt has fallen several times and pt's daughter wants to discuss   home health care as soon as possible.   ---------------------------------------------------------------------------  --------------  [id number]  [phone]; OK to leave message on voicemail  ---------------------------------------------------------------------------  --------------  SCRIPT ANSWERS  ASTER Screen: Jaziel Atkins

## 2022-11-22 ENCOUNTER — HOSPITAL ENCOUNTER (OUTPATIENT)
Dept: LAB | Age: 87
Discharge: HOME | End: 2022-11-22
Payer: MEDICARE

## 2022-11-22 ENCOUNTER — OFFICE VISIT (OUTPATIENT)
Dept: FAMILY MEDICINE CLINIC | Age: 87
End: 2022-11-22
Payer: MEDICARE

## 2022-11-22 VITALS
RESPIRATION RATE: 16 BRPM | BODY MASS INDEX: 18.81 KG/M2 | DIASTOLIC BLOOD PRESSURE: 60 MMHG | HEIGHT: 62 IN | WEIGHT: 102.2 LBS | SYSTOLIC BLOOD PRESSURE: 138 MMHG | TEMPERATURE: 97.4 F | HEART RATE: 60 BPM | OXYGEN SATURATION: 98 %

## 2022-11-22 DIAGNOSIS — R53.83 OTHER FATIGUE: ICD-10-CM

## 2022-11-22 DIAGNOSIS — R29.6 FREQUENT FALLS: Primary | ICD-10-CM

## 2022-11-22 DIAGNOSIS — R45.89 FEELING DOWN: ICD-10-CM

## 2022-11-22 DIAGNOSIS — Z78.9 DECREASED ACTIVITIES OF DAILY LIVING (ADL): ICD-10-CM

## 2022-11-22 DIAGNOSIS — N28.9 RENAL INSUFFICIENCY: ICD-10-CM

## 2022-11-22 DIAGNOSIS — R26.81 UNSTEADY GAIT: ICD-10-CM

## 2022-11-22 DIAGNOSIS — R82.90 ABNORMAL URINALYSIS: ICD-10-CM

## 2022-11-22 PROCEDURE — 87077 CULTURE AEROBIC IDENTIFY: CPT

## 2022-11-22 PROCEDURE — G9717 DOC PT DX DEP/BP F/U NT REQ: HCPCS | Performed by: FAMILY MEDICINE

## 2022-11-22 PROCEDURE — 1101F PT FALLS ASSESS-DOCD LE1/YR: CPT | Performed by: FAMILY MEDICINE

## 2022-11-22 PROCEDURE — 1123F ACP DISCUSS/DSCN MKR DOCD: CPT | Performed by: FAMILY MEDICINE

## 2022-11-22 PROCEDURE — 87186 SC STD MICRODIL/AGAR DIL: CPT

## 2022-11-22 PROCEDURE — G8536 NO DOC ELDER MAL SCRN: HCPCS | Performed by: FAMILY MEDICINE

## 2022-11-22 PROCEDURE — 1090F PRES/ABSN URINE INCON ASSESS: CPT | Performed by: FAMILY MEDICINE

## 2022-11-22 PROCEDURE — G8427 DOCREV CUR MEDS BY ELIG CLIN: HCPCS | Performed by: FAMILY MEDICINE

## 2022-11-22 PROCEDURE — G8420 CALC BMI NORM PARAMETERS: HCPCS | Performed by: FAMILY MEDICINE

## 2022-11-22 PROCEDURE — 99214 OFFICE O/P EST MOD 30 MIN: CPT | Performed by: FAMILY MEDICINE

## 2022-11-22 PROCEDURE — 87086 URINE CULTURE/COLONY COUNT: CPT

## 2022-11-22 RX ORDER — NITROFURANTOIN 25; 75 MG/1; MG/1
100 CAPSULE ORAL 2 TIMES DAILY
Qty: 14 CAPSULE | Refills: 0 | Status: SHIPPED | OUTPATIENT
Start: 2022-11-22

## 2022-11-22 RX ORDER — SERTRALINE HYDROCHLORIDE 50 MG/1
50 TABLET, FILM COATED ORAL DAILY
Qty: 90 TABLET | Refills: 1 | Status: SHIPPED | OUTPATIENT
Start: 2022-11-22

## 2022-11-22 NOTE — PROGRESS NOTES
HISTORY OF PRESENT ILLNESS  Radha Carnes is a 80 y.o. female. HPI: Here for follow-up. Concern from daughter as she has been falling frequently. Feels unsteady gait. No known injury on recent falls as daughter was around and able to hold before she falls. Unsteady use of walker. Gradually declining in health. Last fall she went to emergency room had a CT neck and head done showed no acute findings. She does have a T3 anterior cervical spine compression fracture but she has no neck pain or upper back pain. No headache or dizziness. Currently sitting comfortable without any acute distress on a wheelchair. Has a coming up appointment with nephrology next week she had urine analysis and BMP done by nephrology. Urine analysis shows presence of WBC and bacteriuria. I will send a culture. Starting Macrobid. This could be the reason for her unsteady gait and that might be the cause of frequent fall. She has had decreased ambulation and decreased ADL. Daughter helps with ADL. Need help with home health. Will do a referral for PT OT and might help to increase to strengthening. Avoiding NSAIDs. No headache. No chest pain or shortness of breath. No palpitation or diaphoresis. Appetite is unchanged. Weight is stable. Denies any urine frequency or urgency. No change in bowel movement. Visit Vitals  /60 (BP 1 Location: Left upper arm, BP Patient Position: Sitting, BP Cuff Size: Adult)   Pulse 60   Temp 97.4 °F (36.3 °C) (Temporal)   Resp 16   Ht 5' 2\" (1.575 m)   Wt 102 lb 3.2 oz (46.4 kg)   SpO2 98%   BMI 18.69 kg/m²     Review medication list, vitals, problem list,allergies.    Lab Results   Component Value Date/Time    WBC 6.5 06/20/2022 01:25 PM    WBC 5.4 05/17/2012 08:41 AM    HGB 12.4 06/20/2022 01:25 PM    HCT 38.5 06/20/2022 01:25 PM    PLATELET 636 27/96/6364 01:25 PM    MCV 89.5 06/20/2022 01:25 PM     Lab Results   Component Value Date/Time    Sodium 137 05/06/2022 12:00 AM    Potassium 5.0 05/06/2022 12:00 AM    Chloride 101 05/06/2022 12:00 AM    CO2 24 05/06/2022 12:00 AM    Anion gap 4 12/30/2021 11:39 AM    Glucose 99 05/06/2022 12:00 AM    BUN 31 05/06/2022 12:00 AM    Creatinine 1.44 (H) 05/06/2022 12:00 AM    BUN/Creatinine ratio 22 05/06/2022 12:00 AM    GFR est AA 36 (L) 12/30/2021 11:39 AM    GFR est non-AA 30 (L) 12/30/2021 11:39 AM    Calcium 9.2 05/06/2022 12:00 AM    Bilirubin, total 0.3 05/06/2022 12:00 AM    Alk. phosphatase 93 05/06/2022 12:00 AM    Protein, total 6.6 05/06/2022 12:00 AM    Albumin 3.8 05/06/2022 12:00 AM    Globulin 3.5 12/30/2021 11:34 AM    A-G Ratio 1.4 05/06/2022 12:00 AM    ALT (SGPT) 12 05/06/2022 12:00 AM    AST (SGOT) 18 05/06/2022 12:00 AM     Lab Results   Component Value Date/Time    Cholesterol, total 171 05/06/2022 12:00 AM    HDL Cholesterol 63 05/06/2022 12:00 AM    LDL, calculated 92 05/06/2022 12:00 AM    LDL, calculated 96.8 11/25/2020 08:59 AM    VLDL, calculated 16 05/06/2022 12:00 AM    VLDL, calculated 23.2 11/25/2020 08:59 AM    Triglyceride 85 05/06/2022 12:00 AM    CHOL/HDL Ratio 2.9 11/25/2020 08:59 AM     Lab Results   Component Value Date/Time    TSH 3.320 05/06/2022 12:00 AM   Urine analysis ordered by nephrology showed presence of WBC and bacteria. Sending urine culture. Results been sent to scan      ROS: See HPI    Physical Exam  Cardiovascular:      Rate and Rhythm: Normal rate. Abdominal:      Tenderness: There is no abdominal tenderness. Musculoskeletal:         General: No swelling. Neurological:      General: No focal deficit present. Mental Status: She is alert and oriented to person, place, and time. ASSESSMENT and PLAN    ICD-10-CM ICD-9-CM    1. Frequent falls: Blood pressure stable. Noted she is on Lasix. Advised to contact the cardiology and hold the Lasix for 2 weeks. Meantime home health referral for ADL/skilled nursing and PT OT eval and treat. R29.6 V15.88 REFERRAL TO HOME HEALTH      2.  Feeling down: Due to ongoing fall she is feeling more depressed and feels she is more dependent on her daughter. According to daughter mention that she feels she is a burden on family member etc.  We will go up on her Zoloft dose R45.89 799.29       3. Other fatigue: CMP within normal limit. If needed will check CBC. Fatigue could be from UTI. Sending urine culture R53.83 780.79       4. Abnormal urinalysis: Urine analysis ordered by nephrology. Showed presence of bacteria. Will send culture and starting Macrobid  R82.90 791.9 CULTURE, URINE      5. Renal insufficiency: Avoid NSAIDs N28.9 593.9       6. Decreased activities of daily living (ADL)  Z78.9 V49.89 REFERRAL TO HOME HEALTH      7. Unsteady gait  R26.81 781.2 REFERRAL TO HOME HEALTH      Patient and daughter both understood and agreed with the plan  Follow-up and Dispositions    Return in about 1 month (around 12/22/2022). Please note that this dictation was completed with [employer], the computer voice recognition software. Quite often unanticipated grammatical, syntax, homophones, and other interpretive errors are inadvertently transcribed by the computer software. Please disregard these errors. Please excuse any errors that have escaped final proofreading.

## 2022-11-22 NOTE — PROGRESS NOTES
1. \"Have you been to the ER, urgent care clinic since your last visit? Hospitalized since your last visit? \" No    2. \"Have you seen or consulted any other health care providers outside of the [address] [place] [address] since your last visit? \" No     3. For patients aged 39-70: Has the patient had a colonoscopy / FIT/ Cologuard? NA - based on age      If the patient is female:    4. For patients aged 41-77: Has the patient had a mammogram within the past 2 years? NA - based on age or sex      11. For patients aged 21-65: Has the patient had a pap smear? NA - based on age or sex      Patient has appointment with ENT Dr. Telma Baca on next Thursday and Dr. Maureen Kawasaki Nephrology next Friday. Chief Complaint   Patient presents with    Fall     Has had three falls since 10/26/22; most recent fall was on Thursday 11/17/22. Knee Pain     Bilat knee pain and pain in left knee is worse than the right    Leg Pain     Bilat leg pain - \"the muscle pulls some\" - uses [employer] OTC  for muscle cramping/spasms.

## 2022-11-25 LAB
BACTERIA SPEC CULT: ABNORMAL
CC UR VC: ABNORMAL
SERVICE CMNT-IMP: ABNORMAL

## 2022-11-29 ENCOUNTER — TELEPHONE (OUTPATIENT)
Dept: CARDIOLOGY CLINIC | Age: 87
End: 2022-11-29

## 2022-11-29 NOTE — TELEPHONE ENCOUNTER
----- Message from Snow Parra DO sent at 11/28/2022  2:30 PM EST -----  Regarding: Can u please call pt  Did Ms Natalee Arellano daughter call? I know this patient very well  She does very well from a heart standpoint despite having cardiomyopathy  But she gets very sick when she has a UTI  Im absolutely fine her holding the Lasix- especially if concern she has an infection now    Thanks    ----- Message -----  From: Tonya Leon MD  Sent: 11/22/2022  12:39 PM EST  To: Snow Parra DO    Hi,  I saw Miss Natalee Arellano today. I have asked her to call to your office regarding instruction to hold lasix for couple of weeks. She is having frequent fall and poor balanace. No focal weakness on exam.  Generalize fatigue. UA showed presence of bacteria and pending urine culture. Starting her on antibotic but not sure her weakness, feeling dizzy and fall , holding lasix might help as well?? Has no sob or any signs of volume over load at this time. No leg swelling. Appreciate your help as always.      Thank you  Nicola Jordan

## 2022-11-29 NOTE — TELEPHONE ENCOUNTER
Called S/W daughter in regards to Lasix concerns due to UTI. Informed her that per Dr Suma Streeter ok to stop Lasix. Tiffanie Mann verbalized understanding.

## 2022-11-29 NOTE — PROGRESS NOTES
Positive culture and given antibiotic is sensitive.   If still symptoms persist advised to make a follow-up appointment

## 2023-01-27 ENCOUNTER — OFFICE VISIT (OUTPATIENT)
Dept: FAMILY MEDICINE CLINIC | Age: 88
End: 2023-01-27
Payer: MEDICARE

## 2023-01-27 VITALS
HEIGHT: 62 IN | HEART RATE: 67 BPM | OXYGEN SATURATION: 98 % | TEMPERATURE: 97.3 F | BODY MASS INDEX: 18.22 KG/M2 | RESPIRATION RATE: 16 BRPM | DIASTOLIC BLOOD PRESSURE: 62 MMHG | WEIGHT: 99 LBS | SYSTOLIC BLOOD PRESSURE: 134 MMHG

## 2023-01-27 DIAGNOSIS — I73.9 PERIPHERAL VASCULAR DISEASE (HCC): ICD-10-CM

## 2023-01-27 DIAGNOSIS — N18.4 CHRONIC RENAL IMPAIRMENT, STAGE 4 (SEVERE) (HCC): ICD-10-CM

## 2023-01-27 DIAGNOSIS — G40.109 EPILEPSIA PARTIALIS CONTINUA WITHOUT INTRACTABLE EPILEPSY (HCC): ICD-10-CM

## 2023-01-27 DIAGNOSIS — F03.918 DEMENTIA WITH BEHAVIORAL DISTURBANCE: Primary | ICD-10-CM

## 2023-01-27 DIAGNOSIS — M79.604 PAIN IN BOTH LOWER EXTREMITIES: ICD-10-CM

## 2023-01-27 DIAGNOSIS — F03.90 DEMENTIA WITHOUT BEHAVIORAL DISTURBANCE (HCC): ICD-10-CM

## 2023-01-27 DIAGNOSIS — M79.605 PAIN IN BOTH LOWER EXTREMITIES: ICD-10-CM

## 2023-01-27 DIAGNOSIS — R13.10 DYSPHAGIA, UNSPECIFIED TYPE: ICD-10-CM

## 2023-01-27 DIAGNOSIS — I50.22 CHRONIC SYSTOLIC (CONGESTIVE) HEART FAILURE (HCC): ICD-10-CM

## 2023-01-27 DIAGNOSIS — E04.2 MULTIPLE THYROID NODULES: ICD-10-CM

## 2023-01-27 DIAGNOSIS — N28.9 RENAL INSUFFICIENCY: ICD-10-CM

## 2023-01-27 DIAGNOSIS — G47.10 EXCESSIVE SLEEPINESS: ICD-10-CM

## 2023-01-27 DIAGNOSIS — Z00.00 MEDICARE ANNUAL WELLNESS VISIT, SUBSEQUENT: ICD-10-CM

## 2023-01-27 DIAGNOSIS — Z23 ENCOUNTER FOR IMMUNIZATION: ICD-10-CM

## 2023-01-27 DIAGNOSIS — R26.89 IMBALANCE: ICD-10-CM

## 2023-01-27 RX ORDER — OMEPRAZOLE 40 MG/1
40 CAPSULE, DELAYED RELEASE ORAL DAILY
Qty: 90 CAPSULE | Refills: 1 | Status: SHIPPED | OUTPATIENT
Start: 2023-01-27

## 2023-01-27 NOTE — PROGRESS NOTES
1. \"Have you been to the ER, urgent care clinic since your last visit? Hospitalized since your last visit? \" No    2. \"Have you seen or consulted any other health care providers outside of the [address] [place] [address] since your last visit? \" No     3. For patients aged 39-70: Has the patient had a colonoscopy / FIT/ Cologuard? NA - based on age      If the patient is female:    4. For patients aged 41-77: Has the patient had a mammogram within the past 2 years? NA - based on age or sex      11. For patients aged 21-65: Has the patient had a pap smear?  NA - based on age or sex    Chief Complaint   Patient presents with    Annual Wellness Visit    Leg Pain     Patient has bilateral leg pain at times - no pain at this time

## 2023-01-27 NOTE — ACP (ADVANCE CARE PLANNING)
Advance Care Planning     General Advance Care Planning (ACP) Conversation      Date of Conversation: 1/27/2023  Conducted with: Patient with Decision Making Capacity    Healthcare Decision Maker:     Primary Decision Maker: Yong Castrejon - Child - [phone]    Supplemental (Other) Decision Maker: Delano Arellano - Sister - [phone]  Click here to complete [address] including selection of the Healthcare Decision Maker Relationship (ie \"Primary\")    Today we discussed advance directive.  She wants to be a DNR     Content/Action Overview:   See above   Reviewed DNR/DNI and patient confirms current DNR status - completed forms on file (place new order if needed)      Length of Voluntary ACP Conversation in minutes:  <16 minutes (Non-Billable)    Carlos A Chahal MD

## 2023-01-27 NOTE — PROGRESS NOTES
Visit Vitals  /62 (BP 1 Location: Left upper arm, BP Patient Position: Sitting, BP Cuff Size: Adult)   Pulse 67   Temp 97.3 °F (36.3 °C) (Temporal)   Resp 16   Ht 5' 2\" (1.575 m)   Wt 99 lb (44.9 kg)   LMP  (LMP Unknown)   SpO2 98%   BMI 18.11 kg/m²    Physician order obtained. Patient completed adult immunization consent form. Allergies, contraindications and recommendations reviewed with patient. Prevnar 20 vaccine administered IM right deltoid. Patient tolerated well. Patient remained in office for 15 minutes after injection and no adverse reactions were noted.      LOZANO:[id number]  EX:3/1/2024  [facility]:[id number]

## 2023-01-27 NOTE — PATIENT INSTRUCTIONS
Medicare Wellness Visit, Female     The best way to live healthy is to have a lifestyle where you eat a well-balanced diet, exercise regularly, limit alcohol use, and quit all forms of tobacco/nicotine, if applicable. Regular preventive services are another way to keep healthy. Preventive services (vaccines, screening tests, monitoring & exams) can help personalize your care plan, which helps you manage your own care. Screening tests can find health problems at the earliest stages, when they are easiest to treat. [facility] follows the current, evidence-based guidelines published by the [facility] ([facility]) when recommending preventive services for our patients. Because we follow these guidelines, sometimes recommendations change over time as research supports it. (For example, mammograms used to be recommended annually. Even though Medicare will still pay for an annual mammogram, the newer guidelines recommend a mammogram every two years for women of average risk). Of course, you and your doctor may decide to screen more often for some diseases, based on your risk and your co-morbidities (chronic disease you are already diagnosed with). Preventive services for you include:  - Medicare offers their members a free annual wellness visit, which is time for you and your primary care provider to discuss and plan for your preventive service needs.  Take advantage of this benefit every year!    -Over the age of 72 should receive the recommended pneumonia vaccines.    -All adults should have a flu vaccine yearly.  -All adults should have a tetanus vaccine every 10 years.   -Over the age 48 should receive the shingles vaccines.        -All adults should be screened once for Hepatitis C.  -All adults age 38-68 who are overweight should have a diabetes screening test once every three years.   -Other screening tests and preventive services for persons with diabetes include: an eye exam to screen for diabetic retinopathy, a kidney function test, a foot exam, and stricter control over your cholesterol.   -Cardiovascular screening for adults with routine risk involves an electrocardiogram (ECG) at intervals determined by your doctor.     -Colorectal cancer screenings should be done for adults age 39-70 with no increased risk factors for colorectal cancer. There are a number of acceptable methods of screening for this type of cancer. Each test has its own benefits and drawbacks. Discuss with your doctor what is most appropriate for you during your annual wellness visit. The different tests include: colonoscopy (considered the best screening method), a fecal occult blood test, a fecal DNA test, and sigmoidoscopy.    -Lung cancer screening is recommended annually with a low dose CT scan for adults between age 54 and 68, who have smoked at least 30 pack years (equivalent of 1 pack per day for 30 days), and who is a current smoker or quit less than 15 years ago.    -A bone mass density test is recommended when a woman turns 65 to screen for osteoporosis. This test is only recommended one time, as a screening. Some providers will use this same test as a disease monitoring tool if you already have osteoporosis. -Breast cancer screenings are recommended every other year for women of normal risk, age 54-69.    -Cervical cancer screenings for women over age 72 are only recommended with certain risk factors.      Here is a list of your current Health Maintenance items (your personalized list of preventive services) with a due date:  Health Maintenance Due   Topic Date Due    Shingles Vaccine (1 of 2) Never done    COVID-19 Vaccine (4 - Booster for Mccormick Peter series) 12/21/2021

## 2023-01-27 NOTE — PROGRESS NOTES
This is the Subsequent Medicare Annual Wellness Exam, performed 12 months or more after the Initial AWV or the last Subsequent AWV    I have reviewed the patient's medical history in detail and updated the computerized patient record. Assessment/Plan   Education and counseling provided:  Are appropriate based on today's review and evaluation  Discussed prior health plan. Discussed advanced directive. See ACP note from today  1. Dementia with behavioral disturbance  2. 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 Chronic systolic (congestive) heart failure (HCC)  5. Peripheral vascular disease [facility]  6.  Medicare annual wellness visit, subsequent       Depression Risk Factor Screening     3 most recent PHQ Screens 1/27/2023   Little interest or pleasure in doing things Not at all   Feeling down, depressed, irritable, or hopeless Not at all   Total Score PHQ 2 0   Trouble falling or staying asleep, or sleeping too much Not at all   Feeling tired or having little energy Not at all   Poor appetite, weight loss, or overeating Not at all   Feeling bad about yourself - or that you are a failure or have let yourself or your family down Not at all   Trouble concentrating on things such as school, work, reading, or watching TV Not at all   Moving or speaking so slowly that other people could have noticed; or the opposite being so fidgety that others notice Not at all   Thoughts of being better off dead, or hurting yourself in some way Not at all   PHQ 9 Score 0   How difficult have these problems made it for you to do your work, take care of your home and get along with others Not difficult at all       Alcohol & Drug Abuse Risk Screen    Do you average more than 1 drink per night or more than 7 drinks a week:  No    On any one occasion in the past three months have you have had more than 3 drinks containing alcohol:  No          Functional Ability and Level of Safety    Hearing:  has hearing aids. Fair. Will follow up       Activities of Daily Living: The home contains: handrails, grab bars, and rugs  Patient needs help with:  shopping, preparing meals, laundry, housework, managing medications, managing money, bathing, and bathroom needs      Ambulation:  use walker and wheelchair for long walk, feels unsteady so use walker. Fall Risk:  Fall Risk Assessment, last 12 mths 1/27/2023   Able to walk? Yes   Fall in past 12 months? 1   Do you feel unsteady? 1   Are you worried about falling 1   Is TUG test greater than 12 seconds? -   Is the gait abnormal? -   Number of falls in past 12 months 2   Fall with injury? 1      Abuse Screen:  Patient is not abused       Cognitive Screening    Has your family/caregiver stated any concerns about your memory: yes - diagnosis of dementia. No new behavior changes at this time. Health Maintenance Due     Health Maintenance Due   Topic Date Due    Shingles Vaccine (1 of 2) Never done    COVID-19 Vaccine (4 - Booster for Pfizer series) 12/21/2021   Given Prevenar 20. Does not want any due immunization at this time  Patient Care Team   Patient Care Team:  Ruben Padron MD as PCP - General (Family Medicine)  Ruben Padron MD as PCP [facility]  [place] Vidal, [address] (Ophthalmology)  Montrell Werner MD (Neurology)  Gregoria Bruno DO as Physician (Dermatology Physician)  Gurvinder Ferrara MD as Physician (Nephrology)  Violette White, RN as Ambulatory Care Manager  Kj Bingham MD (Otolaryngology)    History     Patient Active Problem List   Diagnosis Code    Osteoporosis M81.0    Anxiety F41.9    Depression F32. A    Reflux esophagitis K21.00    Generalized osteoarthrosis, involving multiple sites M15.9    Zoster B02.9    Distal radius fracture, left S52.502A    Sprain and strain of shoulder and upper arm [id number]    Vertigo, benign paroxysmal H81.10    Memory loss R41.3    S/P ORIF (open reduction internal fixation) fracture Z98.890, Z87.81    Primary hypertension I10    Advance directive discussed with patient Z71.89    Gastroesophageal reflux disease without esophagitis K21.9    Dysphagia R13.10    Seasonal allergic rhinitis due to pollen J30.1    Primary osteoarthritis of both knees M17.0    Syncope R55    Acute UTI N39.0    Functional urinary incontinence R39.81    Pulmonary edema J81.1    Dyspnea on exertion R06.09    Other fatigue R53.83    Peripheral vascular disease (HCC) I73.9    Skin cancer C44.90    Chronic renal disease, stage III N18.30    Chronic systolic (congestive) heart failure I50.22    Dementia with behavioral disturbance F03.918    Chronic renal impairment, stage 4 (severe) (HCC) N18.4    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 ([facility] [address] G40.109     Past Medical History:   Diagnosis Date    Anxiety     Arthritis     Depression     GERD (gastroesophageal reflux disease)     HTN (hypertension)     LBBB (left bundle branch block)     Osteoporosis     completed 8 years Fosamax    Sciatica     Urinary incontinence       Past Surgical History:   Procedure Laterality Date    HX CHOLECYSTECTOMY      HX HYSTERECTOMY      HX ORTHOPAEDIC      fractured right patella surgery    NC ANES NRV MUSC TNDN FSCIA BURSA SHOULDER & AXILLA      NC INS NEW/RPLC PRM PACEMAKER W/TRANSV ELTRD VENTR N/A 11/28/2018    INSERT PPM SINGLE VENTRICULAR performed by Doss Hammans, MD at [facility] CATH LAB    NC UNLISTED PROCEDURE CARDIAC SURGERY  11/2018    pacemaker      Current Outpatient Medications   Medication Sig Dispense Refill    sertraline (ZOLOFT) 50 mg tablet Take 1 Tablet by mouth daily. 90 Tablet 1    fluticasone propionate (FLONASE) 50 mcg/actuation nasal spray 2 Sprays by Both Nostrils route daily.  1 Each 0    omeprazole (PRILOSEC) 40 mg capsule TAKE 1 CAPSULE BY MOUTH DAILY 90 Capsule 1    carvediloL (COREG) 12.5 mg tablet TAKE 1 TABLET BY MOUTH TWICE DAILY WITH MEALS 180 Tablet 3    atorvastatin (LIPITOR) 10 mg tablet TAKE 1 TABLET BY MOUTH EVERY NIGHT 90 Tablet 3    cranberry extract 425 mg cap Take 1 Cap by mouth daily. aspirin 81 mg chewable tablet Take 1 Tab by mouth daily. 30 Tab 11    MULTI-VITAMIN PO Take 1 Tab by mouth daily. nitrofurantoin, macrocrystal-monohydrate, (MACROBID) 100 mg capsule Take 1 Capsule by mouth two (2) times a day. (Patient not taking: Reported on 1/27/2023) 14 Capsule 0    furosemide (LASIX) 20 mg tablet Take 1 Tablet by mouth three (3) days a week.  (Patient not taking: Reported on 1/27/2023) 90 Tablet 3     No Known Allergies    Family History   Problem Relation Age of Onset    Heart Disease Mother     Diabetes Mother     Heart Disease Father     Diabetes Father     Hypertension Sister     Diabetes Sister     Heart Disease Brother     Heart Disease Sister     Diabetes Sister     Heart Disease Brother      Social History     Tobacco Use    Smoking status: Never    Smokeless tobacco: Never   Substance Use Topics    Alcohol use: No         Lexis Franz MD

## 2023-01-28 PROBLEM — F03.90 DEMENTIA WITHOUT BEHAVIORAL DISTURBANCE (HCC): Status: ACTIVE | Noted: 2023-01-28

## 2023-01-28 NOTE — PROGRESS NOTES
HISTORY OF PRESENT ILLNESS  Abimael Medellin is a 80 y.o. female. HPI: Here for follow-up. Accompanied with daughter. Sitting in a wheelchair. Looking well dressed, happy and comfortable. Dementia. No behavior changes. No concern from  At this time. No more seizure activity. Prior history of CHF.  DVT. No signs of volume overload. Feeling imbalance. Using walker at home. No recent fall. Feeling sometimes a heaviness in the legs. Does have history of lower back degenerative changes. Putting Aspercreme helping. No pain. No swelling or calf tenderness. No headache or dizziness. No chest pain or shortness of breath. No palpitation or diaphoresis. Appetite is fair. History of multinodular thyroid. Due to weight no further work-up. No trouble swallowing at this time. She has no choking episode. Again discussed risk versus benefit of continuing oral diet even with mild swallowing difficulties on and off. Discussed risk of aspiration and choking. Daughter understood the risk. But does not want any further work-up regarding thyroid nodule due to her age. Per daughter she does sleep longer hours. No behavior changes. Will observe. Renal insufficiency. Following nephrology. Discussed to avoid NSAIDs  Visit Vitals  /62 (BP 1 Location: Left upper arm, BP Patient Position: Sitting, BP Cuff Size: Adult)   Pulse 67   Temp 97.3 °F (36.3 °C) (Temporal)   Resp 16   Ht 5' 2\" (1.575 m)   Wt 99 lb (44.9 kg)   SpO2 98%   BMI 18.11 kg/m²     Review medication list, vitals, problem list,allergies.    Lab Results   Component Value Date/Time    WBC 6.5 06/20/2022 01:25 PM    HGB 12.4 06/20/2022 01:25 PM    HCT 38.5 06/20/2022 01:25 PM    PLATELET 528 04/20/1155 01:25 PM    MCV 89.5 06/20/2022 01:25 PM     Lab Results   Component Value Date/Time    Cholesterol, total 171 05/06/2022 12:00 AM    HDL Cholesterol 63 05/06/2022 12:00 AM    LDL, calculated 92 05/06/2022 12:00 AM    LDL, calculated 96.8 11/25/2020 08:59 AM    Triglyceride 85 05/06/2022 12:00 AM    CHOL/HDL Ratio 2.9 11/25/2020 08:59 AM     Lab Results   Component Value Date/Time    TSH 3.320 05/06/2022 12:00 AM    T4, Free 1.0 06/26/2020 12:15 PM      Lab Results   Component Value Date/Time    Sodium 137 05/06/2022 12:00 AM    Potassium 5.0 05/06/2022 12:00 AM    Chloride 101 05/06/2022 12:00 AM    CO2 24 05/06/2022 12:00 AM    Anion gap 4 12/30/2021 11:39 AM    Glucose 99 05/06/2022 12:00 AM    BUN 31 05/06/2022 12:00 AM    Creatinine 1.44 (H) 05/06/2022 12:00 AM    BUN/Creatinine ratio 22 05/06/2022 12:00 AM    GFR est AA 36 (L) 12/30/2021 11:39 AM    GFR est non-AA 30 (L) 12/30/2021 11:39 AM    Calcium 9.2 05/06/2022 12:00 AM      Lab Results   Component Value Date/Time    Sodium 137 05/06/2022 12:00 AM    Potassium 5.0 05/06/2022 12:00 AM    Chloride 101 05/06/2022 12:00 AM    CO2 24 05/06/2022 12:00 AM    Anion gap 4 12/30/2021 11:39 AM    Glucose 99 05/06/2022 12:00 AM    BUN 31 05/06/2022 12:00 AM    Creatinine 1.44 (H) 05/06/2022 12:00 AM    BUN/Creatinine ratio 22 05/06/2022 12:00 AM    GFR est AA 36 (L) 12/30/2021 11:39 AM    GFR est non-AA 30 (L) 12/30/2021 11:39 AM    Calcium 9.2 05/06/2022 12:00 AM    Bilirubin, total 0.3 05/06/2022 12:00 AM    ALT (SGPT) 12 05/06/2022 12:00 AM    Alk. phosphatase 93 05/06/2022 12:00 AM    Protein, total 6.6 05/06/2022 12:00 AM    Albumin 3.8 05/06/2022 12:00 AM    Globulin 3.5 12/30/2021 11:34 AM    A-G Ratio 1.4 05/06/2022 12:00 AM            ROS: See HPI    Physical Exam  Constitutional:       General: She is not in acute distress. Cardiovascular:      Rate and Rhythm: Normal rate and regular rhythm. Heart sounds: Normal heart sounds. Abdominal:      General: Bowel sounds are normal.      Palpations: Abdomen is soft. Tenderness: There is no abdominal tenderness. Musculoskeletal:         General: No swelling. Neurological:      General: No focal deficit present.       Mental Status: She is oriented to person, place, and time. Comments: muscle tone and power bilaterally equal in lower extremity   Psychiatric:         Behavior: Behavior normal.       ASSESSMENT and PLAN    ICD-10-CM ICD-9-CM    1. Dementia with behavioral disturbance: Fairly stable at this time. No behavior changes. Daughter had no concern during the visit F03.918 294.21       2. Chronic renal impairment, stage 4 (severe) ([facility]): Been following nephrology. Discussed to avoid NSAIDs. 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 Will opt G40.109 345.70       4. Chronic systolic (congestive) heart failure ([facility]): No signs of volume overload. Asymptomatic. Will observe I50.22 428.22      428.0       5. Peripheral vascular disease ([facility]): On risk factor management. No concern I73.9 443.9       6. Medicare annual wellness visit, subsequent  Z00.00 V70.0       7. Multiple thyroid nodules: She does have on and off some discomfort with the swallowing. Due to her age daughter decided not to do any further work-up. Initially thyroid nodules noted on CT neck E04.2 241.1     on ct neck. no further work up due to age       6. Imbalance: Use walker as a safety. Will observe R26.89 781.2     seen EnT. no fluid behind TM. for now observe       9. Dysphagia, unspecified type: Still wants to continue oral supplement. Daughter agrees with it. Decision due to age. Does not want any further procedure or see any specialist R13.10 787.20     feels food get stuck. no chocking. improves on its own. for now watchful eating. no further procedure due to age       8. Pain in both lower extremities: No pitting edema. No calf tenderness. Probably due to lower back arthritis. Will observe. Heating pad and symptomatic treatment with Aspercreme as it is effective for patient.  M79.604 729.5     M79.605 11. Renal insufficiency: Following nephrology and the recommendation. Avoid NSAIDs N28.9 593.9     following nephrology. observe. avoid NSAIDs       12. Excessive sleepiness: Sleeping longer hours. Could be sundowning. Not in any acute distress. Does not feel unusually fatigued. Will observe for now G47.10 780.54       13. Encounter for immunization  Z23 V03.89 PNEUMOCOCCAL, PCV20, PREVNAR 20, (AGE 18 YRS+), IM, PF      14. Dementia without behavioral disturbance ([facility] [address]): No concern from daughter at this time. Fairly stable F03.90 294.20       Patient and daughter both understood agreed with plan  Follow-up and Dispositions    Return in about 6 months (around 7/27/2023). Please note that this dictation was completed with [employer], the [employer] voice recognition software. Quite often unanticipated grammatical, syntax, homophones, and other interpretive errors are inadvertently transcribed by the computer software. Please disregard these errors. Please excuse any errors that have escaped final proofreading.

## 2023-02-08 ENCOUNTER — CLINICAL SUPPORT (OUTPATIENT)
Dept: CARDIOLOGY CLINIC | Age: 88
End: 2023-02-08

## 2023-02-08 ENCOUNTER — OFFICE VISIT (OUTPATIENT)
Dept: CARDIOLOGY CLINIC | Age: 88
End: 2023-02-08
Payer: MEDICARE

## 2023-02-08 VITALS
HEIGHT: 62 IN | BODY MASS INDEX: 18.58 KG/M2 | WEIGHT: 101 LBS | SYSTOLIC BLOOD PRESSURE: 130 MMHG | OXYGEN SATURATION: 98 % | HEART RATE: 62 BPM | DIASTOLIC BLOOD PRESSURE: 64 MMHG

## 2023-02-08 DIAGNOSIS — I25.5 ISCHEMIC CARDIOMYOPATHY: Primary | ICD-10-CM

## 2023-02-08 DIAGNOSIS — Z95.0 CARDIAC PACEMAKER IN SITU: ICD-10-CM

## 2023-02-08 PROCEDURE — G9717 DOC PT DX DEP/BP F/U NT REQ: HCPCS | Performed by: INTERNAL MEDICINE

## 2023-02-08 PROCEDURE — 1101F PT FALLS ASSESS-DOCD LE1/YR: CPT | Performed by: INTERNAL MEDICINE

## 2023-02-08 PROCEDURE — 1123F ACP DISCUSS/DSCN MKR DOCD: CPT | Performed by: INTERNAL MEDICINE

## 2023-02-08 PROCEDURE — 99214 OFFICE O/P EST MOD 30 MIN: CPT | Performed by: INTERNAL MEDICINE

## 2023-02-08 PROCEDURE — G8427 DOCREV CUR MEDS BY ELIG CLIN: HCPCS | Performed by: INTERNAL MEDICINE

## 2023-02-08 PROCEDURE — G8536 NO DOC ELDER MAL SCRN: HCPCS | Performed by: INTERNAL MEDICINE

## 2023-02-08 PROCEDURE — G8419 CALC BMI OUT NRM PARAM NOF/U: HCPCS | Performed by: INTERNAL MEDICINE

## 2023-02-08 PROCEDURE — 1090F PRES/ABSN URINE INCON ASSESS: CPT | Performed by: INTERNAL MEDICINE

## 2023-02-08 NOTE — PROGRESS NOTES
Armando Hawkins    F/u SOB    HPI    Armando Hawkins is a 80 y. o. with no known cardiac disease who presented initially for evaluation of syncope. As you know, Azul Douglas is such a pleasant patient with history hypertension, arthritis and falls, found to have cardiomyopathy and SSS. Azul Douglas lives with her daughter. Back in Sept when her daughter was out of town she apparently had a syncopal event. Her sister was with her at the time and saw her somewhat slumped in her chair. She was slow to respond but came to. Azul Douglas wasn't sure what happened and didn't seek medical care after that initial event. Again since then she had another episode. Her daughter said her mother looked unwell/ pale and said her stomach was upset. She again kind of slumped and her daughter caught her so she did not fall/ get injured. She believes she lost consciousness. She came to and felt somewhat fatigued briefly. She had no observed seizure like activity. She completely denies surrounding cardiac complaints with these episodes- specifically has never noticed palpations/ heart racing, no CP, or SOB. No swelling or headaches. She has no known heart problems and has never seen a heart doctor or had cardiac testing. Dom's work up showed globally reduced EF 26-30%. I called and spoke to her daughter- giving her the results and alerted her to the signs and symptoms to look out for. Apparently, she got off the phone and asked her mom how she was feeling and she admitted she had been short of breath for 2 days. They ended up going to the [facility] ER the next day and was in AECHF/ HFrEF with fluid overload. Just before discharge she ended up passing out (as she had mentioned very rarely happens)- and she was noted to have a 6 sec pause that correlated. She was appropriately given a pacemaker. Since then she did struggle with some episodes of syncope that may have been related to her underlying urinary tract infections.   Likely this has not been an issue as of recent and since I last saw me overall she has been doing okay. There has been some overall decline in her memory and her daughter says she is tired all the time and her naps seem longer. She is undergoing a neuro eval and starting meds for dementia. Also established with nephro, and dropped lasix to ~3 x/ week. Also aware her daughter had [place] Canal but pt herself didn't have obvious symptoms. They have been calling over the holidays due to increasing SOB, so much so, daughter states pt cant even get dressed. Turns out symptoms were due to UTI, which have significant improved since Abx. No new complaints today. Past Medical History:   Diagnosis Date    Anxiety     Arthritis     Depression     GERD (gastroesophageal reflux disease)     HTN (hypertension)     LBBB (left bundle branch block)     Osteoporosis     completed 8 years Fosamax    Sciatica     Urinary incontinence        Past Surgical History:   Procedure Laterality Date    HX CHOLECYSTECTOMY      HX HYSTERECTOMY      HX ORTHOPAEDIC      fractured right patella surgery    CT ANES NRV MUSC TNDN FSCIA BURSA SHOULDER & AXILLA      CT INS NEW/RPLC PRM PACEMAKER W/TRANSV ELTRD VENTR N/A 11/28/2018    INSERT PPM SINGLE VENTRICULAR performed by Moise Kirkpatrick MD at [facility] CATH LAB    CT UNLISTED PROCEDURE CARDIAC SURGERY  11/2018    pacemaker        Current Outpatient Medications   Medication Sig Dispense Refill    omeprazole (PRILOSEC) 40 mg capsule Take 1 Capsule by mouth daily. 90 Capsule 1    sertraline (ZOLOFT) 50 mg tablet Take 1 Tablet by mouth daily. 90 Tablet 1    fluticasone propionate (FLONASE) 50 mcg/actuation nasal spray 2 Sprays by Both Nostrils route daily.  (Patient taking differently: 2 Sprays by Both Nostrils route as needed.) 1 Each 0    carvediloL (COREG) 12.5 mg tablet TAKE 1 TABLET BY MOUTH TWICE DAILY WITH MEALS 180 Tablet 3    atorvastatin (LIPITOR) 10 mg tablet TAKE 1 TABLET BY MOUTH EVERY NIGHT 90 Tablet 3    cranberry extract 425 mg cap Take 1 Cap by mouth daily. aspirin 81 mg chewable tablet Take 1 Tab by mouth daily. 30 Tab 11    MULTI-VITAMIN PO Take 1 Tab by mouth daily. No Known Allergies    Social History     Socioeconomic History    Marital status:      Spouse name: Not on file    Number of children: Not on file    Years of education: Not on file    Highest education level: Not on file   Occupational History    Not on file   Tobacco Use    Smoking status: Never    Smokeless tobacco: Never   Vaping Use    Vaping Use: Never used   Substance and Sexual Activity    Alcohol use: No    Drug use: No    Sexual activity: Not Currently   Other Topics Concern    Not on file   Social History Narrative    Not on file     Social Determinants of Health     Financial Resource Strain: Low Risk     Difficulty of Paying Living Expenses: Not hard at all   Food Insecurity: No Food Insecurity    Worried About Running Out of Food in the Last Year: Never true    Ran Out of Food in the Last Year: Never true   Transportation Needs: Not on file   Physical Activity: Not on file   Stress: Not on file   Social Connections: Not on file   Intimate Partner Violence: Not on file   Housing Stability: Not on file        The patient has a family history of heart problems, but no genetic cardiomyopathies or SCD. Review of Systems    14 pt Review of Systems is negative unless otherwise mentioned in the HPI.     Wt Readings from Last 3 Encounters:   02/08/23 45.8 kg (101 lb)   01/27/23 44.9 kg (99 lb)   11/22/22 46.4 kg (102 lb 3.2 oz)     Temp Readings from Last 3 Encounters:   01/27/23 97.3 °F (36.3 °C) (Temporal)   11/22/22 97.4 °F (36.3 °C) (Temporal)   10/26/22 97.3 °F (36.3 °C) (Temporal)     BP Readings from Last 3 Encounters:   02/08/23 130/64   01/27/23 134/62   11/22/22 138/60     Pulse Readings from Last 3 Encounters:   02/08/23 62   01/27/23 67   11/22/22 60     Physical Exam:    Visit Vitals  /64 (BP 1 Location: Left upper arm, BP Patient Position: Sitting, BP Cuff Size: Small adult)   Pulse 62   Ht 5' 2\" (1.575 m)   Wt 45.8 kg (101 lb)   LMP  (LMP Unknown)   SpO2 98%   BMI 18.47 kg/m²      Physical Exam  Constitutional:       General: She is not in acute distress. Appearance: She is well-developed. She is not diaphoretic. HENT:      Head: Normocephalic and atraumatic. Eyes:      Pupils: Pupils are equal, round, and reactive to light. Neck:      Vascular: No JVD. Cardiovascular:      Rate and Rhythm: Normal rate and regular rhythm. Heart sounds: Normal heart sounds. No murmur heard. No friction rub. No gallop. Pulmonary:      Effort: Pulmonary effort is normal. No respiratory distress. Breath sounds: Normal breath sounds. No wheezing or rales. Chest:      Chest wall: No tenderness. Abdominal:      General: Bowel sounds are normal.      Palpations: Abdomen is soft. Musculoskeletal:         General: No tenderness. Skin:     General: Skin is warm and dry. Neurological:      Mental Status: She is alert and oriented to person, place, and time. Lab Results   Component Value Date/Time    Sodium 137 05/06/2022 12:00 AM    Potassium 5.0 05/06/2022 12:00 AM    Chloride 101 05/06/2022 12:00 AM    CO2 24 05/06/2022 12:00 AM    Anion gap 4 12/30/2021 11:39 AM    Glucose 99 05/06/2022 12:00 AM    BUN 31 05/06/2022 12:00 AM    Creatinine 1.44 (H) 05/06/2022 12:00 AM    BUN/Creatinine ratio 22 05/06/2022 12:00 AM    GFR est AA 36 (L) 12/30/2021 11:39 AM    GFR est non-AA 30 (L) 12/30/2021 11:39 AM    Calcium 9.2 05/06/2022 12:00 AM    Bilirubin, total 0.3 05/06/2022 12:00 AM    Alk.  phosphatase 93 05/06/2022 12:00 AM    Protein, total 6.6 05/06/2022 12:00 AM    Albumin 3.8 05/06/2022 12:00 AM    Globulin 3.5 12/30/2021 11:34 AM    A-G Ratio 1.4 05/06/2022 12:00 AM    ALT (SGPT) 12 05/06/2022 12:00 AM    AST (SGOT) 18 05/06/2022 12:00 AM     Lab Results   Component Value Date/Time    WBC 6.5 06/20/2022 01:25 PM    WBC 5.4 05/17/2012 08:41 AM    HGB 12.4 06/20/2022 01:25 PM    HCT 38.5 06/20/2022 01:25 PM    PLATELET 297 51/10/2057 01:25 PM    MCV 89.5 06/20/2022 01:25 PM         EKG LBBB/ paced, no concerning ST or T wave changes    CXR 12/28: IMPRESSION  Mildly increased mid to lower lung interstitial markings may be on the basis of  chronic interstitial lung disease or mild interstitial edema. Impression and Plan:  Kvng Gamez is a 80 y. o. with:    HFrEF, s/p AECHF, now euvolemic/ stable (very likely ischemic but presumed)  Symptomatic SSS s/p PPM, known  Dyslipidemia, known  Advanced age/ frailty  Mental status decline, with h/o delirium on dementia  CKD stage IV, baseline ~ 1.5-1.6 ([facility])  Recent covid exposure with daughter in 1/2020, asymptomatic  Recurrent UTI    She was having dizzy spells which really seem to have improved off Lasix and antihistamines  Really on statin + ASA for presumed ASCVD and Coreg for LV function  Device looks good  RTC 6 months with device check    Thank you for allowing me to participate in the care of your patient, please do not hesitate to call with questions or concerns.       Kindest regards,    Viktor Pointer, DO

## 2023-02-08 NOTE — PROGRESS NOTES
Geoff Bains presents today for   Chief Complaint   Patient presents with    Follow-up     6 month follow up. Chest Pain     Left chest dull pain that comes and goes. Shortness of Breath     SOB with exertion constant. Dizziness     Dizzy spells on/off. Fall     Multiple falls since stopped Lasix falls have decreased. Geoff Bains preferred language for health care discussion is english/other. Is someone accompanying this pt? yes    Is the patient using any DME equipment during OV?  yes    Depression Screening:  3 most recent PHQ Screens 2/8/2023   Little interest or pleasure in doing things Not at all   Feeling down, depressed, irritable, or hopeless Not at all   Total Score PHQ 2 0   Trouble falling or staying asleep, or sleeping too much -   Feeling tired or having little energy -   Poor appetite, weight loss, or overeating -   Feeling bad about yourself - or that you are a failure or have let yourself or your family down -   Trouble concentrating on things such as school, work, reading, or watching TV -   Moving or speaking so slowly that other people could have noticed; or the opposite being so fidgety that others notice -   Thoughts of being better off dead, or hurting yourself in some way -   PHQ 9 Score -   How difficult have these problems made it for you to do your work, take care of your home and get along with others -       Learning Assessment:  Learning Assessment 2/8/2023   PRIMARY LEARNER Patient   HIGHEST LEVEL OF EDUCATION - PRIMARY LEARNER  -   BARRIERS PRIMARY LEARNER -     -   [address] -   ANSWERED BY patient   RELATIONSHIP SELF       Abuse Screening:  Abuse Screening Questionnaire 2/8/2023   Do you ever feel afraid of your partner? N   Are you in a relationship with someone who physically or mentally threatens you? N   Is it safe for you to go home? Y       Fall Risk  Fall Risk Assessment, last 12 mths 2/8/2023   Able to walk? Yes   Fall in past 12 months? 0   Do you feel unsteady? 0   Are you worried about falling 0   Is TUG test greater than 12 seconds? -   Is the gait abnormal? -   Number of falls in past 12 months -   Fall with injury? -           Pt currently taking Anticoagulant therapy? no    Pt currently taking Antiplatelet therapy ? ASA 81 mg 1x daily       Coordination of Care:  1. Have you been to the ER, urgent care clinic since your last visit? Hospitalized since your last visit? no    2. Have you seen or consulted any other health care providers outside of the [address] [place] [address] since your last visit? Include any pap smears or colon screening.  no

## 2023-02-17 RX ORDER — ATORVASTATIN CALCIUM 10 MG/1
TABLET, FILM COATED ORAL
Qty: 90 TABLET | Refills: 3 | Status: SHIPPED | OUTPATIENT
Start: 2023-02-17

## 2023-04-05 ENCOUNTER — HOSPITAL ENCOUNTER (OUTPATIENT)
Facility: HOSPITAL | Age: 88
Discharge: HOME | End: 2023-04-08
Payer: MEDICARE

## 2023-04-05 ENCOUNTER — OFFICE VISIT (OUTPATIENT)
Age: 88
End: 2023-04-05
Payer: MEDICARE

## 2023-04-05 ENCOUNTER — HOSPITAL ENCOUNTER (OUTPATIENT)
Facility: HOSPITAL | Age: 88
Setting detail: SPECIMEN
Discharge: HOME | End: 2023-04-08
Payer: MEDICARE

## 2023-04-05 VITALS
RESPIRATION RATE: 16 BRPM | TEMPERATURE: 97.1 F | OXYGEN SATURATION: 98 % | WEIGHT: 95.6 LBS | HEIGHT: 62 IN | SYSTOLIC BLOOD PRESSURE: 156 MMHG | DIASTOLIC BLOOD PRESSURE: 70 MMHG | BODY MASS INDEX: 17.59 KG/M2 | HEART RATE: 66 BPM

## 2023-04-05 DIAGNOSIS — M53.3 TAIL BONE PAIN: ICD-10-CM

## 2023-04-05 DIAGNOSIS — M54.50 ACUTE MIDLINE LOW BACK PAIN WITHOUT SCIATICA: ICD-10-CM

## 2023-04-05 DIAGNOSIS — L75.0 URINARY BODY ODOR: ICD-10-CM

## 2023-04-05 DIAGNOSIS — F02.80 ALZHEIMER'S DEMENTIA WITHOUT BEHAVIORAL DISTURBANCE, PSYCHOTIC DISTURBANCE, MOOD DISTURBANCE, OR ANXIETY, UNSPECIFIED DEMENTIA SEVERITY, UNSPECIFIED TIMING OF DEMENTIA ONSET (HCC): ICD-10-CM
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
## 2023-04-05 DIAGNOSIS — M54.50 ACUTE MIDLINE LOW BACK PAIN WITHOUT SCIATICA: Primary | ICD-10-CM

## 2023-04-05 DIAGNOSIS — G30.9 ALZHEIMER'S DEMENTIA WITHOUT BEHAVIORAL DISTURBANCE, PSYCHOTIC DISTURBANCE, MOOD DISTURBANCE, OR ANXIETY, UNSPECIFIED DEMENTIA SEVERITY, UNSPECIFIED TIMING OF DEMENTIA ONSET (HCC): ICD-10-CM

## 2023-04-05 LAB
BILIRUBIN, URINE, POC: NEGATIVE
BLOOD URINE, POC: NEGATIVE
GLUCOSE URINE, POC: NEGATIVE
KETONES, URINE, POC: NEGATIVE
LEUKOCYTE ESTERASE, URINE, POC: ABNORMAL
NITRITE, URINE, POC: NEGATIVE
PH, URINE, POC: 5.5 (ref 4.6–8)
PROTEIN,URINE, POC: NEGATIVE
SPECIFIC GRAVITY, URINE, POC: 1.01 (ref 1–1.03)
URINALYSIS CLARITY, POC: ABNORMAL
URINALYSIS COLOR, POC: YELLOW
UROBILINOGEN, POC: ABNORMAL

## 2023-04-05 PROCEDURE — 87077 CULTURE AEROBIC IDENTIFY: CPT

## 2023-04-05 PROCEDURE — 72110 X-RAY EXAM L-2 SPINE 4/>VWS: CPT

## 2023-04-05 PROCEDURE — 72220 X-RAY EXAM SACRUM TAILBONE: CPT

## 2023-04-05 PROCEDURE — 81003 URINALYSIS AUTO W/O SCOPE: CPT | Performed by: FAMILY MEDICINE

## 2023-04-05 PROCEDURE — 87086 URINE CULTURE/COLONY COUNT: CPT

## 2023-04-05 PROCEDURE — 87186 SC STD MICRODIL/AGAR DIL: CPT

## 2023-04-05 RX ORDER — LIDOCAINE 50 MG/G
1 PATCH TOPICAL DAILY
Qty: 30 PATCH | Refills: 0 | Status: SHIPPED | OUTPATIENT
Start: 2023-04-05 | End: 2023-05-05

## 2023-04-05 RX ORDER — NITROFURANTOIN 25; 75 MG/1; MG/1
100 CAPSULE ORAL 2 TIMES DAILY
Qty: 14 CAPSULE | Refills: 0 | Status: SHIPPED | OUTPATIENT
Start: 2023-04-05 | End: 2023-04-15

## 2023-04-05 RX ORDER — ACETAMINOPHEN 500 MG
500 TABLET ORAL EVERY 6 HOURS PRN
COMMUNITY

## 2023-04-05 SDOH — ECONOMIC STABILITY: HOUSING INSECURITY
IN THE LAST 12 MONTHS, WAS THERE A TIME WHEN YOU DID NOT HAVE A STEADY PLACE TO SLEEP OR SLEPT IN A SHELTER (INCLUDING NOW)?: NO

## 2023-04-05 SDOH — ECONOMIC STABILITY: INCOME INSECURITY: HOW HARD IS IT FOR YOU TO PAY FOR THE VERY BASICS LIKE FOOD, HOUSING, MEDICAL CARE, AND HEATING?: NOT HARD AT ALL

## 2023-04-05 SDOH — ECONOMIC STABILITY: FOOD INSECURITY: WITHIN THE PAST 12 MONTHS, THE FOOD YOU BOUGHT JUST DIDN'T LAST AND YOU DIDN'T HAVE MONEY TO GET MORE.: NEVER TRUE

## 2023-04-05 SDOH — ECONOMIC STABILITY: FOOD INSECURITY: WITHIN THE PAST 12 MONTHS, YOU WORRIED THAT YOUR FOOD WOULD RUN OUT BEFORE YOU GOT MONEY TO BUY MORE.: NEVER TRUE

## 2023-04-05 NOTE — PROGRESS NOTES
Roberto Carrillo, 80 y.o.,  female    SUBJECTIVE  Fall 2 weeks ago with back pain (Dr. Jolanta Shipley pt)    Pt with known osteoporosis, dementia, epilepsy, CKD. Accidentally fell back on carpet while walking with dog obstructing her path. Daughter has noticed complaints of pain with prolonged sitting, gives tylenol with relief, no leg weakness, has had bowel/bladder incontinence even prior to fall. Daughter also noticed odorous urine, no fever. ROS:  See HPI, all others negative        Patient Active Problem List   Diagnosis    Osteoporosis    Functional urinary incontinence    Other fatigue    Gastroesophageal reflux disease without esophagitis    Primary osteoarthritis of both knees    Vertigo, benign paroxysmal    Peripheral vascular disease (HCC)    Primary hypertension    Reflux esophagitis    Acute UTI    Generalized osteoarthrosis, involving multiple sites    Pulmonary edema    Anxiety    Advance directive discussed with patient    Memory loss    Zoster    S/P ORIF (open reduction internal fixation) fracture    Syncope    Dysphagia    Seasonal allergic rhinitis due to pollen    Dyspnea on exertion    Depression    Distal radius fracture, left    Skin cancer    Chronic renal disease, stage III (HCC)    Chronic systolic (congestive) heart failure (HCC)    Dementia with behavioral disturbance (HCC)    Chronic renal impairment, stage 4 (severe) (HCC)    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  30 patch 0

## 2023-04-05 NOTE — PROGRESS NOTES
1. \"Have you been to the ER, urgent care clinic since your last visit? Hospitalized since your last visit? \" No    2. \"Have you seen or consulted any other health care providers outside of the [address] [place] [address] since your last visit? \" No     3. For patients aged 39-70: Has the patient had a colonoscopy / FIT/ Cologuard? NA - based on age      If the patient is female:    4. For patients aged 41-77: Has the patient had a mammogram within the past 2 years? NA - based on age or sex      11. For patients aged 21-65: Has the patient had a pap smear?  NA - based on age or sex    Chief Complaint   Patient presents with    Back Pain     Murl Rung two weeks ago and hit her back    Fall     Fall two weeks ago    Other     Urinary odor and decreased appetite x 2 weeks    Nausea     X 2 weeks

## 2023-04-08 LAB
BACTERIA SPEC CULT: ABNORMAL
CC UR VC: ABNORMAL
SERVICE CMNT-IMP: ABNORMAL

## 2023-04-14 ENCOUNTER — HOME HEALTH ADMISSION (OUTPATIENT)
Age: 88
End: 2023-04-14

## 2023-04-17 ENCOUNTER — HOME CARE VISIT (OUTPATIENT)
Age: 88
End: 2023-04-17

## 2023-04-27 ENCOUNTER — HOSPITAL ENCOUNTER (OUTPATIENT)
Facility: HOSPITAL | Age: 88
Discharge: HOME | End: 2023-04-30

## 2023-04-27 LAB — LABCORP SPECIMEN COLLECTION: NORMAL

## 2023-04-27 PROCEDURE — 99001 SPECIMEN HANDLING PT-LAB: CPT

## 2023-07-29 NOTE — TELEPHONE ENCOUNTER
Requested Prescriptions     Pending Prescriptions Disp Refills    omeprazole (PRILOSEC) 40 MG delayed release capsule [Pharmacy Med Name: OMEPRAZOLE 40MG CAPSULES] 90 capsule      Sig: TAKE 1 CAPSULE BY MOUTH DAILY     Last OV: 4/5/2023  Last labs: 5/6/2022  Next OV: 8/21/2023

## 2023-07-31 RX ORDER — OMEPRAZOLE 40 MG/1
40 CAPSULE, DELAYED RELEASE ORAL DAILY
Qty: 90 CAPSULE | Refills: 0 | Status: SHIPPED | OUTPATIENT
Start: 2023-07-31

## 2023-08-09 ENCOUNTER — NURSE ONLY (OUTPATIENT)
Age: 88
End: 2023-08-09

## 2023-08-09 ENCOUNTER — OFFICE VISIT (OUTPATIENT)
Age: 88
End: 2023-08-09

## 2023-08-09 VITALS
DIASTOLIC BLOOD PRESSURE: 62 MMHG | SYSTOLIC BLOOD PRESSURE: 138 MMHG | OXYGEN SATURATION: 97 % | BODY MASS INDEX: 17.48 KG/M2 | WEIGHT: 95 LBS | HEIGHT: 62 IN | HEART RATE: 65 BPM

## 2023-08-09 DIAGNOSIS — R55 SYNCOPE: ICD-10-CM

## 2023-08-09 DIAGNOSIS — Z95.0 PRESENCE OF CARDIAC PACEMAKER: ICD-10-CM

## 2023-08-09 DIAGNOSIS — I25.5 ISCHEMIC CARDIOMYOPATHY: Primary | ICD-10-CM

## 2023-08-09 ASSESSMENT — PATIENT HEALTH QUESTIONNAIRE - PHQ9
SUM OF ALL RESPONSES TO PHQ QUESTIONS 1-9: 0
4. FEELING TIRED OR HAVING LITTLE ENERGY: 0
9. THOUGHTS THAT YOU WOULD BE BETTER OFF DEAD, OR OF HURTING YOURSELF: 0
10. IF YOU CHECKED OFF ANY PROBLEMS, HOW DIFFICULT HAVE THESE PROBLEMS MADE IT FOR YOU TO DO YOUR WORK, TAKE CARE OF THINGS AT HOME, OR GET ALONG WITH OTHER PEOPLE: 0
2. FEELING DOWN, DEPRESSED OR HOPELESS: 0
SUM OF ALL RESPONSES TO PHQ QUESTIONS 1-9: 0
SUM OF ALL RESPONSES TO PHQ QUESTIONS 1-9: 0
7. TROUBLE CONCENTRATING ON THINGS, SUCH AS READING THE NEWSPAPER OR WATCHING TELEVISION: 0
5. POOR APPETITE OR OVEREATING: 0
6. FEELING BAD ABOUT YOURSELF - OR THAT YOU ARE A FAILURE OR HAVE LET YOURSELF OR YOUR FAMILY DOWN: 0
3. TROUBLE FALLING OR STAYING ASLEEP: 0
8. MOVING OR SPEAKING SO SLOWLY THAT OTHER PEOPLE COULD HAVE NOTICED. OR THE OPPOSITE, BEING SO FIGETY OR RESTLESS THAT YOU HAVE BEEN MOVING AROUND A LOT MORE THAN USUAL: 0
SUM OF ALL RESPONSES TO PHQ9 QUESTIONS 1 & 2: 0
1. LITTLE INTEREST OR PLEASURE IN DOING THINGS: 0
SUM OF ALL RESPONSES TO PHQ QUESTIONS 1-9: 0

## 2023-08-09 NOTE — PROGRESS NOTES
Norm Culpe presents today for   Chief Complaint   Patient presents with    Follow-up     6 month f/u     Device Check     Medtronic     Robert Friend 4-5x this year and caused injuries        Norm Castanon preferred language for health care discussion is english/other. Is someone accompanying this pt? yes    Is the patient using any DME equipment during OV? yes    Depression Screening:  Depression: Not at risk    PHQ-2 Score: 0        Learning Assessment:  Who is the primary learner? Patient    What is the preferred language for health care of the primary learner? ENGLISH    How does the primary learner prefer to learn new concepts? DEMONSTRATION    Answered By patient    Relationship to Learner SELF           Pt currently taking Anticoagulant therapy? no    Pt currently taking Antiplatelet therapy ? ASA 81 mg 1x daily       Coordination of Care:  1. Have you been to the ER, urgent care clinic since your last visit? Hospitalized since your last visit? no    2. Have you seen or consulted any other health care providers outside of the [address] [place] [address] since your last visit? Include any pap smears or colon screening.  no

## 2023-08-09 NOTE — PROGRESS NOTES
Roscoe Candelaria    F/u SOB    HPI    Roscoe Candelaria is a 80 y. o. with no known cardiac disease who presented initially for evaluation of syncope. As you know, Sujit Parisi is such a pleasant patient with history hypertension, arthritis and falls, found to have cardiomyopathy and SSS. Sujit Parisi lives with her daughter. Back in Sept when her daughter was out of town she apparently had a syncopal event. Her sister was with her at the time and saw her somewhat slumped in her chair. She was slow to respond but came to. Sujit Parisi wasn't sure what happened and didn't seek medical care after that initial event. Again since then she had another episode. Her daughter said her mother looked unwell/ pale and said her stomach was upset. She again kind of slumped and her daughter caught her so she did not fall/ get injured. She believes she lost consciousness. She came to and felt somewhat fatigued briefly. She had no observed seizure like activity. She completely denies surrounding cardiac complaints with these episodes- specifically has never noticed palpations/ heart racing, no CP, or SOB. No swelling or headaches. She has no known heart problems and has never seen a heart doctor or had cardiac testing. Amarjit's work up showed globally reduced EF 26-30%. I called and spoke to her daughter- giving her the results and alerted her to the signs and symptoms to look out for. Apparently, she got off the phone and asked her mom how she was feeling and she admitted she had been short of breath for 2 days. They ended up going to the [facility] ER the next day and was in AECHF/ HFrEF with fluid overload. Just before discharge she ended up passing out (as she had mentioned very rarely happens)- and she was noted to have a 6 sec pause that correlated. She was appropriately given a pacemaker. Since then she did struggle with some episodes of syncope that may have been related to her underlying urinary tract infections.   Likely this has not been an issue as of

## 2023-08-17 NOTE — RESULT ENCOUNTER NOTE
Device check personally reviewed by me. No clinically significant arrhythmias. Normal device function on interrogation. See scanned interrogation document for complete details.

## 2023-08-18 NOTE — TELEPHONE ENCOUNTER
Requested Prescriptions     Pending Prescriptions Disp Refills    sertraline (ZOLOFT) 50 MG tablet [Pharmacy Med Name: SERTRALINE 50MG TABLETS] 90 tablet 0     Sig: TAKE 1 TABLET BY MOUTH DAILY     Last OV: 4/5/2023  Last labs: 5/6/2022  Next OV: 8/21/2023

## 2023-08-21 ENCOUNTER — OFFICE VISIT (OUTPATIENT)
Age: 88
End: 2023-08-21
Payer: MEDICARE

## 2023-08-21 VITALS
HEIGHT: 62 IN | TEMPERATURE: 96.9 F | OXYGEN SATURATION: 96 % | SYSTOLIC BLOOD PRESSURE: 120 MMHG | WEIGHT: 92.6 LBS | HEART RATE: 64 BPM | RESPIRATION RATE: 16 BRPM | BODY MASS INDEX: 17.04 KG/M2 | DIASTOLIC BLOOD PRESSURE: 60 MMHG

## 2023-08-21 DIAGNOSIS — M81.0 AGE RELATED OSTEOPOROSIS, UNSPECIFIED PATHOLOGICAL FRACTURE PRESENCE: ICD-10-CM

## 2023-08-21 DIAGNOSIS — R23.8 OTHER SKIN CHANGES: ICD-10-CM

## 2023-08-21 DIAGNOSIS — F03.90 DEMENTIA WITHOUT BEHAVIORAL DISTURBANCE (HCC): Primary | ICD-10-CM

## 2023-08-21 DIAGNOSIS — S32.010S CLOSED COMPRESSION FRACTURE OF L1 VERTEBRA, SEQUELA: ICD-10-CM

## 2023-08-21 DIAGNOSIS — N18.4 CHRONIC RENAL IMPAIRMENT, STAGE 4 (SEVERE) (HCC): ICD-10-CM

## 2023-08-21 DIAGNOSIS — M54.9 DISCOMFORT OF BACK: ICD-10-CM

## 2023-08-21 DIAGNOSIS — R13.19 ESOPHAGEAL DYSPHAGIA: ICD-10-CM

## 2023-08-21 DIAGNOSIS — R23.3 EASY BRUISING: ICD-10-CM

## 2023-08-21 DIAGNOSIS — I50.22 CHRONIC SYSTOLIC (CONGESTIVE) HEART FAILURE (HCC): ICD-10-CM

## 2023-08-21 DIAGNOSIS — F32.A DEPRESSION, UNSPECIFIED DEPRESSION TYPE: ICD-10-CM

## 2023-08-21 PROCEDURE — 1090F PRES/ABSN URINE INCON ASSESS: CPT | Performed by: FAMILY MEDICINE

## 2023-08-21 PROCEDURE — G8419 CALC BMI OUT NRM PARAM NOF/U: HCPCS | Performed by: FAMILY MEDICINE

## 2023-08-21 PROCEDURE — 1036F TOBACCO NON-USER: CPT | Performed by: FAMILY MEDICINE

## 2023-08-21 PROCEDURE — G8427 DOCREV CUR MEDS BY ELIG CLIN: HCPCS | Performed by: FAMILY MEDICINE

## 2023-08-21 PROCEDURE — 99213 OFFICE O/P EST LOW 20 MIN: CPT | Performed by: FAMILY MEDICINE

## 2023-08-21 PROCEDURE — 1123F ACP DISCUSS/DSCN MKR DOCD: CPT | Performed by: FAMILY MEDICINE

## 2023-08-21 ASSESSMENT — PATIENT HEALTH QUESTIONNAIRE - PHQ9
2. FEELING DOWN, DEPRESSED OR HOPELESS: 0
SUM OF ALL RESPONSES TO PHQ QUESTIONS 1-9: 0
1. LITTLE INTEREST OR PLEASURE IN DOING THINGS: 0
SUM OF ALL RESPONSES TO PHQ9 QUESTIONS 1 & 2: 0

## 2023-08-21 NOTE — PROGRESS NOTES
1. \"Have you been to the ER, urgent care clinic since your last visit? Hospitalized since your last visit? \" No    2. \"Have you seen or consulted any other health care providers outside of the [address] [place] [address] since your last visit? \" No     3. For patients aged 43-73: Has the patient had a colonoscopy / FIT/ Cologuard? NA - based on age      If the patient is female:    4. For patients aged 43-66: Has the patient had a mammogram within the past 2 years? NA - based on age or sex      11. For patients aged 21-65: Has the patient had a pap smear? NA - based on age or sex    Chief Complaint   Patient presents with    Back Pain     If standing to long, patient states back will hurt. When she sits down the pain goes away. No pain at this time.     Dementia    Skin Problem     Bruising - wants to have Dr. Desean Perez look at areas of concern on left arm
disregard these errors. Please excuse any errors that have escaped final proofreading.

## 2023-08-21 NOTE — PATIENT INSTRUCTIONS
Discuss calcium deposit on finger skin to Dr. Cydney Rivers  Discuss need for aspirin to Dr. Ciera Callahan

## 2023-10-11 ENCOUNTER — HOSPITAL ENCOUNTER (OUTPATIENT)
Facility: HOSPITAL | Age: 88
Discharge: HOME | End: 2023-10-14

## 2023-10-11 LAB — LABCORP SPECIMEN COLLECTION: NORMAL

## 2023-10-30 NOTE — PROGRESS NOTES
conducted an initial consultation and Spiritual Assessment for Aline Barry, who is a 80 y.o.,female. Patients Primary Language is: [place]. According to the patients EMR Yazidi Affiliation is: [facility].  
 
The reason the Patient came to the hospital is:  
Patient Active Problem List  
 Diagnosis Date Noted  Acute on chronic combined systolic and diastolic congestive heart failure ([facility] [address] 02/14/2019  
 SOB (shortness of breath) 02/14/2019  CHF (congestive heart failure), NYHA class III, acute, systolic ([address] [address] 03/35/0506  Acute UTI 11/24/2018  Functional urinary incontinence 11/24/2018  Pulmonary edema 11/24/2018  Primary osteoarthritis of both knees 11/01/2018  Syncope 11/01/2018  Seasonal allergic rhinitis due to pollen 04/23/2018  Dysphagia 12/06/2016  Advance directive discussed with patient 10/21/2016  Gastroesophageal reflux disease without esophagitis 10/21/2016  Essential hypertension 06/22/2016  S/P ORIF (open reduction internal fixation) fracture 06/14/2016  Memory loss 12/16/2015  Vertigo, benign paroxysmal 06/15/2015  Distal radius fracture, left 11/30/2013  Sprain and strain of shoulder and upper arm 11/30/2013  Zoster 06/10/2013  Generalized osteoarthrosis, involving multiple sites 01/08/2013  Reflux esophagitis 01/19/2012  Osteoporosis  Anxiety  Depression The  provided the following Interventions: 
Initiated a relationship of care and support. Explored issues of abisai, belief, spirituality and Muslim/ritual needs while hospitalized. Listened empathically. Provided information about Spiritual Care Services. Offered prayer and assurance of continued prayers on patient's behalf. Chart reviewed. The following outcomes where achieved: 
 confirmed Patient's Yazidi Affiliation. Patient expressed gratitude for 's visit. Assessment: Patient's abisai in God is very important for her to cope. Patient does not have any Holiness/cultural needs that will affect patients preferences in health care. There are no spiritual or Holiness issues which require intervention at this time. Plan: 
Chaplains will continue to follow and will provide pastoral care on an as needed/requested basis.  recommends bedside caregivers page  on duty if patient shows signs of acute spiritual or emotional distress. Cheryle Gross Spiritual Care 
([phone]) [employer] Counseling:  I discussed with the patient the risks of mycophenolate mofetil including but not limited to infection/immunosuppression, GI upset, hypokalemia, hypercholesterolemia, bone marrow suppression, lymphoproliferative disorders, malignancy, GI ulceration/bleed/perforation, colitis, interstitial lung disease, kidney failure, progressive multifocal leukoencephalopathy, and birth defects.  The patient understands that monitoring is required including a baseline creatinine and regular CBC testing. In addition, patient must alert us immediately if symptoms of infection or other concerning signs are noted.

## 2023-10-31 RX ORDER — OMEPRAZOLE 40 MG/1
40 CAPSULE, DELAYED RELEASE ORAL DAILY
Qty: 90 CAPSULE | Refills: 0 | Status: SHIPPED | OUTPATIENT
Start: 2023-10-31

## 2023-12-22 ENCOUNTER — APPOINTMENT (OUTPATIENT)
Facility: HOSPITAL | Age: 88
DRG: 291 | End: 2023-12-22
Payer: MEDICARE

## 2023-12-22 ENCOUNTER — HOSPITAL ENCOUNTER (INPATIENT)
Facility: HOSPITAL | Age: 88
LOS: 2 days | Discharge: HOME | DRG: 291 | End: 2023-12-24
Attending: STUDENT IN AN ORGANIZED HEALTH CARE EDUCATION/TRAINING PROGRAM | Admitting: HOSPITALIST
Payer: MEDICARE

## 2023-12-22 DIAGNOSIS — I31.39 PERICARDIAL EFFUSION: Primary | ICD-10-CM

## 2023-12-22 DIAGNOSIS — K22.89 ESOPHAGEAL DILATATION: ICD-10-CM

## 2023-12-22 DIAGNOSIS — F03.90 DEMENTIA WITHOUT BEHAVIORAL DISTURBANCE (HCC): ICD-10-CM

## 2023-12-22 PROBLEM — Z86.79 HISTORY OF CARDIOMYOPATHY: Status: ACTIVE | Noted: 2023-12-22

## 2023-12-22 PROBLEM — R94.31 QT PROLONGATION: Status: ACTIVE | Noted: 2023-12-22

## 2023-12-22 PROBLEM — I49.5 SSS (SICK SINUS SYNDROME) (HCC): Status: ACTIVE | Noted: 2023-12-22

## 2023-12-22 PROBLEM — Z95.0 S/P PLACEMENT OF CARDIAC PACEMAKER: Status: ACTIVE | Noted: 2023-12-22

## 2023-12-22 LAB
ALBUMIN SERPL-MCNC: 3.5 G/DL (ref 3.4–5)
ALBUMIN/GLOB SERPL: 1.1 (ref 0.8–1.7)
ALP SERPL-CCNC: 60 U/L (ref 45–117)
ALT SERPL-CCNC: 16 U/L (ref 13–56)
ANION GAP SERPL CALC-SCNC: 5 MMOL/L (ref 3–18)
APPEARANCE UR: CLEAR
AST SERPL-CCNC: 17 U/L (ref 10–38)
B PERT DNA SPEC QL NAA+PROBE: NOT DETECTED
BACTERIA URNS QL MICRO: ABNORMAL /HPF
BASOPHILS # BLD: 0 K/UL (ref 0–0.1)
BASOPHILS NFR BLD: 1 % (ref 0–2)
BILIRUB SERPL-MCNC: 0.6 MG/DL (ref 0.2–1)
BILIRUB UR QL: NEGATIVE
BORDETELLA PARAPERTUSSIS BY PCR: NOT DETECTED
BUN SERPL-MCNC: 23 MG/DL (ref 7–18)
BUN/CREAT SERPL: 17 (ref 12–20)
C PNEUM DNA SPEC QL NAA+PROBE: NOT DETECTED
CALCIUM SERPL-MCNC: 8.9 MG/DL (ref 8.5–10.1)
CHLORIDE SERPL-SCNC: 103 MMOL/L (ref 100–111)
CO2 SERPL-SCNC: 28 MMOL/L (ref 21–32)
COLOR UR: YELLOW
CREAT SERPL-MCNC: 1.32 MG/DL (ref 0.6–1.3)
DIFFERENTIAL METHOD BLD: NORMAL
EKG ATRIAL RATE: 63 BPM
EKG DIAGNOSIS: NORMAL
EKG P AXIS: 24 DEGREES
EKG P-R INTERVAL: 188 MS
EKG Q-T INTERVAL: 504 MS
EKG QRS DURATION: 166 MS
EKG QTC CALCULATION (BAZETT): 515 MS
EKG R AXIS: 0 DEGREES
EKG T AXIS: 187 DEGREES
EKG VENTRICULAR RATE: 63 BPM
EOSINOPHIL # BLD: 0.2 K/UL (ref 0–0.4)
EOSINOPHIL NFR BLD: 2 % (ref 0–5)
EPITH CASTS URNS QL MICRO: ABNORMAL /LPF (ref 0–5)
ERYTHROCYTE [DISTWIDTH] IN BLOOD BY AUTOMATED COUNT: 14.3 % (ref 11.6–14.5)
FLUAV SUBTYP SPEC NAA+PROBE: NOT DETECTED
FLUBV RNA SPEC QL NAA+PROBE: NOT DETECTED
GLOBULIN SER CALC-MCNC: 3.3 G/DL (ref 2–4)
GLUCOSE SERPL-MCNC: 116 MG/DL (ref 74–99)
GLUCOSE UR STRIP.AUTO-MCNC: NEGATIVE MG/DL
HADV DNA SPEC QL NAA+PROBE: NOT DETECTED
HCOV 229E RNA SPEC QL NAA+PROBE: NOT DETECTED
HCOV HKU1 RNA SPEC QL NAA+PROBE: NOT DETECTED
HCOV NL63 RNA SPEC QL NAA+PROBE: NOT DETECTED
HCOV OC43 RNA SPEC QL NAA+PROBE: NOT DETECTED
HCT VFR BLD AUTO: 41 % (ref 35–45)
HGB BLD-MCNC: 13.3 G/DL (ref 12–16)
HGB UR QL STRIP: NEGATIVE
HMPV RNA SPEC QL NAA+PROBE: NOT DETECTED
HPIV1 RNA SPEC QL NAA+PROBE: NOT DETECTED
HPIV2 RNA SPEC QL NAA+PROBE: NOT DETECTED
HPIV3 RNA SPEC QL NAA+PROBE: NOT DETECTED
HPIV4 RNA SPEC QL NAA+PROBE: NOT DETECTED
IMM GRANULOCYTES # BLD AUTO: 0 K/UL (ref 0–0.04)
IMM GRANULOCYTES NFR BLD AUTO: 0 % (ref 0–0.5)
INR PPP: 1 (ref 0.9–1.1)
KETONES UR QL STRIP.AUTO: NEGATIVE MG/DL
LEUKOCYTE ESTERASE UR QL STRIP.AUTO: ABNORMAL
LIPASE SERPL-CCNC: 42 U/L (ref 13–75)
LYMPHOCYTES # BLD: 1.5 K/UL (ref 0.9–3.6)
LYMPHOCYTES NFR BLD: 21 % (ref 21–52)
M PNEUMO DNA SPEC QL NAA+PROBE: NOT DETECTED
MAGNESIUM SERPL-MCNC: 1.9 MG/DL (ref 1.6–2.6)
MCH RBC QN AUTO: 29.6 PG (ref 24–34)
MCHC RBC AUTO-ENTMCNC: 32.4 G/DL (ref 31–37)
MCV RBC AUTO: 91.1 FL (ref 78–100)
MONOCYTES # BLD: 0.4 K/UL (ref 0.05–1.2)
MONOCYTES NFR BLD: 6 % (ref 3–10)
NEUTS SEG # BLD: 5.2 K/UL (ref 1.8–8)
NEUTS SEG NFR BLD: 70 % (ref 40–73)
NITRITE UR QL STRIP.AUTO: NEGATIVE
NRBC # BLD: 0 K/UL (ref 0–0.01)
NRBC BLD-RTO: 0 PER 100 WBC
NT PRO BNP: ABNORMAL PG/ML (ref 0–1800)
PH UR STRIP: 6.5 (ref 5–8)
PLATELET # BLD AUTO: 236 K/UL (ref 135–420)
PMV BLD AUTO: 9.9 FL (ref 9.2–11.8)
POTASSIUM SERPL-SCNC: 4.5 MMOL/L (ref 3.5–5.5)
PROT SERPL-MCNC: 6.8 G/DL (ref 6.4–8.2)
PROT UR STRIP-MCNC: NEGATIVE MG/DL
PROTHROMBIN TIME: 13.5 SEC (ref 11.9–14.7)
RBC # BLD AUTO: 4.5 M/UL (ref 4.2–5.3)
RBC #/AREA URNS HPF: ABNORMAL /HPF (ref 0–5)
RSV RNA SPEC QL NAA+PROBE: NOT DETECTED
RV+EV RNA SPEC QL NAA+PROBE: NOT DETECTED
SARS-COV-2 RNA RESP QL NAA+PROBE: NOT DETECTED
SODIUM SERPL-SCNC: 136 MMOL/L (ref 136–145)
SP GR UR REFRACTOMETRY: 1.02 (ref 1–1.03)
TROPONIN I SERPL HS-MCNC: 21 NG/L (ref 0–54)
TROPONIN I SERPL HS-MCNC: 39 NG/L (ref 0–54)
TSH SERPL DL<=0.05 MIU/L-ACNC: 2.37 UIU/ML (ref 0.36–3.74)
UROBILINOGEN UR QL STRIP.AUTO: 0.2 EU/DL (ref 0.2–1)
WBC # BLD AUTO: 7.4 K/UL (ref 4.6–13.2)
WBC URNS QL MICRO: ABNORMAL /HPF (ref 0–4)

## 2023-12-22 PROCEDURE — 83880 ASSAY OF NATRIURETIC PEPTIDE: CPT

## 2023-12-22 PROCEDURE — 99223 1ST HOSP IP/OBS HIGH 75: CPT | Performed by: INTERNAL MEDICINE

## 2023-12-22 PROCEDURE — 2580000003 HC RX 258

## 2023-12-22 PROCEDURE — 99285 EMERGENCY DEPT VISIT HI MDM: CPT

## 2023-12-22 PROCEDURE — 85025 COMPLETE CBC W/AUTO DIFF WBC: CPT

## 2023-12-22 PROCEDURE — 85610 PROTHROMBIN TIME: CPT

## 2023-12-22 PROCEDURE — 83690 ASSAY OF LIPASE: CPT

## 2023-12-22 PROCEDURE — 1100000003 HC PRIVATE W/ TELEMETRY

## 2023-12-22 PROCEDURE — 6360000002 HC RX W HCPCS

## 2023-12-22 PROCEDURE — 81001 URINALYSIS AUTO W/SCOPE: CPT

## 2023-12-22 PROCEDURE — 80053 COMPREHEN METABOLIC PANEL: CPT

## 2023-12-22 PROCEDURE — 83735 ASSAY OF MAGNESIUM: CPT

## 2023-12-22 PROCEDURE — 6360000004 HC RX CONTRAST MEDICATION: Performed by: STUDENT IN AN ORGANIZED HEALTH CARE EDUCATION/TRAINING PROGRAM

## 2023-12-22 PROCEDURE — 93005 ELECTROCARDIOGRAM TRACING: CPT | Performed by: STUDENT IN AN ORGANIZED HEALTH CARE EDUCATION/TRAINING PROGRAM

## 2023-12-22 PROCEDURE — 84443 ASSAY THYROID STIM HORMONE: CPT

## 2023-12-22 PROCEDURE — 0202U NFCT DS 22 TRGT SARS-COV-2: CPT

## 2023-12-22 PROCEDURE — 71260 CT THORAX DX C+: CPT

## 2023-12-22 PROCEDURE — 6360000002 HC RX W HCPCS: Performed by: STUDENT IN AN ORGANIZED HEALTH CARE EDUCATION/TRAINING PROGRAM

## 2023-12-22 PROCEDURE — 84484 ASSAY OF TROPONIN QUANT: CPT

## 2023-12-22 PROCEDURE — 96374 THER/PROPH/DIAG INJ IV PUSH: CPT

## 2023-12-22 PROCEDURE — 6370000000 HC RX 637 (ALT 250 FOR IP)

## 2023-12-22 PROCEDURE — 71045 X-RAY EXAM CHEST 1 VIEW: CPT

## 2023-12-22 PROCEDURE — 93010 ELECTROCARDIOGRAM REPORT: CPT | Performed by: INTERNAL MEDICINE

## 2023-12-22 PROCEDURE — 99223 1ST HOSP IP/OBS HIGH 75: CPT

## 2023-12-22 RX ORDER — ACETAMINOPHEN 650 MG/1
650 SUPPOSITORY RECTAL EVERY 6 HOURS PRN
Status: DISCONTINUED | OUTPATIENT
Start: 2023-12-22 | End: 2023-12-24 | Stop reason: HOSPADM

## 2023-12-22 RX ORDER — BUDESONIDE 1 MG/2ML
1 INHALANT ORAL
Status: DISCONTINUED | OUTPATIENT
Start: 2023-12-22 | End: 2023-12-22

## 2023-12-22 RX ORDER — ALBUTEROL SULFATE 90 UG/1
2 AEROSOL, METERED RESPIRATORY (INHALATION) EVERY 4 HOURS PRN
Status: DISCONTINUED | OUTPATIENT
Start: 2023-12-22 | End: 2023-12-24 | Stop reason: HOSPADM

## 2023-12-22 RX ORDER — IODIXANOL 320 MG/ML
80 INJECTION, SOLUTION INTRAVASCULAR
Status: COMPLETED | OUTPATIENT
Start: 2023-12-22 | End: 2023-12-22

## 2023-12-22 RX ORDER — FUROSEMIDE 10 MG/ML
20 INJECTION INTRAMUSCULAR; INTRAVENOUS 2 TIMES DAILY
Status: DISCONTINUED | OUTPATIENT
Start: 2023-12-22 | End: 2023-12-22

## 2023-12-22 RX ORDER — ACETAMINOPHEN 325 MG/1
650 TABLET ORAL EVERY 6 HOURS PRN
Status: DISCONTINUED | OUTPATIENT
Start: 2023-12-22 | End: 2023-12-24 | Stop reason: HOSPADM

## 2023-12-22 RX ORDER — ATORVASTATIN CALCIUM 10 MG/1
10 TABLET, FILM COATED ORAL NIGHTLY
Status: DISCONTINUED | OUTPATIENT
Start: 2023-12-22 | End: 2023-12-24 | Stop reason: HOSPADM

## 2023-12-22 RX ORDER — PROCHLORPERAZINE EDISYLATE 5 MG/ML
10 INJECTION INTRAMUSCULAR; INTRAVENOUS EVERY 6 HOURS PRN
Status: DISCONTINUED | OUTPATIENT
Start: 2023-12-22 | End: 2023-12-24 | Stop reason: HOSPADM

## 2023-12-22 RX ORDER — SODIUM CHLORIDE 0.9 % (FLUSH) 0.9 %
5-40 SYRINGE (ML) INJECTION EVERY 12 HOURS SCHEDULED
Status: DISCONTINUED | OUTPATIENT
Start: 2023-12-22 | End: 2023-12-24 | Stop reason: HOSPADM

## 2023-12-22 RX ORDER — HEPARIN SODIUM 5000 [USP'U]/ML
5000 INJECTION, SOLUTION INTRAVENOUS; SUBCUTANEOUS 2 TIMES DAILY
Status: DISCONTINUED | OUTPATIENT
Start: 2023-12-22 | End: 2023-12-24 | Stop reason: HOSPADM

## 2023-12-22 RX ORDER — IPRATROPIUM BROMIDE AND ALBUTEROL SULFATE 2.5; .5 MG/3ML; MG/3ML
1 SOLUTION RESPIRATORY (INHALATION)
Status: DISCONTINUED | OUTPATIENT
Start: 2023-12-22 | End: 2023-12-22

## 2023-12-22 RX ORDER — ARFORMOTEROL TARTRATE 15 UG/2ML
15 SOLUTION RESPIRATORY (INHALATION)
Status: DISCONTINUED | OUTPATIENT
Start: 2023-12-22 | End: 2023-12-22

## 2023-12-22 RX ORDER — ASPIRIN 81 MG/1
81 TABLET, CHEWABLE ORAL DAILY
Status: DISCONTINUED | OUTPATIENT
Start: 2023-12-23 | End: 2023-12-24 | Stop reason: HOSPADM

## 2023-12-22 RX ORDER — SODIUM CHLORIDE 0.9 % (FLUSH) 0.9 %
5-40 SYRINGE (ML) INJECTION PRN
Status: DISCONTINUED | OUTPATIENT
Start: 2023-12-22 | End: 2023-12-24 | Stop reason: HOSPADM

## 2023-12-22 RX ORDER — POLYETHYLENE GLYCOL 3350 17 G/17G
17 POWDER, FOR SOLUTION ORAL DAILY PRN
Status: DISCONTINUED | OUTPATIENT
Start: 2023-12-22 | End: 2023-12-24 | Stop reason: HOSPADM

## 2023-12-22 RX ORDER — PANTOPRAZOLE SODIUM 40 MG/1
40 TABLET, DELAYED RELEASE ORAL
Status: DISCONTINUED | OUTPATIENT
Start: 2023-12-23 | End: 2023-12-24 | Stop reason: HOSPADM

## 2023-12-22 RX ORDER — CARVEDILOL 12.5 MG/1
12.5 TABLET ORAL 2 TIMES DAILY WITH MEALS
Status: DISCONTINUED | OUTPATIENT
Start: 2023-12-22 | End: 2023-12-23

## 2023-12-22 RX ORDER — FUROSEMIDE 10 MG/ML
20 INJECTION INTRAMUSCULAR; INTRAVENOUS
Status: COMPLETED | OUTPATIENT
Start: 2023-12-22 | End: 2023-12-22

## 2023-12-22 RX ADMIN — SODIUM CHLORIDE, PRESERVATIVE FREE 10 ML: 5 INJECTION INTRAVENOUS at 23:39

## 2023-12-22 RX ADMIN — IODIXANOL 80 ML: 320 INJECTION, SOLUTION INTRAVASCULAR at 12:02

## 2023-12-22 RX ADMIN — HEPARIN SODIUM 5000 UNITS: 5000 INJECTION INTRAVENOUS; SUBCUTANEOUS at 23:36

## 2023-12-22 RX ADMIN — FUROSEMIDE 20 MG: 10 INJECTION, SOLUTION INTRAMUSCULAR; INTRAVENOUS at 12:40

## 2023-12-22 RX ADMIN — ATORVASTATIN CALCIUM 10 MG: 10 TABLET, FILM COATED ORAL at 23:36

## 2023-12-22 NOTE — ED TRIAGE NOTES
Pt brought in by family who reports she has CHF and has been c/o of SOB and abdominal fullness over the past few days.

## 2023-12-22 NOTE — H&P
Leukocyte Esterase, Urine TRACE (A) NEG     Urinalysis, Micro    Collection Time: 12/22/23  2:18 PM   Result Value Ref Range    WBC, UA 0 to 3 0 - 4 /hpf    RBC, UA 0 to 3 0 - 5 /hpf    Epithelial Cells UA FEW 0 - 5 /lpf    BACTERIA, URINE 4+ (A) NEG /hpf       Imaging Reviewed:  CT CHEST ABDOMEN PELVIS W CONTRAST Additional Contrast? None    Result Date: 12/22/2023  EXAM:  CT Chest, Abdomen, Pelvis with Contrast. CLINICAL INDICATION:  SOB and abdominal pain. Feels like food is getting stuck and moving slowly around the abdomen. Increasing abdominal distention. Vomiting episodes yesterday. COMPARISON:  None. TECHNIQUE:  Helically scanned volumetric axial sections of the chest, abdomen and pelvis are obtained following IV contrast administration. Coronal and sagittal reconstruction images are generated to improve anatomic delineation. Radiation dose optimization techniques are utilized as appropriate to the exam, with combination of automated exposure control, adjustment of the mA and/or kV according to patient's size or use of iterative reconstruction technique. FINDINGS: CT Chest. Lungs, Pleura:  - Small pleural effusion on the right side. Trace effusion on the left. - Apparent peripheral reticulation in both lungs with honeycombing, most pronounced at the lung bases, with paucity of groundglass opacities. - Right upper lobe anterior inferior aspect with focal wedge-shaped area of fibrosis. No focal infiltrate or consolidation is detected in the remainder of lung. No discrete mass or suspicious nodules. Mediastinum:  No adenopathy. Cardiovascular:  Mild to moderate pericardial effusion. Base of Neck:  No acute abnormalities. Chest Wall:  No acute abnormalities. S/P ICD placement. Axillae:  No evidence of significant axillary adenopathy. Esophagus:  Mild esophageal dilation with fluid collection. CT Abdomen-Pelvis. Hepatobiliary:  Hepatosteatosis. S/P cholecystectomy.  Spleen, Pancreas, Adrenal

## 2023-12-22 NOTE — ED PROVIDER NOTES
[facility] EMERGENCY DEPT  EMERGENCY DEPARTMENT ENCOUNTER      Pt Name: Pati Damon  MRN: [id number]  [address] [facility] 10/17/1927  Date of evaluation: 12/22/2023  Provider: Gamaliel Peralta MD    CHIEF COMPLAINT       Chief Complaint   Patient presents with    Abdominal Pain    Shortness of Breath         HISTORY OF PRESENT ILLNESS   (Location/Symptom, Timing/Onset, Context/Setting, Quality, Duration, Modifying Factors, Severity)  Note limiting factors. Pati Damon is a 80 y.o. female who presents to the emergency department for shortness of breath. Started this morning. She states that lately she feels like every time she tries to eat something she feels like it is almost getting stuck and moving slowly in her abdomen. Has had some increasing abdominal distention. Still having regular bowel movements. Yesterday had a couple bouts of vomiting. She has a chronic cough that has been unchanged. Denies any swelling in her lower extremities. States has been off of her water pill for some time. Her daughter has been watching her weight however she notes that her weight is actually down not up. Denies any fevers. Denies any dysuria, hematuria or increased urinary frequency. She denies any actual pain or discomfort anywhere. Denies any runny nose, sore throat fever. Nursing Notes were reviewed. REVIEW OF SYSTEMS    (2-9 systems for level 4, 10 or more for level 5)     Constitutional: No fever  HENT: No ear pain  Eyes: No change in vision  Respiratory: Positive for shortness of breath  Cardio: No chest pain  GI: No blood in stool  : No hematuria  MSK: No back pain  Skin: No rashes  Neuro: No headache    Except as noted above the remainder of the review of systems was reviewed and negative.        PAST MEDICAL HISTORY     Past Medical History:   Diagnosis Date    Anxiety     Arthritis     Depression     GERD (gastroesophageal reflux disease)     HTN (hypertension)     LBBB (left bundle branch block)     Osteoporosis

## 2023-12-22 NOTE — CONSULTS
[facility]    [facility] - Consult Note    Consultation request by Daily Marie MD for advice/opinion related to evaluating Pericardial effusion [I31.39]    Date of  Admission: 12/22/2023 10:25 AM   Primary Care Physician:  Juan Calderon MD     Assessment:     Patient Active Problem List   Diagnosis    Osteoporosis    Functional urinary incontinence    Other fatigue    Gastroesophageal reflux disease without esophagitis    Primary osteoarthritis of both knees    Vertigo, benign paroxysmal    Peripheral vascular disease ([address])    Primary hypertension    Reflux esophagitis    Acute UTI    Generalized osteoarthrosis, involving multiple sites    Pulmonary edema    Anxiety    Advance directive discussed with patient    Memory loss    Zoster    S/P ORIF (open reduction internal fixation) fracture    Syncope    Dysphagia    Seasonal allergic rhinitis due to pollen    Dyspnea on exertion    Depression    Distal radius fracture, left    Skin cancer    Chronic renal disease, stage III (HCC)    Chronic systolic (congestive) heart failure (HCC)    Dementia with behavioral disturbance (HCC)    Chronic renal impairment, stage 4 (severe) ([address])    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 Likely holding on the fluid. -CT evidence of mild to moderate pericardial effusion without clinical findings consistent with significant pericardial tamponade. There is no evidence by examination any compromise. -Known history of cardiomyopathy with EF 25-30%. -History of SSS; history of permanent pacemaker 2018  -History of hypertension  -History of LBBB  -Urinary incontinence    Primary cardiologist [facility], Dr. Thelma Finney:     Currently, there is no evidence of cardiac tamponade. She is short of breath with elevated proBNP.   She is able to lay flat without

## 2023-12-23 ENCOUNTER — APPOINTMENT (OUTPATIENT)
Facility: HOSPITAL | Age: 88
DRG: 291 | End: 2023-12-23
Payer: MEDICARE

## 2023-12-23 LAB
ANION GAP SERPL CALC-SCNC: 7 MMOL/L (ref 3–18)
BASOPHILS # BLD: 0.1 K/UL (ref 0–0.1)
BASOPHILS NFR BLD: 1 % (ref 0–2)
BUN SERPL-MCNC: 26 MG/DL (ref 7–18)
BUN/CREAT SERPL: 22 (ref 12–20)
CALCIUM SERPL-MCNC: 9.2 MG/DL (ref 8.5–10.1)
CHLORIDE SERPL-SCNC: 103 MMOL/L (ref 100–111)
CO2 SERPL-SCNC: 28 MMOL/L (ref 21–32)
CREAT SERPL-MCNC: 1.16 MG/DL (ref 0.6–1.3)
DIFFERENTIAL METHOD BLD: NORMAL
ECHO AO ASC DIAM: 3.1 CM
ECHO AO ASCENDING AORTA INDEX: 2.35 CM/M2
ECHO AO ROOT DIAM: 2.1 CM
ECHO AO ROOT INDEX: 1.59 CM/M2
ECHO AO SINUS VALSALVA DIAM: 2.5 CM
ECHO AO SINUS VALSALVA INDEX: 1.89 CM/M2
ECHO AR MAX VEL PISA: 3.2 M/S
ECHO AV AREA PEAK VELOCITY: 1.4 CM2
ECHO AV AREA VTI: 1.6 CM2
ECHO AV AREA/BSA PEAK VELOCITY: 1.1 CM2/M2
ECHO AV AREA/BSA VTI: 1.2 CM2/M2
ECHO AV MEAN GRADIENT: 5 MMHG
ECHO AV MEAN VELOCITY: 1 M/S
ECHO AV PEAK GRADIENT: 9 MMHG
ECHO AV PEAK VELOCITY: 1.5 M/S
ECHO AV REGURGITANT PHT: 714.9 MILLISECOND
ECHO AV VELOCITY RATIO: 0.47
ECHO AV VTI: 28.9 CM
ECHO BSA: 1.3 M2
ECHO EST RA PRESSURE: 15 MMHG
ECHO LA DIAMETER INDEX: 2.88 CM/M2
ECHO LA DIAMETER: 3.8 CM
ECHO LA TO AORTIC ROOT RATIO: 1.81
ECHO LA VOL A-L A2C: 59 ML (ref 22–52)
ECHO LA VOL A-L A4C: 42 ML (ref 22–52)
ECHO LA VOL BP: 51 ML (ref 22–52)
ECHO LA VOL MOD A2C: 56 ML (ref 22–52)
ECHO LA VOL MOD A4C: 39 ML (ref 22–52)
ECHO LA VOL/BSA BIPLANE: 39 ML/M2 (ref 16–34)
ECHO LA VOLUME AREA LENGTH: 54 ML
ECHO LA VOLUME INDEX A-L A2C: 45 ML/M2 (ref 16–34)
ECHO LA VOLUME INDEX A-L A4C: 32 ML/M2 (ref 16–34)
ECHO LA VOLUME INDEX AREA LENGTH: 41 ML/M2 (ref 16–34)
ECHO LA VOLUME INDEX MOD A2C: 42 ML/M2 (ref 16–34)
ECHO LA VOLUME INDEX MOD A4C: 30 ML/M2 (ref 16–34)
ECHO LV E' LATERAL VELOCITY: 8 CM/S
ECHO LV E' SEPTAL VELOCITY: 5 CM/S
ECHO LV EDV A4C: 99 ML
ECHO LV EDV INDEX A4C: 75 ML/M2
ECHO LV EJECTION FRACTION A4C: 27 %
ECHO LV ESV A4C: 72 ML
ECHO LV ESV INDEX A4C: 55 ML/M2
ECHO LV FRACTIONAL SHORTENING: 7 % (ref 28–44)
ECHO LV INTERNAL DIMENSION DIASTOLE INDEX: 3.33 CM/M2
ECHO LV INTERNAL DIMENSION DIASTOLIC: 4.4 CM (ref 3.9–5.3)
ECHO LV INTERNAL DIMENSION SYSTOLIC INDEX: 3.11 CM/M2
ECHO LV INTERNAL DIMENSION SYSTOLIC: 4.1 CM
ECHO LV IVSD: 1.3 CM (ref 0.6–0.9)
ECHO LV MASS 2D: 215.1 G (ref 67–162)
ECHO LV MASS INDEX 2D: 163 G/M2 (ref 43–95)
ECHO LV POSTERIOR WALL DIASTOLIC: 1.3 CM (ref 0.6–0.9)
ECHO LV RELATIVE WALL THICKNESS RATIO: 0.59
ECHO LVOT AREA: 3.1 CM2
ECHO LVOT AV VTI INDEX: 0.5
ECHO LVOT DIAM: 2 CM
ECHO LVOT MEAN GRADIENT: 1 MMHG
ECHO LVOT PEAK GRADIENT: 2 MMHG
ECHO LVOT PEAK VELOCITY: 0.7 M/S
ECHO LVOT STROKE VOLUME INDEX: 34.5 ML/M2
ECHO LVOT SV: 45.5 ML
ECHO LVOT VTI: 14.5 CM
ECHO MV A VELOCITY: 0.97 M/S
ECHO MV E DECELERATION TIME (DT): 120.2 MS
ECHO MV E VELOCITY: 0.5 M/S
ECHO MV E/A RATIO: 0.52
ECHO MV E/E' LATERAL: 6.25
ECHO MV E/E' RATIO (AVERAGED): 8.13
ECHO PULMONARY ARTERY END DIASTOLIC PRESSURE: 7 MMHG
ECHO PV MAX VELOCITY: 1.1 M/S
ECHO PV PEAK GRADIENT: 5 MMHG
ECHO PV REGURGITANT MAX VELOCITY: 1.4 M/S
ECHO RIGHT VENTRICULAR SYSTOLIC PRESSURE (RVSP): 49 MMHG
ECHO RV BASAL DIMENSION: 2.4 CM
ECHO RVOT PEAK GRADIENT: 4 MMHG
ECHO RVOT PEAK VELOCITY: 1 M/S
ECHO TV REGURGITANT MAX VELOCITY: 2.92 M/S
ECHO TV REGURGITANT PEAK GRADIENT: 34 MMHG
EOSINOPHIL # BLD: 0.2 K/UL (ref 0–0.4)
EOSINOPHIL NFR BLD: 3 % (ref 0–5)
ERYTHROCYTE [DISTWIDTH] IN BLOOD BY AUTOMATED COUNT: 14.3 % (ref 11.6–14.5)
GLUCOSE SERPL-MCNC: 90 MG/DL (ref 74–99)
HCT VFR BLD AUTO: 40.2 % (ref 35–45)
HGB BLD-MCNC: 13.1 G/DL (ref 12–16)
IMM GRANULOCYTES # BLD AUTO: 0 K/UL (ref 0–0.04)
IMM GRANULOCYTES NFR BLD AUTO: 0 % (ref 0–0.5)
LYMPHOCYTES # BLD: 2.4 K/UL (ref 0.9–3.6)
LYMPHOCYTES NFR BLD: 30 % (ref 21–52)
MCH RBC QN AUTO: 28.9 PG (ref 24–34)
MCHC RBC AUTO-ENTMCNC: 32.6 G/DL (ref 31–37)
MCV RBC AUTO: 88.7 FL (ref 78–100)
MONOCYTES # BLD: 0.8 K/UL (ref 0.05–1.2)
MONOCYTES NFR BLD: 10 % (ref 3–10)
NEUTS SEG # BLD: 4.6 K/UL (ref 1.8–8)
NEUTS SEG NFR BLD: 56 % (ref 40–73)
NRBC # BLD: 0 K/UL (ref 0–0.01)
NRBC BLD-RTO: 0 PER 100 WBC
PLATELET # BLD AUTO: 229 K/UL (ref 135–420)
PMV BLD AUTO: 9.7 FL (ref 9.2–11.8)
POTASSIUM SERPL-SCNC: 3.6 MMOL/L (ref 3.5–5.5)
RBC # BLD AUTO: 4.53 M/UL (ref 4.2–5.3)
SODIUM SERPL-SCNC: 138 MMOL/L (ref 136–145)
WBC # BLD AUTO: 8.2 K/UL (ref 4.6–13.2)

## 2023-12-23 PROCEDURE — 2580000003 HC RX 258

## 2023-12-23 PROCEDURE — 6370000000 HC RX 637 (ALT 250 FOR IP): Performed by: INTERNAL MEDICINE

## 2023-12-23 PROCEDURE — 80048 BASIC METABOLIC PNL TOTAL CA: CPT

## 2023-12-23 PROCEDURE — 6360000002 HC RX W HCPCS

## 2023-12-23 PROCEDURE — 93306 TTE W/DOPPLER COMPLETE: CPT | Performed by: INTERNAL MEDICINE

## 2023-12-23 PROCEDURE — 99232 SBSQ HOSP IP/OBS MODERATE 35: CPT | Performed by: NURSE PRACTITIONER

## 2023-12-23 PROCEDURE — 85025 COMPLETE CBC W/AUTO DIFF WBC: CPT

## 2023-12-23 PROCEDURE — 1100000003 HC PRIVATE W/ TELEMETRY

## 2023-12-23 PROCEDURE — 6370000000 HC RX 637 (ALT 250 FOR IP): Performed by: STUDENT IN AN ORGANIZED HEALTH CARE EDUCATION/TRAINING PROGRAM

## 2023-12-23 PROCEDURE — 36415 COLL VENOUS BLD VENIPUNCTURE: CPT

## 2023-12-23 PROCEDURE — 6370000000 HC RX 637 (ALT 250 FOR IP)

## 2023-12-23 PROCEDURE — 93306 TTE W/DOPPLER COMPLETE: CPT

## 2023-12-23 PROCEDURE — 6360000002 HC RX W HCPCS: Performed by: NURSE PRACTITIONER

## 2023-12-23 RX ORDER — PEDIATRIC MULTIPLE VITAMINS W/ IRON CHEW TAB 18 MG 18 MG
1 CHEW TAB ORAL DAILY
Status: DISCONTINUED | OUTPATIENT
Start: 2023-12-24 | End: 2023-12-24 | Stop reason: HOSPADM

## 2023-12-23 RX ORDER — FUROSEMIDE 10 MG/ML
20 INJECTION INTRAMUSCULAR; INTRAVENOUS ONCE
Status: COMPLETED | OUTPATIENT
Start: 2023-12-23 | End: 2023-12-23

## 2023-12-23 RX ORDER — CARVEDILOL 12.5 MG/1
12.5 TABLET ORAL 2 TIMES DAILY WITH MEALS
Status: DISCONTINUED | OUTPATIENT
Start: 2023-12-23 | End: 2023-12-24 | Stop reason: HOSPADM

## 2023-12-23 RX ADMIN — CARVEDILOL 12.5 MG: 12.5 TABLET, FILM COATED ORAL at 19:20

## 2023-12-23 RX ADMIN — ATORVASTATIN CALCIUM 10 MG: 10 TABLET, FILM COATED ORAL at 20:51

## 2023-12-23 RX ADMIN — ASPIRIN 81 MG: 81 TABLET, CHEWABLE ORAL at 09:09

## 2023-12-23 RX ADMIN — SODIUM CHLORIDE, PRESERVATIVE FREE 10 ML: 5 INJECTION INTRAVENOUS at 16:48

## 2023-12-23 RX ADMIN — FUROSEMIDE 20 MG: 10 INJECTION, SOLUTION INTRAMUSCULAR; INTRAVENOUS at 16:47

## 2023-12-23 RX ADMIN — CARVEDILOL 12.5 MG: 12.5 TABLET, FILM COATED ORAL at 02:19

## 2023-12-23 RX ADMIN — SODIUM CHLORIDE, PRESERVATIVE FREE 10 ML: 5 INJECTION INTRAVENOUS at 20:51

## 2023-12-23 RX ADMIN — HEPARIN SODIUM 5000 UNITS: 5000 INJECTION INTRAVENOUS; SUBCUTANEOUS at 09:09

## 2023-12-23 RX ADMIN — HEPARIN SODIUM 5000 UNITS: 5000 INJECTION INTRAVENOUS; SUBCUTANEOUS at 20:50

## 2023-12-23 RX ADMIN — SERTRALINE HYDROCHLORIDE 50 MG: 50 TABLET ORAL at 20:51

## 2023-12-23 RX ADMIN — PANTOPRAZOLE SODIUM 40 MG: 40 TABLET, DELAYED RELEASE ORAL at 06:09

## 2023-12-23 RX ADMIN — CARVEDILOL 12.5 MG: 12.5 TABLET, FILM COATED ORAL at 09:05

## 2023-12-23 NOTE — PLAN OF CARE
Problem: Discharge Planning  Goal: Discharge to home or other facility with appropriate resources  Outcome: Progressing  Flowsheets (Taken 12/22/2023 2753)  Discharge to home or other facility with appropriate resources: Identify barriers to discharge with patient and caregiver     Problem: Skin/Tissue Integrity  Goal: Absence of new skin breakdown  Description: 1. Monitor for areas of redness and/or skin breakdown  2. Assess vascular access sites hourly  3. Every 4-6 hours minimum:  Change oxygen saturation probe site  4. Every 4-6 hours:  If on nasal continuous positive airway pressure, respiratory therapy assess nares and determine need for appliance change or resting period.   Outcome: Progressing     Problem: ABCDS Injury Assessment  Goal: Absence of physical injury  Outcome: Progressing     Problem: Safety - Adult  Goal: Free from fall injury  Outcome: Progressing

## 2023-12-23 NOTE — ED NOTES
Reports given to Jose A Miranda, [address] [place], [place] [address].  No further questions asked

## 2023-12-24 VITALS
WEIGHT: 86 LBS | TEMPERATURE: 98.2 F | BODY MASS INDEX: 16.24 KG/M2 | HEIGHT: 61 IN | HEART RATE: 60 BPM | RESPIRATION RATE: 20 BRPM | SYSTOLIC BLOOD PRESSURE: 111 MMHG | DIASTOLIC BLOOD PRESSURE: 69 MMHG | OXYGEN SATURATION: 96 %

## 2023-12-24 LAB
ANION GAP SERPL CALC-SCNC: 6 MMOL/L (ref 3–18)
BASOPHILS # BLD: 0.1 K/UL (ref 0–0.1)
BASOPHILS NFR BLD: 1 % (ref 0–2)
BUN SERPL-MCNC: 28 MG/DL (ref 7–18)
BUN/CREAT SERPL: 19 (ref 12–20)
CALCIUM SERPL-MCNC: 8.9 MG/DL (ref 8.5–10.1)
CHLORIDE SERPL-SCNC: 99 MMOL/L (ref 100–111)
CO2 SERPL-SCNC: 31 MMOL/L (ref 21–32)
CREAT SERPL-MCNC: 1.48 MG/DL (ref 0.6–1.3)
DIFFERENTIAL METHOD BLD: ABNORMAL
EOSINOPHIL # BLD: 0.4 K/UL (ref 0–0.4)
EOSINOPHIL NFR BLD: 4 % (ref 0–5)
ERYTHROCYTE [DISTWIDTH] IN BLOOD BY AUTOMATED COUNT: 14 % (ref 11.6–14.5)
GLUCOSE SERPL-MCNC: 104 MG/DL (ref 74–99)
HCT VFR BLD AUTO: 39.5 % (ref 35–45)
HGB BLD-MCNC: 13 G/DL (ref 12–16)
IMM GRANULOCYTES # BLD AUTO: 0 K/UL (ref 0–0.04)
IMM GRANULOCYTES NFR BLD AUTO: 0 % (ref 0–0.5)
LYMPHOCYTES # BLD: 2.3 K/UL (ref 0.9–3.6)
LYMPHOCYTES NFR BLD: 26 % (ref 21–52)
MAGNESIUM SERPL-MCNC: 1.8 MG/DL (ref 1.6–2.6)
MCH RBC QN AUTO: 29.2 PG (ref 24–34)
MCHC RBC AUTO-ENTMCNC: 32.9 G/DL (ref 31–37)
MCV RBC AUTO: 88.8 FL (ref 78–100)
MONOCYTES # BLD: 1 K/UL (ref 0.05–1.2)
MONOCYTES NFR BLD: 12 % (ref 3–10)
NEUTS SEG # BLD: 5.3 K/UL (ref 1.8–8)
NEUTS SEG NFR BLD: 58 % (ref 40–73)
NRBC # BLD: 0 K/UL (ref 0–0.01)
NRBC BLD-RTO: 0 PER 100 WBC
PLATELET # BLD AUTO: 222 K/UL (ref 135–420)
PMV BLD AUTO: 9.8 FL (ref 9.2–11.8)
POTASSIUM SERPL-SCNC: 3.1 MMOL/L (ref 3.5–5.5)
RBC # BLD AUTO: 4.45 M/UL (ref 4.2–5.3)
SODIUM SERPL-SCNC: 136 MMOL/L (ref 136–145)
WBC # BLD AUTO: 9 K/UL (ref 4.6–13.2)

## 2023-12-24 PROCEDURE — 6370000000 HC RX 637 (ALT 250 FOR IP)

## 2023-12-24 PROCEDURE — 6370000000 HC RX 637 (ALT 250 FOR IP): Performed by: STUDENT IN AN ORGANIZED HEALTH CARE EDUCATION/TRAINING PROGRAM

## 2023-12-24 PROCEDURE — 6370000000 HC RX 637 (ALT 250 FOR IP): Performed by: NURSE PRACTITIONER

## 2023-12-24 PROCEDURE — 2580000003 HC RX 258

## 2023-12-24 PROCEDURE — 99239 HOSP IP/OBS DSCHRG MGMT >30: CPT | Performed by: NURSE PRACTITIONER

## 2023-12-24 PROCEDURE — 80048 BASIC METABOLIC PNL TOTAL CA: CPT

## 2023-12-24 PROCEDURE — 6360000002 HC RX W HCPCS

## 2023-12-24 PROCEDURE — 36415 COLL VENOUS BLD VENIPUNCTURE: CPT

## 2023-12-24 PROCEDURE — 6370000000 HC RX 637 (ALT 250 FOR IP): Performed by: INTERNAL MEDICINE

## 2023-12-24 PROCEDURE — 94761 N-INVAS EAR/PLS OXIMETRY MLT: CPT

## 2023-12-24 PROCEDURE — 83735 ASSAY OF MAGNESIUM: CPT

## 2023-12-24 PROCEDURE — 85025 COMPLETE CBC W/AUTO DIFF WBC: CPT

## 2023-12-24 RX ORDER — POTASSIUM CHLORIDE 20 MEQ/1
TABLET, EXTENDED RELEASE ORAL
Qty: 60 TABLET | Refills: 0 | Status: SHIPPED | OUTPATIENT
Start: 2023-12-24

## 2023-12-24 RX ORDER — FUROSEMIDE 20 MG/1
20 TABLET ORAL SEE ADMIN INSTRUCTIONS
Qty: 30 TABLET | Refills: 0 | Status: SHIPPED | OUTPATIENT
Start: 2023-12-24

## 2023-12-24 RX ADMIN — CARVEDILOL 12.5 MG: 12.5 TABLET, FILM COATED ORAL at 08:46

## 2023-12-24 RX ADMIN — Medication 1 TABLET: at 08:51

## 2023-12-24 RX ADMIN — ASPIRIN 81 MG: 81 TABLET, CHEWABLE ORAL at 08:46

## 2023-12-24 RX ADMIN — POTASSIUM BICARBONATE 40 MEQ: 782 TABLET, EFFERVESCENT ORAL at 11:51

## 2023-12-24 RX ADMIN — POTASSIUM BICARBONATE 40 MEQ: 782 TABLET, EFFERVESCENT ORAL at 08:46

## 2023-12-24 RX ADMIN — ACETAMINOPHEN 325MG 650 MG: 325 TABLET ORAL at 03:58

## 2023-12-24 RX ADMIN — HEPARIN SODIUM 5000 UNITS: 5000 INJECTION INTRAVENOUS; SUBCUTANEOUS at 08:46

## 2023-12-24 RX ADMIN — PANTOPRAZOLE SODIUM 40 MG: 40 TABLET, DELAYED RELEASE ORAL at 08:46

## 2023-12-24 RX ADMIN — SODIUM CHLORIDE, PRESERVATIVE FREE 10 ML: 5 INJECTION INTRAVENOUS at 08:46

## 2023-12-24 NOTE — DISCHARGE SUMMARY
[address] [facility]  Discharge Summary       Patient: Oj Zapata Age: 80 y.o. : 10/17/1927 MR#: [id number] [id number]: [id number]  PCP on record: Fawad Argueta MD  Admit date: 2023  Discharge date: 2023    Disposition:    []Home   []Home with Home Health   []SNF/NH   []Rehab   [x]Home with family   []Alternate Facility:____________________    Admission Diagnoses:  Pericardial effusion [I31.39]  Esophageal dilatation [K22.89]  Dementia without behavioral disturbance ([address]) [F03.90]    Discharge Diagnoses:                             Acute systolic and diastolic heart Failure -  Moderate pericardial effusion  Hypokalemia  HTN    Mild dementia    Discharge Medications:     Current Discharge Medication List        START taking these medications    Details   furosemide (LASIX) 20 MG tablet Take 1 tablet by mouth See Admin Instructions  Qty: 30 tablet, Refills: 0      potassium chloride (KLOR-CON M) 20 MEQ extended release tablet Take MWF daily along with lasix  Qty: 60 tablet, Refills: 0           CONTINUE these medications which have NOT CHANGED    Details   sertraline (ZOLOFT) 50 MG tablet TAKE 1 TABLET BY MOUTH DAILY  Qty: 90 tablet, Refills: 0      omeprazole (PRILOSEC) 40 MG delayed release capsule TAKE 1 CAPSULE BY MOUTH DAILY  Qty: 90 capsule, Refills: 0      carvedilol (COREG) 12.5 MG tablet TAKE 1 TABLET BY MOUTH TWICE DAILY WITH MEALS  Qty: 180 tablet, Refills: 3      Cranberry 1000 MG CAPS Take 1 capsule by mouth daily      acetaminophen (TYLENOL) 500 MG tablet Take 1 tablet by mouth every 6 hours as needed for Pain      Multiple Vitamin (MULTI-VITAMIN PO) Take 1 tablet by mouth daily      aspirin 81 MG chewable tablet Take 1 tablet by mouth daily      atorvastatin (LIPITOR) 10 MG tablet TAKE 1 TABLET BY MOUTH EVERY NIGHT           Consults:    - Cardiology     Procedures:  -   None    Significant Diagnostic Studies:   CT CHEST ABDOMEN PELVIS W CONTRAST on

## 2023-12-24 NOTE — DISCHARGE INSTRUCTIONS
Follow up with PCP in 5-7 days   Follow up with Cardiology in 1 week, repeat echocardiogram in 1 week     Encourage monitoring fluid intake and daily weights   Avoid added salt in diet.

## 2023-12-24 NOTE — DISCHARGE INSTR - DIET

## 2023-12-24 NOTE — CARE COORDINATION
D/C order noted for today. Orders reviewed. No needs identified at this time. CM spoke with patient's daughter, patient's daughter at the bedside, and patient's daughter will be transporting patient home today for discharge. CM remains available if needed.          Mona Morris, [phone]

## 2023-12-24 NOTE — CARE COORDINATION
CM spoke with patient's daughter, patient's daughter is in the room, and will be transporting patient home today for discharge. No CM needs.            Carmen Helton RN  Case Management [phone]

## 2023-12-26 ENCOUNTER — CARE COORDINATION (OUTPATIENT)
Facility: CLINIC | Age: 88
End: 2023-12-26

## 2023-12-26 DIAGNOSIS — I31.39 PERICARDIAL EFFUSION: Primary | ICD-10-CM

## 2023-12-26 PROCEDURE — 1111F DSCHRG MED/CURRENT MED MERGE: CPT | Performed by: FAMILY MEDICINE

## 2023-12-29 NOTE — PROGRESS NOTES
1. \"Have you been to the ER, urgent care clinic since your last visit?  Hospitalized since your last visit?\" Yes When: [facility]  12/22/23 - 12/24/23.    2. \"Have you seen or consulted any other health care providers outside of the [facility] since your last visit?\" Nephrology, Dr. Dobbs LOV: 10/13/23.     3. For patients aged 45-75: Has the patient had a colonoscopy / FIT/ Cologuard? NA - based on age      If the patient is female:    4. For patients aged 40-74: Has the patient had a mammogram within the past 2 years? NA - based on age or sex      5. For patients aged 21-65: Has the patient had a pap smear? NA - based on age or sex    Chief Complaint   Patient presents with    Follow-Up from Hospital     [facility] 12/22/23 - 12/24/23 - Pericardial effusion

## 2024-01-02 ENCOUNTER — OFFICE VISIT (OUTPATIENT)
Age: 89
End: 2024-01-02
Payer: MEDICARE

## 2024-01-02 ENCOUNTER — HOME CARE VISIT (OUTPATIENT)
Age: 89
End: 2024-01-02
Payer: MEDICARE

## 2024-01-02 ENCOUNTER — HOSPICE ADMISSION (OUTPATIENT)
Age: 89
End: 2024-01-02
Payer: MEDICARE

## 2024-01-02 VITALS
TEMPERATURE: 97.1 F | HEIGHT: 62 IN | BODY MASS INDEX: 16.6 KG/M2 | SYSTOLIC BLOOD PRESSURE: 124 MMHG | HEART RATE: 63 BPM | OXYGEN SATURATION: 99 % | DIASTOLIC BLOOD PRESSURE: 60 MMHG | RESPIRATION RATE: 16 BRPM | WEIGHT: 90.2 LBS

## 2024-01-02 DIAGNOSIS — R06.09 DYSPNEA ON EXERTION: ICD-10-CM

## 2024-01-02 DIAGNOSIS — F03.918 DEMENTIA WITH BEHAVIORAL DISTURBANCE (HCC): ICD-10-CM

## 2024-01-02 DIAGNOSIS — Z95.0 S/P PLACEMENT OF CARDIAC PACEMAKER: ICD-10-CM

## 2024-01-02 DIAGNOSIS — I50.22 CHRONIC SYSTOLIC (CONGESTIVE) HEART FAILURE (HCC): Primary | ICD-10-CM

## 2024-01-02 DIAGNOSIS — E87.6 HYPOKALEMIA: ICD-10-CM

## 2024-01-02 DIAGNOSIS — N18.4 CHRONIC RENAL IMPAIRMENT, STAGE 4 (SEVERE) (HCC): ICD-10-CM

## 2024-01-02 DIAGNOSIS — K22.89 ESOPHAGEAL DILATATION: ICD-10-CM

## 2024-01-02 DIAGNOSIS — Z91.81 AT HIGH RISK FOR FALLS: ICD-10-CM

## 2024-01-02 DIAGNOSIS — I31.39 PERICARDIAL EFFUSION: ICD-10-CM

## 2024-01-02 PROCEDURE — 99213 OFFICE O/P EST LOW 20 MIN: CPT | Performed by: FAMILY MEDICINE

## 2024-01-02 PROCEDURE — 1090F PRES/ABSN URINE INCON ASSESS: CPT | Performed by: FAMILY MEDICINE

## 2024-01-02 PROCEDURE — G8419 CALC BMI OUT NRM PARAM NOF/U: HCPCS | Performed by: FAMILY MEDICINE

## 2024-01-02 PROCEDURE — G8484 FLU IMMUNIZE NO ADMIN: HCPCS | Performed by: FAMILY MEDICINE

## 2024-01-02 PROCEDURE — 1036F TOBACCO NON-USER: CPT | Performed by: FAMILY MEDICINE

## 2024-01-02 PROCEDURE — 1111F DSCHRG MED/CURRENT MED MERGE: CPT | Performed by: FAMILY MEDICINE

## 2024-01-02 PROCEDURE — 1123F ACP DISCUSS/DSCN MKR DOCD: CPT | Performed by: FAMILY MEDICINE

## 2024-01-02 PROCEDURE — G8428 CUR MEDS NOT DOCUMENT: HCPCS | Performed by: FAMILY MEDICINE

## 2024-01-02 ASSESSMENT — PATIENT HEALTH QUESTIONNAIRE - PHQ9
SUM OF ALL RESPONSES TO PHQ QUESTIONS 1-9: 1
SUM OF ALL RESPONSES TO PHQ QUESTIONS 1-9: 1
SUM OF ALL RESPONSES TO PHQ9 QUESTIONS 1 & 2: 1
SUM OF ALL RESPONSES TO PHQ QUESTIONS 1-9: 1
SUM OF ALL RESPONSES TO PHQ QUESTIONS 1-9: 1
1. LITTLE INTEREST OR PLEASURE IN DOING THINGS: 0
2. FEELING DOWN, DEPRESSED OR HOPELESS: 1

## 2024-01-02 NOTE — PROGRESS NOTES
Amarjit Zuleta is a 89 y.o. female complains of   Chief Complaint   Patient presents with    Follow-Up from Hospital     [facility] 12/22/23 - 12/24/23 - Pericardial effusion       HPI: Here for follow up after hospital discharge.   Review discharge note.  Admitted for chf exacerbation, percardial effusion, esophageal dilatation.  Review ct report.  Currently sitting comfortable. Some sob on exertion. Dementia stable. Had sun downing effect during hospitalization and now better.  Daughter expressed that pt had best care but pt does not want to go back to hospital for any cause.   Wants comfort care and hospice care.  Done a referral.  Vitals stable.   12/22/23    ECHO (TTE) COMPLETE (PRN CONTRAST/BUBBLE/STRAIN/3D) 12/23/2023  1:38 PM (Final)    Interpretation Summary    Left Ventricle: Severely reduced left ventricular systolic function with a visually estimated EF of 20 - 25%. Left ventricle is dilated. Mildly increased wall thickness. Global hypokinesis present. Abnormal diastolic function.  Grade I Diastolic Dysfunction    Right Ventricle: Reduced systolic function.    Aortic Valve: Moderate annular calcification. Moderate regurgitation.    Mitral Valve: Moderate annular calcification of the mitral valve. Mild to moderate regurgitation.    Tricuspid Valve: Moderate regurgitation. The estimated RVSP is 49 mmHg.    Left Atrium: Left atrium is dilated.    Pericardium: Moderate (1-2 cm) pericardial effusion present. No indication of cardiac tamponade.    Image quality is good.    Signed by: Russ Lugo MD on 12/23/2023  1:38 PM    Xray Result (most recent):  XR CHEST PORTABLE 12/22/2023    Narrative  INDICATION: Shortness of breath    TECHNIQUE: Portable chest radiograph    COMPARISON: June 20, 2022    FINDINGS:    Enlarged cardiac silhouette. Diffuse, chronic leg increased interstitial lung  markings. No superimposed lung consolidation. Single lead left-sided cardiac  pacer appears intact. No pleural effusion or

## 2024-01-03 ENCOUNTER — TELEPHONE (OUTPATIENT)
Age: 89
End: 2024-01-03

## 2024-01-03 ENCOUNTER — OFFICE VISIT (OUTPATIENT)
Age: 89
End: 2024-01-03
Payer: MEDICARE

## 2024-01-03 ENCOUNTER — HOME CARE VISIT (OUTPATIENT)
Age: 89
End: 2024-01-03
Payer: MEDICARE

## 2024-01-03 VITALS
WEIGHT: 87 LBS | HEIGHT: 62 IN | DIASTOLIC BLOOD PRESSURE: 60 MMHG | OXYGEN SATURATION: 100 % | HEART RATE: 61 BPM | SYSTOLIC BLOOD PRESSURE: 124 MMHG | BODY MASS INDEX: 16.01 KG/M2

## 2024-01-03 DIAGNOSIS — I31.39 PERICARDIAL EFFUSION: ICD-10-CM

## 2024-01-03 DIAGNOSIS — Z95.0 PRESENCE OF CARDIAC PACEMAKER: ICD-10-CM

## 2024-01-03 DIAGNOSIS — I10 ESSENTIAL (PRIMARY) HYPERTENSION: ICD-10-CM

## 2024-01-03 DIAGNOSIS — I25.5 ISCHEMIC CARDIOMYOPATHY: Primary | ICD-10-CM

## 2024-01-03 PROBLEM — I13.10 CARDIORENAL SYNDROME: Status: ACTIVE | Noted: 2021-01-18

## 2024-01-03 PROBLEM — N18.32 STAGE 3B CHRONIC KIDNEY DISEASE (HCC): Status: ACTIVE | Noted: 2021-10-14

## 2024-01-03 PROBLEM — N39.0 URINARY TRACT INFECTION: Status: ACTIVE | Noted: 2023-10-13

## 2024-01-03 PROCEDURE — 1090F PRES/ABSN URINE INCON ASSESS: CPT | Performed by: NURSE PRACTITIONER

## 2024-01-03 PROCEDURE — 1036F TOBACCO NON-USER: CPT | Performed by: NURSE PRACTITIONER

## 2024-01-03 PROCEDURE — 93000 ELECTROCARDIOGRAM COMPLETE: CPT | Performed by: NURSE PRACTITIONER

## 2024-01-03 PROCEDURE — G8419 CALC BMI OUT NRM PARAM NOF/U: HCPCS | Performed by: NURSE PRACTITIONER

## 2024-01-03 PROCEDURE — 1111F DSCHRG MED/CURRENT MED MERGE: CPT | Performed by: NURSE PRACTITIONER

## 2024-01-03 PROCEDURE — G8484 FLU IMMUNIZE NO ADMIN: HCPCS | Performed by: NURSE PRACTITIONER

## 2024-01-03 PROCEDURE — 1123F ACP DISCUSS/DSCN MKR DOCD: CPT | Performed by: NURSE PRACTITIONER

## 2024-01-03 PROCEDURE — 99214 OFFICE O/P EST MOD 30 MIN: CPT | Performed by: NURSE PRACTITIONER

## 2024-01-03 PROCEDURE — G8427 DOCREV CUR MEDS BY ELIG CLIN: HCPCS | Performed by: NURSE PRACTITIONER

## 2024-01-03 ASSESSMENT — ENCOUNTER SYMPTOMS
CHEST TIGHTNESS: 0
BLOOD IN STOOL: 0
WHEEZING: 0
SHORTNESS OF BREATH: 0
ABDOMINAL DISTENTION: 0
COUGH: 0
CONSTIPATION: 0
NAUSEA: 0
VOMITING: 0
DIARRHEA: 0

## 2024-01-03 ASSESSMENT — PATIENT HEALTH QUESTIONNAIRE - PHQ9
1. LITTLE INTEREST OR PLEASURE IN DOING THINGS: 0
SUM OF ALL RESPONSES TO PHQ QUESTIONS 1-9: 0
2. FEELING DOWN, DEPRESSED OR HOPELESS: 0
9. THOUGHTS THAT YOU WOULD BE BETTER OFF DEAD, OR OF HURTING YOURSELF: 0
3. TROUBLE FALLING OR STAYING ASLEEP: 0
10. IF YOU CHECKED OFF ANY PROBLEMS, HOW DIFFICULT HAVE THESE PROBLEMS MADE IT FOR YOU TO DO YOUR WORK, TAKE CARE OF THINGS AT HOME, OR GET ALONG WITH OTHER PEOPLE: 0
6. FEELING BAD ABOUT YOURSELF - OR THAT YOU ARE A FAILURE OR HAVE LET YOURSELF OR YOUR FAMILY DOWN: 0
SUM OF ALL RESPONSES TO PHQ QUESTIONS 1-9: 0
SUM OF ALL RESPONSES TO PHQ QUESTIONS 1-9: 0
7. TROUBLE CONCENTRATING ON THINGS, SUCH AS READING THE NEWSPAPER OR WATCHING TELEVISION: 0
8. MOVING OR SPEAKING SO SLOWLY THAT OTHER PEOPLE COULD HAVE NOTICED. OR THE OPPOSITE, BEING SO FIGETY OR RESTLESS THAT YOU HAVE BEEN MOVING AROUND A LOT MORE THAN USUAL: 0
SUM OF ALL RESPONSES TO PHQ QUESTIONS 1-9: 0
4. FEELING TIRED OR HAVING LITTLE ENERGY: 0
SUM OF ALL RESPONSES TO PHQ9 QUESTIONS 1 & 2: 0
5. POOR APPETITE OR OVEREATING: 0

## 2024-01-03 ASSESSMENT — ANXIETY QUESTIONNAIRES
2. NOT BEING ABLE TO STOP OR CONTROL WORRYING: 0
4. TROUBLE RELAXING: 0
1. FEELING NERVOUS, ANXIOUS, OR ON EDGE: 0
3. WORRYING TOO MUCH ABOUT DIFFERENT THINGS: 0
5. BEING SO RESTLESS THAT IT IS HARD TO SIT STILL: 0
6. BECOMING EASILY ANNOYED OR IRRITABLE: 0
GAD7 TOTAL SCORE: 0
7. FEELING AFRAID AS IF SOMETHING AWFUL MIGHT HAPPEN: 0

## 2024-01-03 NOTE — PATIENT INSTRUCTIONS
Decrease lasix to 20mg - take 1 tablet 3 times a week (Mon-Wed-Fri)  Continue potassium 20meq 3 times a week  Weigh daily and record  If weight progressively increases over 3 day period of time, take lasix daily until weight decreases then go back to 3 times a week  [facility] transmission in Feb. As scheduled  Please call the office if she wants to return for in-office visit (now on Hospice)

## 2024-01-03 NOTE — TELEPHONE ENCOUNTER
Eve from  Hospice called stating they received the referral and the pt will start tomorrow and thank you to Dr. Mancilla for the referral. kristie

## 2024-01-03 NOTE — PROGRESS NOTES
Amarjit uZleta presents today for   Chief Complaint   Patient presents with    Follow-Up from Hospital     Pericardial effusion       Amarjit Zuleta preferred language for health care discussion is english/other.    Is someone accompanying this pt? yes    Is the patient using any DME equipment during OV? wheelchair    Depression Screening:  Depression: Not at risk (1/3/2024)    PHQ-2     PHQ-2 Score: 0        Learning Assessment:  Who is the primary learner? Patient    What is the preferred language for health care of the primary learner? ENGLISH    How does the primary learner prefer to learn new concepts? DEMONSTRATION    Answered By patient    Relationship to Learner SELF           Pt currently taking Anticoagulant therapy? no    Pt currently taking Antiplatelet therapy ? Aspirin 81 mg daily      Coordination of Care:  1. Have you been to the ER, urgent care clinic since your last visit? Hospitalized since your last visit? no    2. Have you seen or consulted any other health care providers outside of the [facility] since your last visit? Include any pap smears or colon screening. no    
wall thickness, global hypokinesis, grade 1 diastolic dysfunction, moderate aortic regurgitation, mild to moderate mitral regurgitation, moderate TR, RVSP 49 mmHg, moderate pericardial effusion with no indication of tamponade.  She was given IV lasix while inpatient and discharged home on lasix 20mg on Mon-Wed-Fri.  She was supposed to have another echo done in one week after discharge.     Her daughter states that her mother has expressed that she wants no more testing or work-up performed.  They saw her PCP shortly after discharge and they have decided to place her in Hospice which Mrs. Zuleta has also requested.  Her daughter states that Mrs. Zuleta told her that she is \"never going to go back to the hospital\" and does not want to go in for doctors' visits anymore.  She has kindly refused the follow-up echo.  They want no further testing or evaluation done.    She has lost more weight since coming home.  She is down to 87 pounds.  She only drinks about 24 ounces per day (rare to get to 32 ounces).  She eats only small amounts and is sleeping about 16 hours a day.  She states that she is \"tired\" and wants nothing else done.      Her daughter is concerned about possible dehydration.  Will decrease her lasix to 3 times a week.  If she becomes short of breath or edema develops, they can increase it back to 20mg daily for a few days.  They are agreeable to this medication adjustment.     They requested that we cancel her appointment with Dr García in mid Feb. 2024.  Her device was to be checked that day.  Her daughter is agreeable to doing a [facility] transmission on that same day instead of coming to the office for an in-office check.     She asked that I inform Dr. García of her wishes.  I spoke with Dr. Underwood and updated her regarding Mrs. Zuleta' recent hospitalization and her request to have no further testing or office visits.  Her daughter states that if she improves and she changes her mind about returning

## 2024-01-04 ENCOUNTER — HOME CARE VISIT (OUTPATIENT)
Age: 89
End: 2024-01-04
Payer: MEDICARE

## 2024-01-04 ENCOUNTER — CARE COORDINATION (OUTPATIENT)
Facility: CLINIC | Age: 89
End: 2024-01-04

## 2024-01-04 VITALS
HEART RATE: 57 BPM | TEMPERATURE: 99 F | OXYGEN SATURATION: 98 % | WEIGHT: 87 LBS | DIASTOLIC BLOOD PRESSURE: 70 MMHG | RESPIRATION RATE: 16 BRPM | BODY MASS INDEX: 15.91 KG/M2 | SYSTOLIC BLOOD PRESSURE: 105 MMHG

## 2024-01-04 PROCEDURE — 0651 HSPC ROUTINE HOME CARE

## 2024-01-04 PROCEDURE — 2500000001 HSPC NON INJECTABLE MED

## 2024-01-04 PROCEDURE — G0299 HHS/HOSPICE OF RN EA 15 MIN: HCPCS

## 2024-01-04 ASSESSMENT — ENCOUNTER SYMPTOMS
HEMOPTYSIS: 0
HEMOPTYSIS: 0
TROUBLE SWALLOWING: 1

## 2024-01-04 NOTE — CARE COORDINATION
Care Transitions Follow Up Call    Patient: Amarjit Zuleta  Patient : 10/17/1927   MRN: [id number]  Reason for Admission: Pericardial effusion.    Discharge Date: 23 RARS: Readmission Risk Score: 10.9      Needs to be reviewed by the provider   Additional needs identified to be addressed with provider: No  none             Method of communication with provider: none.    CTN attempt to reach Patient on phone for follow up.   CTN reached Patient's daughter Ms. Katherine Melgoza on phone.   Patient daughter Ms. Katherine melgoza is listed on Pt. PHI (verified).   Pt. Daughter reported that Pt. is doing fine.    Patient daughter states that Pt. Attended PCP appt. And they discussed about   Hospice care.     Pt. Daughter states that hospice is Coming to talk to them today.     Offer support and assistance. Pt. Daughter states that they are fine.     No questions, concerns and/or needs at this time as per Pt. Daughter.       Follow Up  Future Appointments   Date Time Provider Department Center   2024  1:30 PM Lesley Mancilla MD [facility] BS AMB   2/15/2024  9:00 AM [facility], PACER [facility] AMB       Care Transition Nurse provided contact information for future needs. Plan for follow-up call in 7-10 days based on severity of symptoms and risk factors.  Plan for next call:  follow up if Pt. Enrolled to hospice. Assess for any needs.     Radha Suresh RN

## 2024-01-04 NOTE — HOSPICE
Hospice Admission Summary    Amarjit Zuleta is a 89 year old female, admitted to Hospice services for a terminal diagnosis of advanced chronic systolic heart failure. Patient and daughter/medical decision maker has elected hospice services and is no longer seeking aggressive treatment. Related comorbidities: pericardial effusion, debility, CKD, hypertension , dyspnea. Unrelated comorbidities: esophageal dilatation, hypokalemia, dementia. Pt also has a past medical history of anxiety, depression, osteoporosis, sciatica, arthritis, L1 compression fracture and urinary incontinence.  The caregiver/family is present and willing and safely able to provide care and administer medications, their availability is daily.    Patient and daughter participated in goal setting, care planning, and are agreeable to the care plan.  Admission booklet reviewed with patient and daughter; services provided under hospice benefit, review of rights and responsibilities, disposal of medications, contact information for , Joint Commission, Medicare, O, and outside resources for independence.  Patient and daughter educated on IDT and their right to attend meetings.  Education provided regarding 24-hour availability of hospice services and on-call number provided.       Three months ago pt was ambulating with walker and minimal assist with ADL's; was continent of bladder and did not have dyspnea with exertion. Pt was admitted to hospital 12/22/23-12/24/23 for shortness of breath due to acute systolic and diastolic heart failure and moderate pericardial effusion. In hospital environment she had sundowning and per daughter, pt was agitated and having hallucinations. Patient stated she never wanted to return to the hospital. Per daughter, pt now is often moderate assist with ADL's (chair bath daily, dressing) due to increasing weakness/fatigue (although some days patient is able to do these tasks independently and extra time; once

## 2024-01-04 NOTE — HOSPICE
Evaluation completed. Hospice education provided to pt, pt's daughter Katherine Pastrana, and pt's sister Ana M. All are in agreement with starting hospice care. CTI received. Proceeding with admission.

## 2024-01-05 ENCOUNTER — HOME CARE VISIT (OUTPATIENT)
Age: 89
End: 2024-01-05
Payer: MEDICARE

## 2024-01-05 ENCOUNTER — CARE COORDINATION (OUTPATIENT)
Facility: CLINIC | Age: 89
End: 2024-01-05

## 2024-01-05 PROCEDURE — 0651 HSPC ROUTINE HOME CARE

## 2024-01-05 PROCEDURE — G0300 HHS/HOSPICE OF LPN EA 15 MIN: HCPCS

## 2024-01-05 NOTE — CARE COORDINATION
Noted [facility].     CTN attempt to reach Patient and Patient daughter on phone to offer support and assistance. Ctn reached Pt. Sister Ms. Viramontes on phone. Ms. Viramontes reported that Patient is doing well.     No questions, concerns and/or needs at this time as per Ms. Viramontes.     No Pt. Medical/health information given to Ms. Viramontes at this time.

## 2024-01-06 VITALS
TEMPERATURE: 98.2 F | HEART RATE: 63 BPM | DIASTOLIC BLOOD PRESSURE: 60 MMHG | RESPIRATION RATE: 18 BRPM | SYSTOLIC BLOOD PRESSURE: 125 MMHG | OXYGEN SATURATION: 96 %

## 2024-01-06 PROCEDURE — 0651 HSPC ROUTINE HOME CARE

## 2024-01-06 NOTE — HOSPICE
Patient/Caregiver/Family/Facility findings/issues during SN visit: No new concerns at this time.  Pt had an \"uneasy stomach\" this morning.  Took a 15 minute nap and felt better.    Medication refills ordered this visit: two weeks supply in the home    Medications reconciled and all medications are available in the home this visit.  The following education was provided regarding medications, medication interactions, and look alike medications: Medication side effects, dosages, purposes, frequencies.  Response to teaching: Verbalized understanding. Medications are effective at this time.      Supplies by type and quantity ordered this visit include: No supplies needed    Consulted medical director/attending physician regarding: Not needed this visit    Instructed patient/family/caregiver on 24-hour hospice availability and phone number.    Plan for next visit:  Continue end of life education and support caregiver.

## 2024-01-07 PROCEDURE — 0651 HSPC ROUTINE HOME CARE

## 2024-01-08 ENCOUNTER — HOME CARE VISIT (OUTPATIENT)
Age: 89
End: 2024-01-08
Payer: MEDICARE

## 2024-01-08 PROCEDURE — 0651 HSPC ROUTINE HOME CARE

## 2024-01-08 NOTE — HOSPICE
Patient was in her recliner with her daughter by her side. Both were in great spirits and willing to share.  will continue to follow.

## 2024-01-09 PROCEDURE — 0651 HSPC ROUTINE HOME CARE

## 2024-01-09 NOTE — HOSPICE
MSW places call for assessment and speaks to dtr Katherine. Dtr states she would like to decline MSW services for now, as the only thing she needs is a list of private duty caregivers. MSW obtains dtrs e-mail address and sends the list to dtr. Dtr states they will be using [facility]. Dtr agrees to call hospice if her needs change for MSW support..

## 2024-01-10 ENCOUNTER — HOME CARE VISIT (OUTPATIENT)
Age: 89
End: 2024-01-10
Payer: MEDICARE

## 2024-01-10 ENCOUNTER — CARE COORDINATION (OUTPATIENT)
Facility: CLINIC | Age: 89
End: 2024-01-10

## 2024-01-10 PROCEDURE — 0651 HSPC ROUTINE HOME CARE

## 2024-01-10 PROCEDURE — G0299 HHS/HOSPICE OF RN EA 15 MIN: HCPCS

## 2024-01-10 NOTE — CARE COORDINATION
Care Transitions Follow Up Call      Patient: Amarjit Zuleta  Patient : 10/17/1927   MRN: [id number]    Discharge Date: 23 RARS: Readmission Risk Score: 10.9    .  CTN attempt to reach Patient on phone for follow up.   CTN reached Patient's daughter Ms. Katherine Melgoza on phone.   Patient daughter Ms. Katherine melgoza is listed on Pt. PHI (verified).   Pt. Daughter reported that Pt. is doing fine and hospice is now following.   Ctn offer support and assistance. Pt. Daughter reports that they are doing fine and has Hospice phone number.     Pt. Daughter kept the conversation short and concluded the call.   Care transition program is closed and resolved.     Follow Up  Future Appointments   Date Time Provider Department Center   2024  1:30 PM Lesley Mancilla MD [facility] BS AMB   2/15/2024  9:00 AM [facility], PACER [facility] BS DHARMESH Suresh RN

## 2024-01-11 VITALS
TEMPERATURE: 97 F | RESPIRATION RATE: 17 BRPM | SYSTOLIC BLOOD PRESSURE: 114 MMHG | HEART RATE: 67 BPM | OXYGEN SATURATION: 96 % | DIASTOLIC BLOOD PRESSURE: 64 MMHG

## 2024-01-11 PROCEDURE — 0651 HSPC ROUTINE HOME CARE

## 2024-01-11 NOTE — HOSPICE
Patient/Caregiver/Family/Facility findings/issues during SN visit:  n/a    Medication refills ordered this visit: n/a    Medications reconciled and all medications are available in the home this visit.  The following education was provided regarding medications, medication interactions, and look alike medications.  Response to teaching: caregiver verbalizeed understanding. Medications are effective at this time.      Supplies by type and quantity ordered this visit include: none needed    Consulted medical director/attending physician regarding: n/a    Instructed patient/family/caregiver on 24-hour hospice availability and phone number.    Plan for next visit:  medication administration    daughter refused teaching for lorazepam administration. states, \"no need she doesn't need it now. maybe later in the future.\" nurse provided education for the usage. caregiver needs reinforcement

## 2024-01-12 ENCOUNTER — HOME CARE VISIT (OUTPATIENT)
Age: 89
End: 2024-01-12
Payer: MEDICARE

## 2024-01-12 PROCEDURE — 0651 HSPC ROUTINE HOME CARE

## 2024-01-13 PROCEDURE — 0651 HSPC ROUTINE HOME CARE

## 2024-01-14 PROCEDURE — 0651 HSPC ROUTINE HOME CARE

## 2024-01-15 PROCEDURE — 0651 HSPC ROUTINE HOME CARE

## 2024-01-16 ENCOUNTER — HOME CARE VISIT (OUTPATIENT)
Age: 89
End: 2024-01-16
Payer: MEDICARE

## 2024-01-16 VITALS
SYSTOLIC BLOOD PRESSURE: 109 MMHG | DIASTOLIC BLOOD PRESSURE: 63 MMHG | RESPIRATION RATE: 16 BRPM | TEMPERATURE: 97.7 F | HEART RATE: 62 BPM | OXYGEN SATURATION: 96 %

## 2024-01-16 PROCEDURE — 0651 HSPC ROUTINE HOME CARE

## 2024-01-16 PROCEDURE — G0300 HHS/HOSPICE OF LPN EA 15 MIN: HCPCS

## 2024-01-17 PROCEDURE — 0651 HSPC ROUTINE HOME CARE

## 2024-01-17 NOTE — HOSPICE
Patient/Caregiver/Family/Facility findings/issues during SN visit:  Pt sleeping 16-18 hours a day.  Strong smelling urine, no other symptoms.  Fall yesterday morning, small bruise to right forehead, no other complaints of pain.    Medication refills ordered this visit: Not needed    Medications reconciled and all medications are available in the home this visit.  The following education was provided regarding medications, medication interactions, and look alike medications: Medication side effects, dosages, purposes, frequencies.  Response to teaching: Verbalized understanding. Medications are effective at this time.      Supplies by type and quantity ordered this visit include:  Not needed    Consulted medical director/attending physician regarding: Not needed    Instructed patient/family/caregiver on 24-hour hospice availability and phone number.    Plan for next visit:  Continue end of life education and support caregiver.

## 2024-01-18 ENCOUNTER — HOME CARE VISIT (OUTPATIENT)
Age: 89
End: 2024-01-18
Payer: MEDICARE

## 2024-01-18 PROCEDURE — G0299 HHS/HOSPICE OF RN EA 15 MIN: HCPCS

## 2024-01-18 PROCEDURE — 0651 HSPC ROUTINE HOME CARE

## 2024-01-19 VITALS
OXYGEN SATURATION: 97 % | RESPIRATION RATE: 20 BRPM | SYSTOLIC BLOOD PRESSURE: 156 MMHG | DIASTOLIC BLOOD PRESSURE: 70 MMHG | HEART RATE: 76 BPM | TEMPERATURE: 97.2 F

## 2024-01-19 PROCEDURE — 0651 HSPC ROUTINE HOME CARE

## 2024-01-19 NOTE — HOSPICE
Patient/Caregiver/Family findings/issues during SN visit:   None identified    Medication refills ordered this visit: No refills needed    Medications reconciled and all medications are available in the home this visit.  Education was provided regarding medications, medication interactions, and look alike medications.   Response to teaching.  Daughter verbalizes undeestanding.   Medications are effective at this time.      Supplies by type and quantity ordered this visit include: None needed    Consulted medical director/attending physician regarding: Not at ths time    Instructed patient/family/caregiver on 24-hour hospice availability and phone number.    Plan for next visit:  Continued EOL education and support

## 2024-01-20 PROCEDURE — 0651 HSPC ROUTINE HOME CARE

## 2024-01-21 PROCEDURE — 0651 HSPC ROUTINE HOME CARE

## 2024-01-22 PROCEDURE — 0651 HSPC ROUTINE HOME CARE

## 2024-01-23 ENCOUNTER — HOME CARE VISIT (OUTPATIENT)
Age: 89
End: 2024-01-23
Payer: MEDICARE

## 2024-01-23 VITALS
RESPIRATION RATE: 16 BRPM | OXYGEN SATURATION: 98 % | TEMPERATURE: 98.4 F | HEART RATE: 63 BPM | DIASTOLIC BLOOD PRESSURE: 72 MMHG | SYSTOLIC BLOOD PRESSURE: 118 MMHG

## 2024-01-23 PROCEDURE — G0300 HHS/HOSPICE OF LPN EA 15 MIN: HCPCS

## 2024-01-23 PROCEDURE — 0651 HSPC ROUTINE HOME CARE

## 2024-01-23 ASSESSMENT — ENCOUNTER SYMPTOMS
COUGH: 1
COUGH CHARACTERISTICS: PRODUCTIVE
SPUTUM AMOUNT: SCANT
PAIN LOCATION - PAIN QUALITY: ACHE
SPUTUM COLOR: WHITE
SPUTUM CONSISTENCY: THIN
SPUTUM PRODUCTION: 1

## 2024-01-23 NOTE — HOSPICE
Patient/Caregiver/Family/Facility findings/issues during SN visit: Pt is pleasant and talkative.  No new concerns per daughter this visit.  Will need medications ordered next week.    Medication refills ordered this visit: Not this visit    Medications reconciled and all medications are available in the home this visit.  The following education was provided regarding medications, medication interactions, and look alike medications: Medication side effects, dosages, purposes, frequencies.  Response to teaching: Verbalized understanding. Medications are effective at this time.      Supplies by type and quantity ordered this visit include:Not needed    Consulted medical director/attending physician regarding: Not needed this visit    Instructed patient/family/caregiver on 24-hour hospice availability and phone number.    Plan for next visit:  Continue end of life education and support caregiver.

## 2024-01-24 PROCEDURE — 0651 HSPC ROUTINE HOME CARE

## 2024-01-25 ENCOUNTER — HOME CARE VISIT (OUTPATIENT)
Age: 89
End: 2024-01-25
Payer: MEDICARE

## 2024-01-25 PROCEDURE — 2500000001 HSPC NON INJECTABLE MED

## 2024-01-25 PROCEDURE — 0651 HSPC ROUTINE HOME CARE

## 2024-01-25 PROCEDURE — G0299 HHS/HOSPICE OF RN EA 15 MIN: HCPCS

## 2024-01-26 VITALS
TEMPERATURE: 98.4 F | OXYGEN SATURATION: 98 % | HEART RATE: 71 BPM | SYSTOLIC BLOOD PRESSURE: 125 MMHG | DIASTOLIC BLOOD PRESSURE: 57 MMHG | RESPIRATION RATE: 20 BRPM

## 2024-01-26 PROCEDURE — 0651 HSPC ROUTINE HOME CARE

## 2024-01-26 ASSESSMENT — ENCOUNTER SYMPTOMS
COUGH CHARACTERISTICS: NON-PRODUCTIVE
COUGH: 1

## 2024-01-26 NOTE — HOSPICE
Patient/Caregiver/Family findings/issues during SN visit:  None identified    Medication refills ordered this visit:   None needed    Medications reconciled and all medications are available in the home this visit.  Education was provided regarding medications, medication interactions, and look alike medications.   Response to teaching.  Daughter verbalized understanding.   Medications are effective at this time.      Supplies by type and quantity ordered this visit include:   None needed    Consulted medical director/attending physician regarding: Not at this time    Instructed patient/family/caregiver on 24-hour hospice availability and phone number.    Plan for next visit:  Continued EOL education and support

## 2024-01-27 PROCEDURE — 0651 HSPC ROUTINE HOME CARE

## 2024-01-28 PROCEDURE — 0651 HSPC ROUTINE HOME CARE

## 2024-01-29 ENCOUNTER — HOME CARE VISIT (OUTPATIENT)
Age: 89
End: 2024-01-29
Payer: MEDICARE

## 2024-01-29 PROCEDURE — 0651 HSPC ROUTINE HOME CARE

## 2024-01-29 NOTE — PROGRESS NOTES
Bedside and Verbal shift change report given to [address] (oncoming nurse) by Pierre Sousa (offgoing nurse). Report included the following information SBAR, Kardex, MAR, Recent Results and Cardiac Rhythm SR. Patient quietly resting, denies discomforts. Form has been faxed.

## 2024-01-30 ENCOUNTER — HOME CARE VISIT (OUTPATIENT)
Age: 89
End: 2024-01-30
Payer: MEDICARE

## 2024-01-30 PROCEDURE — 0651 HSPC ROUTINE HOME CARE

## 2024-01-30 PROCEDURE — G0300 HHS/HOSPICE OF LPN EA 15 MIN: HCPCS

## 2024-01-31 ENCOUNTER — HOME CARE VISIT (OUTPATIENT)
Age: 89
End: 2024-01-31
Payer: MEDICARE

## 2024-01-31 VITALS
TEMPERATURE: 98 F | OXYGEN SATURATION: 97 % | DIASTOLIC BLOOD PRESSURE: 61 MMHG | SYSTOLIC BLOOD PRESSURE: 120 MMHG | RESPIRATION RATE: 18 BRPM | HEART RATE: 62 BPM

## 2024-01-31 PROCEDURE — 0651 HSPC ROUTINE HOME CARE

## 2024-01-31 ASSESSMENT — ENCOUNTER SYMPTOMS
COUGH CHARACTERISTICS: NON-PRODUCTIVE
COUGH: 1
PAIN LOCATION - PAIN QUALITY: ACHE

## 2024-01-31 NOTE — HOSPICE
Patient/Caregiver/Family/Facility findings/issues during SN visit:  Patient has had two episodes of confusion during the night.  Caregiver states that the patient is coherant and she remembers wheat she saw but is not confused throughout the day.  Caregiver will continue to monitor and anti-anxiety meds are in the home if needed in the future.    Medication refills ordered this visit: Flonase    Medications reconciled and all medications are available in the home this visit.  The following education was provided regarding medications, medication interactions, and look alike medications: Medication side effects, dosages, purposes, frequencies.  Response to teaching: Verbalized understanding. Medications are effective at this time.      Supplies by type and quantity ordered this visit include: Not needed    Consulted medical director/attending physician regarding:Requesting Flonase for post nasal drip    Instructed patient/family/caregiver on 24-hour hospice availability and phone number.    Plan for next visit:  Continue end of life education and support caregiver.

## 2024-02-01 ENCOUNTER — HOME CARE VISIT (OUTPATIENT)
Age: 89
End: 2024-02-01
Payer: MEDICARE

## 2024-02-01 PROCEDURE — G0299 HHS/HOSPICE OF RN EA 15 MIN: HCPCS

## 2024-02-01 PROCEDURE — 0651 HSPC ROUTINE HOME CARE

## 2024-02-02 VITALS
RESPIRATION RATE: 18 BRPM | HEART RATE: 64 BPM | DIASTOLIC BLOOD PRESSURE: 64 MMHG | OXYGEN SATURATION: 97 % | TEMPERATURE: 97.7 F | SYSTOLIC BLOOD PRESSURE: 125 MMHG

## 2024-02-02 PROBLEM — N39.0 URINARY TRACT INFECTION: Status: RESOLVED | Noted: 2023-10-13 | Resolved: 2024-02-02

## 2024-02-02 PROCEDURE — 0651 HSPC ROUTINE HOME CARE

## 2024-02-02 ASSESSMENT — ENCOUNTER SYMPTOMS
COUGH CHARACTERISTICS: DRY
COUGH: 1

## 2024-02-02 NOTE — HOSPICE
Patient/Caregiver/Family findings/issues during SN visit:  None identified    Medication refills ordered this visit:  None needed    Medications reconciled and all medications are available in the home this visit.  Education was provided regarding medications, medication interactions, and look alike medications.  Response to teaching.  Daughter verbalized understanding.   Medications are effective at this time.      Supplies by type and quantity ordered this visit include: None needed    Consulted medical director/attending physician regarding: Not at this time    Instructed patient/family/caregiver on 24-hour hospice availability and phone number.    Plan for next visit:  Continue to monitor for decline and changes

## 2024-02-02 NOTE — TELEPHONE ENCOUNTER
Last OV 01/02/2024  Next OV none scheduled  Due 03/2024  Last lab 12/24/2023  Last filled 10/31/2023  90 tabs   0 RF

## 2024-02-03 PROCEDURE — 0651 HSPC ROUTINE HOME CARE

## 2024-02-04 PROCEDURE — 0651 HSPC ROUTINE HOME CARE

## 2024-02-05 RX ORDER — OMEPRAZOLE 40 MG/1
40 CAPSULE, DELAYED RELEASE ORAL DAILY
Qty: 90 CAPSULE | Refills: 0 | Status: SHIPPED | OUTPATIENT
Start: 2024-02-05

## 2024-02-06 ENCOUNTER — HOME CARE VISIT (OUTPATIENT)
Age: 89
End: 2024-02-06
Payer: MEDICARE

## 2024-02-06 VITALS
HEART RATE: 64 BPM | TEMPERATURE: 98.3 F | OXYGEN SATURATION: 98 % | DIASTOLIC BLOOD PRESSURE: 72 MMHG | RESPIRATION RATE: 18 BRPM | SYSTOLIC BLOOD PRESSURE: 125 MMHG

## 2024-02-06 PROCEDURE — G0300 HHS/HOSPICE OF LPN EA 15 MIN: HCPCS

## 2024-02-06 RX ORDER — ATORVASTATIN CALCIUM 10 MG/1
TABLET, FILM COATED ORAL
Qty: 90 TABLET | Refills: 3 | Status: SHIPPED | OUTPATIENT
Start: 2024-02-06

## 2024-02-06 ASSESSMENT — ENCOUNTER SYMPTOMS: DYSPNEA ACTIVITY LEVEL: AFTER AMBULATING LESS THAN 10 FT

## 2024-02-06 NOTE — HOSPICE
Patient/Caregiver/Family/Facility findings/issues during SN visit:  No new concerns this visit.  Short period of confusion yesterday after having gone out for the day Sunday with family.  Patient was easily redirected.  No issues today.       Medication refills ordered this visit: No new medications this week.    Medications reconciled and all medications are available in the home this visit.  The following education was provided regarding medications, medication interactions, and look alike medications: Medication side effects, dosages, purposes, frequencies.  Response to teaching: Verbalized understanding. Medications are effective at this time.      Supplies by type and quantity ordered this visit include: No new supplies needed this visit.    Consulted medical director/attending physician regarding: Not needed this visit    Instructed patient/family/caregiver on 24-hour hospice availability and phone number.    Plan for next visit:  Continue end of life education and support caregiver.

## 2024-02-08 ENCOUNTER — HOME CARE VISIT (OUTPATIENT)
Age: 89
End: 2024-02-08
Payer: MEDICARE

## 2024-02-08 VITALS
OXYGEN SATURATION: 99 % | HEART RATE: 61 BPM | DIASTOLIC BLOOD PRESSURE: 57 MMHG | TEMPERATURE: 96.8 F | SYSTOLIC BLOOD PRESSURE: 113 MMHG | RESPIRATION RATE: 16 BRPM

## 2024-02-08 PROCEDURE — G0299 HHS/HOSPICE OF RN EA 15 MIN: HCPCS

## 2024-02-08 ASSESSMENT — ENCOUNTER SYMPTOMS: HEMOPTYSIS: 0

## 2024-02-08 NOTE — HOSPICE
Patient/Caregiver/Family/Facility findings/issues during SN visit:  None at this time.    Medication refills ordered this visit: N/A    Medications reconciled and all medications are available in the home this visit. Medications are effective at this time.      Supplies by type and quantity ordered this visit include: N/A    Consulted medical director/attending physician regarding: N/A    Instructed patient/family/caregiver on 24-hour hospice availability and phone number.     Plan For next visit.: Will follow up with Cm on Monday.    Patient was found sitting in a chair in her living room. patient was clean, and just finished Breakfast. Patient ate a large piece of Banana bread, for breakfast. Per Katherine Pedraza, patient eats 75, to 100 percent of her meals. They are portioned very small, so patient can ask for seconds. Per family patient was taken to [employer] on Sunday. Patient did eat all of her Fettuccine peggy. patient is having regular bowel Movements Daily. Patient reports no pain, while urinating. The only pain, patient complains of is in her knees, when she tries to rise out of her chair. Patient stated no other complaints at this time. Patient denies any shortness of breath,  patient appeared happy , and content sitting in her chair.

## 2024-02-13 ENCOUNTER — HOME CARE VISIT (OUTPATIENT)
Age: 89
End: 2024-02-13
Payer: MEDICARE

## 2024-02-13 VITALS
SYSTOLIC BLOOD PRESSURE: 117 MMHG | OXYGEN SATURATION: 96 % | HEART RATE: 61 BPM | TEMPERATURE: 97.3 F | DIASTOLIC BLOOD PRESSURE: 61 MMHG | RESPIRATION RATE: 16 BRPM

## 2024-02-13 PROCEDURE — G0299 HHS/HOSPICE OF RN EA 15 MIN: HCPCS

## 2024-02-13 NOTE — HOSPICE
Patient/Caregiver/Family/Facility findings/issues during SN visit:  N/A    Medication refills ordered this visit: Patient will wait for next week.    Medications reconciled and all medications are available in the home this week. Medications are effective/not effective at this time.      Supplies by type and quantity ordered this visit include: N/A    Consulted medical director/attending physician regarding: N/A    Instructed patient/family/caregiver on 24-hour hospice availability and phone number.    Plan for next visit:  Will follow up, during the week.with CM.  Arrived at patients residence. Found patient sitting in her chair, Patient had just finished breakfast. Patient was sitting quietly,and seemed to be in no distress. Patients vitals were all within normal limits. Caregiver wanted to wait till next visit to order meds if needed. i instructed if she finds that she needs some refilled call the 24 hour number. Caregiver agreed. Will continue to follow up with vargas. Patient did not seem to be in any distress.

## 2024-02-15 ENCOUNTER — HOME CARE VISIT (OUTPATIENT)
Age: 89
End: 2024-02-15
Payer: MEDICARE

## 2024-02-15 PROCEDURE — G0299 HHS/HOSPICE OF RN EA 15 MIN: HCPCS

## 2024-02-16 VITALS
RESPIRATION RATE: 18 BRPM | OXYGEN SATURATION: 98 % | HEART RATE: 67 BPM | TEMPERATURE: 97.7 F | DIASTOLIC BLOOD PRESSURE: 62 MMHG | SYSTOLIC BLOOD PRESSURE: 110 MMHG

## 2024-02-20 ENCOUNTER — HOME CARE VISIT (OUTPATIENT)
Age: 89
End: 2024-02-20
Payer: MEDICARE

## 2024-02-20 VITALS
RESPIRATION RATE: 14 BRPM | OXYGEN SATURATION: 96 % | DIASTOLIC BLOOD PRESSURE: 52 MMHG | HEART RATE: 62 BPM | TEMPERATURE: 97.3 F | SYSTOLIC BLOOD PRESSURE: 120 MMHG

## 2024-02-20 PROCEDURE — G0299 HHS/HOSPICE OF RN EA 15 MIN: HCPCS

## 2024-02-20 ASSESSMENT — ENCOUNTER SYMPTOMS
STOOL DESCRIPTION: SOFT FORMED
HEMOPTYSIS: 0

## 2024-02-20 NOTE — HOSPICE
Patient/Caregiver/Family/Facility findings/issues during SN visit:  Patient sitting up in chair in living room with daughter present.  She is alert and oriented to person and place.  Denies pain or shortness of breath.  No changes in appetite.  No falls or signs of infection    Medication refills ordered this visit: no needs    Medications reconciled and all medications are in the home this visit.  The following education was provided regarding medications, medication interactions, and look alike medications: use of comfort meds  Response to teaching: verbalizes understanding Medications are effective at this time.      Supplies by type and quantity ordered this visit include: no supplies needed    Consulted medical director/attending physician regarding no needs    Instructed patient/family/caregiver on 24-hour hospice availability and phone number.    Plan for next visit:  assess comfort and needs of patient

## 2024-02-22 ENCOUNTER — HOME CARE VISIT (OUTPATIENT)
Age: 89
End: 2024-02-22
Payer: MEDICARE

## 2024-02-22 PROCEDURE — G0299 HHS/HOSPICE OF RN EA 15 MIN: HCPCS

## 2024-02-23 VITALS
DIASTOLIC BLOOD PRESSURE: 71 MMHG | RESPIRATION RATE: 18 BRPM | TEMPERATURE: 97.7 F | HEART RATE: 66 BPM | SYSTOLIC BLOOD PRESSURE: 142 MMHG | OXYGEN SATURATION: 97 %

## 2024-02-23 ASSESSMENT — ENCOUNTER SYMPTOMS: CONTUSION: 1

## 2024-02-27 ENCOUNTER — HOME CARE VISIT (OUTPATIENT)
Age: 89
End: 2024-02-27
Payer: MEDICARE

## 2024-02-27 PROCEDURE — G0299 HHS/HOSPICE OF RN EA 15 MIN: HCPCS

## 2024-02-28 VITALS
OXYGEN SATURATION: 96 % | RESPIRATION RATE: 18 BRPM | HEART RATE: 64 BPM | SYSTOLIC BLOOD PRESSURE: 146 MMHG | TEMPERATURE: 97.7 F | DIASTOLIC BLOOD PRESSURE: 60 MMHG

## 2024-02-28 NOTE — HOSPICE
Patient/Caregiver/Family findings/issues during SN visit:   none identified    Medication refills ordered this visit:   none needed    Medications reconciled and all medications are available in the home this visit.  Education was provided regarding medications, medication interactions, and look alike medications.   Response to teaching.  Daughter verbalizes understanding.   Medications are effective at this time.   Not using comfort medications.    Supplies by type and quantity ordered this visit include:  None needed    Consulted medical director/attending physician regarding:  Not at this time    Instructed patient/family/caregiver on 24-hour hospice availability and phone number.    Plan for next visit:  Continue to assess for decline.  Started talk with daughter about possible discharge if there is no decline.

## 2024-02-29 ENCOUNTER — HOME CARE VISIT (OUTPATIENT)
Age: 89
End: 2024-02-29
Payer: MEDICARE

## 2024-02-29 VITALS
TEMPERATURE: 98.2 F | RESPIRATION RATE: 16 BRPM | SYSTOLIC BLOOD PRESSURE: 141 MMHG | DIASTOLIC BLOOD PRESSURE: 62 MMHG | HEART RATE: 70 BPM | OXYGEN SATURATION: 95 %

## 2024-02-29 PROCEDURE — G0300 HHS/HOSPICE OF LPN EA 15 MIN: HCPCS

## 2024-02-29 ASSESSMENT — ENCOUNTER SYMPTOMS
COUGH CHARACTERISTICS: NON-PRODUCTIVE
CONTUSION: 1
COUGH: 1

## 2024-02-29 NOTE — HOSPICE
Patient/Caregiver/Family/Facility findings/issues during SN visit:  NO new concerns or questions at this time.  No falls since the last visit.  Patient is sitting in her recliner with no signs of distress.  Pt denies pain.      Medication refills ordered this visit: Not needed    Medications reconciled and all medications are available in the home this visit.  The following education was provided regarding medications, medication interactions, and look alike medications: Medication side effects, dosages, purposes, frequencies.  Response to teaching: Verbalized understanding. Medications are effective at this time.      Supplies by type and quantity ordered this visit include: Not needed this visit    Consulted medical director/attending physician regarding: Not needed    Instructed patient/family/caregiver on 24-hour hospice availability and phone number.    Plan for next visit:  Continue end of life education and support caregiver.

## 2024-03-05 ENCOUNTER — HOME CARE VISIT (OUTPATIENT)
Age: 89
End: 2024-03-05
Payer: MEDICARE

## 2024-03-05 PROCEDURE — G0299 HHS/HOSPICE OF RN EA 15 MIN: HCPCS

## 2024-03-06 VITALS
DIASTOLIC BLOOD PRESSURE: 62 MMHG | OXYGEN SATURATION: 98 % | RESPIRATION RATE: 22 BRPM | TEMPERATURE: 97.7 F | SYSTOLIC BLOOD PRESSURE: 101 MMHG | HEART RATE: 80 BPM

## 2024-03-06 ASSESSMENT — ENCOUNTER SYMPTOMS: COUGH: 1

## 2024-03-06 NOTE — HOSPICE
Patient/Caregiver/Family findings/issues during SN visit:  Patient having coughing, SOB and increased anxiety a few times according to daughter.    Medication refills ordered this visit:   None needed    Medications reconciled and all medications are available in the home this visit.  Education was provided regarding medications, medication interactions, and look alike medications.  Response to teaching.  Daughter verbalized understanding.  Medications are effective at this time.      Supplies by type and quantity ordered this visit include: none needed    Consulted medical director/attending physician regarding: Not at this time    Instructed patient/family/caregiver on 24-hour hospice availability and phone number.    Plan for next visit:  Continue to educate on EOL, comfort medication administration, use of oxygen and support daughter.  Braeden called to reach out

## 2024-03-07 ENCOUNTER — HOME CARE VISIT (OUTPATIENT)
Age: 89
End: 2024-03-07
Payer: MEDICARE

## 2024-03-07 PROCEDURE — G0299 HHS/HOSPICE OF RN EA 15 MIN: HCPCS

## 2024-03-08 VITALS
OXYGEN SATURATION: 96 % | RESPIRATION RATE: 17 BRPM | TEMPERATURE: 96.9 F | DIASTOLIC BLOOD PRESSURE: 72 MMHG | SYSTOLIC BLOOD PRESSURE: 116 MMHG | HEART RATE: 62 BPM

## 2024-03-08 ASSESSMENT — ENCOUNTER SYMPTOMS
COUGH CHARACTERISTICS: NON-PRODUCTIVE
COUGH: 1

## 2024-03-08 NOTE — HOSPICE
Patient/Caregiver/Family/Facility findings/issues during SN visit:  increased urination and tiredness    Medication refills ordered this visit: Confirmation #: [id number] sertiline    Medications reconciled and all medications are available in the home this visit.  The following education was provided regarding medications, medication interactions, and look alike medications.  Response to teaching: caregiver verbalized understanding. Medications are effective at this time.      Supplies by type and quantity ordered this visit include: n/a    Consulted medical director/attending physician regarding: HAL Sánchez discontinuing lasix order-  notify NP if pt has increased edema or dyspnea    Instructed patient/family/caregiver on 24-hour hospice availability and phone number.    Plan for next visit:  follow up

## 2024-03-12 ENCOUNTER — HOME CARE VISIT (OUTPATIENT)
Age: 89
End: 2024-03-12
Payer: MEDICARE

## 2024-03-12 VITALS
SYSTOLIC BLOOD PRESSURE: 104 MMHG | DIASTOLIC BLOOD PRESSURE: 60 MMHG | RESPIRATION RATE: 18 BRPM | OXYGEN SATURATION: 97 % | HEART RATE: 61 BPM

## 2024-03-12 PROCEDURE — G0299 HHS/HOSPICE OF RN EA 15 MIN: HCPCS

## 2024-03-14 ENCOUNTER — HOME CARE VISIT (OUTPATIENT)
Age: 89
End: 2024-03-14
Payer: MEDICARE

## 2024-03-14 PROCEDURE — G0300 HHS/HOSPICE OF LPN EA 15 MIN: HCPCS

## 2024-03-15 VITALS
HEART RATE: 66 BPM | TEMPERATURE: 98 F | SYSTOLIC BLOOD PRESSURE: 124 MMHG | RESPIRATION RATE: 16 BRPM | OXYGEN SATURATION: 96 % | DIASTOLIC BLOOD PRESSURE: 85 MMHG

## 2024-03-15 NOTE — HOSPICE
Patient/Caregiver/Family/Facility findings/issues during SN visit:  NO new concerns at this time.  Pt is sleeping more during the day and has had to use PRN Oxygen once since the last nursing visit.  No c/o pain today.  No needs    Medication refills ordered this visit: Not needed    Medications reconciled and all medications are available in the home this visit.  The following education was provided regarding medications, medication interactions, and look alike medications: Medication side effects, dosages, purposes, frequencies.  Response to teaching: Verbalized understanding. Medications are effective at this time.      Supplies by type and quantity ordered this visit include: Not needed    Consulted medical director/attending physician regarding: Not needed this visit    Instructed patient/family/caregiver on 24-hour hospice availability and phone number.    Plan for next visit:  Continue end of life education and support caregiver.

## 2024-03-19 ENCOUNTER — HOME CARE VISIT (OUTPATIENT)
Age: 89
End: 2024-03-19
Payer: MEDICARE

## 2024-03-19 PROCEDURE — G0299 HHS/HOSPICE OF RN EA 15 MIN: HCPCS

## 2024-03-20 ENCOUNTER — HOME CARE VISIT (OUTPATIENT)
Age: 89
End: 2024-03-20
Payer: MEDICARE

## 2024-03-20 VITALS
OXYGEN SATURATION: 96 % | RESPIRATION RATE: 18 BRPM | HEART RATE: 78 BPM | SYSTOLIC BLOOD PRESSURE: 168 MMHG | DIASTOLIC BLOOD PRESSURE: 60 MMHG | TEMPERATURE: 97.6 F

## 2024-03-20 NOTE — HOSPICE
Patient/Caregiver/Family findings/issues during SN visit:  None identified    Medication refills ordered this visit: coreg and sertraline refilled order#[id number]    Medications reconciled and all medications are available in the home this visit.  Education was provided regarding medications, medication interactions, and look alike medications.   Response to teaching.  Daughter verbalizes understanding.  Medications are effective at this time.      Supplies by type and quantity ordered this visit include: none needed    Consulted medical director/attending physician regarding: Not at this time    Instructed patient/family/caregiver on 24-hour hospice availability and phone number.    Plan for next visit:  Continue EOL education and support    Re-Certification Summary  Pt Name:  Amarjit Zuleta  Age:  89  Hospice Diagnosis:  Advanced chronic systolic heart failure  Related Dx:  pericardial effusion, debility, CKD, HTN, dyspnea  Unrelated Co-morbids: esophageal dilatation, hypokalemia, dementia, anxiety/depression, osteoporosis, sciatica, arthritis, L1 compression fracture and urinary incontinence.  Resides:  at home with daughter  Benefit Period:  2  Code Status:  DNR  F2F: not needed.    Changes since last re-certification:     Appetite: Appetite poor.  Eating 2 meals small portions.  Difficulty swallowing at times and has coughing “episodes” Has to be encouraged to drink adequate fluids.   Educated on diet to include soft foods for easy digestion and ease of swallowing.  High risk for aspiration.  Sleep:  Sleeping in excess 16 hours a day.  Function:  Ambulates with wheeled walker.  Requiring more assistance since admission.  Patient has had 2 falls since admission.  No injuries noted another than an abrasion to nose and forehead.  Continued high risk for falls.  Skin:  Skin fragile.  Scattered bruises to arms.  No wounds  Cognitive Function:   Alert and oriented to person and place.  Has [place]  Cardiac:  Heart rate

## 2024-03-21 ENCOUNTER — HOME CARE VISIT (OUTPATIENT)
Age: 89
End: 2024-03-21
Payer: MEDICARE

## 2024-03-21 PROCEDURE — G0299 HHS/HOSPICE OF RN EA 15 MIN: HCPCS

## 2024-03-22 VITALS
OXYGEN SATURATION: 97 % | RESPIRATION RATE: 20 BRPM | TEMPERATURE: 96.7 F | HEART RATE: 69 BPM | DIASTOLIC BLOOD PRESSURE: 64 MMHG | SYSTOLIC BLOOD PRESSURE: 108 MMHG

## 2024-03-22 ASSESSMENT — ENCOUNTER SYMPTOMS
COUGH: 1
COUGH CHARACTERISTICS: DRY

## 2024-03-22 NOTE — HOSPICE
Patient/Caregiver/Family findings/issues during SN visit:  Patient having increased SOB/anxiety.  She is very fatigued as well.  Oxygen in home.  Encouraged patient to wear it to help with fatigue and SOB.  Daughter educated on using morphine and lorazepam for symptom management.  DME ordered for friday delivery per daughter's request.  Visit for Saturday and SN visits increased to 3x weekly.    Medication refills ordered this visit: Lorazepam liquid ordered #[id number]    Medications reconciled and all medications are available in the home this visit.  Education was provided regarding medications, medication interactions, and look alike medications.   Response to teaching.  Daughter verbalized understanding.  Medications are effective at this time.      Supplies by type and quantity ordered this visit include: None needed    Consulted medical director/attending physician regarding: Escript sent to Princess Chun NP    Instructed patient/family/caregiver on 24-hour hospice availability and phone number.    Plan for next visit:    Continued EOL education and support

## 2024-03-23 ENCOUNTER — HOME CARE VISIT (OUTPATIENT)
Age: 89
End: 2024-03-23
Payer: MEDICARE

## 2024-03-23 VITALS
OXYGEN SATURATION: 98 % | RESPIRATION RATE: 18 BRPM | SYSTOLIC BLOOD PRESSURE: 110 MMHG | DIASTOLIC BLOOD PRESSURE: 60 MMHG | TEMPERATURE: 97.7 F | HEART RATE: 64 BPM

## 2024-03-23 PROCEDURE — G0299 HHS/HOSPICE OF RN EA 15 MIN: HCPCS

## 2024-03-25 ENCOUNTER — HOME CARE VISIT (OUTPATIENT)
Age: 89
End: 2024-03-25
Payer: MEDICARE

## 2024-03-25 PROCEDURE — G0299 HHS/HOSPICE OF RN EA 15 MIN: HCPCS

## 2024-03-26 ENCOUNTER — HOME CARE VISIT (OUTPATIENT)
Age: 89
End: 2024-03-26
Payer: MEDICARE

## 2024-03-26 VITALS
RESPIRATION RATE: 18 BRPM | SYSTOLIC BLOOD PRESSURE: 116 MMHG | HEART RATE: 89 BPM | DIASTOLIC BLOOD PRESSURE: 67 MMHG | TEMPERATURE: 97.6 F | OXYGEN SATURATION: 98 %

## 2024-03-26 ASSESSMENT — ENCOUNTER SYMPTOMS
COUGH CHARACTERISTICS: DRY
COUGH: 1

## 2024-03-26 NOTE — HOSPICE
Patient/Caregiver/Family findings/issues during SN visit:  None identified    Medication refills ordered this visit: none needed    Medications reconciled and all medications are available in the home this visit.  Education was provided regarding medications, medication interactions, and look alike medications.   Response to teaching.  Daughter verbalized understanding.  Medications are effective at this time.      Supplies by type and quantity ordered this visit include:  bath wipes, wipes and chux ordered and approved by Yuliya Nice.    Consulted medical director/attending physician regarding:   Not at this time    Instructed patient/family/caregiver on 24-hour hospice availability and phone number.    Plan for next visit:  Cotinued EOL education and support

## 2024-03-26 NOTE — HOSPICE
Patient was sitting in her favorite chair. She was sharing about her worrying about leaving her daughter here without her and she does not want to die by her self. We will continue to talk about this to help reduce her anxiety.

## 2024-03-27 ENCOUNTER — HOME CARE VISIT (OUTPATIENT)
Age: 89
End: 2024-03-27
Payer: MEDICARE

## 2024-03-27 VITALS
HEART RATE: 77 BPM | DIASTOLIC BLOOD PRESSURE: 69 MMHG | OXYGEN SATURATION: 99 % | TEMPERATURE: 96.7 F | SYSTOLIC BLOOD PRESSURE: 141 MMHG | RESPIRATION RATE: 20 BRPM

## 2024-03-27 PROCEDURE — G0299 HHS/HOSPICE OF RN EA 15 MIN: HCPCS

## 2024-03-27 ASSESSMENT — ENCOUNTER SYMPTOMS
COUGH CHARACTERISTICS: DRY
COUGH: 1

## 2024-03-27 NOTE — HOSPICE
Patient/Caregiver/Family findings/issues during SN visit:  None identified    Medication refills ordered this visit:  none needed    Medications reconciled and all medications are available in the home this visit.  Education was provided regarding medications, medication interactions, and look alike medications.   Response to teaching.  DaughterKatherine verbalized understanding.   Medications are effective at this time.      Supplies by type and quantity ordered this visit include:  None needed    Consulted medical director/attending physician regarding: Not at this time    Instructed patient/family/caregiver on 24-hour hospice availability and phone number.    Plan for next visit:  Continued EOL education and support.

## 2024-03-29 ENCOUNTER — HOME CARE VISIT (OUTPATIENT)
Age: 89
End: 2024-03-29
Payer: MEDICARE

## 2024-03-29 PROCEDURE — G0299 HHS/HOSPICE OF RN EA 15 MIN: HCPCS

## 2024-03-30 VITALS
DIASTOLIC BLOOD PRESSURE: 66 MMHG | OXYGEN SATURATION: 98 % | HEART RATE: 80 BPM | TEMPERATURE: 98.5 F | RESPIRATION RATE: 22 BRPM | SYSTOLIC BLOOD PRESSURE: 122 MMHG

## 2024-03-30 ASSESSMENT — ENCOUNTER SYMPTOMS
COUGH CHARACTERISTICS: NON-PRODUCTIVE
COUGH: 1

## 2024-03-30 NOTE — HOSPICE
Patient/Caregiver/Family findings/issues during SN visit:  none identified    Medication refills ordered this visit: none needed    Medications reconciled and all medications are available in the home this visit.  Education was provided regarding medications, medication interactions, and look alike medications.   Response to teaching.  Daughter verbalized understanding of medication changes:  Omeprazole 40mg 2x daily, Lillie RN and lasix 0.5 tablet(10mg) 3x weekly.   Comfort medications are effective at this time.      Supplies by type and quantity ordered this visit include:  None needed    Consulted medical director/attending physician regarding: Spoke with Elena Rutherford NP in reference to meds    Instructed patient/family/caregiver on 24-hour hospice availability and phone number.    Plan for next visit:  Continued EOL education and support

## 2024-04-01 ENCOUNTER — HOME CARE VISIT (OUTPATIENT)
Age: 89
End: 2024-04-01
Payer: MEDICARE

## 2024-04-01 PROCEDURE — G0299 HHS/HOSPICE OF RN EA 15 MIN: HCPCS

## 2024-04-01 NOTE — HOSPICE
Family findings/issues during SN visit:  varying appetite    Medication refills ordered this visit: none requested    Medications reconciled and all medications are available in the home this visit.  Medications are effective at this time.      Supplies by type and quantity ordered this visit include: none requested    Instructed family on 24-hour hospice availability and phone number.    Plan for next visit: EOL education and caregiver support.

## 2024-04-02 VITALS
TEMPERATURE: 97.2 F | OXYGEN SATURATION: 99 % | HEART RATE: 62 BPM | SYSTOLIC BLOOD PRESSURE: 152 MMHG | DIASTOLIC BLOOD PRESSURE: 73 MMHG | RESPIRATION RATE: 18 BRPM

## 2024-04-02 ASSESSMENT — ENCOUNTER SYMPTOMS
COUGH: 1
COUGH CHARACTERISTICS: NON-PRODUCTIVE

## 2024-04-02 NOTE — HOSPICE
Patient/Caregiver/Family findings/issues during SN visit:  None identified    Medication refills ordered this visit:   None needed    Medications reconciled and all medications are available in the home this visit.  Education was provided regarding medications, medication interactions, and look alike medications.    Response to teaching.  Daughter verbalizes understanding.  Medications are effective/not effective at this time.      Supplies by type and quantity ordered this visit include:  None needed    Consulted medical director/attending physician regarding:  Not at this time    Instructed patient/family/caregiver on 24-hour hospice availability and phone number.    Plan for next visit:  Continued EOL education and support

## 2024-04-03 ENCOUNTER — HOME CARE VISIT (OUTPATIENT)
Age: 89
End: 2024-04-03
Payer: MEDICARE

## 2024-04-03 VITALS
DIASTOLIC BLOOD PRESSURE: 54 MMHG | SYSTOLIC BLOOD PRESSURE: 128 MMHG | TEMPERATURE: 98.5 F | OXYGEN SATURATION: 98 % | RESPIRATION RATE: 20 BRPM | HEART RATE: 70 BPM

## 2024-04-03 PROCEDURE — G0299 HHS/HOSPICE OF RN EA 15 MIN: HCPCS

## 2024-04-03 ASSESSMENT — ENCOUNTER SYMPTOMS
COUGH CHARACTERISTICS: DRY
COUGH: 1

## 2024-04-05 ENCOUNTER — HOME CARE VISIT (OUTPATIENT)
Age: 89
End: 2024-04-05
Payer: MEDICARE

## 2024-04-05 PROCEDURE — G0299 HHS/HOSPICE OF RN EA 15 MIN: HCPCS

## 2024-04-06 VITALS
RESPIRATION RATE: 18 BRPM | SYSTOLIC BLOOD PRESSURE: 136 MMHG | DIASTOLIC BLOOD PRESSURE: 45 MMHG | HEART RATE: 93 BPM | TEMPERATURE: 98.5 F | OXYGEN SATURATION: 99 %

## 2024-04-06 ASSESSMENT — ENCOUNTER SYMPTOMS
COUGH: 1
COUGH CHARACTERISTICS: DRY

## 2024-04-06 NOTE — HOSPICE
Patient/Caregiver/Family findings/issues during SN visit:  none identified    Medication refills ordered this visit:   sertraline and coreg refilled.  order# [id number]    Medications reconciled and all medications are available in the home this visit.  Education was provided regarding medications, medication interactions, and look alike medications.    Response to teaching.  Daughter verbalizes understanding.   Medications are effective at this time.      Supplies by type and quantity ordered this visit include:   none needed    Consulted medical director/attending physician regarding: Not at this time    Instructed patient/family/caregiver on 24-hour hospice availability and phone number.    Plan for next visit:  Continued EOL education and support

## 2024-04-08 ENCOUNTER — HOME CARE VISIT (OUTPATIENT)
Age: 89
End: 2024-04-08
Payer: MEDICARE

## 2024-04-08 PROCEDURE — G0300 HHS/HOSPICE OF LPN EA 15 MIN: HCPCS

## 2024-04-09 VITALS
TEMPERATURE: 98.7 F | OXYGEN SATURATION: 96 % | HEART RATE: 62 BPM | SYSTOLIC BLOOD PRESSURE: 114 MMHG | RESPIRATION RATE: 16 BRPM | DIASTOLIC BLOOD PRESSURE: 56 MMHG

## 2024-04-09 ASSESSMENT — ENCOUNTER SYMPTOMS
COUGH: 1
COUGH CHARACTERISTICS: NON-PRODUCTIVE

## 2024-04-09 NOTE — HOSPICE
Patient/Caregiver/Family/Facility findings/issues during SN visit: Patient presents sitting up in her living room.  NO signs of distress or discomfort.  Pt denies pain at this time.  NO new concerns per pt daughter at this time.    Medication refills ordered this visit: None needed    Medications reconciled and all medications are available in the home this visit.  The following education was provided regarding medications, medication interactions, and look alike medications: Medication side effects, dosages, purposes, frequencies.  Response to teaching: Verbalized understanding. Medications are effective at this time.      Supplies by type and quantity ordered this visit include: Not needed    Consulted medical director/attending physician regarding: Not needed    Instructed patient/family/caregiver on 24-hour hospice availability and phone number.    Plan for next visit:  Continue end of life education and support caregiver.

## 2024-04-10 ENCOUNTER — HOME CARE VISIT (OUTPATIENT)
Age: 89
End: 2024-04-10
Payer: MEDICARE

## 2024-04-10 PROCEDURE — G0299 HHS/HOSPICE OF RN EA 15 MIN: HCPCS

## 2024-04-11 VITALS
SYSTOLIC BLOOD PRESSURE: 117 MMHG | DIASTOLIC BLOOD PRESSURE: 68 MMHG | RESPIRATION RATE: 18 BRPM | HEART RATE: 66 BPM | TEMPERATURE: 98.4 F | OXYGEN SATURATION: 98 %

## 2024-04-11 ASSESSMENT — ENCOUNTER SYMPTOMS
COUGH: 1
COUGH CHARACTERISTICS: DRY

## 2024-04-11 NOTE — HOSPICE
Patient/Caregiver/Family findings/issues during SN visit:  None identified    Medication refills ordered this visit: None needed    Medications reconciled and all medications are available in the home this visit.  Education was provided regarding medications, medication interactions, and look alike medications.   Response to teaching.  Daughter verbalizes understanding.   Medications are effective at this time.      Supplies by type and quantity ordered this visit include:  None needed    Consulted medical director/attending physician regarding:  Not at this time    Instructed patient/family/caregiver on 24-hour hospice availability and phone number.    Plan for next visit:  Continue to monitor for decline. Patient has been doing rather well.  Daughter rquesting to change visits to 2x weekly if everything says the same by tomorrows visit 4/12

## 2024-04-12 ENCOUNTER — HOME CARE VISIT (OUTPATIENT)
Age: 89
End: 2024-04-12
Payer: MEDICARE

## 2024-04-16 ENCOUNTER — HOME CARE VISIT (OUTPATIENT)
Age: 89
End: 2024-04-16
Payer: MEDICARE

## 2024-04-16 PROCEDURE — G0299 HHS/HOSPICE OF RN EA 15 MIN: HCPCS

## 2024-04-17 VITALS
HEART RATE: 78 BPM | RESPIRATION RATE: 18 BRPM | DIASTOLIC BLOOD PRESSURE: 78 MMHG | TEMPERATURE: 97.8 F | SYSTOLIC BLOOD PRESSURE: 125 MMHG | OXYGEN SATURATION: 98 %

## 2024-04-17 ASSESSMENT — ENCOUNTER SYMPTOMS
COUGH: 1
COUGH CHARACTERISTICS: DRY

## 2024-04-17 NOTE — HOSPICE
Patient/Caregiver/Family findings/issues during SN visit:  None identfied    Medication refills ordered this visit: No refills needed    Medications reconciled and all medications are available in the home this visit.  Education was provided regarding medications, medication interactions, and look alike medications.    Response to teaching.  Daughter verbalized understanding.   Medications are effective at this time.      Supplies by type and quantity ordered this visit include:  None needed    Consulted medical director/attending physician regarding:  Not at this time    Instructed patient/family/caregiver on 24-hour hospice availability and phone number.    Plan for next visit:  Continued EOL and support

## 2024-04-18 ENCOUNTER — HOME CARE VISIT (OUTPATIENT)
Age: 89
End: 2024-04-18
Payer: MEDICARE

## 2024-04-18 VITALS
DIASTOLIC BLOOD PRESSURE: 64 MMHG | SYSTOLIC BLOOD PRESSURE: 156 MMHG | TEMPERATURE: 97.5 F | HEART RATE: 56 BPM | RESPIRATION RATE: 20 BRPM | OXYGEN SATURATION: 99 %

## 2024-04-18 PROCEDURE — G0299 HHS/HOSPICE OF RN EA 15 MIN: HCPCS

## 2024-04-18 ASSESSMENT — ENCOUNTER SYMPTOMS
COUGH CHARACTERISTICS: DRY
SKIN LESIONS: 1
COUGH: 1

## 2024-04-18 NOTE — HOSPICE
Patient/Caregiver/Family/Facility findings/issues during SN visit:  None identified    Medication refills ordered this visit:  Coreg and sertraline refilled order# [id number]    Medications reconciled and all medications are available in the home this visit.  Education was provided regarding medications, medication interactions, and look alike medications.   Response to teaching.  Daughter verbalized understanding.   Medications are effective at this time.      Supplies by type and quantity ordered this visit include: No supplies needed    Consulted medical director/attending physician regarding: Not at this time    Instructed patient/family/caregiver on 24-hour hospice availability and phone number.    Plan for next visit:  Continued EOL education and support

## 2024-04-23 ENCOUNTER — HOME CARE VISIT (OUTPATIENT)
Age: 89
End: 2024-04-23
Payer: MEDICARE

## 2024-04-23 VITALS
SYSTOLIC BLOOD PRESSURE: 152 MMHG | DIASTOLIC BLOOD PRESSURE: 83 MMHG | HEART RATE: 62 BPM | OXYGEN SATURATION: 100 % | RESPIRATION RATE: 20 BRPM | TEMPERATURE: 97.9 F

## 2024-04-23 PROCEDURE — G0299 HHS/HOSPICE OF RN EA 15 MIN: HCPCS

## 2024-04-24 ENCOUNTER — HOME CARE VISIT (OUTPATIENT)
Age: 89
End: 2024-04-24
Payer: MEDICARE

## 2024-04-24 NOTE — HOSPICE
PAtient was in the bathroom when I arrived. Her daughter is doing a great job with her care. Patient was able to share.

## 2024-04-25 ENCOUNTER — HOME CARE VISIT (OUTPATIENT)
Age: 89
End: 2024-04-25
Payer: MEDICARE

## 2024-04-25 VITALS
TEMPERATURE: 98.3 F | DIASTOLIC BLOOD PRESSURE: 57 MMHG | OXYGEN SATURATION: 94 % | HEART RATE: 70 BPM | SYSTOLIC BLOOD PRESSURE: 136 MMHG

## 2024-04-25 PROCEDURE — G0299 HHS/HOSPICE OF RN EA 15 MIN: HCPCS

## 2024-04-25 ASSESSMENT — ENCOUNTER SYMPTOMS
SKIN LESIONS: 1
TROUBLE SWALLOWING: 1

## 2024-04-26 NOTE — HOSPICE
Patient awake and alert sitting up in chair in living room.  Patient denies pain and discomfort.  Patient behavior indicators negative for pain and discomfort.      Good hesham.  Sleeps well, good sleeper per daughter.  [id number] she is awake ready to go to the restroom.  3 naps per day avg 1-2 hours and sleeps 11 hours at night.      Eating:  Ate well today.  Ate2 a pancake and an egg.  DId not eat supper, but ate ice cream.  Not drinking very much.  Takes the Lasix MWF, but is not drinking much but she has to go to the restroom a lot.  meals vary.Toddler size portions.  at least 2 meals and a snack.      LBM:  This morning.  Usually goes daily.        Pain:  Not complained of any.  Yesterday patient complained of the food getting stuck and it causes discomfort.      No recent agitation.    Took a shower this week. Made her tires.  Uses shower chair.  Usually takes a sink bath.      At night patient will have hot flashes - just started that the last time she got sick      Medication:  None needed    Supplies: Dtr states that she orders their briefs because patient requires an extra small as the small is too big.

## 2024-04-30 ENCOUNTER — HOME CARE VISIT (OUTPATIENT)
Age: 89
End: 2024-04-30
Payer: MEDICARE

## 2024-04-30 VITALS
RESPIRATION RATE: 18 BRPM | DIASTOLIC BLOOD PRESSURE: 58 MMHG | SYSTOLIC BLOOD PRESSURE: 120 MMHG | TEMPERATURE: 97.9 F | OXYGEN SATURATION: 93 % | HEART RATE: 58 BPM

## 2024-04-30 PROCEDURE — G0300 HHS/HOSPICE OF LPN EA 15 MIN: HCPCS

## 2024-04-30 ASSESSMENT — ENCOUNTER SYMPTOMS: SKIN LESIONS: 1

## 2024-05-01 NOTE — HOSPICE
Patient/Caregiver/Family/Facility findings/issues during SN visit: No new concerns at this time per caregiver.  Pt presents sitting up in her armchair.  No signs of discomfort, Pt denies pain.  Appetite is unchanged but varies.  BM yesterday.  Patient is sleeping through the night and naping several times throughout the day.  Approx 12-16 hours according to daughter.    Medication refills ordered this visit: Omeprazole, lasix---Confirmation #: [id number]    Medications reconciled and all medications are available in the home this visit.  The following education was provided regarding medications, medication interactions, and look alike medications: Medication side effects, dosages, purposes, frequencies.  Response to teaching: Verbalized understanding. Medications are effective at this time.      Supplies by type and quantity ordered this visit include: Not needed    Consulted medical director/attending physician regarding: Not needed    Instructed patient/family/caregiver on 24-hour hospice availability and phone number.    Plan for next visit:  Continue end of life education and support caregiver.

## 2024-05-02 ENCOUNTER — HOME CARE VISIT (OUTPATIENT)
Age: 89
End: 2024-05-02
Payer: MEDICARE

## 2024-05-02 VITALS
SYSTOLIC BLOOD PRESSURE: 118 MMHG | HEART RATE: 57 BPM | TEMPERATURE: 97.2 F | OXYGEN SATURATION: 90 % | DIASTOLIC BLOOD PRESSURE: 47 MMHG | RESPIRATION RATE: 15 BRPM

## 2024-05-02 PROCEDURE — G0299 HHS/HOSPICE OF RN EA 15 MIN: HCPCS

## 2024-05-02 ASSESSMENT — ENCOUNTER SYMPTOMS
SKIN LESIONS: 1
TROUBLE SWALLOWING: 1

## 2024-05-03 NOTE — HOSPICE
Patient awnaomye and lert sitting up in chair.  Pleasant demeanor.       JUst wobbly legs, not feeling 100%.  Had a couple of naps.  Slept well last night.    I dont hurt anywhere, I just feel rotten.    Ate a little bit this morning, ate most of one of a sausage link and some cinnamon apples.  Drank some coke and burped.  Felt better.      LBM:  Yesterday per daughter, patient thought it was today.  Per daughter she usually has them in the afternoon/evening time.      Daughter states that she pushed patient on her rollator, but at night she uses the BSC.  No falls per daughter, in a long long time.  She sttes it has been at least 6 weeks.  She states she was assisted last fall.    Per daughter patient will drink about a 12oz bottle of water per day.  Per daughter patient was told by nephrologist to limit to 24oz of fluid.  Patient self limits    Pain in the butt while sitting in the chair      Meds:  Were received.  None needed.    Supplies:  None

## 2024-05-06 NOTE — HOSPICE
Patient/Caregiver/Family/Facility findings/issues during SN visit:  Anxiety    Medication refills ordered this visit: none    Medications reconciled and all medications are available in the home this visit. Medications are effective at this time.      Supplies by type and quantity ordered this visit include: none    Instructed patient/family/caregiver on 24-hour hospice availability and phone number.    Plan for next visit:  EOL education and caregiver support

## 2024-05-07 ENCOUNTER — HOME CARE VISIT (OUTPATIENT)
Age: 89
End: 2024-05-07
Payer: MEDICARE

## 2024-05-07 PROCEDURE — G0299 HHS/HOSPICE OF RN EA 15 MIN: HCPCS

## 2024-05-08 VITALS
HEART RATE: 60 BPM | SYSTOLIC BLOOD PRESSURE: 139 MMHG | RESPIRATION RATE: 22 BRPM | TEMPERATURE: 98.8 F | DIASTOLIC BLOOD PRESSURE: 73 MMHG | OXYGEN SATURATION: 99 %

## 2024-05-08 NOTE — HOSPICE
Patient/Caregiver/Family findings/issues during SN visit:  None identified    Medication refills ordered this visit: Sertraline and coreg refilled order# [id number]    Medications reconciled and all medications are available in the home this visit.  Education was provided regarding medications, medication interactions, and look alike medications.  Response to teaching.  DaughterKatherine verbalized understanding.  Medications are effective at this time.      Supplies by type and quantity ordered this visit include:  None needed    Consulted medical director/attending physician regarding: Not at this time    Instructed patient/family/caregiver on 24-hour hospice availability and phone number.    Plan for next visit:  Continued EOL education and support

## 2024-05-09 ENCOUNTER — HOME CARE VISIT (OUTPATIENT)
Age: 89
End: 2024-05-09
Payer: MEDICARE

## 2024-05-09 VITALS
RESPIRATION RATE: 15 BRPM | DIASTOLIC BLOOD PRESSURE: 46 MMHG | SYSTOLIC BLOOD PRESSURE: 130 MMHG | OXYGEN SATURATION: 99 % | HEART RATE: 61 BPM | TEMPERATURE: 97.6 F

## 2024-05-09 PROCEDURE — G0299 HHS/HOSPICE OF RN EA 15 MIN: HCPCS

## 2024-05-09 ASSESSMENT — ENCOUNTER SYMPTOMS: TROUBLE SWALLOWING: 1

## 2024-05-09 NOTE — HOSPICE
1030:  When writer called patient daughter to arrange today's visit, daughter asked this nurse to come closer to 1200 as patient had slept in and was just eating breakfast.      Patient awake and alert in chair in living room with oxygen in place via nasal canula.    Last week sometime she had trouble breathing with o2 in place.      Egg, biscuit and jelly.  She had some coffee.  Did well per daughter.  Eating pretty good for her.  Ate a chicken strip and a few potatoes for dinner.  Drinks about 1.5 12oz bottles of water, a few sips of coffee and juice.  Sometimes     LBM this morning.      Urinating okay, MWF a little more because of Lasix.      Sleeping:  Sleeps good.  GOes to bed \"there's no time before I go to sleep.\"  Uakes up between     [id number] 2-3 hour naps sometimes.      Per daughter and patient there are times when patient feet, legs and hands are cold     No medication needs.  Ordered meds in route, per [employer] website should be delivered today.   Patient medication arrived while this nurse in the home.      No supplies needed per daughter.

## 2024-05-09 NOTE — PROGRESS NOTES
HISTORY OF PRESENT ILLNESS  Crispin Acosta is a 80 y.o. female. HPI: Here for follow-up . Accompanied with her daughter. History of dementia. Seen neurology. Started and recommended Namenda but per daughter it was not helping and it was stopped. Currently having some auditory hallucination. No trouble at nighttime. Said sleeping longer hours. Currently appetite is fair. No recent weight changes. Mildly elevated blood pressure which was improved on repeat. She was sitting comfortable on a wheelchair. Has her own frustration with memory and not able to recall certain information as she would like to. Reviewed recent cardiology notes and also pacemaker was checked and no concern. Currently no signs of volume overload. No cough or cold. No wheezing. No shortness of breath or any chest pain. No diaphoresis. She used to have a syncopal episode followed by weird smell which episodes has been resolved at this time. She had a mechanical fall twice last month. No injury. Visit Vitals  BP (!) 140/70 (BP 1 Location: Left upper arm, BP Patient Position: Sitting, BP Cuff Size: Small adult)   Pulse 62   Temp 98.1 °F (36.7 °C) (Oral)   Resp 16   Ht 5' 2\" (1.575 m)   Wt 106 lb (48.1 kg)   SpO2 98%   BMI 19.39 kg/m²     Review medication list, vitals, problem list,allergies. She did not want to see any new neurologist for any medication or treatment regarding osteoporosis at this time. Daughter cannot talk to patient and see if she is willing to see the neurologist at [facility] regarding her dementia and current behavior changes. She is on Zoloft. Currently discussed to reduce the dose and take it at nighttime instead of daytime to see if her excessive sleepiness can improve. Discussed to have orientation with the date time month. Leave the curtains open during daytime. Reading newspaper, doing sudoku etc. might help. Renal insufficiency. Following nephrology. No new concern.   Lab Results Component Value Date/Time    WBC 8.1 06/26/2020 12:15 PM    WBC 5.4 05/17/2012 08:41 AM    HGB 11.9 06/26/2020 12:15 PM    HCT 39.4 06/26/2020 12:15 PM    PLATELET 871 04/06/9748 12:15 PM    MCV 96 06/26/2020 12:15 PM     Lab Results   Component Value Date/Time    Sodium 140 04/21/2021 01:19 PM    Potassium 4.7 04/21/2021 01:19 PM    Chloride 107 04/21/2021 01:19 PM    CO2 29 04/21/2021 01:19 PM    Anion gap 4 04/21/2021 01:19 PM    Glucose 92 04/21/2021 01:19 PM    BUN 29 (H) 04/21/2021 01:19 PM    Creatinine 1.39 (H) 04/21/2021 01:19 PM    BUN/Creatinine ratio 21 (H) 04/21/2021 01:19 PM    GFR est AA 43 (L) 04/21/2021 01:19 PM    GFR est non-AA 35 (L) 04/21/2021 01:19 PM    Calcium 9.3 04/21/2021 01:21 PM    Bilirubin, total 0.3 11/25/2020 08:59 AM    Alk. phosphatase 73 11/25/2020 08:59 AM    Protein, total 7.1 11/25/2020 08:59 AM    Albumin 3.6 04/21/2021 01:19 PM    Globulin 3.6 11/25/2020 08:59 AM    A-G Ratio 1.0 11/25/2020 08:59 AM    ALT (SGPT) 15 11/25/2020 08:59 AM    AST (SGOT) 16 11/25/2020 08:59 AM     Lab Results   Component Value Date/Time    Cholesterol, total 183 11/25/2020 08:59 AM    HDL Cholesterol 63 (H) 11/25/2020 08:59 AM    LDL, calculated 96.8 11/25/2020 08:59 AM    VLDL, calculated 23.2 11/25/2020 08:59 AM    Triglyceride 116 11/25/2020 08:59 AM    CHOL/HDL Ratio 2.9 11/25/2020 08:59 AM     Lab Results   Component Value Date/Time    TSH 3.51 06/26/2020 12:15 PM         ROS: See HPI    Physical Exam  Constitutional:       General: She is not in acute distress. Cardiovascular:      Rate and Rhythm: Normal rate and regular rhythm. Heart sounds: Normal heart sounds. Abdominal:      General: Bowel sounds are normal.      Palpations: Abdomen is soft. Tenderness: There is no abdominal tenderness. Neurological:      Mental Status: She is oriented to person, place, and time. Psychiatric:         Behavior: Behavior normal.         ASSESSMENT and PLAN    ICD-10-CM ICD-9-CM    1. Memory changes: Daughter will talk to patient and see if she wants to see the neurologist at the [facility] center. Does not take Namenda as it does not help. Currently auditory hallucination and long hour of sleep. We will did reduce the dose of Zoloft. Daughter will call back if she wants to go back up on the dose of Zoloft R41.3 780.93    2. Dementia with behavioral disturbance, unspecified dementia type ([facility] [address] [facility]  F03.91 294.21    3. Syncope, unspecified syncope type: Resolved at this time. Has a pacemaker. Following cardiology and no new recommendation R55 780.2     seen cardiology. does not want to see neurology at this time. worsening of dementia and behaviour problem. 4. [facility]omyopathy, unspecified [facility]  I42.9 425.4     low EF. has pacemaker. 5. Renal insufficiency: Following nephrology. No new recommendation. Avoid NSAIDs over-the-counter N28.9 593.9     following nephrology    6. Auditory hallucination: Probably due to dementia. Will observe see above R44.0 780.1    7. Peripheral vascular disease ([facility]): No leg claudication at this time. On aspirin. Will observe I73.9 443.9    8. Elevated blood pressure reading: Repeat was improved. Low-salt diet. We will continue current plan R03.0 796.2    Pt understood and agree with the plan     Follow-up and Dispositions    · Return in about 6 months (around 12/8/2021). Please note that this dictation was completed with [employer] the [employer] voice recognition software. Quite often unanticipated grammatical, syntax, homophones, and other interpretive errors are inadvertently transcribed by the computer software. Please disregard these errors. Please excuse any errors that have escaped final proofreading. none

## 2024-05-14 ENCOUNTER — HOME CARE VISIT (OUTPATIENT)
Age: 89
End: 2024-05-14
Payer: MEDICARE

## 2024-05-14 PROCEDURE — G0300 HHS/HOSPICE OF LPN EA 15 MIN: HCPCS

## 2024-05-15 VITALS
HEART RATE: 68 BPM | DIASTOLIC BLOOD PRESSURE: 72 MMHG | TEMPERATURE: 97.9 F | RESPIRATION RATE: 18 BRPM | SYSTOLIC BLOOD PRESSURE: 128 MMHG | OXYGEN SATURATION: 90 %

## 2024-05-15 NOTE — HOSPICE
Patient/Caregiver/Family/Facility findings/issues during SN visit:  No new concerns at this time.  Pt presents sitting up in her living room.  Pt denies pain at this time.  Vitals within normal limits.   Pt appetite has been varying and not so good recently but Caregiver states she ate about 75% of her dinner on Sunday.      Medication refills ordered this visit: Not needed     Medications reconciled and all medications are available in the home this visit.  The following education was provided regarding medications, medication interactions, and look alike medications: Medication side effects, dosages, purposes, frequencies.  Response to teaching: Verbalized understanding. Medications are effective at this time.      Supplies by type and quantity ordered this visit include: Not needed    Consulted medical director/attending physician regarding: Not needed this visit    Instructed patient/family/caregiver on 24-hour hospice availability and phone number.    Plan for next visit:  Continue end of life education and support caregiver.

## 2024-05-16 ENCOUNTER — HOME CARE VISIT (OUTPATIENT)
Age: 89
End: 2024-05-16
Payer: MEDICARE

## 2024-05-16 VITALS
RESPIRATION RATE: 12 BRPM | OXYGEN SATURATION: 99 % | HEART RATE: 52 BPM | TEMPERATURE: 97.1 F | SYSTOLIC BLOOD PRESSURE: 114 MMHG | DIASTOLIC BLOOD PRESSURE: 69 MMHG

## 2024-05-16 PROCEDURE — G0299 HHS/HOSPICE OF RN EA 15 MIN: HCPCS

## 2024-05-16 NOTE — HOSPICE
Upon arrival, patient resting in chair in living room with eyes closed.  Patient states she was \"about to take a nap.\"  Patient arouses easily to verbal stimuli. Patient denies pain and discomfort.  \"Not today\" is what patient states.  Daughter states she had some chest heriberto yesterday.  Dtr gave Morphine and the pain whent away.  The day before patient was having a hard time breathing even with oxygen the oxygen in place.  Wears oxygen most of the time, will take it off once in a while.      Daughter juana patient a mattress topper to help with hard bed for patient.      Ate scrambled eggs this morining.  Ate really well last evening had pot roast with potatoes and carrots.  Dtr states she ate everything that was put on her plate.      LBM:  Yesterday.  No issues going     Urine is so dark sometimes it looks like tea and daughter states that it smells concentrated.      No edmea today, but daughter states that she had some yesterday.  Daughter states that the Lasix is working; she states it is in the right foot mostly.  When asked daughter states that she never complains of pain in it and it does not affect her walking.      Param states that in the morning and sometimes throughout the day the patient's legs will tremble and patient gets anxious and does not feel steady.  Patient calls it her legs dancing.       Medications:  Daughter states medications are good, none needed.    Supplies: None needed.  Mainly uses the wipes.

## 2024-05-18 ENCOUNTER — HOME CARE VISIT (OUTPATIENT)
Age: 89
End: 2024-05-18
Payer: MEDICARE

## 2024-05-21 ENCOUNTER — HOME CARE VISIT (OUTPATIENT)
Age: 89
End: 2024-05-21
Payer: MEDICARE

## 2024-05-21 VITALS
OXYGEN SATURATION: 100 % | RESPIRATION RATE: 16 BRPM | TEMPERATURE: 97.7 F | HEART RATE: 54 BPM | SYSTOLIC BLOOD PRESSURE: 118 MMHG | DIASTOLIC BLOOD PRESSURE: 66 MMHG

## 2024-05-21 PROCEDURE — G0300 HHS/HOSPICE OF LPN EA 15 MIN: HCPCS

## 2024-05-21 NOTE — HOSPICE
Patient/Caregiver/Family/Facility findings/issues during SN visit:  Patient is receiving Morphine about twice weekly for abdomimal pain.  Pt may be transitioning.  Family not ready to change visits.  Continue to monitor through the end of the week.    Medication refills ordered this visit: Furosemide, certriline, caredilol------ order # [id number]    Medications reconciled and all medications are available in the home this visit.  The following education was provided regarding medications, medication interactions, and look alike medications: Medication side effects, dosages, purposes, frequencies.  Response to teaching: Verbalized understanding. Medications are effective at this time.      Supplies by type and quantity ordered this visit include: Not needed    Consulted medical director/attending physician regarding: Not needed this visit    Instructed patient/family/caregiver on 24-hour hospice availability and phone number.    Plan for next visit:  Continue end of life education and support caregiver.

## 2024-05-23 ENCOUNTER — HOME CARE VISIT (OUTPATIENT)
Age: 89
End: 2024-05-23
Payer: MEDICARE

## 2024-05-23 VITALS
OXYGEN SATURATION: 99 % | RESPIRATION RATE: 15 BRPM | DIASTOLIC BLOOD PRESSURE: 70 MMHG | HEART RATE: 48 BPM | SYSTOLIC BLOOD PRESSURE: 140 MMHG | TEMPERATURE: 97.1 F

## 2024-05-23 PROCEDURE — G0299 HHS/HOSPICE OF RN EA 15 MIN: HCPCS

## 2024-05-23 NOTE — HOSPICE
Patient awake and alert up in chair in living room with oxygen in place via nasal canula.      Broke one of her hearing aids, daughter took it and got it fixed.      I am eating fine.  Per daughter, she is eating very little per dtr.  SOme days she is eating very little some days more.  Ate deviled eggs, not quite a whole egg.  Ate a spoonful of of baked beans and a taste of cucumber.      Per daughter she weighs 84lbs.  Daughter states that weighed 87 prior.        Per daughter patient woke up one day and she wasn't Katherine.  Daughter states that it took her about 5 minutes to recognize her.     Saturday had some trouble sleeping was seeing things.      Has had several doses of Morphine for shortness of breath.        Medications:  Ordered meds deliviered during visit  Supplies:  None needed per daughter.

## 2024-05-25 ENCOUNTER — HOME CARE VISIT (OUTPATIENT)
Age: 89
End: 2024-05-25
Payer: MEDICARE

## 2024-05-26 ENCOUNTER — HOME CARE VISIT (OUTPATIENT)
Age: 89
End: 2024-05-26
Payer: MEDICARE

## 2024-05-28 ENCOUNTER — HOME CARE VISIT (OUTPATIENT)
Age: 89
End: 2024-05-28
Payer: MEDICARE

## 2024-05-28 PROCEDURE — G0300 HHS/HOSPICE OF LPN EA 15 MIN: HCPCS

## 2024-05-29 VITALS
TEMPERATURE: 97.8 F | RESPIRATION RATE: 18 BRPM | OXYGEN SATURATION: 100 % | SYSTOLIC BLOOD PRESSURE: 134 MMHG | DIASTOLIC BLOOD PRESSURE: 68 MMHG | HEART RATE: 70 BPM

## 2024-05-29 NOTE — HOSPICE
Patient/Caregiver/Family/Facility findings/issues during SN visit: Patient is sleeping more.  Yesterday slept from 6830-8106 the next day.  Caregiver has called over the weekend for the past two weeks for unmanaged symptoms.  Pt is using O2 continuously now and using Morphine and Lorazepam 1-2 times a day.  Appetite has decreased and pt is only eating 2-3 bites of two meals a day.  Spoke to caregiver about pt possibly transitioning and increasing visits soon and cg verbalized understanding.  CM made aware    Medication refills ordered this visit: Located within [facility] Order # [id number]    Medications reconciled and all medications are available in the home this visit.  The following education was provided regarding medications, medication interactions, and look alike medications: Medication side effects, dosages, purposes, frequencies.  Response to teaching: Verbalized understanding. Medications are effective at this time.      Supplies by type and quantity ordered this visit include: Not needed    Consulted medical director/attending physician regarding: Not needed    Instructed patient/family/caregiver on 24-hour hospice availability and phone number.    Plan for next visit:  Continue end of life education and support caregiver.

## 2024-05-30 ENCOUNTER — HOME CARE VISIT (OUTPATIENT)
Age: 89
End: 2024-05-30
Payer: MEDICARE

## 2024-05-30 VITALS
OXYGEN SATURATION: 99 % | TEMPERATURE: 97.7 F | HEART RATE: 75 BPM | SYSTOLIC BLOOD PRESSURE: 127 MMHG | DIASTOLIC BLOOD PRESSURE: 55 MMHG | RESPIRATION RATE: 14 BRPM

## 2024-05-30 PROCEDURE — G0299 HHS/HOSPICE OF RN EA 15 MIN: HCPCS

## 2024-05-30 ASSESSMENT — ENCOUNTER SYMPTOMS: TROUBLE SWALLOWING: 1

## 2024-05-30 NOTE — HOSPICE
Patient awake and alert sitting up in her arm chair in the living room.  Patient has oxygen in place via nasal canula.  Patient pleasant demeanor.  Patient denies pain and discomfort.        Just woke up from her second nap.  Per patient, she is sleeping well.  She states that as soon as she gets to bed she falls asleep right away.  With dose of Lorazepam in the afternoon and evening, patient is not getting as anxiety.  Per daughter she is eating less and less and her drinking is not much.  Per daughter , the patient had a scrambled egg.  She states that that is her food.  Daughter states that yesterday she did not want to eat.      Had a fall over a year ago and cracked her vertebrae in her sacral area.  Daughter states that patient is having pain there now.    Lot of sleeping and more confusion.  Duaghter states that patient breathing pattern changes when she gets her anxiety attacks.  Per daughter patient is sleeping at least 18 hours, maybe more.  Daughter states that patient is wearing her oxygen almost continuously, will not take it off for more than 15 minutes.      Patient has a weak, non-productive cough      Writer discussed increasing visits.  Daughter okay with it.    Daughter states that patient had a fall where patient on her bottom and she tried to get out of bed and stumbled.  Daughter states that she put the rail rodrigo on the bed.  She states that in patient's confusion she tries to do things she cannot do.  Per patient shehas a history of falls.  Duaghter states that she moves too quickly and does not have any core strength.  Daughter states at one time she had to have staples in her head.  Patient states she almost slid out of the bed this morning and that she has things on her bed to prevent her from getting up by herself.      Patient states something about her  .    Supplies left:  Zinc and soothe and cool

## 2024-05-31 ENCOUNTER — HOME CARE VISIT (OUTPATIENT)
Age: 89
End: 2024-05-31
Payer: MEDICARE

## 2024-06-03 ENCOUNTER — HOME CARE VISIT (OUTPATIENT)
Age: 89
End: 2024-06-03
Payer: MEDICARE

## 2024-06-03 PROCEDURE — G0300 HHS/HOSPICE OF LPN EA 15 MIN: HCPCS

## 2024-06-04 VITALS
RESPIRATION RATE: 18 BRPM | HEART RATE: 77 BPM | DIASTOLIC BLOOD PRESSURE: 74 MMHG | SYSTOLIC BLOOD PRESSURE: 118 MMHG | OXYGEN SATURATION: 100 % | TEMPERATURE: 97.8 F

## 2024-06-04 NOTE — HOSPICE
Patient/Caregiver/Family/Facility findings/issues during SN visit:  No new concrns per caregiver.  Pt is recieveing Lorazepam in the evening with Morphine and is sleeping through the night.  One dose of Lorzepam is given mid day.   Pt is pleasantly confused and lying onthe reciner when this nurse arrives.    Medication refills ordered this visit: Not needed    Medications reconciled and all medications are available in the home this visit.  The following education was provided regarding medications, medication interactions, and look alike medications: Medication side effects, dosages, purposes, frequencies.  Response to teaching: Verbalized understanding. Medications are effective at this time.      Supplies by type and quantity ordered this visit include: Soothe and cool-----  Order Number : [id number]  Consulted medical director/attending physician regarding: Not needed    Instructed patient/family/caregiver on 24-hour hospice availability and phone number.    Plan for next visit:  Continue end of life education and support caregiver.

## 2024-06-05 ENCOUNTER — HOME CARE VISIT (OUTPATIENT)
Age: 89
End: 2024-06-05
Payer: MEDICARE

## 2024-06-05 VITALS
OXYGEN SATURATION: 97 % | HEART RATE: 74 BPM | TEMPERATURE: 97.6 F | SYSTOLIC BLOOD PRESSURE: 131 MMHG | DIASTOLIC BLOOD PRESSURE: 76 MMHG

## 2024-06-05 PROCEDURE — G0299 HHS/HOSPICE OF RN EA 15 MIN: HCPCS

## 2024-06-05 ASSESSMENT — ENCOUNTER SYMPTOMS
STOOL DESCRIPTION: LARGE
BOWEL INCONTINENCE: 1

## 2024-06-05 NOTE — HOSPICE
Patient awake  and alert sitting up in her  chair on room air.  Per sister, patient just took her oxygen off a little while ago and she will put it back on her shortly.  Patient denies pain and discomfort.        Per sister, patient went outside to sit on the porch swing and she was wiped out.  She also states that the other day she had to help daughter get her in the chair because the patient had no energy.          Per patient, she had  few tiny pancakes and a few sips of coffee this morning.      LBM:  Per sister, she had a blowout this morning.  Sister states that daughter had to bath her and change her clothes.      Falls:  No    Per sister patient had gotten out of bed and got to the potty chair and couldn't get back to the bed.     Patient tells this nurse that she cannot remember anything.    Refills:  Sertaline.  Patient sister states that daughter left Lorazepam out over 24hours.  Writer to order a new one.  Ordered to [facility], confirmation number: [id number]

## 2024-06-07 ENCOUNTER — HOME CARE VISIT (OUTPATIENT)
Age: 89
End: 2024-06-07
Payer: MEDICARE

## 2024-06-07 VITALS
RESPIRATION RATE: 15 BRPM | OXYGEN SATURATION: 99 % | TEMPERATURE: 97.2 F | HEART RATE: 60 BPM | SYSTOLIC BLOOD PRESSURE: 121 MMHG | DIASTOLIC BLOOD PRESSURE: 59 MMHG

## 2024-06-07 PROCEDURE — G0299 HHS/HOSPICE OF RN EA 15 MIN: HCPCS

## 2024-06-07 ASSESSMENT — ENCOUNTER SYMPTOMS
TROUBLE SWALLOWING: 1
BOWEL INCONTINENCE: 1

## 2024-06-08 NOTE — HOSPICE
Patient awake and alert with oxygen in place via nasal canula.    Pretty well for her and other days not as much.  Drinking very little.  Briefs used to be flooded, decrease urine output.  LBM yesterday.   Every other day pattern.  Daughter states if it is longer than 2 days she will give the patient prunes.        Daughter states that patient is sleeping well with the Lorazepam.  She states that without it, they patient becomes very anxious.              Medication refills needed:  Carvedilol.  Ordered to local pharmacy, [address] [phone]

## 2024-06-10 ENCOUNTER — HOME CARE VISIT (OUTPATIENT)
Age: 89
End: 2024-06-10
Payer: MEDICARE

## 2024-06-10 VITALS
TEMPERATURE: 97.2 F | HEART RATE: 58 BPM | OXYGEN SATURATION: 98 % | DIASTOLIC BLOOD PRESSURE: 63 MMHG | SYSTOLIC BLOOD PRESSURE: 99 MMHG

## 2024-06-10 PROCEDURE — G0299 HHS/HOSPICE OF RN EA 15 MIN: HCPCS

## 2024-06-10 ASSESSMENT — ENCOUNTER SYMPTOMS
TROUBLE SWALLOWING: 1
BOWEL INCONTINENCE: 1

## 2024-06-10 NOTE — HOSPICE
Patient restingin recliner in the livingroom with oxygen in place via nasal canula.      Just wasn't there for dinner.  She ate 1/2 of what she normally eats.  A cople of choking spells.  Daughter states that she has been telling her that she is choking when she eats very little.  Daughter states that patient did not eat much yesterday, but after patient did not eat lunch, she put her back in her chair and she slept.        Daughter sttes that patient forgot how to open her pill box.  She states that is the first time she has said that.  She sttes that patient also told her the other day she did not know what to do next.  Daughter states that she is forgetting more.      Daughter states that she is having more trouble assisting patient when she cannot help.        Discussed Volunteer Services with daughter.      Gone back to Drinking Ensure again.  Not a full bottle.  Eating 2 cookies and sliced peaches for a midday snack.    Patient states that she is having little pains on her left side and it comes an goes.  Daughter states she hasn't really complained of it.      Daughter states that Lorazepam works well.  She states that patient will ask for it and drink water because of the taste and she is asleep within 15-20 minutes.  Daughter states that she will sleep all night, sometimes in the same position.

## 2024-06-11 ENCOUNTER — HOME CARE VISIT (OUTPATIENT)
Age: 89
End: 2024-06-11
Payer: MEDICARE

## 2024-06-12 ENCOUNTER — HOME CARE VISIT (OUTPATIENT)
Age: 89
End: 2024-06-12
Payer: MEDICARE

## 2024-06-12 VITALS
DIASTOLIC BLOOD PRESSURE: 64 MMHG | SYSTOLIC BLOOD PRESSURE: 102 MMHG | HEART RATE: 64 BPM | TEMPERATURE: 97.9 F | OXYGEN SATURATION: 97 % | RESPIRATION RATE: 16 BRPM

## 2024-06-12 PROCEDURE — G0300 HHS/HOSPICE OF LPN EA 15 MIN: HCPCS

## 2024-06-12 ASSESSMENT — ENCOUNTER SYMPTOMS: BOWEL INCONTINENCE: 1

## 2024-06-13 NOTE — HOSPICE
Patient/Caregiver/Family/Facility findings/issues during SN visit:  Patient appetite is still not good but unchanged.  BM on Monday.  Sleeping between 16-18 hours a day.  Increased secretions, pt was coughing some and spit out clear secretions twice.  Educated on hyoscyamine for increased secretions.  CG verbalized understanding.  Pt had abdominal pain earlier today and it was relieved with morphine.  Pt denies pain at this visit.    Medication refills ordered this visit: Not needed this visit    Medications reconciled and all medications are available in the home this visit.  The following education was provided regarding medications, medication interactions, and look alike medications: Medication side effects, dosages, purposes, frequencies.  Response to teaching: Verbalized understanding. Medications are effective at this time.      Supplies by type and quantity ordered this visit include: Not needed this visit    Consulted medical director/attending physician regarding: Not needed    Instructed patient/family/caregiver on 24-hour hospice availability and phone number.    Plan for next visit:  Continue end of life education and support caregiver.

## 2024-06-14 ENCOUNTER — HOME CARE VISIT (OUTPATIENT)
Age: 89
End: 2024-06-14
Payer: MEDICARE

## 2024-06-14 PROCEDURE — G0299 HHS/HOSPICE OF RN EA 15 MIN: HCPCS

## 2024-06-15 VITALS
HEART RATE: 58 BPM | OXYGEN SATURATION: 99 % | SYSTOLIC BLOOD PRESSURE: 139 MMHG | TEMPERATURE: 96.9 F | DIASTOLIC BLOOD PRESSURE: 71 MMHG

## 2024-06-15 ASSESSMENT — ENCOUNTER SYMPTOMS
CONSTIPATION: 1
TROUBLE SWALLOWING: 1
BOWEL INCONTINENCE: 1

## 2024-06-15 NOTE — HOSPICE
Patient awake and alert up in the chair in the living room with oxygen in place via nasal canula.      Daughter states that the patient has been having \"significant swelling\"     Foot does not do what she wants it to.  She states that patient has forgotten how to use her legs, which makes it very hard when we are trying to get her up into the rollator to move.      LBM not in 3 days, she gave her prunes 2 days in a row.  Per daughter patient appetite varies and has decreased fluid intake.      I am sleepy all the time - sleeps 16-18 hours a day per daughter.      Per daughter she is not sre that patient is understanding her so she makes her look at her.  She has trouble responding sometimes  Having trouble talking sometimes.    Daughter states that patient told her that \"she can't keep doing this.\"  Daughter states that she called [facility] and they require 16 hours.  She states that patient was upset at first, but she understands.      Daughter states that she is figuring things out and when she will need care.  She states she told her mom that she was trying to keep her here as long as she can keep her self.      Discussed Hospice aide with daughter and patient.  They are okay with trying once a week for now.      Patient displays word finding.      No supply needs  No medication needs.

## 2024-06-17 ENCOUNTER — HOME CARE VISIT (OUTPATIENT)
Age: 89
End: 2024-06-17
Payer: MEDICARE

## 2024-06-17 VITALS
OXYGEN SATURATION: 99 % | HEART RATE: 54 BPM | SYSTOLIC BLOOD PRESSURE: 125 MMHG | TEMPERATURE: 98.2 F | DIASTOLIC BLOOD PRESSURE: 56 MMHG

## 2024-06-17 PROCEDURE — G0299 HHS/HOSPICE OF RN EA 15 MIN: HCPCS

## 2024-06-17 ASSESSMENT — ENCOUNTER SYMPTOMS
BOWEL INCONTINENCE: 1
TROUBLE SWALLOWING: 1

## 2024-06-17 NOTE — HOSPICE
Patient awake and alert sitting up in the chair in the living room with oxygen in place via nasal canula.  Patient denies pain and discomfort.  Behavior indicators negative as well.        Still having swelling in her bilateral ankless.  Swelling at night, elevate at night, almost back to normal in the morning.  Daughter denies any anxiety or chest pain.  Still giving patient the Lorazepam at night prior to bedet pat time.  Last night patient told her that she was going to throw something if she wanted to get daughter's attention.  Daughter reminded her to just call her.      Today has been a good day, patient states her legs are working today.      Scrambled egg and a little roll with butter.  Daughter states she just had a strawbery shortcake.  Ate well yesterday as well per daughter.  Daughter states that she is not drinking well still.    Daughter states that NP heard a little crackling in patient's lower lungs.  Dtr states that she has had that off and on before.  Crackles noted during writers assessment in the mid and lower lobes.      Daughter states that after using the soothe and cool barrier cream the pinkness on patient sacral area is gone.      Patient talks a little bit during visit today    Medication refills:  Carvedilol and Zoloft.  [id number]  Supply needs:  Daughter denies any needs

## 2024-06-19 ENCOUNTER — HOME CARE VISIT (OUTPATIENT)
Age: 89
End: 2024-06-19
Payer: MEDICARE

## 2024-06-19 PROCEDURE — G0300 HHS/HOSPICE OF LPN EA 15 MIN: HCPCS

## 2024-06-20 VITALS
DIASTOLIC BLOOD PRESSURE: 66 MMHG | RESPIRATION RATE: 18 BRPM | OXYGEN SATURATION: 100 % | SYSTOLIC BLOOD PRESSURE: 108 MMHG | TEMPERATURE: 97.5 F | HEART RATE: 66 BPM

## 2024-06-20 ASSESSMENT — ENCOUNTER SYMPTOMS: BOWEL INCONTINENCE: 1

## 2024-06-20 NOTE — HOSPICE
Patient/Caregiver/Family/Facility findings/issues during SN visit:  Pt is having troube swallowing occasionally  per pts daughter.   Pt is getting alot of condensation through the oxygen tubing.  This nurse explained to keep the water between the max and min line and to make sure the chamber is  upright.  CG also made aware to administer the pt Lasix three times a week per CM.  CG verbalized understanding.  Vitals WNL.  Pt denies pain.      Medication refills ordered this visit: Not needed    Medications reconciled and all medications are available in the home this visit.  The following education was provided regarding medications, medication interactions, and look alike medications: Medication side effects, dosages, purposes, frequencies.  Response to teaching: Verbalized understanding. Medications are effective at this time.      Supplies by type and quantity ordered this visit include: Not needed    Consulted medical director/attending physician regarding: Not needed this visit    Instructed patient/family/caregiver on 24-hour hospice availability and phone number.    Plan for next visit:  Continue end of life education and support caregiver.

## 2024-06-21 ENCOUNTER — HOME CARE VISIT (OUTPATIENT)
Age: 89
End: 2024-06-21
Payer: MEDICARE

## 2024-06-21 VITALS
RESPIRATION RATE: 14 BRPM | DIASTOLIC BLOOD PRESSURE: 61 MMHG | TEMPERATURE: 97.9 F | OXYGEN SATURATION: 99 % | HEART RATE: 73 BPM | SYSTOLIC BLOOD PRESSURE: 130 MMHG

## 2024-06-21 PROCEDURE — G0299 HHS/HOSPICE OF RN EA 15 MIN: HCPCS

## 2024-06-21 ASSESSMENT — ENCOUNTER SYMPTOMS
TROUBLE SWALLOWING: 1
BOWEL INCONTINENCE: 1

## 2024-06-21 NOTE — HOSPICE
(including changes since hospice admission and/or re-certification):    On Admission:  Patient is alert, speaks conversationally.  Patient has a decreased appetite. She has occurrences of incontinent of bowel and bladder and moderate assist with ADLs.  Patient will walk short distances with her rollator.       Six months ago: (if needed)     Now:  Patient is alert at times but will respond to tactile stimuli.  Patient is oriented to herself.  Patient is verbal but will only speak in short sentences.  At times patient has word finding.  Patient is extremely hard of hearing despite wearing bilateral hearing aids.  Appears frail in appearance, patient moves feebly.  Daughter transfers her from her bed to recliner by seating her on rollator and rolling her.  Patient spends a lot of time in recliner.  Patient has an intermittent delayed cough after eating.  Patient appetite has fluctuated but is mostly decreased per daughter.  Some days patient will only take bites of food.  Patient has decreased fluid intake, approximately one 12oz bottle of water and ¼ cup of coffee.  Sometimes patient will drink Ensure, about ¾ of a bottle.  Patient is thinner in her facial area, exhibits temporal lobe wasting and bony prominences are visible.  Patient has lost 1.7cm in her RMAC.  Patient has been started on Lorazepam for sundowning. Patient has been restarted on Lasix 3 times per week for increased edema and bilateral lung crackles.  Patient having increased sleeping.  Patient will sleep 12-14 hours at night and naps more during the day.  Patient will take 2-3 naps lasting 1-2 hours.       Use the Eligibility Assessment tool below to document the recent decline and/or change in your patient's clinical presentation to support the current need for recertification. Paint a picture. DO NOT READ THIS SECTION IN IDG. Remove when completing recert summary.      Eligibility Assessment  Prognosis Guideline (LCD)  Patient is eligible for

## 2024-06-24 ENCOUNTER — HOME CARE VISIT (OUTPATIENT)
Age: 89
End: 2024-06-24
Payer: MEDICARE

## 2024-06-24 PROCEDURE — G0300 HHS/HOSPICE OF LPN EA 15 MIN: HCPCS

## 2024-06-25 VITALS
HEART RATE: 70 BPM | DIASTOLIC BLOOD PRESSURE: 66 MMHG | SYSTOLIC BLOOD PRESSURE: 118 MMHG | RESPIRATION RATE: 16 BRPM | TEMPERATURE: 97.5 F | OXYGEN SATURATION: 100 %

## 2024-06-25 ASSESSMENT — ENCOUNTER SYMPTOMS: BOWEL INCONTINENCE: 1

## 2024-06-25 NOTE — HOSPICE
Patient/Caregiver/Family/Facility findings/issues during SN visit:  Pt presents sitting in her recliner when this nurse arrived.  No s/s of acute distress noted.  Pt denies pain.  Vitals within normal limits.  Pt has had a few coughing episodes while eating.  No other new concerns per pts daughter     Medication refills ordered this visit: Not needed    Medications reconciled and all medications are available in the home this visit.  The following education was provided regarding medications, medication interactions, and look alike medications: Medication side effects, dosages, purposes, frequencies.  Response to teaching: Verbalized understanding. Medications are effective at this time.      Supplies by type and quantity ordered this visit include: Not needed    Consulted medical director/attending physician regarding:Not needed    Instructed patient/family/caregiver on 24-hour hospice availability and phone number.    Plan for next visit:  Continue end of life education and support caregiver.

## 2024-06-26 ENCOUNTER — HOME CARE VISIT (OUTPATIENT)
Age: 89
End: 2024-06-26
Payer: MEDICARE

## 2024-06-26 VITALS
HEART RATE: 64 BPM | TEMPERATURE: 97.3 F | DIASTOLIC BLOOD PRESSURE: 55 MMHG | RESPIRATION RATE: 14 BRPM | OXYGEN SATURATION: 99 % | SYSTOLIC BLOOD PRESSURE: 101 MMHG

## 2024-06-26 PROCEDURE — G0299 HHS/HOSPICE OF RN EA 15 MIN: HCPCS

## 2024-06-26 ASSESSMENT — ENCOUNTER SYMPTOMS
BOWEL INCONTINENCE: 1
CONTUSION: 1
TROUBLE SWALLOWING: 1

## 2024-06-26 NOTE — HOSPICE
Patient awake and alert sitting up in the chair with oxygen in place via nasal canula.  Patient states she has no pain or discomfort.  Per daughter, patient has been sleeping a lot today.        Per daughter, patient has had an increase in dementia symptoms and confusion.  She states that patient was calling her by her aunts name.  She states that sometimes she is the other Katherine who ties her to the bed.  She sttes that the other night she had to stay in the room with the patient until she drifted to sleep.      Per daughter, patient is eating the same.  Per daughter, patient has off and on trouble swallowing.  DIscussed patient's issues with her esophagus and how the ENT did not want to stretch it as he did not want to do it because he was not sure how she would do with sedation.      [id number] and gets up between 8517-9987.  patient states that she goes to sleep very good and easily.  She states it is a good.        Patient states that she wonders how long she will be here.  When asked why, she states that she is too old.  Daughter states that she told her that she is too bored just sitting here.      Per daughter, patient has been taking about  family members and asking where they are.      Medication Needs:  Furosemide refill.  [facility] [id number]

## 2024-06-28 ENCOUNTER — HOME CARE VISIT (OUTPATIENT)
Age: 89
End: 2024-06-28
Payer: MEDICARE

## 2024-06-28 VITALS
RESPIRATION RATE: 13 BRPM | OXYGEN SATURATION: 96 % | DIASTOLIC BLOOD PRESSURE: 43 MMHG | HEART RATE: 54 BPM | TEMPERATURE: 97.4 F | SYSTOLIC BLOOD PRESSURE: 103 MMHG

## 2024-06-28 PROCEDURE — G0299 HHS/HOSPICE OF RN EA 15 MIN: HCPCS

## 2024-06-28 ASSESSMENT — ENCOUNTER SYMPTOMS
TROUBLE SWALLOWING: 1
CONTUSION: 1
BOWEL INCONTINENCE: 1

## 2024-06-28 NOTE — HOSPICE
Upon arival, patient sitting up in her chair in the recliner with her oxygen in place via nasal canula.  Patient denies pain and discomfort.  WHile talking to daughter, patient was trying to put her legs up however, patient was raising her chair to the point she almost dumped herslef out of the chair.      2 Katherine's one here at some points and the otherone other times.  Has been more this week.  In example, helder states that she will tell the patient something and then later on the patient will tell her that the other Katherine tolder her.      Daughter and sister infromed this nurse that patient had a pretty bad day yesterday   Daughter states that at about 1600 patient became very agitated and unredirectable.  She states that patient thought that someone was coming over to fix the house and that they needed to open the door and let them in.  Tiannather states that no matter how many times the family told her that there was nothing wrong with the home and no one was coming, she persisted.  Writer explained to family that by stating no one was coming and nothing was wrong with the home they were keeping it at the forefront of her mind and they should try to redirect her to something else.  Daughter verbalized understanding.  Sister states that she knows what that is as she was the caregiver for both her mom and sister during EOL.  Writer and family discussed medications.  Writer informed daughter that she could give a 0.25mL dose of Lorazepam or she could give patient 0.5mL of Haldol when patient starts to sundown as she gives the 0.5mL of Lorazepam before bed.  Daughter afraid of overdosing.  Writer explained to her that 1mL of Lorazepam could be given every 6 hours and giving 0.25mL at 1600 and 0.5mL at 2000 is less than the 1mL in 6 hours.  Writer educated family that liquid medication gets in your system faster, but the half life is shorter.      Daughter states that patient got up at 0930 and has some scrambled

## 2024-07-01 ENCOUNTER — HOME CARE VISIT (OUTPATIENT)
Age: 89
End: 2024-07-01
Payer: MEDICARE

## 2024-07-01 VITALS
OXYGEN SATURATION: 100 % | DIASTOLIC BLOOD PRESSURE: 64 MMHG | SYSTOLIC BLOOD PRESSURE: 112 MMHG | HEART RATE: 50 BPM | TEMPERATURE: 97.8 F | RESPIRATION RATE: 16 BRPM

## 2024-07-01 PROCEDURE — G0300 HHS/HOSPICE OF LPN EA 15 MIN: HCPCS

## 2024-07-01 ASSESSMENT — ENCOUNTER SYMPTOMS: BOWEL INCONTINENCE: 1

## 2024-07-01 NOTE — HOSPICE
Patient/Caregiver/Family/Facility findings/issues during SN visit: Pt presents sitting in her recliner in the living room.  Private aide from [facility] present today.  No new concerns per pts daughter Katherine.  Pt still has a poor appetite but ate one scrambled egg and a roll for breakfast today.  CG reports they are using the Lorazepam more often for agitation.    Medication refills ordered this visit: Omeprazole-----Confirmation #: [id number]    Medications reconciled and all medications are available in the home this visit.  The following education was provided regarding medications, medication interactions, and look alike medications: Medication side effects, dosages, purposes, frequencies.  Response to teaching: Verbalized understanding. Medications are effective at this time.      Supplies by type and quantity ordered this visit include: wipes-----Order Number : [id number]    Consulted medical director/attending physician regarding: Not needed    Instructed patient/family/caregiver on 24-hour hospice availability and phone number.    Plan for next visit:  Continue end of life education and support caregiver.

## 2024-07-02 ENCOUNTER — HOME CARE VISIT (OUTPATIENT)
Age: 89
End: 2024-07-02
Payer: MEDICARE

## 2024-07-02 NOTE — HOSPICE
Patient was in her recliner and smiling with her daughter by her side. Her health has been up and down and her daughter says that her nights are chaning.

## 2024-07-03 ENCOUNTER — HOME CARE VISIT (OUTPATIENT)
Age: 89
End: 2024-07-03
Payer: MEDICARE

## 2024-07-03 VITALS
SYSTOLIC BLOOD PRESSURE: 111 MMHG | TEMPERATURE: 97.8 F | HEART RATE: 59 BPM | OXYGEN SATURATION: 99 % | DIASTOLIC BLOOD PRESSURE: 50 MMHG

## 2024-07-03 PROCEDURE — G0299 HHS/HOSPICE OF RN EA 15 MIN: HCPCS

## 2024-07-03 ASSESSMENT — ENCOUNTER SYMPTOMS: TROUBLE SWALLOWING: 1

## 2024-07-03 NOTE — HOSPICE
Patient awake and alert sitting up in chair in the living room with feet elevated reading the newspaper.  patient denies pain and discomfort.  Behavior indicators negative as well.  Patient daughter introduce's this nurse to the aide from [facility] who will be with the patient.          LBM:  This morning.      Per patient daughter, patient has been going to bed between [id number] the last few days and has been getting up at about 0830 in the morning.      More trouble with issues swallowing in the evening.  She states that when she has it, patient will ask her for some Coke so that she can burp and she does alright with it.    Daughter states that she does not have to give the patient Lorazepam at around 2539-0183.  She states when she needs it she gives 0.25mL.  She states that she will give her the 0.5mL at night still.    Daughter statest that the dementia is really showing.  She states that patient still has days with 2 Katherine's, some days there are 2 dogs and sometimes her sister Shreya is another sister.      Patient had cough while this nurse bedside.  Daughter states that sometimes she will do it at night.

## 2024-07-05 ENCOUNTER — HOME CARE VISIT (OUTPATIENT)
Age: 89
End: 2024-07-05
Payer: MEDICARE

## 2024-07-05 VITALS
HEART RATE: 60 BPM | DIASTOLIC BLOOD PRESSURE: 45 MMHG | SYSTOLIC BLOOD PRESSURE: 120 MMHG | RESPIRATION RATE: 14 BRPM | OXYGEN SATURATION: 99 % | TEMPERATURE: 97 F

## 2024-07-05 PROCEDURE — G0299 HHS/HOSPICE OF RN EA 15 MIN: HCPCS

## 2024-07-05 ASSESSMENT — ENCOUNTER SYMPTOMS
TROUBLE SWALLOWING: 1
BOWEL INCONTINENCE: 1

## 2024-07-06 NOTE — HOSPICE
Patient awake and alert sitting up in her recliner.  Patient denies any pain and discomfort.  Behavior indicators negative for pain and discomfort.          Per daughter, patient ate well for the patient yesterday.  She ate 1/2 of a small hamburger, 1/2 of a deviled egg, a little baked beans and a little watermelon and 1/2 of small slice.  For breakfast this morning she ate most of a scrambled egg and a piece of raisin bread toast.      Urine in the morning, the smell is horrific.  Daughter states she is goig to bed earlier.  She is going about 12 hours Daughter states it smells super concentrated.      Daughter states that the visiting [facility] gave the patient the best shower.  Patient states she liked it.      Daughter states that on wednesday patient stood up and walked to her walker and wanted to take the dog for a walk.  Suggest to use the bed alarm for the chair.      ..  [facility] confirmation number [id number]

## 2024-07-08 ENCOUNTER — HOME CARE VISIT (OUTPATIENT)
Age: 89
End: 2024-07-08
Payer: MEDICARE

## 2024-07-08 VITALS
TEMPERATURE: 98.3 F | SYSTOLIC BLOOD PRESSURE: 133 MMHG | DIASTOLIC BLOOD PRESSURE: 76 MMHG | OXYGEN SATURATION: 97 % | HEART RATE: 65 BPM | RESPIRATION RATE: 16 BRPM

## 2024-07-08 PROCEDURE — G0300 HHS/HOSPICE OF LPN EA 15 MIN: HCPCS

## 2024-07-08 ASSESSMENT — ENCOUNTER SYMPTOMS
BOWEL INCONTINENCE: 1
DYSPNEA ACTIVITY LEVEL: AT REST

## 2024-07-08 NOTE — HOSPICE
Patient/Caregiver/Family/Facility findings/issues during SN visit: Pt had an \"off\" day yesterday according to daughter.  Not very alert and vomited clear liquid before bed.  .  Slept through the night 12 hours and is ok today.  Pt ate a scrambled egg for breakfast and some cookies for a snack but already told Katherine she doesnt want any supper.      Medication refills ordered this visit: Not needed     Medications reconciled and all medications are available in the home this visit.  The following education was provided regarding medications, medication interactions, and look alike medications: Medication side effects, dosages, purposes, frequencies.  Response to teaching: Verbalized understanding. Medications are effective at this time.      Supplies by type and quantity ordered this visit include: Not needed    Consulted medical director/attending physician regarding: Not needed    Instructed patient/family/caregiver on 24-hour hospice availability and phone number.    Plan for next visit:  Continue end of life education and support caregiver.

## 2024-07-10 ENCOUNTER — HOME CARE VISIT (OUTPATIENT)
Age: 89
End: 2024-07-10
Payer: MEDICARE

## 2024-07-10 VITALS
OXYGEN SATURATION: 99 % | SYSTOLIC BLOOD PRESSURE: 127 MMHG | RESPIRATION RATE: 13 BRPM | TEMPERATURE: 96.8 F | HEART RATE: 60 BPM | DIASTOLIC BLOOD PRESSURE: 57 MMHG

## 2024-07-10 PROCEDURE — G0299 HHS/HOSPICE OF RN EA 15 MIN: HCPCS

## 2024-07-10 ASSESSMENT — ENCOUNTER SYMPTOMS
TROUBLE SWALLOWING: 1
BOWEL INCONTINENCE: 1

## 2024-07-13 ENCOUNTER — HOME CARE VISIT (OUTPATIENT)
Age: 89
End: 2024-07-13
Payer: MEDICARE

## 2024-07-13 VITALS
DIASTOLIC BLOOD PRESSURE: 44 MMHG | OXYGEN SATURATION: 99 % | RESPIRATION RATE: 13 BRPM | TEMPERATURE: 98.1 F | HEART RATE: 56 BPM | SYSTOLIC BLOOD PRESSURE: 120 MMHG

## 2024-07-13 PROCEDURE — G0299 HHS/HOSPICE OF RN EA 15 MIN: HCPCS

## 2024-07-13 ASSESSMENT — ENCOUNTER SYMPTOMS
STOOL DESCRIPTION: SMEARS IN UNDERWEAR
BOWEL INCONTINENCE: 1
TROUBLE SWALLOWING: 1

## 2024-07-13 NOTE — HOSPICE
Per daughter patient only ate 1/2 of a scrambled egg; she sttes that she also had trouble getting it on the fork.  She states patient declined help.  She also sttes that patient only drank 1/2 cup of coffee and a few swallows of juice.      She states that patient asked to go to t bed to take a nap yesterday, she states that she is asking more and more.  Daughter states that she asks patient if she wants to stay in the bed, but patient will refuse.   Per daughter, patient is sleeping \"more and more and more.\"  Daughter states that she hates to see her mother so confused, she states that she is using he lorazepam to calm her down.  Daugher states that patient will ask questions about safety, and then the patient told her this morning that she thought her daughter wasn't here.      She sttes that yesterday patient thought they were at the beach and she thought that the house was going to flood.  Daughter states that yesterday patient thought that there were other people here as well.      Per daughter patient hasn't had a BM in at least 3 days.  She states that patient had a stool softener yesterday and today.  Writer explained that if the patient isn't eating much, that she will not put much out.    Daughter states that pateint looked miserable.  She states that patient wanted to get up and walk, she tried to let her walk but patient could not take a step.      Patent arouses while this nurse present, patient confused and mumbles.      Patient BLE edematous, writer removed slippers for comfort.      Daughter states that patient tells her every night, \"thank you for what you do for me.\"      Supplies:  None needed  Meds: None needed

## 2024-07-15 ENCOUNTER — HOME CARE VISIT (OUTPATIENT)
Age: 89
End: 2024-07-15
Payer: MEDICARE

## 2024-07-15 VITALS
HEART RATE: 64 BPM | OXYGEN SATURATION: 98 % | SYSTOLIC BLOOD PRESSURE: 115 MMHG | RESPIRATION RATE: 14 BRPM | DIASTOLIC BLOOD PRESSURE: 62 MMHG | TEMPERATURE: 97.6 F

## 2024-07-15 PROCEDURE — G0300 HHS/HOSPICE OF LPN EA 15 MIN: HCPCS

## 2024-07-15 ASSESSMENT — ENCOUNTER SYMPTOMS: BOWEL INCONTINENCE: 1

## 2024-07-15 NOTE — HOSPICE
Patient/Caregiver/Family/Facility findings/issues during SN visit:  Pt is sleeping about 18 hours in a 24 hour period, appetite has decreased slightly.  Pt ate half an egg and half an english muffin for breakfast.  CG reports increased confusion.  Pt is having conversations with her mother.  Pt is worried about children that aren't there.      Medication refills ordered this visit:  Not this visit.    Medications reconciled and all medications are available in the home this visit.  The following education was provided regarding medications, medication interactions, and look alike medications: Medication side effects, dosages, purposes, frequencies.  Response to teaching: Verbalized understanding. Medications are effective at this time.      Supplies by type and quantity ordered this visit include: Not needed    Consulted medical director/attending physician regarding: Not needed    Instructed patient/family/caregiver on 24-hour hospice availability and phone number.    Plan for next visit:  Continue end of life education and support caregiver.

## 2024-07-17 ENCOUNTER — HOME CARE VISIT (OUTPATIENT)
Age: 89
End: 2024-07-17
Payer: MEDICARE

## 2024-07-17 PROCEDURE — G0300 HHS/HOSPICE OF LPN EA 15 MIN: HCPCS

## 2024-07-19 ENCOUNTER — HOME CARE VISIT (OUTPATIENT)
Age: 89
End: 2024-07-19
Payer: MEDICARE

## 2024-07-19 VITALS
DIASTOLIC BLOOD PRESSURE: 58 MMHG | HEART RATE: 58 BPM | RESPIRATION RATE: 18 BRPM | TEMPERATURE: 97.9 F | OXYGEN SATURATION: 100 % | SYSTOLIC BLOOD PRESSURE: 113 MMHG

## 2024-07-19 ASSESSMENT — ENCOUNTER SYMPTOMS: BOWEL INCONTINENCE: 1

## 2024-07-19 NOTE — HOSPICE
Patient/Caregiver/Family/Facility findings/issues during SN visit:  Pt slept through most of the visit.  CG states the pt wanted to get up and put clothes on today so private aide got her dressed and the pt ate one egg, one roll and one piece of sausage which is more than her usual.  Pt stated she heard a lot of people talking in the yard in the middle of the night to CG.  Pt then slept in her recliner until this nurses arrival 4 hours later.      Medication refills ordered this visit: Sertraline---- Order #[id number]    Medications reconciled and all medications are available in the home this visit.  The following education was provided regarding medications, medication interactions, and look alike medications: Medication side effects, dosages, purposes, frequencies.  Response to teaching: Verbalized understanding. Medications are effective at this time.      Supplies by type and quantity ordered this visit include: Not needed this visit    Consulted medical director/attending physician regarding: Not needed this visit    Instructed patient/family/caregiver on 24-hour hospice availability and phone number.    Plan for next visit:  Continue end of life education and support caregiver.

## 2024-07-20 ENCOUNTER — HOME CARE VISIT (OUTPATIENT)
Age: 89
End: 2024-07-20
Payer: MEDICARE

## 2024-07-20 VITALS
SYSTOLIC BLOOD PRESSURE: 124 MMHG | DIASTOLIC BLOOD PRESSURE: 77 MMHG | RESPIRATION RATE: 16 BRPM | HEART RATE: 59 BPM | TEMPERATURE: 98.2 F

## 2024-07-20 PROCEDURE — G0299 HHS/HOSPICE OF RN EA 15 MIN: HCPCS

## 2024-07-22 ENCOUNTER — HOME CARE VISIT (OUTPATIENT)
Age: 89
End: 2024-07-22
Payer: MEDICARE

## 2024-07-22 PROCEDURE — G0300 HHS/HOSPICE OF LPN EA 15 MIN: HCPCS

## 2024-07-23 VITALS
SYSTOLIC BLOOD PRESSURE: 125 MMHG | HEART RATE: 55 BPM | OXYGEN SATURATION: 97 % | TEMPERATURE: 97 F | RESPIRATION RATE: 16 BRPM | DIASTOLIC BLOOD PRESSURE: 65 MMHG

## 2024-07-23 ASSESSMENT — ENCOUNTER SYMPTOMS: BOWEL INCONTINENCE: 1

## 2024-07-23 NOTE — HOSPICE
Patient/Caregiver/Family/Facility findings/issues during SN visit:  Pt has three dime sized red non blanchable areas on her spine mid back.  Advised CG on frequent repositioning to avoid pressure injury.    Medication refills ordered this visit: Carvedilol Order # [id number]    Medications reconciled and all medications are available in the home this visit.  The following education was provided regarding medications, medication interactions, and look alike medications: Medication side effects, dosages, purposes, frequencies.  Response to teaching: Verbalized understanding. Medications are effective at this time.      Supplies by type and quantity ordered this visit include: Not needed today    Consulted medical director/attending physician regarding: Discontinuing Lasix but keeping on as PRN for swelling or discomfort    Instructed patient/family/caregiver on 24-hour hospice availability and phone number.    Plan for next visit:  Continue end of life education and support caregiver.

## 2024-07-24 ENCOUNTER — HOME CARE VISIT (OUTPATIENT)
Age: 89
End: 2024-07-24
Payer: MEDICARE

## 2024-07-24 PROCEDURE — G0300 HHS/HOSPICE OF LPN EA 15 MIN: HCPCS

## 2024-07-25 VITALS
OXYGEN SATURATION: 100 % | SYSTOLIC BLOOD PRESSURE: 117 MMHG | TEMPERATURE: 98.1 F | HEART RATE: 65 BPM | DIASTOLIC BLOOD PRESSURE: 57 MMHG | RESPIRATION RATE: 16 BRPM

## 2024-07-25 ASSESSMENT — ENCOUNTER SYMPTOMS: BOWEL INCONTINENCE: 1

## 2024-07-25 NOTE — HOSPICE
Patient/Caregiver/Family/Facility findings/issues during SN visit: Pt presents sitting in her recliner.  CG reports new pressure injury to pt left heel.  Small non blanchable circular stage one to mid back along spine.   Picture taken and wedges ordered for positioning and prevention.  CG verbalized understanding of frequent repositioning and use of wedges.  Pt appetite remains poor and she is eating one meal a day at breakfast which is one egg that she will share with the family pet and one piece of toast.  Pt is sleeping more off and on throughout the day and about 12 hours at night.    Pt slept through most of the visit.      Medication refills ordered this visit: Not needed    Medications reconciled and all medications are available in the home this visit.  The following education was provided regarding medications, medication interactions, and look alike medications: Medication side effects, dosages, purposes, frequencies.  Response to teaching: Verbalized understanding. Medications are effective at this time.      Supplies by type and quantity ordered this visit include: wedge----Order Number : [id number]    Consulted medical director/attending physician regarding: Not needed    Instructed patient/family/caregiver on 24-hour hospice availability and phone number.    Plan for next visit:  Continue end of life education and support caregiver.

## 2024-07-26 ENCOUNTER — HOME CARE VISIT (OUTPATIENT)
Age: 89
End: 2024-07-26
Payer: MEDICARE

## 2024-07-26 VITALS
HEART RATE: 61 BPM | RESPIRATION RATE: 16 BRPM | TEMPERATURE: 97.8 F | SYSTOLIC BLOOD PRESSURE: 120 MMHG | DIASTOLIC BLOOD PRESSURE: 68 MMHG | OXYGEN SATURATION: 100 %

## 2024-07-26 PROCEDURE — G0300 HHS/HOSPICE OF LPN EA 15 MIN: HCPCS

## 2024-07-26 ASSESSMENT — ENCOUNTER SYMPTOMS: BOWEL INCONTINENCE: 1

## 2024-07-26 NOTE — HOSPICE
Patient/Caregiver/Family/Facility findings/issues during SN visit:  No new concerns per CG.  Pt presents siting up in her recliner.  Edema tobilateral lower legs has lessened.  Appetite remains poor and pt is sleepin more on and off throughout the day.  Last BM yesterday.       Medication refills ordered this visit: Not needed this visit    Medications reconciled and all medications are available in the home this visit.  The following education was provided regarding medications, medication interactions, and look alike medications: Medication side effects, dosages, purposes, frequencies.  Response to teaching: Verbalized understanding. Medications are effective at this time.      Supplies by type and quantity ordered this visit include: Not needed this visit    Consulted medical director/attending physician regarding: Not needed    Instructed patient/family/caregiver on 24-hour hospice availability and phone number.    Plan for next visit:  Continue end of life education and support caregiver.
no

## 2024-07-29 ENCOUNTER — HOME CARE VISIT (OUTPATIENT)
Age: 89
End: 2024-07-29
Payer: MEDICARE

## 2024-07-29 VITALS
SYSTOLIC BLOOD PRESSURE: 137 MMHG | DIASTOLIC BLOOD PRESSURE: 59 MMHG | HEART RATE: 60 BPM | OXYGEN SATURATION: 99 % | TEMPERATURE: 97.9 F

## 2024-07-29 PROCEDURE — G0299 HHS/HOSPICE OF RN EA 15 MIN: HCPCS

## 2024-07-29 ASSESSMENT — ENCOUNTER SYMPTOMS: TROUBLE SWALLOWING: 1

## 2024-07-29 NOTE — HOSPICE
Patient awake and alert sitting up in with her feet elevated on a pillow.  Patient denies any pain.      Daughter states that the areas on the patient back have cleared up with the zinc and she is using a pillow to keep the pressure off.     Going to bed earlier and earlier.  Last night went to bed at 1900.  Daughter states that she is starting to fall asleep inthe hair    Appetite.  About the same, ate pretty well yesterday.  Daughter states they ate some heart healthy spaghetti yesterday.  Daughter states that patient ate a little bit of it.  Had a scrambled egg and a piece of toast for breakfast today.    Drinking:  Daughter shakes her head and states very very little.      Daughter states that last night before bed, the patient \"barely got the BSC pot wet\".  Daughter informs this nurse that they stopped the Lasix last week.  Hasnot had any increase.     Dementia getting worse, some days it is all day and some days it will start about [id number].      Daughter states that it has been a while since she has had any choking episodes.    Per daughter, patient is talking less and less.      Per daughter, last night patient thought someone was going to take her away.  Daughter states that she reassured her telling her this was her house and while she was here, no one was going to take her away.    Per daughter, patient had a short nap prior to this nurse's visit.  When asked if she is ready for a nap during visit, patient shook her head \"yes\".      Medications:  None needed per daughter.   Supplies:  None needed per daughter.  Bring purple booties for heel protection.

## 2024-07-30 ASSESSMENT — ENCOUNTER SYMPTOMS: BOWEL INCONTINENCE: 1

## 2024-07-30 NOTE — HOSPICE
Patient/Caregiver/Family findings/issues during SN visit:  decreased appetite today, 3/4 1 scrambled egg, sips of juice and coffee, lept all day thursday, confused Friday with little sleep insisting she could walk on her own    Medication refills ordered this visit: none    Medications reconciled and all medications are available in the home this visit.  The following education was provided regarding medications, medication interactions, and look alike medications.  Response to teaching: verbalized understanding. Medications are effective at this time.      Supplies by type and quantity ordered this visit include: none    Consulted medical director/attending physician regarding: N/A    Instructed patient/family/caregiver on 24-hour hospice availability and phone number.    Plan for next visit:  monitor nutritional intake

## 2024-07-31 ENCOUNTER — HOME CARE VISIT (OUTPATIENT)
Age: 89
End: 2024-07-31
Payer: MEDICARE

## 2024-07-31 VITALS
OXYGEN SATURATION: 99 % | SYSTOLIC BLOOD PRESSURE: 125 MMHG | HEART RATE: 59 BPM | TEMPERATURE: 97.4 F | RESPIRATION RATE: 14 BRPM | DIASTOLIC BLOOD PRESSURE: 62 MMHG

## 2024-07-31 PROCEDURE — G0299 HHS/HOSPICE OF RN EA 15 MIN: HCPCS

## 2024-07-31 ASSESSMENT — ENCOUNTER SYMPTOMS: TROUBLE SWALLOWING: 1

## 2024-07-31 NOTE — HOSPICE
Patient awake and alert sitting up in arm chair with oxygen in place via nasal canula eating a snack.  Patient denies pain and discomfort.  Patient dressed in her clothes today.      Patient had a pudding, ate 2 cookies and 1/2 of an Ensure.  For breakfast she had 3/4 egg and a piece of toast and a few swallows of coffee and juice.    Per daughter patient got up from her chair and sat on the couch next to her chair as the patient fell on the couch as she was looking for her daughter who had left the room for \"too long\"    In the mroning when patient wakes up, she does not call the daughter any longer.  Daughter states that she has to continually has to check on her.      Patient lower extremities edematous.  Suggested to daughter to cut sock band to prevent it from digging in her legs.        Medication needs: Omeprazole and Zoloft      Lane confirmation [id number]

## 2024-08-02 ENCOUNTER — HOME CARE VISIT (OUTPATIENT)
Age: 89
End: 2024-08-02
Payer: MEDICARE

## 2024-08-02 VITALS
RESPIRATION RATE: 15 BRPM | HEART RATE: 57 BPM | TEMPERATURE: 97.8 F | OXYGEN SATURATION: 99 % | DIASTOLIC BLOOD PRESSURE: 59 MMHG | SYSTOLIC BLOOD PRESSURE: 132 MMHG

## 2024-08-02 PROCEDURE — G0299 HHS/HOSPICE OF RN EA 15 MIN: HCPCS

## 2024-08-02 NOTE — HOSPICE
Patient awake and alert sitting up in her recliner with oxygen in place via nasal canula.  Patient denies any pain and discomfort.  Behavior indicators negative as well.  Per daughter she just got out of the bathroom.  LBM was yesterday.      Baby pancakes, 6 of them with jelly on them.    Left outer heel blister is dried out and hard to touch.  Educated daughter about not peeling the tough part off and to continue to elevate her feet.    Daughter states that patient most times has dereased urine output, but sometimes she will have a little more.  She states that patient has not been drinking as much.      Patient skin is is cool to tough and she has mottling on her bilateral feet.  Cap reill >3 seconds.      Patient gets sleepy during visit and just stares off into space.  Daughter states that patient has already taken a nap this morning.          Medications:  Have not been delivered yet  Supplies:  None needed

## 2024-08-05 ENCOUNTER — HOME CARE VISIT (OUTPATIENT)
Age: 89
End: 2024-08-05
Payer: MEDICARE

## 2024-08-05 VITALS
SYSTOLIC BLOOD PRESSURE: 113 MMHG | HEART RATE: 61 BPM | OXYGEN SATURATION: 99 % | TEMPERATURE: 97.9 F | DIASTOLIC BLOOD PRESSURE: 52 MMHG

## 2024-08-05 PROCEDURE — G0299 HHS/HOSPICE OF RN EA 15 MIN: HCPCS

## 2024-08-05 ASSESSMENT — ENCOUNTER SYMPTOMS
SPUTUM AMOUNT: SCANT
COUGH CHARACTERISTICS: PRODUCTIVE
SPUTUM PRODUCTION: 1
TROUBLE SWALLOWING: 1
COUGH: 1

## 2024-08-05 NOTE — HOSPICE
Patient awake and alert sitting in her recliner with feet elevated and oxygen in place via nasal canula.  Writer informed daughter that patient canula was upside down, she fixed it.  Patient denies any pain and discomfort.  Behavior indicators negative as well.        Per daughter, patient sounds very congested.  Per daughter she started coughing this morning.  She states patient will intermittently cough at night.      Per daughter patient ate fairly well this weekend.  Ate a good Sunday dinner, only a little bit of eggs this morning but she had a biscuit with jelly.      Drinking, about the same, not much.      LBM:  Yesterday.    Naps for about an hour and sometimes take a little nap.  Then she wants to go to bed at 1730 and then she will wake up at 430-0500.  Daughter states that not taking the afternoon nap is different, the other night patient went to bed at 1830.  Daughter states that some of it is for safety to make sure the patient could help her.    Spoke to daughter about speaking to the NP about nebulizer treatments.      Stacey confirmation # [id number]

## 2024-08-07 ENCOUNTER — HOME CARE VISIT (OUTPATIENT)
Age: 89
End: 2024-08-07
Payer: MEDICARE

## 2024-08-07 VITALS
OXYGEN SATURATION: 99 % | DIASTOLIC BLOOD PRESSURE: 56 MMHG | RESPIRATION RATE: 16 BRPM | HEART RATE: 56 BPM | TEMPERATURE: 97.6 F | SYSTOLIC BLOOD PRESSURE: 105 MMHG

## 2024-08-07 PROCEDURE — G0300 HHS/HOSPICE OF LPN EA 15 MIN: HCPCS

## 2024-08-07 ASSESSMENT — ENCOUNTER SYMPTOMS
SPUTUM PRODUCTION: 1
COUGH: 1
COUGH CHARACTERISTICS: PRODUCTIVE
SPUTUM AMOUNT: SCANT

## 2024-08-07 NOTE — HOSPICE
Patient/Caregiver/Family/Facility findings/issues during SN visit:  Pt is having increased urination overnight.  Pt is sleeping 12 hours at night and around 6 hours throughout the day. Eating small breakfast but just picking at other meals.  Fluids are difficult to get in according to CG.    Medication refills ordered this visit: Not needed    Medications reconciled and all medications are available in the home this visit.  The following education was provided regarding medications, medication interactions, and look alike medications: Medication side effects, dosages, purposes, frequencies.  Response to teaching: Verbalized understanding. Medications are effective at this time.      Supplies by type and quantity ordered this visit include: Not needed    Consulted medical director/attending physician regarding: Not needed    Instructed patient/family/caregiver on 24-hour hospice availability and phone number.    Plan for next visit:  Continue end of life education and support caregiver.

## 2024-08-09 ENCOUNTER — HOME CARE VISIT (OUTPATIENT)
Age: 89
End: 2024-08-09
Payer: MEDICARE

## 2024-08-09 VITALS
OXYGEN SATURATION: 100 % | HEART RATE: 59 BPM | TEMPERATURE: 97.8 F | DIASTOLIC BLOOD PRESSURE: 55 MMHG | SYSTOLIC BLOOD PRESSURE: 105 MMHG | RESPIRATION RATE: 16 BRPM

## 2024-08-09 PROCEDURE — G0300 HHS/HOSPICE OF LPN EA 15 MIN: HCPCS

## 2024-08-09 ASSESSMENT — ENCOUNTER SYMPTOMS
COUGH: 1
SPUTUM PRODUCTION: 1
SPUTUM CONSISTENCY: THIN
SPUTUM AMOUNT: SCANT
COUGH CHARACTERISTICS: PRODUCTIVE

## 2024-08-09 NOTE — HOSPICE
Patient/Caregiver/Family/Facility findings/issues during SN visit:  Pt slepyt 14 hours last night.  Woke up at 1030 and was asleep again by the time this nurse arrived at 1150.       Medication refills ordered this visit: Not needed    Medications reconciled and all medications are available in the home this visit.  The following education was provided regarding medications, medication interactions, and look alike medications: Medication side effects, dosages, purposes, frequencies.  Response to teaching: Verbalized understanding. Medications are effective at this time.      Supplies by type and quantity ordered this visit include: Not needed    Consulted medical director/attending physician regarding: Not needed this visit    Instructed patient/family/caregiver on 24-hour hospice availability and phone number.    Plan for next visit:  Continue end of life education and support caregiver.

## 2024-08-12 ENCOUNTER — HOME CARE VISIT (OUTPATIENT)
Age: 89
End: 2024-08-12
Payer: MEDICARE

## 2024-08-12 PROCEDURE — G0300 HHS/HOSPICE OF LPN EA 15 MIN: HCPCS

## 2024-08-13 VITALS
DIASTOLIC BLOOD PRESSURE: 70 MMHG | RESPIRATION RATE: 16 BRPM | SYSTOLIC BLOOD PRESSURE: 117 MMHG | OXYGEN SATURATION: 99 % | TEMPERATURE: 97.6 F | HEART RATE: 63 BPM

## 2024-08-13 NOTE — HOSPICE
Patient/Caregiver/Family/Facility findings/issues during SN visit:  Pt presents sitting in her recliner with eyes closed.  NO signs of distress or discomfort.  Pt appetite is decreased.  Ate only half an egg today with 3/4 a piece of toast.  Last BM yesterday.    Medication refills ordered this visit: Lorazepam----Order #[id number]    Medications reconciled and all medications are available in the home this visit.  The following education was provided regarding medications, medication interactions, and look alike medications: Medication side effects, dosages, purposes, frequencies.  Response to teaching: Verbalized understanding. Medications are effective at this time.      Supplies by type and quantity ordered this visit include: Not needed    Consulted medical director/attending physician regarding: Not needed    Instructed patient/family/caregiver on 24-hour hospice availability and phone number.    Plan for next visit:  Continue end of life education and support caregiver.

## 2024-08-14 ENCOUNTER — HOME CARE VISIT (OUTPATIENT)
Age: 89
End: 2024-08-14
Payer: MEDICARE

## 2024-08-14 VITALS
TEMPERATURE: 97.2 F | OXYGEN SATURATION: 95 % | RESPIRATION RATE: 16 BRPM | SYSTOLIC BLOOD PRESSURE: 112 MMHG | DIASTOLIC BLOOD PRESSURE: 62 MMHG | HEART RATE: 62 BPM

## 2024-08-14 PROCEDURE — G0299 HHS/HOSPICE OF RN EA 15 MIN: HCPCS

## 2024-08-14 ASSESSMENT — ENCOUNTER SYMPTOMS: BOWEL INCONTINENCE: 1

## 2024-08-14 NOTE — HOSPICE
Nurse congratulated Katherine on her excellent caregiving and acknowledged the significant effort it takes. She has lived with the patient for nine years, selling her own home in order to keep her mother in hers.

## 2024-08-16 ENCOUNTER — HOME CARE VISIT (OUTPATIENT)
Age: 89
End: 2024-08-16
Payer: MEDICARE

## 2024-08-16 VITALS
SYSTOLIC BLOOD PRESSURE: 138 MMHG | TEMPERATURE: 97.3 F | DIASTOLIC BLOOD PRESSURE: 73 MMHG | HEART RATE: 73 BPM | OXYGEN SATURATION: 98 % | RESPIRATION RATE: 14 BRPM

## 2024-08-16 PROCEDURE — G0299 HHS/HOSPICE OF RN EA 15 MIN: HCPCS

## 2024-08-16 ASSESSMENT — ENCOUNTER SYMPTOMS
TROUBLE SWALLOWING: 1
COUGH CHARACTERISTICS: NON-PRODUCTIVE
SPUTUM CONSISTENCY: THIN
COUGH: 1
SPUTUM PRODUCTION: 1
SPUTUM AMOUNT: SCANT
BOWEL INCONTINENCE: 1

## 2024-08-17 NOTE — HOSPICE
Upon arrival, patient up in the bathroom with her daughter.  Daughter states that they will be out \"in a little bit\".    Daughter states that she thinks she is getting to the point that she may have to leave the patient in bed as it is becoming more difficulty.  Daughter states that patient is forgetting how to move certain body parts.  When transfered from walker to chair, patient appears contracted in seated position.  Daughter replaces patient oxygen.  Patient eyes open, but she appears to be exhaused and confused.  Daughter states her confusion is increasing daily.  She sttes that is is starting earlier and earlier in the day.  She states that she has stopped giving the early afternoon dose of Lorazepam because she doesn't want to over medicate her, but she states that she has started giving it to her again.      Writer spoke to daughter about leaving her in the bed as it will be the best thing for both as writer is afraid that patient may fall and then they could both possibly get injured.      Daughter states that she gave 0.25mL yesterday afternoon so she could go get a haircut.  She states then about 1730 she gave another    Did not eat supper last evening, that is the second time this week.  She states luzn is struggling to eat.  She states that patient is having difficulty with using a fork or spoon.  Daughter asked if it was time to start feeding her.  She states that patient is not using her left hand anylonger.      Dtr states that she is not sure that patient can tell when she is going to have a BM, she states that she is not making it to the bathroom much any longer.  She also states that patient is no longer getting the sensation to have to urinate, she states that in the morning patient will wake up with a saturated  pullup.      LBM was yesterday.  Daughter states that she had to give her a bath yesterday due to the BM.    Per daughter patient went to bed at 1800 last night.  She states patient

## 2024-08-19 ENCOUNTER — HOME CARE VISIT (OUTPATIENT)
Age: 89
End: 2024-08-19
Payer: MEDICARE

## 2024-08-19 PROCEDURE — G0300 HHS/HOSPICE OF LPN EA 15 MIN: HCPCS

## 2024-08-20 VITALS
SYSTOLIC BLOOD PRESSURE: 110 MMHG | RESPIRATION RATE: 16 BRPM | TEMPERATURE: 97.9 F | DIASTOLIC BLOOD PRESSURE: 64 MMHG | OXYGEN SATURATION: 93 %

## 2024-08-20 ASSESSMENT — ENCOUNTER SYMPTOMS: BOWEL INCONTINENCE: 1

## 2024-08-20 NOTE — HOSPICE
Patient/Caregiver/Family/Facility findings/issues during SN visit:  Pt appears to be transitioning.  Symptoms are manageded well at this time.  Vitals are WNL.  Next visit Wednesday to determine if pt needs increased visits.    Medication refills ordered this visit: Carvidilol and sertraline hyoscyamine and morphine---Order # [id number]    Medications reconciled and all medications are available in the home this visit.  The following education was provided regarding medications, medication interactions, and look alike medications: Medication side effects, dosages, purposes, frequencies.  Response to teaching: Verbalized understanding. Medications are effective at this time.      Supplies by type and quantity ordered this visit include: Medium gloves inserts---Order #[id number]    Consulted medical director/attending physician regarding: Not needed    Instructed patient/family/caregiver on 24-hour hospice availability and phone number.    Plan for next visit:  Continue end of life education and support caregiver.

## 2024-08-21 ENCOUNTER — HOME CARE VISIT (OUTPATIENT)
Age: 89
End: 2024-08-21
Payer: MEDICARE

## 2024-08-21 VITALS — SYSTOLIC BLOOD PRESSURE: 129 MMHG | OXYGEN SATURATION: 98 % | DIASTOLIC BLOOD PRESSURE: 63 MMHG | HEART RATE: 64 BPM

## 2024-08-21 PROCEDURE — G0299 HHS/HOSPICE OF RN EA 15 MIN: HCPCS

## 2024-08-21 ASSESSMENT — ENCOUNTER SYMPTOMS
BOWEL INCONTINENCE: 1
TROUBLE SWALLOWING: 1

## 2024-08-22 NOTE — HOSPICE
Patient awake and alert sitting up in her chair with oxygen in place via nasal canula.  Patient shakes her head \"no\" when asked if she has any pain.  Patient drifts to sleep quickly during visit.  Per daughter, \"this is what she is like most of the day.\" Patient appears frail and cachectic with bony prominences visible, temporal lobe wasting noted and abdominal concave.      Per daughter, she eats off and on.  Monday went to sleep at noon slept until 1800.  Got a little food \"very\" little.  sound asleep at 2030.    Waking up at about 2 hours later after going to bed and then she will fidget with her hands or grabbing on the rails.  Daughter states she just had to \"put her body parts back away\" and she will got to sleep.  Sometimes she will just look around.      Daughter states that patient no longer calls for her, just grunts. She states that if the patient grunts louder, she knows she needs to hurry.     Last night ate mac n cheese and a small slice of lemon meringue pie.  This morning barely ate 1/2 scrambled egg and a piece of toast, hardly any drink.      Daughter states that she does not go at night before bed and woke up this morning soaked.  Dtr states that she does not know when she has to have a BM.  She states that she had to clean her up on Sunday.  Daughter educated on how to perform incontience care.      Patient was able to assist with scooting herself up in the bed last night.      Daughter sattes patient tried to put her shoes on \"to go visit people\" and that she is ready to go home but cannot remember where home is.    When asked how she is doing, patient daughter informs this nurse that it was difficult at first, but after talking to her aunt and her sister in law, daughter feels that leaving the patient in her bed is the best choice for the patient.  She informs this nurse that her sister in law went through this with her mom, so she is a good person to vent to and she calls to check on the

## 2024-08-23 ENCOUNTER — HOME CARE VISIT (OUTPATIENT)
Age: 89
End: 2024-08-23
Payer: MEDICARE

## 2024-08-23 VITALS
TEMPERATURE: 98.3 F | RESPIRATION RATE: 20 BRPM | DIASTOLIC BLOOD PRESSURE: 73 MMHG | SYSTOLIC BLOOD PRESSURE: 123 MMHG | OXYGEN SATURATION: 97 % | HEART RATE: 80 BPM

## 2024-08-23 PROCEDURE — G0300 HHS/HOSPICE OF LPN EA 15 MIN: HCPCS

## 2024-08-23 ASSESSMENT — ENCOUNTER SYMPTOMS
BOWEL INCONTINENCE: 1
DYSPNEA ACTIVITY LEVEL: AT REST

## 2024-08-23 NOTE — HOSPICE
Patient/Caregiver/Family/Facility findings/issues during SN visit:  Pt presents lying in bed watching TV.  Pt noted to be SOB and almost panting.  O2 Sat at 97%.  Pt CG administers Morphine and pt noticably more comfortable by the time this nurse leaves.  Pt denies pain when asked and only states she is worried.  Pt can't express why she is worried and seems to forget that she stated she was worried.  Vitals WNL.  Education provided to daughter on what to expect at EOL.  Pt will remain on 3Xweekly visits at this time.    Medication refills ordered this visit: Not needed this visit    Medications reconciled and all medications are available in the home this visit.  The following education was provided regarding medications, medication interactions, and look alike medications: Medication side effects, dosages, purposes, frequencies.  Response to teaching: Verbalized understanding. Medications are effective at this time.      Supplies by type and quantity ordered this visit include: Not needed    Consulted medical director/attending physician regarding:Not needed this visit    Instructed patient/family/caregiver on 24-hour hospice availability and phone number.    Plan for next visit:  Continue end of life education and support caregiver.

## 2024-08-24 ENCOUNTER — HOME CARE VISIT (OUTPATIENT)
Age: 89
End: 2024-08-24
Payer: MEDICARE

## 2024-08-26 ENCOUNTER — HOME CARE VISIT (OUTPATIENT)
Age: 89
End: 2024-08-26
Payer: MEDICARE

## 2024-08-26 VITALS
TEMPERATURE: 97.3 F | HEART RATE: 72 BPM | DIASTOLIC BLOOD PRESSURE: 66 MMHG | OXYGEN SATURATION: 100 % | RESPIRATION RATE: 18 BRPM | SYSTOLIC BLOOD PRESSURE: 117 MMHG

## 2024-08-26 PROCEDURE — G0300 HHS/HOSPICE OF LPN EA 15 MIN: HCPCS

## 2024-08-26 ASSESSMENT — ENCOUNTER SYMPTOMS
DYSPNEA ACTIVITY LEVEL: AT REST
BOWEL INCONTINENCE: 1

## 2024-08-26 NOTE — HOSPICE
Patient/Caregiver/Family/Facility findings/issues during SN visit:  Pt slept through the entire day Saturday and refused to eat.  Yesterday pt ate 2 bites of scrambled eggs and grits.  Pt ate two bites of peas and mashed potatoes.  Pt more alert today than she has been but is very quiet and CG states she isnt talking much at all.  Vitals WNL this visit.  Pt winces when being moved but is unable to describe the pain.      Medication refills ordered this visit: Not needed    Medications reconciled and all medications are available in the home this visit.  The following education was provided regarding medications, medication interactions, and look alike medications: Medication side effects, dosages, purposes, frequencies.  Response to teaching: Verbalized understanding. Medications are effective at this time.      Supplies by type and quantity ordered this visit include: Glycerin Swabs to be delivered on Wednesday    Consulted medical director/attending physician regarding: Not needed.    Instructed patient/family/caregiver on 24-hour hospice availability and phone number.    Plan for next visit:  Continue end of life education and support caregiver.

## 2024-08-28 ENCOUNTER — HOME CARE VISIT (OUTPATIENT)
Age: 89
End: 2024-08-28
Payer: MEDICARE

## 2024-08-28 VITALS
OXYGEN SATURATION: 99 % | DIASTOLIC BLOOD PRESSURE: 58 MMHG | TEMPERATURE: 97.4 F | HEART RATE: 88 BPM | SYSTOLIC BLOOD PRESSURE: 105 MMHG

## 2024-08-28 PROCEDURE — G0299 HHS/HOSPICE OF RN EA 15 MIN: HCPCS

## 2024-08-29 ENCOUNTER — HOME CARE VISIT (OUTPATIENT)
Age: 89
End: 2024-08-29
Payer: MEDICARE

## 2024-08-29 VITALS
SYSTOLIC BLOOD PRESSURE: 113 MMHG | RESPIRATION RATE: 18 BRPM | OXYGEN SATURATION: 99 % | HEART RATE: 92 BPM | DIASTOLIC BLOOD PRESSURE: 53 MMHG | TEMPERATURE: 97.1 F

## 2024-08-29 PROCEDURE — G0299 HHS/HOSPICE OF RN EA 15 MIN: HCPCS

## 2024-08-29 ASSESSMENT — ENCOUNTER SYMPTOMS
TROUBLE SWALLOWING: 1
BOWEL INCONTINENCE: 1
BOWEL INCONTINENCE: 1
DYSPNEA ACTIVITY LEVEL: AT REST

## 2024-08-29 NOTE — HOSPICE
Patient resting in bed in no apparent distress in semi fowlers with oxygen in place via nasal canula.  Patient behavior indicators negative for pain and  discomet.  Daughter denies any pain except with rolling.  Patient visibly losing weight, rib and clavicles are grossly visible.      Not eaten at all, forgotten how to drink from a straw.  She sometimes forgets to swallow.    Daughter states that patient arms were swollen and she tried to put it on pillows but patient would push them away.  Patient upper extremities not swollen this visit.      Daughter states that she wonders what patient is thinking at times.  She states that one time the patient cried when she and the dog tried to leave so she stayed with the patient.      Daughter became tearful during visit.  She states that she wants her mom to be comfortable, and she is afraid that she is going to hurt her mom caring for her and that she wonders if she is thinking of herself wanting her mom to go and not be just laying      Talked to daughter about daily visit for support.  Daughter agreed after she stated several times that she didn't want to burden the Hospice team.  She tells this nurse that earlier in the process she and her mom talked and patient told her she did not want to leave her alone.  Writer discussed with daughter about giving the patient permission to go and suggested that she tell her aunt and her nephew as well.

## 2024-08-30 ENCOUNTER — HOME CARE VISIT (OUTPATIENT)
Age: 89
End: 2024-08-30
Payer: MEDICARE

## 2024-08-30 PROCEDURE — G0300 HHS/HOSPICE OF LPN EA 15 MIN: HCPCS

## 2024-08-30 NOTE — HOSPICE
Patient resting in bed in no apparent distress with oxygen in place via nasal canula.  Patient behavior indicators negative for pain and discomfort.  Per daughter, she gave the patient a dose of Morphine prior to bathing her at 1000 as suggested.  She also states that she cut her shirt up the back.  She states that that was much easier for both the patient and the family.  She states that the patient has a bruise on her bottom.  Daughter tells this nurse that patient tried to speak a little today, but she could barely get the words out.    Daughter informs this nurse that she spoke to her aunt about what was discussed yesterday.  She states her aunt is still standoff niki about it, but she is getting there.  Writer suggested to have her aunt read the little blue book as it may assist her.  Daughter states that that is a good idea.      She informs this nurse about patient before she got sick     Writer assessed patient skin.  Patient has a Kirill ulcer on her sacral area.  Writer placed a foam dressing over area to protect it.  Writer showed daughter that her skin is thin and her bone is close to the surface of the skin.  Writer educated her that she was not doing anything wrong, that is a byproduct of not eating and feeding the largest organ.

## 2024-08-31 ENCOUNTER — HOME CARE VISIT (OUTPATIENT)
Age: 89
End: 2024-08-31
Payer: MEDICARE

## 2024-08-31 VITALS
HEART RATE: 71 BPM | RESPIRATION RATE: 18 BRPM | TEMPERATURE: 97.6 F | SYSTOLIC BLOOD PRESSURE: 110 MMHG | DIASTOLIC BLOOD PRESSURE: 67 MMHG

## 2024-08-31 VITALS
DIASTOLIC BLOOD PRESSURE: 46 MMHG | TEMPERATURE: 97.2 F | RESPIRATION RATE: 10 BRPM | SYSTOLIC BLOOD PRESSURE: 105 MMHG | HEART RATE: 69 BPM

## 2024-08-31 PROCEDURE — G0300 HHS/HOSPICE OF LPN EA 15 MIN: HCPCS

## 2024-08-31 ASSESSMENT — ENCOUNTER SYMPTOMS
BOWEL INCONTINENCE: 1
DYSPNEA ACTIVITY LEVEL: AT REST
BOWEL INCONTINENCE: 1

## 2024-08-31 NOTE — HOSPICE
Patient/Caregiver/Family/Facility findings/issues during SN visit: Pt preents sitting up in bed with eyes open.  Pt will respond to voice with nods of her head but does not speak.  Vitals WNL.  Pt is drigfting in and out of sleep throughout visit.  dAILY VISITS TO CONTINUE    Medication refills ordered this visit: Not needed this visit    Medications reconciled and all medications are available in the home this visit.  The following education was provided regarding medications, medication interactions, and look alike medications: Medication side effects, dosages, purposes, frequencies.  Response to teaching: Verbalized understanding. Medications are effective at this time.      Supplies by type and quantity ordered this visit include:  Not needed this vsiit    Consulted medical director/attending physician regarding: Not needed this visit    Instructed patient/family/caregiver on 24-hour hospice availability and phone number.    Plan for next visit:  Continue end of life education and support caregiver.

## 2024-12-05 NOTE — PROGRESS NOTES
Per orders oxygen testing completed: 
O2 sat at rest 94% on room air. O2 sat ambulatory 96% on room air.  notified. Detail Level: Detailed Depth Of Biopsy: dermis Was A Bandage Applied: Yes Size Of Lesion In Cm: 0.4 X Size Of Lesion In Cm: 0 Biopsy Type: H and E Anesthesia Type: 1% lidocaine with epinephrine Anesthesia Volume In Cc: 0.5 Hemostasis: Drysol and Electrocautery Wound Care: Bacitracin Dressing: bandage Destruction After The Procedure: No Type Of Destruction Used: Curettage Curettage Text: The wound bed was treated with curettage after the biopsy was performed. Cryotherapy Text: The wound bed was treated with cryotherapy after the biopsy was performed. Electrodesiccation Text: The wound bed was treated with electrodesiccation after the biopsy was performed. Electrodesiccation And Curettage Text: The wound bed was treated with electrodesiccation and curettage after the biopsy was performed. Silver Nitrate Text: The wound bed was treated with silver nitrate after the biopsy was performed. Lab: [id number] Lab Facility: [id number] Consent: Written consent was obtained and risks were reviewed including but not limited to scarring, infection, bleeding, scabbing, incomplete removal, nerve damage and allergy to anesthesia. Post-Care Instructions: I reviewed with the patient in detail post-care instructions. Patient is to keep the biopsy site dry overnight, and then apply bacitracin twice daily until healed. Patient may apply hydrogen peroxide soaks to remove any crusting. Notification Instructions: Patient will be notified of biopsy results. However, patient instructed to call the office if not contacted within 2 weeks. Billing Type: Third-Party Bill Information: Selecting Yes will display possible errors in your note based on the variables you have selected. This validation is only offered as a suggestion for you. PLEASE NOTE THAT THE VALIDATION TEXT WILL BE REMOVED WHEN YOU FINALIZE YOUR NOTE. IF YOU WANT TO FAX A PRELIMINARY NOTE YOU WILL NEED TO TOGGLE THIS TO 'NO' IF YOU DO NOT WANT IT IN YOUR FAXED NOTE.
